# Patient Record
Sex: FEMALE | Race: WHITE | Employment: UNEMPLOYED | URBAN - METROPOLITAN AREA
[De-identification: names, ages, dates, MRNs, and addresses within clinical notes are randomized per-mention and may not be internally consistent; named-entity substitution may affect disease eponyms.]

---

## 2020-09-21 ENCOUNTER — APPOINTMENT (OUTPATIENT)
Dept: GENERAL RADIOLOGY | Age: 65
DRG: 140 | End: 2020-09-21
Attending: EMERGENCY MEDICINE
Payer: MEDICAID

## 2020-09-21 ENCOUNTER — HOSPITAL ENCOUNTER (INPATIENT)
Age: 65
LOS: 2 days | Discharge: HOME HEALTH | DRG: 140 | End: 2020-09-24
Attending: EMERGENCY MEDICINE | Admitting: HOSPITALIST
Payer: MEDICAID

## 2020-09-21 DIAGNOSIS — J96.01 ACUTE RESPIRATORY FAILURE WITH HYPOXIA (HCC): Primary | ICD-10-CM

## 2020-09-21 LAB
ALBUMIN SERPL-MCNC: 3.2 G/DL (ref 3.5–5)
ALBUMIN/GLOB SERPL: 0.8 {RATIO} (ref 1.1–2.2)
ALP SERPL-CCNC: 91 U/L (ref 45–117)
ALT SERPL-CCNC: 28 U/L (ref 12–78)
ANION GAP SERPL CALC-SCNC: 12 MMOL/L (ref 5–15)
AST SERPL W P-5'-P-CCNC: 37 U/L (ref 15–37)
BASOPHILS # BLD: 0 K/UL (ref 0–0.1)
BASOPHILS NFR BLD: 0 % (ref 0–1)
BILIRUB SERPL-MCNC: 0.4 MG/DL (ref 0.2–1)
BUN SERPL-MCNC: 6 MG/DL (ref 6–20)
BUN/CREAT SERPL: 11 (ref 12–20)
CA-I BLD-MCNC: 8.1 MG/DL (ref 8.5–10.1)
CHLORIDE SERPL-SCNC: 87 MMOL/L (ref 97–108)
CK SERPL-CCNC: 215 NG/ML (ref 26–192)
CO2 SERPL-SCNC: 25 MMOL/L (ref 21–32)
CREAT SERPL-MCNC: 0.57 MG/DL (ref 0.55–1.02)
DIFFERENTIAL METHOD BLD: ABNORMAL
EOSINOPHIL # BLD: 0 K/UL (ref 0–0.4)
EOSINOPHIL NFR BLD: 0 % (ref 0–7)
ERYTHROCYTE [DISTWIDTH] IN BLOOD BY AUTOMATED COUNT: 12.1 % (ref 11.5–14.5)
GLOBULIN SER CALC-MCNC: 3.8 G/DL (ref 2–4)
GLUCOSE SERPL-MCNC: 100 MG/DL (ref 65–100)
HCT VFR BLD AUTO: 41.1 % (ref 35–47)
HGB BLD-MCNC: 15 % (ref 11.5–16)
IMM GRANULOCYTES # BLD AUTO: 0 K/UL (ref 0–0.04)
IMM GRANULOCYTES NFR BLD AUTO: 1 % (ref 0–0.5)
LYMPHOCYTES # BLD: 0.4 K/UL (ref 0.8–3.5)
LYMPHOCYTES NFR BLD: 5 % (ref 12–49)
MAGNESIUM SERPL-MCNC: 1.4 MG/DL (ref 1.6–2.4)
MCH RBC QN AUTO: 38.1 PG (ref 26–34)
MCHC RBC AUTO-ENTMCNC: 36.5 G/DL (ref 30–36.5)
MCV RBC AUTO: 104.3 FL (ref 80–99)
MONOCYTES # BLD: 0.9 K/UL (ref 0–1)
MONOCYTES NFR BLD: 10 % (ref 5–13)
NEUTS SEG # BLD: 7 K/UL (ref 1.8–8)
NEUTS SEG NFR BLD: 84 % (ref 32–75)
PLATELET # BLD AUTO: 222 K/UL (ref 150–400)
PMV BLD AUTO: 9.8 FL (ref 8.9–12.9)
POTASSIUM SERPL-SCNC: 2.6 MMOL/L (ref 3.5–5.1)
PROT SERPL-MCNC: 7 G/DL (ref 6.4–8.2)
RBC # BLD AUTO: 3.94 M/UL (ref 3.8–5.2)
SODIUM SERPL-SCNC: 124 MMOL/L (ref 136–145)
TROPONIN I SERPL-MCNC: <0.05 NG/ML
WBC # BLD AUTO: 8.3 K/UL (ref 3.6–11)

## 2020-09-21 PROCEDURE — 80053 COMPREHEN METABOLIC PANEL: CPT

## 2020-09-21 PROCEDURE — 83735 ASSAY OF MAGNESIUM: CPT

## 2020-09-21 PROCEDURE — 36415 COLL VENOUS BLD VENIPUNCTURE: CPT

## 2020-09-21 PROCEDURE — 85025 COMPLETE CBC W/AUTO DIFF WBC: CPT

## 2020-09-21 PROCEDURE — 84484 ASSAY OF TROPONIN QUANT: CPT

## 2020-09-21 PROCEDURE — 94640 AIRWAY INHALATION TREATMENT: CPT

## 2020-09-21 PROCEDURE — 94761 N-INVAS EAR/PLS OXIMETRY MLT: CPT

## 2020-09-21 PROCEDURE — 82550 ASSAY OF CK (CPK): CPT

## 2020-09-21 PROCEDURE — 93005 ELECTROCARDIOGRAM TRACING: CPT

## 2020-09-21 PROCEDURE — 82553 CREATINE MB FRACTION: CPT

## 2020-09-21 PROCEDURE — 87635 SARS-COV-2 COVID-19 AMP PRB: CPT

## 2020-09-21 PROCEDURE — 74011250637 HC RX REV CODE- 250/637: Performed by: EMERGENCY MEDICINE

## 2020-09-21 PROCEDURE — 74011250636 HC RX REV CODE- 250/636: Performed by: EMERGENCY MEDICINE

## 2020-09-21 PROCEDURE — 99285 EMERGENCY DEPT VISIT HI MDM: CPT

## 2020-09-21 PROCEDURE — 74011250637 HC RX REV CODE- 250/637

## 2020-09-21 PROCEDURE — 71045 X-RAY EXAM CHEST 1 VIEW: CPT

## 2020-09-21 RX ORDER — AMLODIPINE BESYLATE 10 MG/1
5 TABLET ORAL DAILY
COMMUNITY
End: 2020-09-22 | Stop reason: DRUGHIGH

## 2020-09-21 RX ORDER — PREDNISONE 20 MG/1
40 TABLET ORAL ONCE
Status: DISCONTINUED | OUTPATIENT
Start: 2020-09-21 | End: 2020-09-21 | Stop reason: SINTOL

## 2020-09-21 RX ORDER — NEBIVOLOL 10 MG/1
10 TABLET ORAL DAILY
COMMUNITY

## 2020-09-21 RX ORDER — MAGNESIUM SULFATE 1 G/100ML
1 INJECTION INTRAVENOUS ONCE
Status: COMPLETED | OUTPATIENT
Start: 2020-09-21 | End: 2020-09-21

## 2020-09-21 RX ORDER — POTASSIUM CHLORIDE 7.45 MG/ML
10 INJECTION INTRAVENOUS ONCE
Status: COMPLETED | OUTPATIENT
Start: 2020-09-21 | End: 2020-09-21

## 2020-09-21 RX ORDER — ALBUTEROL SULFATE 90 UG/1
AEROSOL, METERED RESPIRATORY (INHALATION)
Status: COMPLETED
Start: 2020-09-21 | End: 2020-09-21

## 2020-09-21 RX ORDER — POTASSIUM CHLORIDE 750 MG/1
40 TABLET, FILM COATED, EXTENDED RELEASE ORAL
Status: COMPLETED | OUTPATIENT
Start: 2020-09-21 | End: 2020-09-21

## 2020-09-21 RX ORDER — ALBUTEROL SULFATE 90 UG/1
4 AEROSOL, METERED RESPIRATORY (INHALATION) ONCE
Status: COMPLETED | OUTPATIENT
Start: 2020-09-21 | End: 2020-09-21

## 2020-09-21 RX ADMIN — MAGNESIUM SULFATE HEPTAHYDRATE 1 G: 1 INJECTION, SOLUTION INTRAVENOUS at 20:50

## 2020-09-21 RX ADMIN — POTASSIUM CHLORIDE 40 MEQ: 750 TABLET, FILM COATED, EXTENDED RELEASE ORAL at 18:46

## 2020-09-21 RX ADMIN — POTASSIUM CHLORIDE 10 MEQ: 7.46 INJECTION, SOLUTION INTRAVENOUS at 18:48

## 2020-09-21 RX ADMIN — ALBUTEROL SULFATE 4 PUFF: 108 AEROSOL, METERED RESPIRATORY (INHALATION) at 21:42

## 2020-09-21 RX ADMIN — ALBUTEROL SULFATE 2 PUFF: 108 AEROSOL, METERED RESPIRATORY (INHALATION) at 22:17

## 2020-09-21 NOTE — ED PROVIDER NOTES
EMERGENCY DEPARTMENT HISTORY AND PHYSICAL EXAM        Date: 9/21/2020  Patient Name: Uche Salazar    History of Presenting Illness     Chief Complaint   Patient presents with    Shortness of Breath    Wheezing    Cough       History Provided By: Patient    HPI: Rolo Blackman, 76 y.o. female with history of smoking and HTN presents to the ED stating that she has been feeling ill for 1 week. Pt states that she has been feeling fatigued with a sore throat, abdominal discomfort, decreased appetite, and cough; these symptoms have gradually worsened through out the week. Pt notes that she does not feel feverish, but has not taken her temperature at home; she is 97.9F in the ED. Pt also reports feeling short of breath due to wearing a mask and her history of smoking. She normally does not wear oxygen at home.     PCP: None    Current Facility-Administered Medications   Medication Dose Route Frequency Provider Last Rate Last Dose    multivitamin (ONE A DAY) tablet 1 Tab  1 Tab Oral DAILY Jessica Martin MD   1 Tab at 09/23/20 0921    amLODIPine (NORVASC) tablet 5 mg  5 mg Oral DAILY Jessica Martin MD   5 mg at 09/23/20 7426    nebivoloL (BYSTOLIC) tablet 10 mg (Patient Supplied)  10 mg Oral DAILY Jessica Martin MD   10 mg at 09/23/20 3128    sodium chloride (NS) flush 5-40 mL  5-40 mL IntraVENous Q8H Jessica Martin MD   Stopped at 09/23/20 0600    sodium chloride (NS) flush 5-40 mL  5-40 mL IntraVENous PRN Jessica Martin MD        budesonide-formoteroL (SYMBICORT) 160-4.5 mcg/actuation HFA inhaler 2 Puff  2 Puff Inhalation BID RT Jessica Martin MD   Stopped at 09/22/20 0800    ondansetron (ZOFRAN ODT) tablet 4 mg  4 mg Oral Q4H PRN Jessica Martin MD        nicotine (NICODERM CQ) 14 mg/24 hr patch 1 Patch  1 Patch TransDERmal Q24H Jessica Martin MD        enoxaparin (LOVENOX) injection 30 mg  30 mg SubCUTAneous Q24H Jessica Martin MD 30 mg at 09/23/20 0631       Past History     Past Medical History:  Past Medical History:   Diagnosis Date    HTN (hypertension)     Smoker        Past Surgical History:  Past Surgical History:   Procedure Laterality Date    HX SHOULDER REPLACEMENT Right 11/06/2011    Has fracture surgical neck, has ORIF DONE       Family History:  Family History   Problem Relation Age of Onset    No Known Problems Mother     No Known Problems Father        Social History:  Social History     Tobacco Use    Smoking status: Current Every Day Smoker     Packs/day: 0.50     Years: 46.00     Pack years: 23.00    Smokeless tobacco: Never Used    Tobacco comment: will set up later   Substance Use Topics    Alcohol use: Yes     Alcohol/week: 24.0 standard drinks     Types: 24 Cans of beer per week     Comment: weekly \"couple beers\"    Drug use: Never       Allergies: Allergies   Allergen Reactions    Sulfa (Sulfonamide Antibiotics) Swelling    Pcn [Penicillins] Rash    Prednisone Other (comments)     Patient states \"it makes me bounce off of the walls\"; declines taking     Darvocet A500 [Propoxyphene N-Acetaminophen] Rash       Review of Systems   Review of Systems   Constitutional: Positive for appetite change and fatigue. HENT: Positive for sore throat. Negative for congestion. Eyes: Negative for visual disturbance. Respiratory: Positive for cough and shortness of breath. Cardiovascular: Negative for chest pain. Gastrointestinal: Positive for abdominal pain. Negative for vomiting. Genitourinary: Negative for hematuria. Musculoskeletal: Negative for myalgias. Skin: Negative for rash. Neurological: Negative for headaches. Physical Exam   Physical Exam  Constitutional:       General: She is not in acute distress. Appearance: Normal appearance. HENT:      Head: Normocephalic and atraumatic. Pulmonary:      Effort: Pulmonary effort is normal.      Breath sounds: Normal breath sounds. Comments: Productive cough  Abdominal:      General: There is no distension. Musculoskeletal:      Right lower leg: No edema. Left lower leg: No edema. Skin:     Findings: No rash. Neurological:      Mental Status: She is alert. Psychiatric:         Mood and Affect: Mood normal.         Diagnostic Study Results     Labs -     Recent Results (from the past 12 hour(s))   METABOLIC PANEL, COMPREHENSIVE    Collection Time: 09/23/20  9:50 AM   Result Value Ref Range    Sodium 125 (L) 136 - 145 mmol/L    Potassium 3.6 3.5 - 5.1 mmol/L    Chloride 89 (L) 97 - 108 mmol/L    CO2 25 21 - 32 mmol/L    Anion gap 11 5 - 15 mmol/L    Glucose 110 (H) 65 - 100 mg/dL    BUN 14 6 - 20 mg/dL    Creatinine 0.81 0.55 - 1.02 mg/dL    BUN/Creatinine ratio 17 12 - 20      GFR est AA >60 >60 ml/min/1.73m2    GFR est non-AA >60 >60 ml/min/1.73m2    Calcium 9.2 8.5 - 10.1 mg/dL    Bilirubin, total 0.4 0.2 - 1.0 mg/dL    AST (SGOT) 45 (H) 15 - 37 U/L    ALT (SGPT) 36 12 - 78 U/L    Alk. phosphatase 87 45 - 117 U/L    Protein, total 7.7 6.4 - 8.2 g/dL    Albumin 3.5 3.5 - 5.0 g/dL    Globulin 4.2 (H) 2.0 - 4.0 g/dL    A-G Ratio 0.8 (L) 1.1 - 2.2     CBC WITH AUTOMATED DIFF    Collection Time: 09/23/20  9:50 AM   Result Value Ref Range    WBC 7.0 3.6 - 11.0 K/uL    RBC 3.68 (L) 3.80 - 5.20 M/uL    HGB 12.6 11.5 - 16.0 %    HCT 38.4 35.0 - 47.0 %    .3 (H) 80.0 - 99.0 FL    MCH 34.2 (H) 26.0 - 34.0 PG    MCHC 32.8 30.0 - 36.5 g/dL    RDW 12.5 11.5 - 14.5 %    PLATELET 849 563 - 963 K/uL    MPV 9.7 8.9 - 12.9 FL    NEUTROPHILS 90 (H) 32 - 75 %    LYMPHOCYTES 5 (L) 12 - 49 %    MONOCYTES 5 5 - 13 %    EOSINOPHILS 0 0 - 7 %    BASOPHILS 0 0 - 1 %    IMMATURE GRANULOCYTES 0 0.0 - 0.5 %    ABS. NEUTROPHILS 6.3 1.8 - 8.0 K/UL    ABS. LYMPHOCYTES 0.4 (L) 0.8 - 3.5 K/UL    ABS. MONOCYTES 0.4 0.0 - 1.0 K/UL    ABS. EOSINOPHILS 0.0 0.0 - 0.4 K/UL    ABS. BASOPHILS 0.0 0.0 - 0.1 K/UL    ABS. IMM.  GRANS. 0.0 0.00 - 0.04 K/UL    DF AUTOMATED         Radiologic Studies -   CT CHEST WO CONT   Final Result   IMPRESSION: Changes of COPD are suggested without superimposed acute   cardiopulmonary process. Mild areas of atelectasis suggested in the lingula and   inferior right upper lobe      XR CHEST PORT   Final Result   IMPRESSION: No acute cardiopulmonary finding. Age indeterminate deformity of bilateral posterior ribs. CT Results  (Last 48 hours)               09/22/20 0452  CT CHEST WO CONT Final result    Impression:  IMPRESSION: Changes of COPD are suggested without superimposed acute   cardiopulmonary process. Mild areas of atelectasis suggested in the lingula and   inferior right upper lobe       Narrative:  HISTORY:  COPD       TECHNIQUE: Noncontrast CT of the chest is performed    COMPARISON: None   LIMITATIONS: None       LUNG PARENCHYMA: Overall hyperexpanded appearance of the lungs and flattening of   the diaphragms. No convincing evidence of an acute airspace process. Atelectasis   or scar in the lingula and in the inferior right upper lobe along the minor   fissure   TRACHEA/BRONCHI: Normal   PULMONARY VESSELS: Normal.    PLEURA: Normal   MEDIASTINUM: Normal   HEART: Normal   AORTA/GREAT VESSELS: Normal.   ESOPHAGUS: Normal   AXILLAE: Normal   BONES/TISSUES: Negative for acute abnormality. UPPER ABDOMEN: Visualized upper abdomen unremarkable   OTHER: None               CXR Results  (Last 48 hours)               09/21/20 1620  XR CHEST PORT Final result    Impression:  IMPRESSION: No acute cardiopulmonary finding. Age indeterminate deformity of bilateral posterior ribs. Narrative:  EXAM: XR CHEST PORT       INDICATION: Shortness of breath       COMPARISON: None. FINDINGS: Age indeterminate deformity in bilateral posterior ribs, including   right 4th and 5th and left fifth ribs. Postop changes in the right shoulder   again noted. No focal airspace consolidation.  No pneumothorax or pleural effusion. Unremarkable cardiomediastinal contours. Medical Decision Making and ED Course     I reviewed the vital signs, available nursing notes, past medical history, past surgical history, family history and social history. Vital Signs - Reviewed the patient's vital signs. Patient Vitals for the past 12 hrs:   Temp Pulse Resp BP SpO2   09/23/20 1139 98.3 °F (36.8 °C) 65 20 124/73 98 %   09/23/20 0949     93 %   09/23/20 0754 98.3 °F (36.8 °C) 63 18 111/62 93 %       EKG interpretation: time: 1863 on 9/21/20. Normal sinus rhythm at 71bpm. Normal Axis, normal OK [138], normal QRS [62], normal QTc 384, ST & T waves are normal.      Records Reviewed: Nursing Notes, Old Medical Records and Ambulance Run Sheet    The patient presents with dyspnea, cough, aches and the differential diagnosis is copd, pneumonia, chf, covid 19, viral syndrome, anxiety    Medical Decision Making:   Workup shows potassium of 2.6. Given potassium repletion for this as well as magnesium. Patient short of breath with hypoxia. Will admit for covid rule out. Disposition     Admitted    Diagnosis     Clinical impression:   1. Acute respiratory failure with hypoxia ([address]    2. Hypokalemia     Attestation:  I, [Jose Francisco Merino] am scribing for, and in the presence of Dr. Orly Shepherd, Dr Loreto Sterling, have reviewed any and all documentation by a scribe and authenticated its accuracy. I have reviewed and agreed with any and all documentation and medical decision making by a nurse practitioner/physician assistant. Please note that this dictation was completed with Un[employer], the Free[employer] voice recognition software. Quite often unanticipated grammatical, syntax, homophones, and other interpretive errors are inadvertently transcribed by the computer software. Please disregard these errors. Please excuse any errors that have escaped final proofreading. Thank you.

## 2020-09-21 NOTE — ED TRIAGE NOTES
SOB, malaise x 2 days, no respiratory hx, expiratory wheeze per EMS, given duonebs x 2 PTA by EMS, o2 sats 92% PTA with ems, now on [facility] by EMS

## 2020-09-22 ENCOUNTER — APPOINTMENT (OUTPATIENT)
Dept: CT IMAGING | Age: 65
DRG: 140 | End: 2020-09-22
Attending: HOSPITALIST
Payer: MEDICAID

## 2020-09-22 PROBLEM — J96.91 RESPIRATORY FAILURE WITH HYPOXIA (HCC): Status: ACTIVE | Noted: 2020-09-22

## 2020-09-22 PROBLEM — E87.6 HYPOKALEMIA: Status: ACTIVE | Noted: 2020-09-22

## 2020-09-22 PROBLEM — J44.1 COPD EXACERBATION (HCC): Status: ACTIVE | Noted: 2020-09-22

## 2020-09-22 PROBLEM — J96.00 ACUTE RESPIRATORY FAILURE (HCC): Status: ACTIVE | Noted: 2020-09-22

## 2020-09-22 LAB
ALBUMIN SERPL-MCNC: 3 G/DL (ref 3.5–5)
ANION GAP SERPL CALC-SCNC: 9 MMOL/L (ref 5–15)
BUN SERPL-MCNC: 7 MG/DL (ref 6–20)
BUN/CREAT SERPL: 14 (ref 12–20)
CA-I BLD-MCNC: 8.9 MG/DL (ref 8.5–10.1)
CHLORIDE SERPL-SCNC: 89 MMOL/L (ref 97–108)
CO2 SERPL-SCNC: 24 MMOL/L (ref 21–32)
CREAT SERPL-MCNC: 0.5 MG/DL (ref 0.55–1.02)
GLUCOSE SERPL-MCNC: 87 MG/DL (ref 65–100)
PHOSPHATE SERPL-MCNC: 2.6 MG/DL (ref 2.6–4.7)
POTASSIUM SERPL-SCNC: 4.3 MMOL/L (ref 3.5–5.1)
SODIUM SERPL-SCNC: 122 MMOL/L (ref 136–145)
TSH SERPL DL<=0.05 MIU/L-ACNC: 1.21 UIU/ML (ref 0.36–3.74)

## 2020-09-22 PROCEDURE — 74011250637 HC RX REV CODE- 250/637: Performed by: HOSPITALIST

## 2020-09-22 PROCEDURE — 74011250636 HC RX REV CODE- 250/636: Performed by: HOSPITALIST

## 2020-09-22 PROCEDURE — 84443 ASSAY THYROID STIM HORMONE: CPT

## 2020-09-22 PROCEDURE — 65270000029 HC RM PRIVATE

## 2020-09-22 PROCEDURE — 71250 CT THORAX DX C-: CPT

## 2020-09-22 PROCEDURE — 80069 RENAL FUNCTION PANEL: CPT

## 2020-09-22 PROCEDURE — 82306 VITAMIN D 25 HYDROXY: CPT

## 2020-09-22 RX ORDER — ENOXAPARIN SODIUM 100 MG/ML
30 INJECTION SUBCUTANEOUS EVERY 24 HOURS
Status: DISCONTINUED | OUTPATIENT
Start: 2020-09-22 | End: 2020-09-24 | Stop reason: HOSPADM

## 2020-09-22 RX ORDER — AMLODIPINE BESYLATE 5 MG/1
5 TABLET ORAL DAILY
COMMUNITY
Start: 2020-07-15

## 2020-09-22 RX ORDER — NEBIVOLOL 10 MG/1
10 TABLET ORAL DAILY
Status: DISCONTINUED | OUTPATIENT
Start: 2020-09-22 | End: 2020-09-24 | Stop reason: HOSPADM

## 2020-09-22 RX ORDER — BUDESONIDE AND FORMOTEROL FUMARATE DIHYDRATE 160; 4.5 UG/1; UG/1
2 AEROSOL RESPIRATORY (INHALATION)
Status: DISCONTINUED | OUTPATIENT
Start: 2020-09-22 | End: 2020-09-24 | Stop reason: HOSPADM

## 2020-09-22 RX ORDER — AMLODIPINE BESYLATE 5 MG/1
5 TABLET ORAL DAILY
Status: DISCONTINUED | OUTPATIENT
Start: 2020-09-22 | End: 2020-09-24 | Stop reason: HOSPADM

## 2020-09-22 RX ORDER — NEBIVOLOL 10 MG/1
10 TABLET ORAL DAILY
Status: DISCONTINUED | OUTPATIENT
Start: 2020-09-23 | End: 2020-09-22

## 2020-09-22 RX ORDER — ONDANSETRON 4 MG/1
4 TABLET, ORALLY DISINTEGRATING ORAL
Status: DISCONTINUED | OUTPATIENT
Start: 2020-09-22 | End: 2020-09-24 | Stop reason: HOSPADM

## 2020-09-22 RX ORDER — SODIUM CHLORIDE 0.9 % (FLUSH) 0.9 %
5-40 SYRINGE (ML) INJECTION AS NEEDED
Status: DISCONTINUED | OUTPATIENT
Start: 2020-09-22 | End: 2020-09-24 | Stop reason: HOSPADM

## 2020-09-22 RX ORDER — POTASSIUM CHLORIDE 750 MG/1
40 TABLET, FILM COATED, EXTENDED RELEASE ORAL
Status: COMPLETED | OUTPATIENT
Start: 2020-09-22 | End: 2020-09-22

## 2020-09-22 RX ORDER — IBUPROFEN 200 MG
1 TABLET ORAL EVERY 24 HOURS
Status: DISCONTINUED | OUTPATIENT
Start: 2020-09-22 | End: 2020-09-24 | Stop reason: HOSPADM

## 2020-09-22 RX ORDER — SODIUM CHLORIDE 0.9 % (FLUSH) 0.9 %
5-40 SYRINGE (ML) INJECTION EVERY 8 HOURS
Status: DISCONTINUED | OUTPATIENT
Start: 2020-09-22 | End: 2020-09-24 | Stop reason: HOSPADM

## 2020-09-22 RX ORDER — BISMUTH SUBSALICYLATE 262 MG
1 TABLET,CHEWABLE ORAL DAILY
Status: DISCONTINUED | OUTPATIENT
Start: 2020-09-22 | End: 2020-09-24 | Stop reason: HOSPADM

## 2020-09-22 RX ADMIN — Medication 10 ML: at 23:05

## 2020-09-22 RX ADMIN — METHYLPREDNISOLONE SODIUM SUCCINATE 40 MG: 125 INJECTION, POWDER, FOR SOLUTION INTRAMUSCULAR; INTRAVENOUS at 06:00

## 2020-09-22 RX ADMIN — Medication 10 ML: at 15:39

## 2020-09-22 RX ADMIN — ENOXAPARIN SODIUM 30 MG: 30 INJECTION SUBCUTANEOUS at 05:56

## 2020-09-22 RX ADMIN — POTASSIUM CHLORIDE 40 MEQ: 750 TABLET, FILM COATED, EXTENDED RELEASE ORAL at 05:57

## 2020-09-22 RX ADMIN — Medication 10 ML: at 06:00

## 2020-09-22 RX ADMIN — METHYLPREDNISOLONE SODIUM SUCCINATE 40 MG: 125 INJECTION, POWDER, FOR SOLUTION INTRAMUSCULAR; INTRAVENOUS at 15:36

## 2020-09-22 RX ADMIN — METHYLPREDNISOLONE SODIUM SUCCINATE 40 MG: 125 INJECTION, POWDER, FOR SOLUTION INTRAMUSCULAR; INTRAVENOUS at 23:03

## 2020-09-22 RX ADMIN — AMLODIPINE BESYLATE 5 MG: 5 TABLET ORAL at 09:09

## 2020-09-22 NOTE — ED NOTES
Observed patient on room air vs NC O2. Patient's SpO2 was 92% at rest, then decreased to 85-86% after an episode of coughing with no SpO2 increase after resting. Will notify Dr. Tuyet George and place patient on NC O2.

## 2020-09-22 NOTE — ROUTINE PROCESS
TRANSFER - OUT REPORT: 
 
Verbal report given to Harika(name) on Ryan Govea  being transferred to [facility] room 420(unit) for routine progression of care Report consisted of patients Situation, Background, Assessment and  
Recommendations(SBAR). Information from the following report(s) ED Summary was reviewed with the receiving nurse. Lines:  
Peripheral IV 09/21/20 Right Wrist (Active) Opportunity for questions and clarification was provided. Patient transported with: 
 O2 @ 3 liters

## 2020-09-22 NOTE — PROGRESS NOTES
Pt has bilateral upper/lower extremitie bruises of variable color. Brown discoloration on lower back. Bilateral lower buttocks with red area.

## 2020-09-22 NOTE — PROGRESS NOTES
Problem: Falls - Risk of  Goal: *Absence of Falls  Description: Document Lev Gr Fall Risk and appropriate interventions in the flowsheet.   Outcome: Progressing Towards Goal  Note: Fall Risk Interventions:            Medication Interventions: Bed/chair exit alarm, Teach patient to arise slowly                   Problem: Patient Education: Go to Patient Education Activity  Goal: Patient/Family Education  Outcome: Progressing Towards Goal

## 2020-09-22 NOTE — H&P
History and Physical            History and Physical    Subjective:   Chief Complaint : Severe shortness of breath, cough  Source of information : Patient herself, ED provider. No old medical records available. History of present illness:   76 y.o. female history of hypertension presents chronic smoker presents to the emergency room complaining of not feeling good for more than 1 week. States she started not feeling good for 3 weeks with shortness of breath and cough, it started getting worse for last 1 week. Intermittently she might have noticed some fever, feeling tired all the time with no energy to do anything. Cough is with white-colored sputum. Denies any exacerbating relieving factors. She denies any palpitations. Associated complaints of feeling drenching sweats and fatigue. She found with hypoxia, started on oxygen nasal cannula with improvement. Patient admits to feeling better now. She wanted to go home, but when tried to take off the oxygen she became hypoxic saturation dropping to 80s. Never been diagnosed with COPD, admits recurrent episodes of chest cold and cough episodes. Now she has a cough with no sputum production. She continues to smoke. Past Medical History:   Diagnosis Date    HTN (hypertension)     Smoker      Past Surgical History:   Procedure Laterality Date    HX SHOULDER REPLACEMENT Right 11/06/2011    Has fracture surgical neck, has ORIF DONE     Family History   Problem Relation Age of Onset    No Known Problems Mother     No Known Problems Father       Social History     Tobacco Use    Smoking status: Current Every Day Smoker     Packs/day: 0.50     Years: 46.00     Pack years: 23.00    Smokeless tobacco: Never Used    Tobacco comment: will set up later   Substance Use Topics    Alcohol use: Yes     Comment: weekly \"couple beers\"       Prior to Admission medications    Medication Sig Start Date End Date Taking?  Authorizing Provider   amLODIPine (NORVASC) 5 mg tablet Take 5 mg by mouth daily. 7/15/20  Yes Provider, Historical   nebivoloL (Bystolic) 10 mg tablet Take 10 mg by mouth daily. Indications: high blood pressure   Yes Other, MD David   multivitamin, tx-iron-ca-min (THERA-M w/ IRON) 9 mg iron-400 mcg tab tablet Take 1 Tab by mouth daily. Indications: treatment to prevent mineral deficiency, treatment to prevent vitamin deficiency   Yes Other, MD David     Allergies   Allergen Reactions    Sulfa (Sulfonamide Antibiotics) Swelling    Pcn [Penicillins] Rash    Prednisone Other (comments)     Patient states \"it makes me bounce off of the walls\"; declines taking     Darvocet A500 [Propoxyphene N-Acetaminophen] Rash             Review of Systems:  Constitutional: Appetite is not good, + weight loss, ++ fever, ++ chills, no night sweats  Eye: No recent visual disturbances, no discharge, no double vision  Ear/nose/mouth/throat : No hearing disturbance, no ear pain, ++ nasal congestion, ++ sneezing. No sore throat, no trouble swallowing. Respiratory : +++ trouble breathing, ++++ cough, +++ shortness of breath, no hemoptysis, +++ wheezing  Cardiovascular : No chest pain, no palpitation, no racing of heart, no orthopnea, no paroxysmal nocturnal dyspnea, no peripheral edema  Gastrointestinal : No nausea, no vomiting, no diarrhea, ++constipation, No heartburn, abdominal pain  Genitourinary : No dysuria, no hematuria, no increased frequency, incontinence,  Lymphatics : No swollen glands -Neck, axillary, inguinal  Endocrine : No excessive thirst, no polyuria no cold intolerance, no heat intolerance.   Immunologic : No hives, urticaria, no seasonal allergies,   Musculoskeletal : No joint swelling, pain, effusion,  no back pain, no neck pain,   Integumentary : No rash, no pruritus, no ecchymosis  Hematology : No petechiae, No easy bruising,  No tendency to bleed easy  Neurology : Denies change in mental status, no abnormal balance, no headache, no confusion, numbness, tingling,  Psychiatric : No mood swings, no anxiety, depression    Vitals:     Visit Vitals  /86   Pulse 80   Temp 97.9 °F (36.6 °C)   Resp 27   Ht 5' 3\" (1.6 m)   Wt 52.2 kg (115 lb)   SpO2 97%   BMI 20.37 kg/m²       Physical Exam:   General : Very thin built, chronically ill looking,, looks uncomfortable, with mild respiratory distress  HEENT : PERRLA, normal oral mucosa, atraumatic normocephalic, Normal ear and nose. Neck : Supple, no JVD, no masses noted, no carotid bruit  Lungs : Breath sounds with poor air entry bilaterally, diffuse expiratory wheezes and rhonchi and coarse breath sounds. ,  CVS : Distant heart sounds difficult to hear, monitor is showing sinus bradycardia heart rate around 50. Abdomen : Soft, nontender, no organomegaly, bowel sounds active  Extremities : No edema noted,  pedal pulses palpable  Musculoskeletal : Poor muscle mass, generalized mild wasting. Fair range of motion, no joint swelling or effusion, muscle tone and power appears fair. Skin : Dry,, warm, skin turgor, no pathological rash  Lymphatic : No cervical lymphadenopathy. Neurological : Awake, alert, oriented to time place person. No neurological deficits   Psychiatric : Mood and affect appears appropriate to the situation. Data Review:   Recent Results (from the past 24 hour(s))   CBC WITH AUTOMATED DIFF    Collection Time: 09/21/20  4:15 PM   Result Value Ref Range    WBC 8.3 3.6 - 11.0 K/uL    RBC 3.94 3.80 - 5.20 M/uL    HGB 15.0 11.5 - 16.0 %    HCT 41.1 35.0 - 47.0 %    .3 (H) 80.0 - 99.0 FL    MCH 38.1 (H) 26.0 - 34.0 PG    MCHC 36.5 30.0 - 36.5 g/dL    RDW 12.1 11.5 - 14.5 %    PLATELET 339 024 - 715 K/uL    MPV 9.8 8.9 - 12.9 FL    NEUTROPHILS 84 (H) 32 - 75 %    LYMPHOCYTES 5 (L) 12 - 49 %    MONOCYTES 10 5 - 13 %    EOSINOPHILS 0 0 - 7 %    BASOPHILS 0 0 - 1 %    IMMATURE GRANULOCYTES 1 (H) 0.0 - 0.5 %    ABS. NEUTROPHILS 7.0 1.8 - 8.0 K/UL    ABS.  LYMPHOCYTES 0.4 (L) 0.8 - 3.5 K/UL ABS. MONOCYTES 0.9 0.0 - 1.0 K/UL    ABS. EOSINOPHILS 0.0 0.0 - 0.4 K/UL    ABS. BASOPHILS 0.0 0.0 - 0.1 K/UL    ABS. IMM. GRANS. 0.0 0.00 - 0.04 K/UL    DF AUTOMATED     METABOLIC PANEL, COMPREHENSIVE    Collection Time: 09/21/20  4:15 PM   Result Value Ref Range    Sodium 124 (L) 136 - 145 mmol/L    Potassium 2.6 (LL) 3.5 - 5.1 mmol/L    Chloride 87 (L) 97 - 108 mmol/L    CO2 25 21 - 32 mmol/L    Anion gap 12 5 - 15 mmol/L    Glucose 100 65 - 100 mg/dL    BUN 6 6 - 20 mg/dL    Creatinine 0.57 0.55 - 1.02 mg/dL    BUN/Creatinine ratio 11 (L) 12 - 20      GFR est AA >60 >60 ml/min/1.73m2    GFR est non-AA >60 >60 ml/min/1.73m2    Calcium 8.1 (L) 8.5 - 10.1 mg/dL    Bilirubin, total 0.4 0.2 - 1.0 mg/dL    AST (SGOT) 37 15 - 37 U/L    ALT (SGPT) 28 12 - 78 U/L    Alk. phosphatase 91 45 - 117 U/L    Protein, total 7.0 6.4 - 8.2 g/dL    Albumin 3.2 (L) 3.5 - 5.0 g/dL    Globulin 3.8 2.0 - 4.0 g/dL    A-G Ratio 0.8 (L) 1.1 - 2.2     CK W/ REFLX CKMB    Collection Time: 09/21/20  4:15 PM   Result Value Ref Range    .0 (H) 26 - 192 ng/mL   TROPONIN I    Collection Time: 09/21/20  4:15 PM   Result Value Ref Range    Troponin-I, Qt. <0.05 <0.05 ng/mL   MAGNESIUM    Collection Time: 09/21/20  4:26 PM   Result Value Ref Range    Magnesium 1.4 (L) 1.6 - 2.4 mg/dL             Assessment and Plan :     Acute exacerbation of chronic obstructive bronchitis and chronic obstructive pulmonary disease: Patient was never diagnosed, and never followed with any primary care or pulmonary. She is doing little better since the in the ED. I will start her on IV steroids, nebulizer treatment with DuoNeb. Will add steroid inhaler so that she continue to use it. Consultation placed to pulmonary    Acute hypoxic respiratory failure: We will get an ABG for evaluation, she is doing fairly good on oxygen nasal cannula which we will continue. She may need to set up for home oxygen.     Hypokalemia: Etiology unclear, she is not on any diuretics. Likely from dehydration, she received 40 mEq in the emergency room. I will give another 40 mEq and monitor    Hyponatremia: Likely from volume contraction. But this patient with chronic smoker and COPD need close monitoring of this to make sure it is not an SIADH syndrome from malignancy. I ordered a CT of the chest due to her smoking history for screening. Benign essential hypertension: We will continue home medications    Nicotine addiction: I try to talk to her, but not ready to discuss about it. Ordered nicotine patch. Admitted to medical floor, she is full CODE STATUS, has no advanced medical directives. Home medications reviewed and documented. Signed By: Melanie Briggs MD     September 22, 2020          This dictation was done by dragon, computer voice recognition software. Often unanticipated grammatical, syntax, [place] phones and other interpretive errors are inadvertently transcribed. Please excuse errors that have escaped final proofreading.

## 2020-09-22 NOTE — CONSULTS
PULMONARY CONSULT  [facility]    Name: Becca Hernandez MRN: [id number]   : 1955 Hospital: [facility]   Date: 2020  Admission date: 2020 Hospital Day: 2       HPI:     Hospital Problems  Date Reviewed: 2020          Codes Class Noted POA    COPD exacerbation ([address] ICD-10-CM: J44.1  ICD-9-CM: 491.21  2020 Yes        Respiratory failure with hypoxia [facility]) ICD-10-CM: J96.91  ICD-9-CM: 518.81  2020 Yes        Hypokalemia ICD-10-CM: E87.6  ICD-9-CM: 276.8  2020 Yes        Acute respiratory failure [facility]) ICD-10-CM: J96.00  ICD-9-CM: 518.81  2020 Unknown                   [x] High complexity decision making was performed  [x] See my orders for details      Subjective/Initial History:     I was asked by Brian Gibson MD to see Becca Hernandez  a 76 y.o.    female in consultation     Excerpts from admission 2020 or consult notes as follows:   21-year-old lady came in because of shortness of breath dyspnea cough she was hypoxic she was complaining of generalized weakness for the past 1 week also having cough with white-yellowish colored sputum she was diaphoretic short of breath wheezing cough she is not using any oxygen at home she was found hypoxic she was put on oxygen 3 to 4 L nasal cannula saturation was in the 80s now admitted and pulmonary consult was called for further evaluation     Allergies   Allergen Reactions    Sulfa (Sulfonamide Antibiotics) Swelling    Pcn [Penicillins] Rash    Prednisone Other (comments)     Patient states \"it makes me bounce off of the walls\"; declines taking     Darvocet A500 [Propoxyphene N-Acetaminophen] Rash        MAR reviewed and pertinent medications noted or modified as needed     Current Facility-Administered Medications   Medication    multivitamin (ONE A DAY) tablet 1 Tab    amLODIPine (NORVASC) tablet 5 mg    nebivoloL (BYSTOLIC) tablet 10 mg    sodium chloride (NS) flush 5-40 mL    sodium chloride (NS) flush 5-40 mL    budesonide-formoteroL (SYMBICORT) 160-4.5 mcg/actuation HFA inhaler 2 Puff    methylPREDNISolone (PF) (Solu-MEDROL) injection 40 mg    ondansetron (ZOFRAN ODT) tablet 4 mg    nicotine (NICODERM CQ) 14 mg/24 hr patch 1 Patch    enoxaparin (LOVENOX) injection 30 mg      Patient PCP: None  PMH:  has a past medical history of HTN (hypertension) and Smoker. PSH:   has a past surgical history that includes hx shoulder replacement (Right, 2011). FHX: family history includes No Known Problems in her father and mother. SHX:  reports that she has been smoking. She has a 23.00 pack-year smoking history. She has never used smokeless tobacco. She reports current alcohol use of about 24.0 standard drinks of alcohol per week. She reports that she does not use drugs. ROS:    Review of Systems   Constitutional: Positive for diaphoresis, malaise/fatigue and weight loss. HENT: Positive for congestion. Eyes: Negative. Respiratory: Positive for cough, sputum production, shortness of breath and wheezing. Cardiovascular: Positive for orthopnea. Gastrointestinal: Positive for heartburn. Genitourinary: Negative. Musculoskeletal: Negative. Neurological: Negative. Endo/Heme/Allergies: Negative. Psychiatric/Behavioral: Negative.          Objective:     Vital Signs: Telemetry:    normal sinus rhythm Intake/Output:   Visit Vitals  BP (!) 164/89 (BP 1 Location: Right arm, BP Patient Position: At rest)   Pulse 74   Temp 98.2 °F (36.8 °C)   Resp 20   Ht 5' 3\" (1.6 m)   Wt 52.2 kg (115 lb)   SpO2 98%   BMI 20.37 kg/m²       Temp (24hrs), Av.1 °F (36.7 °C), Min:97.9 °F (36.6 °C), Max:98.2 °F (36.8 °C)        O2 Device: Nasal cannula O2 Flow Rate (L/min): 3 l/min       Wt Readings from Last 4 Encounters:   20 52.2 kg (115 lb)          Intake/Output Summary (Last 24 hours) at 2020 1157  Last data filed at 2020 2227  Gross per 24 hour Intake 200 ml   Output    Net 200 ml       Last shift:      No intake/output data recorded. Last 3 shifts: 09/20 1901 - 09/22 0700  In: 200 [I.V.:200]  Out: -        Physical Exam:     Physical Exam   Constitutional: She is oriented to person, place, and time. She appears distressed. HENT:   Head: Normocephalic and atraumatic. Eyes: Pupils are equal, round, and reactive to light. Conjunctivae and EOM are normal.   Neck: Normal range of motion. Neck supple. Cardiovascular: Normal rate and regular rhythm. Pulmonary/Chest: She is in respiratory distress. She has wheezes. She has rales. Abdominal: Soft. Bowel sounds are normal.   Musculoskeletal: Normal range of motion. Neurological: She is oriented to person, place, and time. Skin: She is diaphoretic. Labs:    Recent Labs     09/21/20  1615   WBC 8.3   HGB 15.0        Recent Labs     09/22/20  0600 09/21/20  1626 09/21/20  1615   *  --  124*   K 4.3  --  2.6*   CL 89*  --  87*   CO2 24  --  25   GLU 87  --  100   BUN 7  --  6   CREA 0.50*  --  0.57   CA 8.9  --  8.1*   MG  --  1.4*  --    PHOS 2.6  --   --    ALB 3.0*  --  3.2*   ALT  --   --  28     No results for input(s): PH, PCO2, PO2, HCO3, FIO2 in the last 72 hours. Recent Labs     09/21/20  1615   TROIQ <0.05     No results found for: BNPP, BNP   No results found for: CULT  Lab Results   Component Value Date/Time    TSH 1.21 09/22/2020 06:00 AM       Imaging:    CXR Results  (Last 48 hours)               09/21/20 1620  XR CHEST PORT Final result    Impression:  IMPRESSION: No acute cardiopulmonary finding. Age indeterminate deformity of bilateral posterior ribs. Narrative:  EXAM: XR CHEST PORT       INDICATION: Shortness of breath       COMPARISON: None. FINDINGS: Age indeterminate deformity in bilateral posterior ribs, including   right 4th and 5th and left fifth ribs. Postop changes in the right shoulder   again noted. No focal airspace consolidation. No pneumothorax or pleural   effusion. Unremarkable cardiomediastinal contours. Results from [place] encounter on 09/21/20   XR CHEST PORT    Narrative EXAM: XR CHEST PORT    INDICATION: Shortness of breath    COMPARISON: None. FINDINGS: Age indeterminate deformity in bilateral posterior ribs, including  right 4th and 5th and left fifth ribs. Postop changes in the right shoulder  again noted. No focal airspace consolidation. No pneumothorax or pleural  effusion. Unremarkable cardiomediastinal contours. Impression IMPRESSION: No acute cardiopulmonary finding. Age indeterminate deformity of bilateral posterior ribs. Results from [place] encounter on 09/21/20   CT CHEST WO CONT    Narrative HISTORY:  COPD    TECHNIQUE: Noncontrast CT of the chest is performed   COMPARISON: None  LIMITATIONS: None    LUNG PARENCHYMA: Overall hyperexpanded appearance of the lungs and flattening of  the diaphragms. No convincing evidence of an acute airspace process. Atelectasis  or scar in the lingula and in the inferior right upper lobe along the minor  fissure  TRACHEA/BRONCHI: Normal  PULMONARY VESSELS: Normal.   PLEURA: Normal  MEDIASTINUM: Normal  HEART: Normal  AORTA/GREAT VESSELS: Normal.  ESOPHAGUS: Normal  AXILLAE: Normal  BONES/TISSUES: Negative for acute abnormality. UPPER ABDOMEN: Visualized upper abdomen unremarkable  OTHER: None      Impression IMPRESSION: Changes of COPD are suggested without superimposed acute  cardiopulmonary process. Mild areas of atelectasis suggested in the lingula and  inferior right upper lobe         IMPRESSION:   1. Acute hypoxic respiratory failure  2. Chronic Obstructive Pulmonary Disease with Severe Acute Exacerbation requiring inpatient hospitalization and management; has very poor airway clearance. Increased work of breathing  3. Atelectasis possible pneumonia right upper lobe lingular area  4. Additional workup outlined below  5.  Pt is requiring Drug therapy requiring intensive monitoring for toxicity  6. Pt is unstable, unpredictable needing inpatient monitoring; is acutely ill and at high risk of sudden decline and decompensation with severe consequenses and continued end organ dysfunction and failure  7. Prognosis guarded       RECOMMENDATIONS/PLAN:     1. Started patient on oxygen via nasal cannula  2. Possible she need oxygen when discharged home will evaluate;  3. Nebulizer treatment Symbicort IV Solu-Medrol  4. Agree with Empiric IV antibiotics pending culture results   5. Follow culture results  6. Supplemental O2 to keep sats > 93%  7. Aspiration precautions  8. Labs to follow electrolytes, renal function and and blood counts  9. Glucose monitoring and SSI  10. Bronchial hygiene with respiratory therapy techniques, bronchodilators  11. DVT, SUP prophylaxis  12. Smoking cessation counseling done  13. Pt needs IV fluids with additives and Drug therapy requiring intensive monitoring for toxicity  14. Prescription drug management with home med reconciliation reviewed       This care involved high complexity medical decision making: I personally:  · Reviewed the flowsheet and previous days notes  · Reviewed and summarized records or history from previous days note or discussions with staff, family  · High Risk Drug therapy requiring intensive monitoring for toxicity: eg steroids, pressors, antibiotics  · Reviewed and/or ordered Clinical lab tests  · Reviewed images and/or ordered Radiology tests  · Reviewed the patients ECG / Telemetry  · Reviewed and/or adjusted NiPPV settings  · Called and arranged for Radiologic procedures or interventions  · performed or ordered Diagnostic endoscopies with identified risk factors.   · discussed my assessment/management with : Nursing, Hospitalist and Family for coordination of care          Jesenia Garcia MD

## 2020-09-23 LAB
25(OH)D3 SERPL-MCNC: 20.5 NG/ML (ref 30–100)
ALBUMIN SERPL-MCNC: 3.5 G/DL (ref 3.5–5)
ALBUMIN/GLOB SERPL: 0.8 {RATIO} (ref 1.1–2.2)
ALP SERPL-CCNC: 87 U/L (ref 45–117)
ALT SERPL-CCNC: 36 U/L (ref 12–78)
ANION GAP SERPL CALC-SCNC: 11 MMOL/L (ref 5–15)
AST SERPL W P-5'-P-CCNC: 45 U/L (ref 15–37)
ATRIAL RATE: 71 BPM
BASOPHILS # BLD: 0 K/UL (ref 0–0.1)
BASOPHILS NFR BLD: 0 % (ref 0–1)
BILIRUB SERPL-MCNC: 0.4 MG/DL (ref 0.2–1)
BUN SERPL-MCNC: 14 MG/DL (ref 6–20)
BUN/CREAT SERPL: 17 (ref 12–20)
CA-I BLD-MCNC: 9.2 MG/DL (ref 8.5–10.1)
CALCULATED P AXIS, ECG09: 77 DEGREES
CALCULATED R AXIS, ECG10: 102 DEGREES
CALCULATED T AXIS, ECG11: 82 DEGREES
CHLORIDE SERPL-SCNC: 89 MMOL/L (ref 97–108)
CO2 SERPL-SCNC: 25 MMOL/L (ref 21–32)
CREAT SERPL-MCNC: 0.81 MG/DL (ref 0.55–1.02)
DIAGNOSIS, 93000: NORMAL
DIFFERENTIAL METHOD BLD: ABNORMAL
EOSINOPHIL # BLD: 0 K/UL (ref 0–0.4)
EOSINOPHIL NFR BLD: 0 % (ref 0–7)
ERYTHROCYTE [DISTWIDTH] IN BLOOD BY AUTOMATED COUNT: 12.5 % (ref 11.5–14.5)
GLOBULIN SER CALC-MCNC: 4.2 G/DL (ref 2–4)
GLUCOSE SERPL-MCNC: 110 MG/DL (ref 65–100)
HCT VFR BLD AUTO: 38.4 % (ref 35–47)
HGB BLD-MCNC: 12.6 % (ref 11.5–16)
IMM GRANULOCYTES # BLD AUTO: 0 K/UL (ref 0–0.04)
IMM GRANULOCYTES NFR BLD AUTO: 0 % (ref 0–0.5)
LYMPHOCYTES # BLD: 0.4 K/UL (ref 0.8–3.5)
LYMPHOCYTES NFR BLD: 5 % (ref 12–49)
MCH RBC QN AUTO: 34.2 PG (ref 26–34)
MCHC RBC AUTO-ENTMCNC: 32.8 G/DL (ref 30–36.5)
MCV RBC AUTO: 104.3 FL (ref 80–99)
MONOCYTES # BLD: 0.4 K/UL (ref 0–1)
MONOCYTES NFR BLD: 5 % (ref 5–13)
NEUTS SEG # BLD: 6.3 K/UL (ref 1.8–8)
NEUTS SEG NFR BLD: 90 % (ref 32–75)
P-R INTERVAL, ECG05: 138 MS
PLATELET # BLD AUTO: 280 K/UL (ref 150–400)
PMV BLD AUTO: 9.7 FL (ref 8.9–12.9)
POTASSIUM SERPL-SCNC: 3.6 MMOL/L (ref 3.5–5.1)
PROT SERPL-MCNC: 7.7 G/DL (ref 6.4–8.2)
Q-T INTERVAL, ECG07: 354 MS
QRS DURATION, ECG06: 62 MS
QTC CALCULATION (BEZET), ECG08: 384 MS
RBC # BLD AUTO: 3.68 M/UL (ref 3.8–5.2)
SODIUM SERPL-SCNC: 125 MMOL/L (ref 136–145)
VENTRICULAR RATE, ECG03: 71 BPM
WBC # BLD AUTO: 7 K/UL (ref 3.6–11)

## 2020-09-23 PROCEDURE — 80053 COMPREHEN METABOLIC PANEL: CPT

## 2020-09-23 PROCEDURE — 36415 COLL VENOUS BLD VENIPUNCTURE: CPT

## 2020-09-23 PROCEDURE — 65270000029 HC RM PRIVATE

## 2020-09-23 PROCEDURE — 74011250636 HC RX REV CODE- 250/636: Performed by: HOSPITALIST

## 2020-09-23 PROCEDURE — 74011250637 HC RX REV CODE- 250/637: Performed by: HOSPITALIST

## 2020-09-23 PROCEDURE — 74011250637 HC RX REV CODE- 250/637: Performed by: INTERNAL MEDICINE

## 2020-09-23 PROCEDURE — 85025 COMPLETE CBC W/AUTO DIFF WBC: CPT

## 2020-09-23 PROCEDURE — 94762 N-INVAS EAR/PLS OXIMTRY CONT: CPT

## 2020-09-23 RX ORDER — DOXYCYCLINE 100 MG/1
100 CAPSULE ORAL EVERY 12 HOURS
Status: DISCONTINUED | OUTPATIENT
Start: 2020-09-23 | End: 2020-09-24 | Stop reason: HOSPADM

## 2020-09-23 RX ADMIN — NEBIVOLOL 10 MG: 10 TABLET ORAL at 09:21

## 2020-09-23 RX ADMIN — ENOXAPARIN SODIUM 30 MG: 30 INJECTION SUBCUTANEOUS at 06:31

## 2020-09-23 RX ADMIN — DOXYCYCLINE HYCLATE 100 MG: 100 CAPSULE ORAL at 14:22

## 2020-09-23 RX ADMIN — MULTIVITAMIN TABLET 1 TABLET: TABLET at 09:21

## 2020-09-23 RX ADMIN — AMLODIPINE BESYLATE 5 MG: 5 TABLET ORAL at 09:21

## 2020-09-23 RX ADMIN — DOXYCYCLINE HYCLATE 100 MG: 100 CAPSULE ORAL at 21:16

## 2020-09-23 RX ADMIN — Medication 10 ML: at 14:25

## 2020-09-23 NOTE — CONSULTS
PULMONARY NOTE  [facility]    Name: Jenae Medina MRN: [id number]   : 1955 Hospital: [facility]   Date: 2020  Admission date: 2020 Hospital Day: 3       HPI:     Hospital Problems  Date Reviewed: 2020          Codes Class Noted POA    COPD exacerbation ([address] ICD-10-CM: J44.1  ICD-9-CM: 491.21  2020 Yes        Respiratory failure with hypoxia [facility]) ICD-10-CM: J96.91  ICD-9-CM: 518.81  2020 Yes        Hypokalemia ICD-10-CM: E87.6  ICD-9-CM: 276.8  2020 Yes        Acute respiratory failure [facility]) ICD-10-CM: J96.00  ICD-9-CM: 518.81  2020 Unknown                   [x] High complexity decision making was performed  [x] See my orders for details      Subjective/Initial History:     I was asked by Frida Alvarenga MD to see Jenae Medina  a 76 y.o.    female in consultation     Excerpts from admission 2020 or consult notes as follows:   58-year-old lady came in because of shortness of breath dyspnea cough she was hypoxic she was complaining of generalized weakness for the past 1 week also having cough with white-yellowish colored sputum she was diaphoretic short of breath wheezing cough she is not using any oxygen at home she was found hypoxic she was put on oxygen 3 to 4 L nasal cannula saturation was in the 80s now admitted and pulmonary consult was called for further evaluation     Allergies   Allergen Reactions    Sulfa (Sulfonamide Antibiotics) Swelling    Pcn [Penicillins] Rash    Prednisone Other (comments)     Patient states \"it makes me bounce off of the walls\"; declines taking     Darvocet A500 [Propoxyphene N-Acetaminophen] Rash        MAR reviewed and pertinent medications noted or modified as needed     Current Facility-Administered Medications   Medication    multivitamin (ONE A DAY) tablet 1 Tab    amLODIPine (NORVASC) tablet 5 mg    nebivoloL (BYSTOLIC) tablet 10 mg (Patient Supplied)    sodium chloride (NS) flush 5-40 mL    sodium chloride (NS) flush 5-40 mL    budesonide-formoteroL (SYMBICORT) 160-4.5 mcg/actuation HFA inhaler 2 Puff    ondansetron (ZOFRAN ODT) tablet 4 mg    nicotine (NICODERM CQ) 14 mg/24 hr patch 1 Patch    enoxaparin (LOVENOX) injection 30 mg      Patient PCP: None  PMH:  has a past medical history of HTN (hypertension) and Smoker. PSH:   has a past surgical history that includes hx shoulder replacement (Right, 2011). FHX: family history includes No Known Problems in her father and mother. SHX:  reports that she has been smoking. She has a 23.00 pack-year smoking history. She has never used smokeless tobacco. She reports current alcohol use of about 24.0 standard drinks of alcohol per week. She reports that she does not use drugs. ROS:    Review of Systems   Constitutional: Positive for diaphoresis, malaise/fatigue and weight loss. HENT: Positive for congestion. Eyes: Negative. Respiratory: Positive for cough, sputum production, shortness of breath and wheezing. Cardiovascular: Positive for orthopnea. Gastrointestinal: Positive for heartburn. Genitourinary: Negative. Musculoskeletal: Negative. Neurological: Negative. Endo/Heme/Allergies: Negative. Psychiatric/Behavioral: Negative. Objective:     Vital Signs: Telemetry:    normal sinus rhythm Intake/Output:   Visit Vitals  /62 (BP 1 Location: Right arm, BP Patient Position: Lying left side)   Pulse 63   Temp 98.3 °F (36.8 °C)   Resp 18   Ht 5' 3\" (1.6 m)   Wt 52.2 kg (115 lb)   SpO2 93%   BMI 20.37 kg/m²       Temp (24hrs), Av.1 °F (36.7 °C), Min:98 °F (36.7 °C), Max:98.3 °F (36.8 °C)        O2 Device: Room air O2 Flow Rate (L/min): 3 l/min       Wt Readings from Last 4 Encounters:   20 52.2 kg (115 lb)        No intake or output data in the 24 hours ending 20 1050    Last shift:      No intake/output data recorded.   Last 3 shifts: 1901 -  0700  In: 200 [I.V.:200]  Out: -        Physical Exam:     Physical Exam   Constitutional: She is oriented to person, place, and time. She appears distressed. HENT:   Head: Normocephalic and atraumatic. Eyes: Pupils are equal, round, and reactive to light. Conjunctivae and EOM are normal.   Neck: Normal range of motion. Neck supple. Cardiovascular: Normal rate and regular rhythm. Pulmonary/Chest: She is in respiratory distress. She has wheezes. She has rales. Abdominal: Soft. Bowel sounds are normal.   Musculoskeletal: Normal range of motion. Neurological: She is oriented to person, place, and time. Skin: She is diaphoretic. Labs:    Recent Labs     09/21/20  1615   WBC 8.3   HGB 15.0        Recent Labs     09/22/20  0600 09/21/20  1626 09/21/20  1615   *  --  124*   K 4.3  --  2.6*   CL 89*  --  87*   CO2 24  --  25   GLU 87  --  100   BUN 7  --  6   CREA 0.50*  --  0.57   CA 8.9  --  8.1*   MG  --  1.4*  --    PHOS 2.6  --   --    ALB 3.0*  --  3.2*   ALT  --   --  28     No results for input(s): PH, PCO2, PO2, HCO3, FIO2 in the last 72 hours. Recent Labs     09/21/20  1615   TROIQ <0.05     No results found for: BNPP, BNP   No results found for: CULT  Lab Results   Component Value Date/Time    TSH 1.21 09/22/2020 06:00 AM       Imaging:    CXR Results  (Last 48 hours)               09/21/20 1620  XR CHEST PORT Final result    Impression:  IMPRESSION: No acute cardiopulmonary finding. Age indeterminate deformity of bilateral posterior ribs. Narrative:  EXAM: XR CHEST PORT       INDICATION: Shortness of breath       COMPARISON: None. FINDINGS: Age indeterminate deformity in bilateral posterior ribs, including   right 4th and 5th and left fifth ribs. Postop changes in the right shoulder   again noted. No focal airspace consolidation. No pneumothorax or pleural   effusion. Unremarkable cardiomediastinal contours.                Results from [facility] encounter on 09/21/20   XR CHEST PORT    Narrative EXAM: XR CHEST PORT    INDICATION: Shortness of breath    COMPARISON: None. FINDINGS: Age indeterminate deformity in bilateral posterior ribs, including  right 4th and 5th and left fifth ribs. Postop changes in the right shoulder  again noted. No focal airspace consolidation. No pneumothorax or pleural  effusion. Unremarkable cardiomediastinal contours. Impression IMPRESSION: No acute cardiopulmonary finding. Age indeterminate deformity of bilateral posterior ribs. Results from [place] encounter on 09/21/20   CT CHEST WO CONT    Narrative HISTORY:  COPD    TECHNIQUE: Noncontrast CT of the chest is performed   COMPARISON: None  LIMITATIONS: None    LUNG PARENCHYMA: Overall hyperexpanded appearance of the lungs and flattening of  the diaphragms. No convincing evidence of an acute airspace process. Atelectasis  or scar in the lingula and in the inferior right upper lobe along the minor  fissure  TRACHEA/BRONCHI: Normal  PULMONARY VESSELS: Normal.   PLEURA: Normal  MEDIASTINUM: Normal  HEART: Normal  AORTA/GREAT VESSELS: Normal.  ESOPHAGUS: Normal  AXILLAE: Normal  BONES/TISSUES: Negative for acute abnormality. UPPER ABDOMEN: Visualized upper abdomen unremarkable  OTHER: None      Impression IMPRESSION: Changes of COPD are suggested without superimposed acute  cardiopulmonary process. Mild areas of atelectasis suggested in the lingula and  inferior right upper lobe         IMPRESSION:   1. Acute hypoxic respiratory failure  2. Chronic Obstructive Pulmonary Disease with Severe Acute Exacerbation requiring inpatient hospitalization and management; has very poor airway clearance. Increased work of breathing  3. Atelectasis possible pneumonia right upper lobe lingular area  4. Additional workup outlined below  5. Pt is requiring Drug therapy requiring intensive monitoring for toxicity  6.  Pt is unstable, unpredictable needing inpatient monitoring; is acutely ill and at high risk of sudden decline and decompensation with severe consequenses and continued end organ dysfunction and failure  7. Prognosis guarded       RECOMMENDATIONS/PLAN:     1. Patient is on room air still continues to have shortness of breath dyspnea and cough  2. Will repeat CXR  3. Nebulizer treatment Symbicort IV Solu-Medrol  4. Agree with Empiric IV antibiotics pending culture results   5. Follow culture results  6. Supplemental O2 to keep sats > 93%  7. Aspiration precautions  8. Labs to follow electrolytes, renal function and and blood counts  9. Glucose monitoring and SSI  10. Bronchial hygiene with respiratory therapy techniques, bronchodilators  11. DVT, SUP prophylaxis  12. Smoking cessation counseling done         This care involved high complexity medical decision making: I personally:  · Reviewed the flowsheet and previous days notes  · Reviewed and summarized records or history from previous days note or discussions with staff, family  · High Risk Drug therapy requiring intensive monitoring for toxicity: eg steroids, pressors, antibiotics  · Reviewed and/or ordered Clinical lab tests  · Reviewed images and/or ordered Radiology tests  · Reviewed the patients ECG / Telemetry  · Reviewed and/or adjusted NiPPV settings  · Called and arranged for Radiologic procedures or interventions  · performed or ordered Diagnostic endoscopies with identified risk factors.   · discussed my assessment/management with : Nursing, Hospitalist and Family for coordination of care          Tamra Acevedo MD

## 2020-09-23 NOTE — PROGRESS NOTES
Hospitalist Progress Note           Daily Progress Note: 2020    Chief complaint: Shortness of breath    Subjective: The patient is seen for follow  up. Notes shortness of breath with exertion. On room air.   COVID-19 result pending    Problem List:  Problem List as of 2020 Date Reviewed: 2020          Codes Class Noted - Resolved    COPD exacerbation ([address] ICD-10-CM: J44.1  ICD-9-CM: 491.21  2020 - Present        Respiratory failure with hypoxia [facility]) ICD-10-CM: J96.91  ICD-9-CM: 518.81  2020 - Present        Hypokalemia ICD-10-CM: E87.6  ICD-9-CM: 276.8  2020 - Present        Acute respiratory failure ([facility]) ICD-10-CM: J96.00  ICD-9-CM: 518.81  2020 - Present              Medications reviewed  Current Facility-Administered Medications   Medication Dose Route Frequency    multivitamin (ONE A DAY) tablet 1 Tab  1 Tab Oral DAILY    amLODIPine (NORVASC) tablet 5 mg  5 mg Oral DAILY    nebivoloL (BYSTOLIC) tablet 10 mg (Patient Supplied)  10 mg Oral DAILY    sodium chloride (NS) flush 5-40 mL  5-40 mL IntraVENous Q8H    sodium chloride (NS) flush 5-40 mL  5-40 mL IntraVENous PRN    budesonide-formoteroL (SYMBICORT) 160-4.5 mcg/actuation HFA inhaler 2 Puff  2 Puff Inhalation BID RT    ondansetron (ZOFRAN ODT) tablet 4 mg  4 mg Oral Q4H PRN    nicotine (NICODERM CQ) 14 mg/24 hr patch 1 Patch  1 Patch TransDERmal Q24H    enoxaparin (LOVENOX) injection 30 mg  30 mg SubCUTAneous Q24H       Review of Systems:   A comprehensive review of systems was negative except for: Respiratory: positive for dyspnea on exertion    Objective:   Physical Exam:     Visit Vitals  /73   Pulse 65   Temp 98.3 °F (36.8 °C)   Resp 20   Ht 5' 3\" (1.6 m)   Wt 52.2 kg (115 lb)   SpO2 98%   BMI 20.37 kg/m²    O2 Flow Rate (L/min): 3 l/min O2 Device: Room air    Temp (24hrs), Av.2 °F (36.8 °C), Min:98 °F (36.7 °C), Max:98.3 °F (36.8 °C)    No intake/output data recorded. 09/21 1901 - 09/23 0700  In: 200 [I.V.:200]  Out: -     General:  Alert, cooperative, no distress, appears stated age. Lungs:   Clear to auscultation bilaterally. Chest wall:  No tenderness or deformity. Heart:  Regular rate and rhythm, S1, S2 normal, no murmur, click, rub or gallop. Abdomen:   Soft, non-tender. Bowel sounds normal. No masses,  No organomegaly. Extremities: Extremities normal, atraumatic, no cyanosis or edema. Pulses: 2+ and symmetric all extremities. Skin: Skin color, texture, turgor normal. No rashes or lesions   Neurologic: CNII-XII intact. No gross sensory or motor deficits     Data Review:       Recent Days:  Recent Labs     09/23/20  0950 09/21/20  1615   WBC 7.0 8.3   HGB 12.6 15.0   HCT 38.4 41.1    222     Recent Labs     09/23/20  0950 09/22/20  0600 09/21/20  1626 09/21/20  1615   * 122*  --  124*   K 3.6 4.3  --  2.6*   CL 89* 89*  --  87*   CO2 25 24  --  25   * 87  --  100   BUN 14 7  --  6   CREA 0.81 0.50*  --  0.57   CA 9.2 8.9  --  8.1*   MG  --   --  1.4*  --    PHOS  --  2.6  --   --    ALB 3.5 3.0*  --  3.2*   TBILI 0.4  --   --  0.4   ALT 36  --   --  28     No results for input(s): PH, PCO2, PO2, HCO3, FIO2 in the last 72 hours. 24 Hour Results:  Recent Results (from the past 24 hour(s))   METABOLIC PANEL, COMPREHENSIVE    Collection Time: 09/23/20  9:50 AM   Result Value Ref Range    Sodium 125 (L) 136 - 145 mmol/L    Potassium 3.6 3.5 - 5.1 mmol/L    Chloride 89 (L) 97 - 108 mmol/L    CO2 25 21 - 32 mmol/L    Anion gap 11 5 - 15 mmol/L    Glucose 110 (H) 65 - 100 mg/dL    BUN 14 6 - 20 mg/dL    Creatinine 0.81 0.55 - 1.02 mg/dL    BUN/Creatinine ratio 17 12 - 20      GFR est AA >60 >60 ml/min/1.73m2    GFR est non-AA >60 >60 ml/min/1.73m2    Calcium 9.2 8.5 - 10.1 mg/dL    Bilirubin, total 0.4 0.2 - 1.0 mg/dL    AST (SGOT) 45 (H) 15 - 37 U/L    ALT (SGPT) 36 12 - 78 U/L    Alk.  phosphatase 87 45 - 117 U/L    Protein, total 7.7 6.4 - 8.2 g/dL    Albumin 3.5 3.5 - 5.0 g/dL    Globulin 4.2 (H) 2.0 - 4.0 g/dL    A-G Ratio 0.8 (L) 1.1 - 2.2     CBC WITH AUTOMATED DIFF    Collection Time: 09/23/20  9:50 AM   Result Value Ref Range    WBC 7.0 3.6 - 11.0 K/uL    RBC 3.68 (L) 3.80 - 5.20 M/uL    HGB 12.6 11.5 - 16.0 %    HCT 38.4 35.0 - 47.0 %    .3 (H) 80.0 - 99.0 FL    MCH 34.2 (H) 26.0 - 34.0 PG    MCHC 32.8 30.0 - 36.5 g/dL    RDW 12.5 11.5 - 14.5 %    PLATELET 239 351 - 889 K/uL    MPV 9.7 8.9 - 12.9 FL    NEUTROPHILS 90 (H) 32 - 75 %    LYMPHOCYTES 5 (L) 12 - 49 %    MONOCYTES 5 5 - 13 %    EOSINOPHILS 0 0 - 7 %    BASOPHILS 0 0 - 1 %    IMMATURE GRANULOCYTES 0 0.0 - 0.5 %    ABS. NEUTROPHILS 6.3 1.8 - 8.0 K/UL    ABS. LYMPHOCYTES 0.4 (L) 0.8 - 3.5 K/UL    ABS. MONOCYTES 0.4 0.0 - 1.0 K/UL    ABS. EOSINOPHILS 0.0 0.0 - 0.4 K/UL    ABS. BASOPHILS 0.0 0.0 - 0.1 K/UL    ABS. IMM. GRANS. 0.0 0.00 - 0.04 K/UL    DF AUTOMATED             Assessment/     Acute hypoxic respiratory failure  Acute COPD exacerbation  Chronic tobacco use       Plan:  Continue supportive care including DuoNeb treatment and oral antibiotics  Ambulatory pulse oximetry obtained  Anticipate discharge to home tomorrow      Care Plan discussed with: Patient/Family    Total time spent with patient: 30 minutes.     Jd Uribe MD

## 2020-09-24 VITALS
HEART RATE: 64 BPM | DIASTOLIC BLOOD PRESSURE: 63 MMHG | RESPIRATION RATE: 18 BRPM | WEIGHT: 115 LBS | BODY MASS INDEX: 20.38 KG/M2 | OXYGEN SATURATION: 93 % | TEMPERATURE: 98.1 F | SYSTOLIC BLOOD PRESSURE: 122 MMHG | HEIGHT: 63 IN

## 2020-09-24 LAB — SARS-COV-2, COV2NT: NOT DETECTED

## 2020-09-24 PROCEDURE — 94762 N-INVAS EAR/PLS OXIMTRY CONT: CPT

## 2020-09-24 PROCEDURE — 74011250637 HC RX REV CODE- 250/637: Performed by: INTERNAL MEDICINE

## 2020-09-24 PROCEDURE — 74011250636 HC RX REV CODE- 250/636: Performed by: HOSPITALIST

## 2020-09-24 PROCEDURE — 74011250637 HC RX REV CODE- 250/637: Performed by: HOSPITALIST

## 2020-09-24 RX ORDER — BUDESONIDE AND FORMOTEROL FUMARATE DIHYDRATE 160; 4.5 UG/1; UG/1
2 AEROSOL RESPIRATORY (INHALATION) 2 TIMES DAILY
Qty: 1 INHALER | Refills: 0 | Status: SHIPPED | OUTPATIENT
Start: 2020-09-24 | End: 2020-10-24

## 2020-09-24 RX ORDER — DOXYCYCLINE 100 MG/1
100 CAPSULE ORAL EVERY 12 HOURS
Qty: 10 CAP | Refills: 0 | Status: SHIPPED | OUTPATIENT
Start: 2020-09-24 | End: 2020-09-29

## 2020-09-24 RX ORDER — IBUPROFEN 200 MG
1 TABLET ORAL EVERY 24 HOURS
Qty: 30 PATCH | Refills: 0 | Status: SHIPPED | OUTPATIENT
Start: 2020-09-25 | End: 2020-10-25

## 2020-09-24 RX ADMIN — NEBIVOLOL 10 MG: 10 TABLET ORAL at 09:00

## 2020-09-24 RX ADMIN — MULTIVITAMIN TABLET 1 TABLET: TABLET at 09:19

## 2020-09-24 RX ADMIN — DOXYCYCLINE HYCLATE 100 MG: 100 CAPSULE ORAL at 09:19

## 2020-09-24 RX ADMIN — AMLODIPINE BESYLATE 5 MG: 5 TABLET ORAL at 09:19

## 2020-09-24 RX ADMIN — ENOXAPARIN SODIUM 30 MG: 30 INJECTION SUBCUTANEOUS at 08:47

## 2020-09-24 NOTE — DISCHARGE SUMMARY
Physician Discharge Summary     Patient ID:    Rachel Cortez  [id number]  76 y.o.  1955    Admit date: 9/21/2020    Discharge date : 9/24/2020    Chronic Diagnoses:    Problem List as of 9/24/2020 Date Reviewed: 9/22/2020          Codes Class Noted - Resolved    COPD exacerbation ([facility] ICD-10-CM: J44.1  ICD-9-CM: 491.21  9/22/2020 - Present        Respiratory failure with hypoxia ([facility] ICD-10-CM: J96.91  ICD-9-CM: 518.81  9/22/2020 - Present        Hypokalemia ICD-10-CM: E87.6  ICD-9-CM: 276.8  9/22/2020 - Present        Acute respiratory failure ([facility] ICD-10-CM: J96.00  ICD-9-CM: 518.81  9/22/2020 - Present          22    Final Diagnoses:   COPD exacerbation ([facility] [J44.1]  Acute respiratory failure ([facility] [J96.00]    Reason for Hospitalization: Shortness of breath        Hospital Course: 69-year-old lady with history of chronic tobacco use admitted for shortness of breath. Noted to be in acute COPD exacerbation with acute bronchitis. Treated with antibiotics but no steroids. She has allergies to IV and oral steroids. Receive treatments in the form of duo nebs with improvement in respiratory status. She has been on room air for over 24 hours without desaturation. Deemed stable for discharge to home with outpatient follow-up. Coronavirus test results pending. Advised self quarantine at home until results obtained                Discharge Medications:   Current Discharge Medication List      START taking these medications    Details   budesonide-formoteroL (SYMBICORT) 160-4.5 mcg/actuation HFAA Take 2 Puffs by inhalation two (2) times a day for 30 days. Qty: 1 Inhaler, Refills: 0      doxycycline (VIBRAMYCIN) 100 mg capsule Take 1 Cap by mouth every twelve (12) hours for 5 days. Qty: 10 Cap, Refills: 0      nicotine (NICODERM CQ) 14 mg/24 hr patch 1 Patch by TransDERmal route every twenty-four (24) hours for 30 days.   Qty: 30 Patch, Refills: 0         CONTINUE these medications which have NOT CHANGED    Details   amLODIPine (NORVASC) 5 mg tablet Take 5 mg by mouth daily. nebivoloL (Bystolic) 10 mg tablet Take 10 mg by mouth daily. Indications: high blood pressure      multivitamin, tx-iron-ca-min (THERA-M w/ IRON) 9 mg iron-400 mcg tab tablet Take 1 Tab by mouth daily. Indications: treatment to prevent mineral deficiency, treatment to prevent vitamin deficiency               Follow up Care:    1. None in 1-2 weeks. Please call to set up an appointment shortly after discharge. Diet:  Cardiac Diet    Disposition:  Home. Advanced Directive:   FULL x   DNR      Discharge Exam:  Lungs:   Clear to auscultation bilaterally. Chest wall:  No tenderness or deformity. Heart:  Regular rate and rhythm, S1, S2 normal, no murmur, click, rub or gallop. Abdomen:   Soft, non-tender. Bowel sounds normal. No masses,  No organomegaly. Extremities: Extremities normal, atraumatic, no cyanosis or edema. Pulses: 2+ and symmetric all extremities. Skin: Skin color, texture, turgor normal. No rashes or lesions   Neurologic: CNII-XII intact. No gross sensory or motor deficits         CONSULTATIONS: Pulmonary/Intensive care    Significant Diagnostic Studies:   9/21/2020: BUN 6 mg/dL (Ref range: 6 - 20 mg/dL); Calcium 8.1 mg/dL* (Ref range: 8.5 - 10.1 mg/dL); CO2 25 mmol/L (Ref range: 21 - 32 mmol/L); Creatinine 0.57 mg/dL (Ref range: 0.55 - 1.02 mg/dL); Glucose 100 mg/dL (Ref range: 65 - 100 mg/dL); HCT 41.1 % (Ref range: 35.0 - 47.0 %); HGB 15.0 % (Ref range: 11.5 - 16.0 %); Potassium 2.6 mmol/L* (Ref range: 3.5 - 5.1 mmol/L); Sodium 124 mmol/L* (Ref range: 136 - 145 mmol/L)  9/22/2020: BUN 7 mg/dL (Ref range: 6 - 20 mg/dL); Calcium 8.9 mg/dL (Ref range: 8.5 - 10.1 mg/dL); CO2 24 mmol/L (Ref range: 21 - 32 mmol/L); Creatinine 0.50 mg/dL* (Ref range: 0.55 - 1.02 mg/dL); Glucose 87 mg/dL (Ref range: 65 - 100 mg/dL); Potassium 4.3 mmol/L (Ref range: 3.5 - 5.1 mmol/L);  Sodium 122 mmol/L* (Ref range: 136 - 145 mmol/L)  Recent Labs     09/23/20  0950 09/21/20  1615   WBC 7.0 8.3   HGB 12.6 15.0   HCT 38.4 41.1    222     Recent Labs     09/23/20  0950 09/22/20  0600 09/21/20  1626 09/21/20  1615   * 122*  --  124*   K 3.6 4.3  --  2.6*   CL 89* 89*  --  87*   CO2 25 24  --  25   BUN 14 7  --  6   CREA 0.81 0.50*  --  0.57   * 87  --  100   CA 9.2 8.9  --  8.1*   MG  --   --  1.4*  --    PHOS  --  2.6  --   --      Recent Labs     09/23/20  0950 09/22/20  0600 09/21/20  1615   ALT 36  --  28   AP 87  --  91   TBILI 0.4  --  0.4   TP 7.7  --  7.0   ALB 3.5 3.0* 3.2*   GLOB 4.2*  --  3.8     No results for input(s): INR, PTP, APTT, INREXT in the last 72 hours. No results for input(s): FE, TIBC, PSAT, FERR in the last 72 hours. No results for input(s): PH, PCO2, PO2 in the last 72 hours. No results for input(s): CPK, CKMB in the last 72 hours.     No lab exists for component: TROPONINI  No results found for: GLUCPOC        Signed:  Domenico Rushing MD  9/24/2020  10:32 AM

## 2020-09-24 NOTE — PROGRESS NOTES
Discharge order written for home health. Pt discharged prior to CM being able to follow up to make home health arrangements. Cm attempted to contact pt [phone]. Cm was not able to get in touch with her.

## 2020-09-24 NOTE — PROGRESS NOTES
Pulmonary Progress Note    Subjective:   Daily Progress Note: 2020 10:34 AM    CC: Shortness of breath dyspnea cough    HPI:  56-year-old lady came in because of shortness of breath dyspnea cough she was hypoxic she was complaining of generalized weakness for the past 1 week also having cough with white-yellowish colored sputum she was diaphoretic short of breath wheezing cough she is not using any oxygen at home she was found hypoxic she was put on oxygen 3 to 4 L nasal cannula saturation was in the 80s now admitted and pulmonary consult was called for further evaluation     Review of Systems  Review of Systems   Constitutional: Positive for diaphoresis, malaise/fatigue and weight loss. HENT: Positive for congestion. Eyes: Negative. Respiratory: Positive for cough, sputum production, shortness of breath and wheezing. Cardiovascular: Positive for orthopnea. Gastrointestinal: Positive for heartburn. Genitourinary: Negative. Musculoskeletal: Negative. Neurological: Negative. Endo/Heme/Allergies: Negative. Psychiatric/Behavioral: Negative. Objective:     Visit Vitals  /64   Pulse 62   Temp 98 °F (36.7 °C)   Resp 18   Ht 5' 3\" (1.6 m)   Wt 52.2 kg (115 lb)   SpO2 93%   BMI 20.37 kg/m²    O2 Flow Rate (L/min): 3 l/min O2 Device: Room air    Temp (24hrs), Av.1 °F (36.7 °C), Min:98 °F (36.7 °C), Max:98.3 °F (36.8 °C)      No intake/output data recorded. No intake/output data recorded. Physical Exam   Constitutional: She is oriented to person, place, and time. She appears distressed. HENT:   Head: Normocephalic and atraumatic. Eyes: Pupils are equal, round, and reactive to light. Conjunctivae and EOM are normal.   Neck: Normal range of motion. Neck supple. Cardiovascular: Normal rate and regular rhythm. Pulmonary/Chest: She is in respiratory distress. She has wheezes. She has rales. Abdominal: Soft.  Bowel sounds are normal.   Musculoskeletal: Normal range of motion. Neurological: She is oriented to person, place, and time. Skin: She is diaphoretic. Data Review    No results found for this or any previous visit (from the past 24 hour(s)). Current Facility-Administered Medications   Medication Dose Route Frequency    doxycycline (VIBRAMYCIN) capsule 100 mg  100 mg Oral Q12H    multivitamin (ONE A DAY) tablet 1 Tab  1 Tab Oral DAILY    amLODIPine (NORVASC) tablet 5 mg  5 mg Oral DAILY    nebivoloL (BYSTOLIC) tablet 10 mg (Patient Supplied)  10 mg Oral DAILY    sodium chloride (NS) flush 5-40 mL  5-40 mL IntraVENous Q8H    sodium chloride (NS) flush 5-40 mL  5-40 mL IntraVENous PRN    budesonide-formoteroL (SYMBICORT) 160-4.5 mcg/actuation HFA inhaler 2 Puff  2 Puff Inhalation BID RT    ondansetron (ZOFRAN ODT) tablet 4 mg  4 mg Oral Q4H PRN    nicotine (NICODERM CQ) 14 mg/24 hr patch 1 Patch  1 Patch TransDERmal Q24H    enoxaparin (LOVENOX) injection 30 mg  30 mg SubCUTAneous Q24H         Assessment/Plan:     Active Problems:    COPD exacerbation ([facility]) (9/22/2020)      Respiratory failure with hypoxia ([facility]) (9/22/2020)      Hypokalemia (9/22/2020)      Acute respiratory failure ([address] (9/22/2020)      1. Acute hypoxic respiratory failure  2. Chronic Obstructive Pulmonary Disease with Severe Acute Exacerbation requiring inpatient hospitalization and management; has very poor airway clearance. Increased work of breathing  3. Atelectasis possible pneumonia right upper lobe lingular area  4. Additional workup outlined below  5. Pt is requiring Drug therapy requiring intensive monitoring for toxicity  6. Pt is unstable, unpredictable needing inpatient monitoring; is acutely ill and at high risk of sudden decline and decompensation with severe consequenses and continued end organ dysfunction and failure  7. Prognosis guarded        RECOMMENDATIONS/PLAN:      1. Started patient on oxygen via nasal cannula  2.  Possible she need oxygen when discharged home will evaluate;  3. Nebulizer treatment Symbicort IV Solu-Medrol  4. Agree with Empiric IV antibiotics pending culture results   5. Follow culture results  6. Supplemental O2 to keep sats > 93%  7. Aspiration precautions  8. Labs to follow electrolytes, renal function and and blood counts  9. Glucose monitoring and SSI  10. Bronchial hygiene with respiratory therapy techniques, bronchodilators  11. DVT, SUP prophylaxis  12.  Smoking cessation counseling done

## 2022-03-18 PROBLEM — J96.91 RESPIRATORY FAILURE WITH HYPOXIA (HCC): Status: ACTIVE | Noted: 2020-09-22

## 2022-03-19 PROBLEM — E87.6 HYPOKALEMIA: Status: ACTIVE | Noted: 2020-09-22

## 2022-03-19 PROBLEM — J96.00 ACUTE RESPIRATORY FAILURE (HCC): Status: ACTIVE | Noted: 2020-09-22

## 2022-03-20 PROBLEM — J44.1 COPD EXACERBATION (HCC): Status: ACTIVE | Noted: 2020-09-22

## 2022-12-25 ENCOUNTER — HOSPITAL ENCOUNTER (INPATIENT)
Age: 67
LOS: 11 days | Discharge: NURSING, REHAB OR LONG-TERM CARE | DRG: 981 | End: 2023-01-05
Attending: STUDENT IN AN ORGANIZED HEALTH CARE EDUCATION/TRAINING PROGRAM | Admitting: INTERNAL MEDICINE
Payer: MEDICARE

## 2022-12-25 ENCOUNTER — APPOINTMENT (OUTPATIENT)
Dept: CT IMAGING | Age: 67
DRG: 981 | End: 2022-12-25
Attending: STUDENT IN AN ORGANIZED HEALTH CARE EDUCATION/TRAINING PROGRAM
Payer: MEDICARE

## 2022-12-25 ENCOUNTER — APPOINTMENT (OUTPATIENT)
Dept: GENERAL RADIOLOGY | Age: 67
DRG: 981 | End: 2022-12-25
Attending: STUDENT IN AN ORGANIZED HEALTH CARE EDUCATION/TRAINING PROGRAM
Payer: MEDICARE

## 2022-12-25 DIAGNOSIS — E83.42 HYPOMAGNESEMIA: ICD-10-CM

## 2022-12-25 DIAGNOSIS — N17.9 AKI (ACUTE KIDNEY INJURY) (HCC): ICD-10-CM

## 2022-12-25 DIAGNOSIS — E87.1 CHRONIC HYPONATREMIA: ICD-10-CM

## 2022-12-25 DIAGNOSIS — Z78.9 ALCOHOL USE: ICD-10-CM

## 2022-12-25 DIAGNOSIS — S42.292A OTHER CLOSED DISPLACED FRACTURE OF PROXIMAL END OF LEFT HUMERUS, INITIAL ENCOUNTER: Primary | ICD-10-CM

## 2022-12-25 PROBLEM — E86.0 DEHYDRATION: Status: ACTIVE | Noted: 2022-12-25

## 2022-12-25 LAB
ABO + RH BLD: NORMAL
ALBUMIN SERPL-MCNC: 2.6 G/DL (ref 3.5–5)
ALBUMIN/GLOB SERPL: 0.9 (ref 1.1–2.2)
ALP SERPL-CCNC: 93 U/L (ref 45–117)
ALT SERPL-CCNC: 28 U/L (ref 12–78)
ANION GAP SERPL CALC-SCNC: 13 MMOL/L (ref 5–15)
AST SERPL W P-5'-P-CCNC: 53 U/L (ref 15–37)
BASOPHILS # BLD: 0 K/UL (ref 0–0.1)
BASOPHILS NFR BLD: 0 % (ref 0–1)
BILIRUB SERPL-MCNC: 0.9 MG/DL (ref 0.2–1)
BLOOD GROUP ANTIBODIES SERPL: NEGATIVE
BUN SERPL-MCNC: 19 MG/DL (ref 6–20)
BUN/CREAT SERPL: 14 (ref 12–20)
CA-I BLD-MCNC: 7.4 MG/DL (ref 8.5–10.1)
CHLORIDE SERPL-SCNC: 92 MMOL/L (ref 97–108)
CK SERPL-CCNC: 616 U/L (ref 26–192)
CO2 SERPL-SCNC: 21 MMOL/L (ref 21–32)
CREAT SERPL-MCNC: 1.36 MG/DL (ref 0.55–1.02)
DIFFERENTIAL METHOD BLD: ABNORMAL
EOSINOPHIL # BLD: 0 K/UL (ref 0–0.4)
EOSINOPHIL NFR BLD: 0 % (ref 0–7)
ERYTHROCYTE [DISTWIDTH] IN BLOOD BY AUTOMATED COUNT: 11.6 % (ref 11.5–14.5)
ETHANOL SERPL-MCNC: <10 MG/DL
GLOBULIN SER CALC-MCNC: 2.8 G/DL (ref 2–4)
GLUCOSE SERPL-MCNC: 121 MG/DL (ref 65–100)
HCT VFR BLD AUTO: 32.9 % (ref 35–47)
HGB BLD-MCNC: 11.8 G/DL (ref 11.5–16)
IMM GRANULOCYTES # BLD AUTO: 0.1 K/UL (ref 0–0.04)
IMM GRANULOCYTES NFR BLD AUTO: 1 % (ref 0–0.5)
INR PPP: 1 (ref 0.9–1.1)
LIPASE SERPL-CCNC: 123 U/L (ref 73–393)
LYMPHOCYTES # BLD: 0.8 K/UL (ref 0.8–3.5)
LYMPHOCYTES NFR BLD: 8 % (ref 12–49)
MAGNESIUM SERPL-MCNC: 1.1 MG/DL (ref 1.6–2.4)
MCH RBC QN AUTO: 37.5 PG (ref 26–34)
MCHC RBC AUTO-ENTMCNC: 35.9 G/DL (ref 30–36.5)
MCV RBC AUTO: 104.4 FL (ref 80–99)
MONOCYTES # BLD: 1.2 K/UL (ref 0–1)
MONOCYTES NFR BLD: 12 % (ref 5–13)
NEUTS SEG # BLD: 8 K/UL (ref 1.8–8)
NEUTS SEG NFR BLD: 79 % (ref 32–75)
NRBC # BLD: 0 K/UL (ref 0–0.01)
NRBC BLD-RTO: 0 PER 100 WBC
PLATELET # BLD AUTO: 237 K/UL (ref 150–400)
PMV BLD AUTO: 9.7 FL (ref 8.9–12.9)
POTASSIUM SERPL-SCNC: 3.9 MMOL/L (ref 3.5–5.1)
PROT SERPL-MCNC: 5.4 G/DL (ref 6.4–8.2)
PROTHROMBIN TIME: 13.6 SEC (ref 11.9–14.6)
RBC # BLD AUTO: 3.15 M/UL (ref 3.8–5.2)
SODIUM SERPL-SCNC: 126 MMOL/L (ref 136–145)
SPECIMEN EXP DATE BLD: NORMAL
TROPONIN-HIGH SENSITIVITY: 25 NG/L (ref 0–51)
TROPONIN-HIGH SENSITIVITY: 25 NG/L (ref 0–51)
WBC # BLD AUTO: 10 K/UL (ref 3.6–11)

## 2022-12-25 PROCEDURE — 80053 COMPREHEN METABOLIC PANEL: CPT

## 2022-12-25 PROCEDURE — 93005 ELECTROCARDIOGRAM TRACING: CPT

## 2022-12-25 PROCEDURE — 74011250636 HC RX REV CODE- 250/636: Performed by: STUDENT IN AN ORGANIZED HEALTH CARE EDUCATION/TRAINING PROGRAM

## 2022-12-25 PROCEDURE — 86850 RBC ANTIBODY SCREEN: CPT

## 2022-12-25 PROCEDURE — 82077 ASSAY SPEC XCP UR&BREATH IA: CPT

## 2022-12-25 PROCEDURE — 71045 X-RAY EXAM CHEST 1 VIEW: CPT

## 2022-12-25 PROCEDURE — 71250 CT THORAX DX C-: CPT

## 2022-12-25 PROCEDURE — 96365 THER/PROPH/DIAG IV INF INIT: CPT

## 2022-12-25 PROCEDURE — 74011000250 HC RX REV CODE- 250: Performed by: STUDENT IN AN ORGANIZED HEALTH CARE EDUCATION/TRAINING PROGRAM

## 2022-12-25 PROCEDURE — 94761 N-INVAS EAR/PLS OXIMETRY MLT: CPT

## 2022-12-25 PROCEDURE — 83690 ASSAY OF LIPASE: CPT

## 2022-12-25 PROCEDURE — 84484 ASSAY OF TROPONIN QUANT: CPT

## 2022-12-25 PROCEDURE — 70486 CT MAXILLOFACIAL W/O DYE: CPT

## 2022-12-25 PROCEDURE — 36415 COLL VENOUS BLD VENIPUNCTURE: CPT

## 2022-12-25 PROCEDURE — 85610 PROTHROMBIN TIME: CPT

## 2022-12-25 PROCEDURE — 65270000029 HC RM PRIVATE

## 2022-12-25 PROCEDURE — 99285 EMERGENCY DEPT VISIT HI MDM: CPT

## 2022-12-25 PROCEDURE — 82550 ASSAY OF CK (CPK): CPT

## 2022-12-25 PROCEDURE — 73060 X-RAY EXAM OF HUMERUS: CPT

## 2022-12-25 PROCEDURE — 72125 CT NECK SPINE W/O DYE: CPT

## 2022-12-25 PROCEDURE — 70450 CT HEAD/BRAIN W/O DYE: CPT

## 2022-12-25 PROCEDURE — 83735 ASSAY OF MAGNESIUM: CPT

## 2022-12-25 PROCEDURE — 74011000250 HC RX REV CODE- 250: Performed by: INTERNAL MEDICINE

## 2022-12-25 PROCEDURE — 94640 AIRWAY INHALATION TREATMENT: CPT

## 2022-12-25 PROCEDURE — 85025 COMPLETE CBC W/AUTO DIFF WBC: CPT

## 2022-12-25 PROCEDURE — 73030 X-RAY EXAM OF SHOULDER: CPT

## 2022-12-25 RX ORDER — ACETYLCYSTEINE 200 MG/ML
200 SOLUTION ORAL; RESPIRATORY (INHALATION)
Status: DISCONTINUED | OUTPATIENT
Start: 2022-12-26 | End: 2022-12-27

## 2022-12-25 RX ORDER — PROMETHAZINE HYDROCHLORIDE 25 MG/1
12.5 TABLET ORAL
Status: DISCONTINUED | OUTPATIENT
Start: 2022-12-25 | End: 2023-01-05 | Stop reason: HOSPADM

## 2022-12-25 RX ORDER — IPRATROPIUM BROMIDE AND ALBUTEROL SULFATE 2.5; .5 MG/3ML; MG/3ML
3 SOLUTION RESPIRATORY (INHALATION)
Status: DISCONTINUED | OUTPATIENT
Start: 2022-12-25 | End: 2023-01-05 | Stop reason: HOSPADM

## 2022-12-25 RX ORDER — KETAMINE HYDROCHLORIDE 50 MG/ML
0.5 INJECTION INTRAMUSCULAR; INTRAVENOUS
Status: DISPENSED | OUTPATIENT
Start: 2022-12-25 | End: 2022-12-26

## 2022-12-25 RX ORDER — ATENOLOL 25 MG/1
25 TABLET ORAL DAILY
Status: DISCONTINUED | OUTPATIENT
Start: 2022-12-26 | End: 2022-12-27

## 2022-12-25 RX ORDER — ACETYLCYSTEINE 200 MG/ML
400 SOLUTION ORAL; RESPIRATORY (INHALATION)
Status: COMPLETED | OUTPATIENT
Start: 2022-12-25 | End: 2022-12-25

## 2022-12-25 RX ORDER — PROPOFOL 10 MG/ML
0.5 INJECTION, EMULSION INTRAVENOUS
Status: DISPENSED | OUTPATIENT
Start: 2022-12-25 | End: 2022-12-26

## 2022-12-25 RX ORDER — ACETYLCYSTEINE 200 MG/ML
200 SOLUTION ORAL; RESPIRATORY (INHALATION)
Status: DISCONTINUED | OUTPATIENT
Start: 2022-12-25 | End: 2022-12-25

## 2022-12-25 RX ORDER — MAGNESIUM SULFATE HEPTAHYDRATE 40 MG/ML
2 INJECTION, SOLUTION INTRAVENOUS
Status: COMPLETED | OUTPATIENT
Start: 2022-12-25 | End: 2022-12-26

## 2022-12-25 RX ORDER — IPRATROPIUM BROMIDE AND ALBUTEROL SULFATE 2.5; .5 MG/3ML; MG/3ML
3 SOLUTION RESPIRATORY (INHALATION)
Status: COMPLETED | OUTPATIENT
Start: 2022-12-25 | End: 2022-12-25

## 2022-12-25 RX ORDER — HYDROCODONE BITARTRATE AND ACETAMINOPHEN 5; 325 MG/1; MG/1
1 TABLET ORAL
Status: DISCONTINUED | OUTPATIENT
Start: 2022-12-25 | End: 2023-01-03

## 2022-12-25 RX ORDER — ONDANSETRON 2 MG/ML
4 INJECTION INTRAMUSCULAR; INTRAVENOUS
Status: DISCONTINUED | OUTPATIENT
Start: 2022-12-25 | End: 2023-01-05 | Stop reason: HOSPADM

## 2022-12-25 RX ORDER — DIAZEPAM 5 MG/1
20 TABLET ORAL
Status: DISCONTINUED | OUTPATIENT
Start: 2022-12-25 | End: 2023-01-05 | Stop reason: HOSPADM

## 2022-12-25 RX ORDER — AMLODIPINE BESYLATE 5 MG/1
5 TABLET ORAL DAILY
Status: DISCONTINUED | OUTPATIENT
Start: 2022-12-26 | End: 2022-12-25

## 2022-12-25 RX ORDER — POLYETHYLENE GLYCOL 3350 17 G/17G
17 POWDER, FOR SOLUTION ORAL DAILY PRN
Status: DISCONTINUED | OUTPATIENT
Start: 2022-12-25 | End: 2023-01-05 | Stop reason: HOSPADM

## 2022-12-25 RX ORDER — SODIUM CHLORIDE 0.9 % (FLUSH) 0.9 %
5-40 SYRINGE (ML) INJECTION EVERY 8 HOURS
Status: DISCONTINUED | OUTPATIENT
Start: 2022-12-25 | End: 2023-01-05 | Stop reason: HOSPADM

## 2022-12-25 RX ORDER — ACETAMINOPHEN 650 MG/1
650 SUPPOSITORY RECTAL
Status: DISCONTINUED | OUTPATIENT
Start: 2022-12-25 | End: 2023-01-05 | Stop reason: HOSPADM

## 2022-12-25 RX ORDER — DIAZEPAM 5 MG/1
10 TABLET ORAL
Status: DISCONTINUED | OUTPATIENT
Start: 2022-12-25 | End: 2023-01-05 | Stop reason: HOSPADM

## 2022-12-25 RX ORDER — SODIUM CHLORIDE 9 MG/ML
100 INJECTION, SOLUTION INTRAVENOUS CONTINUOUS
Status: DISCONTINUED | OUTPATIENT
Start: 2022-12-25 | End: 2022-12-27 | Stop reason: ALTCHOICE

## 2022-12-25 RX ORDER — ENOXAPARIN SODIUM 100 MG/ML
30 INJECTION SUBCUTANEOUS DAILY
Status: DISCONTINUED | OUTPATIENT
Start: 2022-12-26 | End: 2023-01-05 | Stop reason: HOSPADM

## 2022-12-25 RX ORDER — ACETAMINOPHEN 325 MG/1
650 TABLET ORAL
Status: DISCONTINUED | OUTPATIENT
Start: 2022-12-25 | End: 2023-01-05 | Stop reason: HOSPADM

## 2022-12-25 RX ORDER — SODIUM CHLORIDE 0.9 % (FLUSH) 0.9 %
5-40 SYRINGE (ML) INJECTION AS NEEDED
Status: DISCONTINUED | OUTPATIENT
Start: 2022-12-25 | End: 2023-01-05 | Stop reason: HOSPADM

## 2022-12-25 RX ADMIN — SODIUM CHLORIDE 100 ML/HR: 9 INJECTION, SOLUTION INTRAVENOUS at 17:13

## 2022-12-25 RX ADMIN — MAGNESIUM SULFATE HEPTAHYDRATE 2 G: 40 INJECTION, SOLUTION INTRAVENOUS at 17:31

## 2022-12-25 RX ADMIN — SODIUM CHLORIDE 1000 ML: 9 INJECTION, SOLUTION INTRAVENOUS at 16:30

## 2022-12-25 RX ADMIN — MAGNESIUM SULFATE HEPTAHYDRATE 2 G: 40 INJECTION, SOLUTION INTRAVENOUS at 22:07

## 2022-12-25 RX ADMIN — IPRATROPIUM BROMIDE AND ALBUTEROL SULFATE 3 ML: 2.5; .5 SOLUTION RESPIRATORY (INHALATION) at 18:42

## 2022-12-25 RX ADMIN — SODIUM CHLORIDE, PRESERVATIVE FREE 10 ML: 5 INJECTION INTRAVENOUS at 22:12

## 2022-12-25 RX ADMIN — ACETYLCYSTEINE 400 MG: 200 SOLUTION ORAL; RESPIRATORY (INHALATION) at 18:40

## 2022-12-25 NOTE — ED TRIAGE NOTES
Pt arrived via ems from home, pt fell last night, NO LOC, NO THINNERS, pt complains of pain all over, and extreme left arm pain, difficulty walking, pt appears to have bruises throughout left side of body.      Pt is a&0x4  Pt has 4mg of zofran and 50mcg of fentanyl in route

## 2022-12-25 NOTE — ED NOTES
Bedside and Verbal shift change report given to Enrike RN (oncoming nurse) by Davon Burton RN (offgoing nurse). Report included the following information SBAR and ED Summary.

## 2022-12-25 NOTE — ED PROVIDER NOTES
[address]  EMERGENCY DEPARTMENT ENCOUNTER NOTE    Date: 12/25/2022  Patient Name: [facility] DR RUTH STOCKTON    History of Presenting Illness     Chief Complaint   Patient presents with    Fall     HPI: Ramana Dupree, 75 y.o. female with a past medical history and outpatient medications as listed and reviewed below  presents for follow-up. The details of the incident are not clear as the patient does not remember how she fell down however she reports that she was going to the bathroom yesterday when she found her self on the ground. She sustained acute onset pain over the left shoulder and arm and developed significant bruising today. Her boyfriend was able to put her in bed and she has not been able to get out of bed since yesterday. She is unsure if she hit her head or neck but reports neck pain, arm pain, chest pain. No abdominal pain, nausea, vomiting. No weakness numbness in lower extremities. Is able to feel and move her left elbow and wrist and hand without any numbness however has significant pain over the proximal humerus and shoulder. Shortness of breath is not significant and the chest pain is localized to the left-sided chest wall and worse with movement and palpation. She reports daily alcohol drinking. Denies any anticoagulation or antiplatelet use.     Medical History   I reviewed the medical, surgical, family, and social history, as well as allergies:    PCP: None    Past Medical History:  Past Medical History:   Diagnosis Date    HTN (hypertension)     Smoker      Past Surgical History:  Past Surgical History:   Procedure Laterality Date    HX SHOULDER REPLACEMENT Right 11/06/2011    Has fracture surgical neck, has ORIF DONE     Current Outpatient Medications:  Current Outpatient Medications   Medication Instructions    multivitamin, tx-iron-ca-min (THERA-M w/ IRON) 9 mg iron-400 mcg tab tablet 1 Tablet, Oral, DAILY    nebivoloL (BYSTOLIC) 10 mg, Oral, DAILY      Family History:  Family History   Problem Relation Age of Onset    No Known Problems Mother     No Known Problems Father      Social History:  Social History     Tobacco Use    Smoking status: Every Day     Packs/day: 0.50     Years: 46.00     Pack years: 23.00     Types: Cigarettes    Smokeless tobacco: Never    Tobacco comments:     will set up later   Substance Use Topics    Alcohol use: Yes     Alcohol/week: 24.0 standard drinks     Types: 24 Cans of beer per week     Comment: weekly \"couple beers\"    Drug use: Never     Allergies: Allergies   Allergen Reactions    Sulfa (Sulfonamide Antibiotics) Swelling    Pcn [Penicillins] Rash    Prednisone Other (comments)     Patient states \"it makes me bounce off of the walls\"; declines taking     Darvocet A500 [Propoxyphene N-Acetaminophen] Rash       Review of Systems     Review of Systems  Negative: Positives and pertinent negatives as per HPI. All other systems were reviewed and are negative. Physical Exam & Vital Signs   Vital Signs - I reviewed the patient's vital signs. Patient Vitals for the past 12 hrs:   Temp Pulse Resp BP SpO2   12/25/22 1813 98.3 °F (36.8 °C) 90 20 (!) 143/97 99 %   12/25/22 1811 -- 90 -- (!) 143/97 95 %   12/25/22 1739 -- 86 -- -- 100 %   12/25/22 1730 -- 86 -- (!) 155/108 --   12/25/22 1715 -- 94 -- (!) 149/84 --   12/25/22 1700 -- 86 13 (!) 154/84 94 %   12/25/22 1449 -- 88 19 -- 97 %   12/25/22 1444 -- 93 18 (!) 150/83 96 %   12/25/22 1439 -- 96 -- (!) 140/105 98 %   12/25/22 1434 98.3 °F (36.8 °C) (!) 105 18 (!) 140/105 95 %     Physical Exam:    PRIMARY SURVEY  GENERAL: awake, alert  AIRWAY: Intact. C-spine precautions initiated. BREATHING: Equal bilateral air entry. CIRCULATION: Initial BP normal. Access secured via PIVs.  DISABILITY: GCS 15  EXPOSURE: Patient was examined for signs of trauma.     SECONDARY SURVEY  HEENT:  * PERRL, EOMI  * No raccoon eyes, no coy sign  * No fractured teeth  * Oropharynx clear without bleeding  * No C-spine tenderness, C-collar in place  CV:  * audible heart sounds  * +2 pulses in UE/LE bilaterally  * warm and perfused extremities bilaterally  PULMONARY: Good bilateral air movement, no wheezes, no crackles  ABDOMEN: soft ND/NT. BACK: Noted midline C-spine tenderness, step offs, or deformities. EXTREMITIES: WWP, significant hematoma and bruising over the left shoulder and humerus extending to the upper left side of the back. Tenderness over the ribs over the left anterior and posterior chest.  She has normal radial pulses with intact ulnar, median, and radial nerve function in the left upper extremity. SKIN: No lacerations. No abrasions. NEURO:  * Speech clear  * Moves U&LE to command    Medical Decision Making     Patient is a 75 y.o. female presenting for fall. Vitals reveal no significant abnormalities and physical exam reveals  traumatic findings as above . Based on the history, physical exam, risk factors, and vital signs, differential includes: Pneumothorax, hemothorax, rib fractures, humerus fracture. No evidence of neurovascular compromise. We will do a CT head, face, and neck as well as CT chest to rule out occult injury. In addition, will do x-rays. We will do cardiac work-up to rule out a cardiac cause of her syncope however it might be mechanical due to her alcohol use however due to the unclear history, will initiate work-up. We will also rule out rhabdomyolysis, LUIS ANGEL, and other metabolic abnormalities. .    See ED Course and Reassessment for evaluation and discussion.       EMR Automatically Imported Results     Labs:  Recent Results (from the past 12 hour(s))   CBC WITH AUTOMATED DIFF    Collection Time: 12/25/22  2:44 PM   Result Value Ref Range    WBC 10.0 3.6 - 11.0 K/uL    RBC 3.15 (L) 3.80 - 5.20 M/uL    HGB 11.8 11.5 - 16.0 g/dL    HCT 32.9 (L) 35.0 - 47.0 %    .4 (H) 80.0 - 99.0 FL    MCH 37.5 (H) 26.0 - 34.0 PG    MCHC 35.9 30.0 - 36.5 g/dL    RDW 11.6 11.5 - 14.5 %    PLATELET 123 931 - 698 K/uL    MPV 9.7 8.9 - 12.9 FL    NRBC 0.0 0.0  WBC    ABSOLUTE NRBC 0.00 0.00 - 0.01 K/uL    NEUTROPHILS 79 (H) 32 - 75 %    LYMPHOCYTES 8 (L) 12 - 49 %    MONOCYTES 12 5 - 13 %    EOSINOPHILS 0 0 - 7 %    BASOPHILS 0 0 - 1 %    IMMATURE GRANULOCYTES 1 (H) 0 - 0.5 %    ABS. NEUTROPHILS 8.0 1.8 - 8.0 K/UL    ABS. LYMPHOCYTES 0.8 0.8 - 3.5 K/UL    ABS. MONOCYTES 1.2 (H) 0.0 - 1.0 K/UL    ABS. EOSINOPHILS 0.0 0.0 - 0.4 K/UL    ABS. BASOPHILS 0.0 0.0 - 0.1 K/UL    ABS. IMM. GRANS. 0.1 (H) 0.00 - 0.04 K/UL    DF AUTOMATED     TYPE & SCREEN    Collection Time: 12/25/22  2:44 PM   Result Value Ref Range    Crossmatch Expiration 12/28/2022,2359     ABO/Rh(D) O Negative     Antibody screen Negative    PROTHROMBIN TIME + INR    Collection Time: 12/25/22  2:44 PM   Result Value Ref Range    Prothrombin time 13.6 11.9 - 14.6 sec    INR 1.0 0.9 - 1.1     METABOLIC PANEL, COMPREHENSIVE    Collection Time: 12/25/22  2:44 PM   Result Value Ref Range    Sodium 126 (L) 136 - 145 mmol/L    Potassium 3.9 3.5 - 5.1 mmol/L    Chloride 92 (L) 97 - 108 mmol/L    CO2 21 21 - 32 mmol/L    Anion gap 13 5 - 15 mmol/L    Glucose 121 (H) 65 - 100 mg/dL    BUN 19 6 - 20 mg/dL    Creatinine 1.36 (H) 0.55 - 1.02 mg/dL    BUN/Creatinine ratio 14 12 - 20      eGFR 42 (L) >60 ml/min/1.73m2    Calcium 7.4 (L) 8.5 - 10.1 mg/dL    Bilirubin, total 0.9 0.2 - 1.0 mg/dL    AST (SGOT) 53 (H) 15 - 37 U/L    ALT (SGPT) 28 12 - 78 U/L    Alk.  phosphatase 93 45 - 117 U/L    Protein, total 5.4 (L) 6.4 - 8.2 g/dL    Albumin 2.6 (L) 3.5 - 5.0 g/dL    Globulin 2.8 2.0 - 4.0 g/dL    A-G Ratio 0.9 (L) 1.1 - 2.2     TROPONIN-HIGH SENSITIVITY    Collection Time: 12/25/22  2:44 PM   Result Value Ref Range    Troponin-High Sensitivity 25 0 - 51 ng/L   LIPASE    Collection Time: 12/25/22  2:44 PM   Result Value Ref Range    Lipase 123 73 - 393 U/L   MAGNESIUM    Collection Time: 12/25/22  2:44 PM   Result Value Ref Range Magnesium 1.1 (L) 1.6 - 2.4 mg/dL   ETHYL ALCOHOL    Collection Time: 12/25/22  2:44 PM   Result Value Ref Range    ALCOHOL(ETHYL),SERUM <10 <10 mg/dL   CK    Collection Time: 12/25/22  2:44 PM   Result Value Ref Range     (H) 26 - 192 U/L   TROPONIN-HIGH SENSITIVITY    Collection Time: 12/25/22  5:09 PM   Result Value Ref Range    Troponin-High Sensitivity 25 0 - 51 ng/L     Radiologic Studies:  CT Results  (Last 48 hours)                 12/25/22 1552  CT HEAD WO CONT Final result    Impression:  1. No definite acute maxillofacial fracture. Age-indeterminate bilateral nasal   bone fractures, correlate with symptomatology. 2.  No acute intracranial abnormality. .           Narrative:  EXAM: CT MAXILLOFACIAL WO CONT,       EXAM: CT HEAD WO CONT       INDICATION: fall, L maxillary tenderness       COMPARISON: None. CONTRAST:   None. TECHNIQUE:  Multislice helical CT of the head and facial bones was performed in   the axial plane without intravenous contrast administration. Coronal and   sagittal reformations were generated. CT dose reduction was achieved through   use of a standardized protocol tailored for this examination and automatic   exposure control for dose modulation. FINDINGS:       HEAD:   Generalized volume loss. Scattered white matter hypodensities may reflect   chronic microangiopathic change. There is no intracranial hemorrhage,   extra-axial collection, or mass effect. The basilar cisterns are open. No CT   evidence of acute infarct. MAXILLOFACIAL:   Bones: No definite acute fracture. Age-indeterminate bilateral nasal bone   fractures (402-39, 405-33). Paranasal sinuses: Clear   Orbits: The globes, optic nerves, and extraocular muscles are within normal   limits. Soft tissues: Within normal limits. No evidence of mass. Miscellaneous: N/A            12/25/22 1552  CT SPINE CERV WO CONT Final result    Impression:  No acute fracture nor subluxation.  Chronic C6 spinous process fracture. Fat   stranding/contusion in the left supraclavicular region. Narrative:  EXAM:  CT CERVICAL SPINE WITHOUT CONTRAST       INDICATION: fall, neck tenderness. COMPARISON: None. CONTRAST:  None. TECHNIQUE: Multislice helical CT of the cervical spine was performed without   intravenous contrast administration. Sagittal and coronal reformats were   generated. CT dose reduction was achieved through use of a standardized   protocol tailored for this examination and automatic exposure control for dose   modulation. FINDINGS:       No subluxation. Degenerative-appearing stepwise anterolisthesis from C5-C7. There is no acute fracture or compression deformity. Chronic C6 spinous process   fracture (304-104). The odontoid process is intact. The craniocervical junction   is within normal limits. Multilevel degenerative changes. Fat stranding/contusion in the left supraclavicular region. Atherosclerosis. 12/25/22 6380  CT MAXILLOFACIAL WO CONT Final result    Impression:  1. No definite acute maxillofacial fracture. Age-indeterminate bilateral nasal   bone fractures, correlate with symptomatology. 2.  No acute intracranial abnormality. .           Narrative:  EXAM: CT MAXILLOFACIAL WO CONT,       EXAM: CT HEAD WO CONT       INDICATION: fall, L maxillary tenderness       COMPARISON: None. CONTRAST:   None. TECHNIQUE:  Multislice helical CT of the head and facial bones was performed in   the axial plane without intravenous contrast administration. Coronal and   sagittal reformations were generated. CT dose reduction was achieved through   use of a standardized protocol tailored for this examination and automatic   exposure control for dose modulation. FINDINGS:       HEAD:   Generalized volume loss. Scattered white matter hypodensities may reflect   chronic microangiopathic change.  There is no intracranial hemorrhage, extra-axial collection, or mass effect. The basilar cisterns are open. No CT   evidence of acute infarct. MAXILLOFACIAL:   Bones: No definite acute fracture. Age-indeterminate bilateral nasal bone   fractures (402-39, 405-33). Paranasal sinuses: Clear   Orbits: The globes, optic nerves, and extraocular muscles are within normal   limits. Soft tissues: Within normal limits. No evidence of mass. Miscellaneous: N/A            12/25/22 1552  CT CHEST WO CONT Final result    Impression:  1. Redemonstrated acute left humerus fracture. No definite acute rib fracture. Several chronic bilateral rib deformities. Chronic right proximal humerus   probably. 2.  No acute cardiopulmonary abnormality. Narrative:  INDICATION: fall with humerus deformity and crepitus concern for rib fractures,   hemothorax, pneumothorax       COMPARISON: Chest radiograph 12/25/2022, left shoulder and humerus radiograph   12/25/2022       CONTRAST: None. TECHNIQUE:  5 mm axial images were obtained through the chest. Coronal and   sagittal reformats were generated. CT dose reduction was achieved through use   of a standardized protocol tailored for this examination and automatic exposure   control for dose modulation. The absence of intravenous contrast reduces the sensitivity for evaluation of   the mediastinum, jordan, vasculature, and upper abdominal organs. FINDINGS:       CHEST WALL: No mass or axillary lymphadenopathy. THYROID: No nodule. MEDIASTINUM: No mass or lymphadenopathy. JORDAN: No mass or lymphadenopathy. THORACIC AORTA: No aneurysm. Atherosclerosis. MAIN PULMONARY ARTERY: Normal in caliber. TRACHEA/BRONCHI: Patent. ESOPHAGUS: Patulous. HEART: Normal in size. Mild coronary artery calcifications. PLEURA: No effusion or pneumothorax. LUNGS: No nodule, mass, or airspace disease. Bandlike atelectasis in the right   upper lobe and lingula.    INCIDENTALLY IMAGED UPPER ABDOMEN: No significant abnormality in the   incidentally imaged upper abdomen. BONES: Redemonstrated acute comminuted proximal-mid left humeral shaft fracture,   with surrounding soft tissue stranding/contusion no definite acute rib fracture. Several chronic bilateral rib fracture deformities. Chronic right proximal   humerus deformity. Degenerative changes. .                 CXR Results  (Last 48 hours)                 12/25/22 1524  XR CHEST PORT Final result    Impression:      No acute process on portable chest.           Narrative:  EXAM:  XR CHEST PORT       INDICATION: Left chest tenderness       COMPARISON: none       TECHNIQUE: Upright portable chest AP view       FINDINGS: Cardiac monitoring leads. The cardiac silhouette is within normal   limits. The pulmonary vasculature is within normal limits. The lungs and pleural spaces are clear. The visualized bones and upper abdomen   are age-appropriate.                  Medications ordered:  Medications   0.9% sodium chloride infusion (100 mL/hr IntraVENous New Bag 12/25/22 1713)   ketamine (KETALAR) 50 mg/mL injection 25 mg (has no administration in time range)   propofoL (DIPRIVAN) 10 mg/mL injection 25 mg (has no administration in time range)   magnesium sulfate 2 g/50 ml IVPB (premix or compounded) (2 g IntraVENous New Bag 12/25/22 1731)   sodium chloride (NS) flush 5-40 mL (has no administration in time range)   sodium chloride (NS) flush 5-40 mL (has no administration in time range)   acetaminophen (TYLENOL) tablet 650 mg (has no administration in time range)     Or   acetaminophen (TYLENOL) suppository 650 mg (has no administration in time range)   polyethylene glycol (MIRALAX) packet 17 g (has no administration in time range)   promethazine (PHENERGAN) tablet 12.5 mg (has no administration in time range)     Or   ondansetron (ZOFRAN) injection 4 mg (has no administration in time range)   enoxaparin (LOVENOX) injection 30 mg (has no administration in time range)   diazePAM (VALIUM) tablet 10 mg (has no administration in time range)   diazePAM (VALIUM) tablet 20 mg (has no administration in time range)   0.9% sodium chloride 3,408 mL with folic acid 1 mg, thiamine 100 mg, mvi (adult no. 4 with vit K) 10 mL infusion (has no administration in time range)   multivitamin, tx-iron-ca-min (THERA-M w/ IRON) tablet 1 Tablet (has no administration in time range)   atenoloL (TENORMIN) tablet 25 mg (has no administration in time range)   HYDROcodone-acetaminophen (NORCO) 5-325 mg per tablet 1 Tablet (has no administration in time range)   acetylcysteine (MUCOMYST) 200 mg/mL (20 %) solution 400 mg (has no administration in time range)   sodium chloride 0.9 % bolus infusion 1,000 mL (1,000 mL IntraVENous New Bag 12/25/22 1630)       ED Course & Reassessment     ED Course:     ED Course as of 12/25/22 1830   Sun Dec 25, 2022   1519 CBC does not show any evidence of acute process. Leukocytosis not present to suggest infection. Hemoglobin not suggestive of acute anemia. INR and PT are within normal limits. [SS]   1601 Hypomagnesemia, will replace. Lipase is not significantly elevated. ETOH level not elevated. [SS]   1602 Trop 25, will trend. [SS]   4737 Mild rhabdo noted. [SS]   1602 No significant electrolyte derangements besides chronic hyponatremia. Creatinine elevated, LUIS ANGEL noted. No significant transaminitis noted. Normal bilirubin. Giving additional fluids. [SS]   1604 XR: Acute comminuted fracture of the proximal to mid humeral shaft with impaction and varus angulation. [SS]   1472 Chest x-ray negative for acute pathology: no CXR evidence of pulmonary edema, pleural effusion, pneumothorax, or pneumonia. [SS]   7996 The non-contrasted head CT was negative for acute process making acute intracranial bleed unlikely. No evidence of evolving large subacute strokes, ventriculomegaly, or masses. CT C-spine did not show any evidence of acute bone abnormalities.  CT facial bones did not show any signs of facial bone fractures or bone abnormalities. [SS]   1657 The patient will need reduction of fracture however given her low magnesium and electrolyte abnormalities, will need to replace those first before reduction. Discussed with orthopedics as well. [SS]   7232 CT chest without pneumothorax or hemothorax or rib fractures. [SS]   8379 Second troponin with negative delta change. ACS ruled out per the high-sensitivity troponin algorithm.   [SS]      ED Course User Index  [SS] Stephanie Heath MD       Reassessment:    The patient was about to undergo procedural sedation after obtaining consent however she had an aspiration event before the procedure was initiated. She had thick secretions and required to be sat up and chest physiotherapy was done with improvement of her symptoms. Given her aspiration events and high risk of decompensation during procedure sedation, I will hold off on that procedure, give Mucomyst, and admit for reduction under controlled setting in the operating room. Discussed the case with orthopedics, they recommended sling and no reduction for now. Final Disposition     Admission: [facility]    After completion of ED workup and discussion of results and diagnoses, patient was admitted to the hospital. Case was discussed with admitting provider. Procedures, Critical Care, & Clinical Tools   Performed by: Anjali Velasco MD  Procedures     EKG interpretation (Preliminary):  Rhythm: normal sinus rhythm; and regular . Rate (approx.): 89.   Axis: normal;  WI interval: normal;  QRS interval: normal ;  ST/T wave: normal;  Other findings: normal.        PROCEDURES:  none      CRITICAL CARE DOCUMENTATION  TRAUMA/BLEEDING CRITICAL CARE NOTE :  6:29 PM    Critical care time is being documented due to the fulfillment of at least one of the following:    - Critical conditions: condition that acutely impairs one or more vital organ systems such that there is a high probability of imminent or life-threatening deterioration in condition. Examples are diagnoses including but not limited to Afib RVR, DKA, PE, Etc. .    - Critical interventions: an action whose failure to initiate would likely allow a sudden, clinically significant decline in the patient's condition. These include  Requirement of transfer or ICU admission  Contemplation or provision of tPA  Drip initiation (pressors, antiarrhythmics, heparin, etc.)  Antidotes given (narcan, charcoal, epi for anaphylaxis, etc..)  >=2L fluid bolus  >=3 Duonebs  >1 IV/IM doses of sedatives, antiepileptics, BP meds, rate control meds, adenosine. Procedures that are suggestive of critical care: chest tubes, cardioversion, BiPAP, IO, etc..    Critical care time is documented based on continuous or non-continuous provision of care that includes face-to-face time, placing orders, chart review, documentation, discussion with consultants, discussion with family. This time calculation is a best approximation and does not include time spent on CPR, EKG interpretation, central line placement, intubation, laceration repairs, and other separately billed procedures. Amount of Critical Care Time: 35minutes    Details of critical care provision is documented above. A general summary is listed below:    IMPENDING DETERIORATION - Cardiovascular  ASSOCIATED RISK FACTORS - Trauma  MANAGEMENT- Bedside Assessment  INTERPRETATION -  Xrays and CT Scan  INTERVENTIONS - Hemodynamic and Metabolic interventions, Vascular control. CASE REVIEW - Hospitalist & Subspecialist  TREATMENT RESPONSE - Stable  PERFORMED BY - Self    NOTES   :  During this entire length of time I was immediately available to the patient . Matt Gamez MD        HEART SCORE  Heart score not applicable: no chest pain . Diagnosis     Clinical Impression:   1. Other closed displaced fracture of proximal end of left humerus, initial encounter    2. Alcohol use    3. Hypomagnesemia    4. LUIS ANGEL (acute kidney injury) ([facility] [address]    5. Chronic hyponatremia        Attestations:  Mel Cano MD    Documentation Comments   - I am the first and primary provider for this patient and am the primary provider of record. - Initial assessment performed. The patients presenting problems have been discussed, and the staff are in agreement with the care plan formulated and outlined with them. I have encouraged them to ask questions as they arise throughout their visit. - Available medical records, nursing notes, old EKGs, and EMS run sheets (if patient was EMS transported) were reviewed    Please note that this dictation was completed with YAZUO, the computer voice recognition software. Quite often unanticipated grammatical, syntax, homophones, and other interpretive errors are inadvertently transcribed by the computer software. Please disregard these errors. Please excuse any errors that have escaped final proofreading.

## 2022-12-25 NOTE — H&P
History & Physical    Primary Care Provider: None  Source of Information: Patient/family     History of Presenting Illness:   Tai Becker is a 75 y.o. female with history of alcohol abuse who fell in her home last night. Since then she complains of severe pain all over as well as extreme left arm pain and difficulty walking. In the ED she was hypertensive and mildly tachycardic. Labs showed a sodium of 126, creatinine 1.36 and a magnesium of 1.1. X-ray of the right arm shows a right proximal to mid humeral shaft fracture with impaction and varus angulation    Patient admits to drinking about 2 beers a day although her significant other at the bedside motioned to me that it is significantly more than that. In fact, she was intoxicated last night when she fell    She is supposed to be on Bystolic for hypertension but stopped several weeks ago because she did not want to go into the doctor's office to get a refill       Review of Systems:  A comprehensive review of systems was negative except for that written in the History of Present Illness. Past Medical History:   Diagnosis Date    HTN (hypertension)     Smoker         Past Surgical History:   Procedure Laterality Date    HX SHOULDER REPLACEMENT Right 11/06/2011    Has fracture surgical neck, has ORIF DONE       Prior to Admission medications    Medication Sig Start Date End Date Taking? Authorizing Provider   amLODIPine (NORVASC) 5 mg tablet Take 5 mg by mouth daily. 7/15/20   Provider, Historical   nebivoloL (Bystolic) 10 mg tablet Take 10 mg by mouth daily. Indications: high blood pressure    David Caro MD   multivitamin, tx-iron-ca-min (THERA-M w/ IRON) 9 mg iron-400 mcg tab tablet Take 1 Tab by mouth daily.  Indications: treatment to prevent mineral deficiency, treatment to prevent vitamin deficiency    David Caro MD       Allergies   Allergen Reactions    Sulfa (Sulfonamide Antibiotics) Swelling    Pcn [Penicillins] Rash Prednisone Other (comments)     Patient states \"it makes me bounce off of the walls\"; declines taking     Darvocet A500 [Propoxyphene N-Acetaminophen] Rash        Family History   Problem Relation Age of Onset    No Known Problems Mother     No Known Problems Father         Social History     Socioeconomic History    Marital status: LIFE PARTNER    Number of children: 0   Tobacco Use    Smoking status: Every Day     Packs/day: 0.50     Years: 46.00     Pack years: 23.00     Types: Cigarettes    Smokeless tobacco: Never    Tobacco comments:     will set up later   Substance and Sexual Activity    Alcohol use: Yes     Alcohol/week: 24.0 standard drinks     Types: 24 Cans of beer per week     Comment: weekly \"couple beers\"    Drug use: Never    Sexual activity: Not Currently   Social History Narrative    Lives at home with a friend, independent with activity, both of them smoke. CODE STATUS:  DNR    Full    Other      Objective:     Physical Exam:     Visit Vitals  BP (!) 155/108   Pulse 86   Temp 98.3 °F (36.8 °C)   Resp 13   Ht 5' 3\" (1.6 m)   Wt 49.9 kg (110 lb)   SpO2 100%   BMI 19.49 kg/m²      O2 Device: None (Room air)    General:  Alert, cooperative, no distress, appears stated age. Head:  Normocephalic, without obvious abnormality, atraumatic. Eyes:  Conjunctivae/corneas clear. PERRL, EOMs intact. Nose: Nares normal. Septum midline. Mucosa normal. No drainage or sinus tenderness. Throat: Lips, mucosa, and tongue normal. Teeth and gums normal.   Neck: Supple, symmetrical, trachea midline, no adenopathy, thyroid: no enlargement/tenderness/nodules, no carotid bruit and no JVD. Back:   Symmetric, no curvature. ROM normal. No CVA tenderness. Lungs:   Clear to auscultation bilaterally. Chest wall:  No tenderness or deformity. Heart:  Regular rate and rhythm, S1, S2 normal, no murmur, click, rub or gallop. Abdomen:   Soft, non-tender. Bowel sounds normal. No masses,  No organomegaly. Extremities: Extensive ecchymoses over the entire left arm   Pulses: 2+ and symmetric all extremities. Skin: Skin color, texture, turgor normal. No rashes or lesions   Neurologic: CNII-XII intact. No motor or sensory deficits. 24 Hour Results:    Recent Results (from the past 24 hour(s))   CBC WITH AUTOMATED DIFF    Collection Time: 12/25/22  2:44 PM   Result Value Ref Range    WBC 10.0 3.6 - 11.0 K/uL    RBC 3.15 (L) 3.80 - 5.20 M/uL    HGB 11.8 11.5 - 16.0 g/dL    HCT 32.9 (L) 35.0 - 47.0 %    .4 (H) 80.0 - 99.0 FL    MCH 37.5 (H) 26.0 - 34.0 PG    MCHC 35.9 30.0 - 36.5 g/dL    RDW 11.6 11.5 - 14.5 %    PLATELET 244 010 - 103 K/uL    MPV 9.7 8.9 - 12.9 FL    NRBC 0.0 0.0  WBC    ABSOLUTE NRBC 0.00 0.00 - 0.01 K/uL    NEUTROPHILS 79 (H) 32 - 75 %    LYMPHOCYTES 8 (L) 12 - 49 %    MONOCYTES 12 5 - 13 %    EOSINOPHILS 0 0 - 7 %    BASOPHILS 0 0 - 1 %    IMMATURE GRANULOCYTES 1 (H) 0 - 0.5 %    ABS. NEUTROPHILS 8.0 1.8 - 8.0 K/UL    ABS. LYMPHOCYTES 0.8 0.8 - 3.5 K/UL    ABS. MONOCYTES 1.2 (H) 0.0 - 1.0 K/UL    ABS. EOSINOPHILS 0.0 0.0 - 0.4 K/UL    ABS. BASOPHILS 0.0 0.0 - 0.1 K/UL    ABS. IMM.  GRANS. 0.1 (H) 0.00 - 0.04 K/UL    DF AUTOMATED     TYPE & SCREEN    Collection Time: 12/25/22  2:44 PM   Result Value Ref Range    Crossmatch Expiration 12/28/2022,2359     ABO/Rh(D) O Negative     Antibody screen Negative    PROTHROMBIN TIME + INR    Collection Time: 12/25/22  2:44 PM   Result Value Ref Range    Prothrombin time 13.6 11.9 - 14.6 sec    INR 1.0 0.9 - 1.1     METABOLIC PANEL, COMPREHENSIVE    Collection Time: 12/25/22  2:44 PM   Result Value Ref Range    Sodium 126 (L) 136 - 145 mmol/L    Potassium 3.9 3.5 - 5.1 mmol/L    Chloride 92 (L) 97 - 108 mmol/L    CO2 21 21 - 32 mmol/L    Anion gap 13 5 - 15 mmol/L    Glucose 121 (H) 65 - 100 mg/dL    BUN 19 6 - 20 mg/dL    Creatinine 1.36 (H) 0.55 - 1.02 mg/dL    BUN/Creatinine ratio 14 12 - 20      eGFR 42 (L) >60 ml/min/1.73m2    Calcium 7.4 (L) 8.5 - 10.1 mg/dL    Bilirubin, total 0.9 0.2 - 1.0 mg/dL    AST (SGOT) 53 (H) 15 - 37 U/L    ALT (SGPT) 28 12 - 78 U/L    Alk. phosphatase 93 45 - 117 U/L    Protein, total 5.4 (L) 6.4 - 8.2 g/dL    Albumin 2.6 (L) 3.5 - 5.0 g/dL    Globulin 2.8 2.0 - 4.0 g/dL    A-G Ratio 0.9 (L) 1.1 - 2.2     TROPONIN-HIGH SENSITIVITY    Collection Time: 12/25/22  2:44 PM   Result Value Ref Range    Troponin-High Sensitivity 25 0 - 51 ng/L   LIPASE    Collection Time: 12/25/22  2:44 PM   Result Value Ref Range    Lipase 123 73 - 393 U/L   MAGNESIUM    Collection Time: 12/25/22  2:44 PM   Result Value Ref Range    Magnesium 1.1 (L) 1.6 - 2.4 mg/dL   ETHYL ALCOHOL    Collection Time: 12/25/22  2:44 PM   Result Value Ref Range    ALCOHOL(ETHYL),SERUM <10 <10 mg/dL   CK    Collection Time: 12/25/22  2:44 PM   Result Value Ref Range     (H) 26 - 192 U/L   TROPONIN-HIGH SENSITIVITY    Collection Time: 12/25/22  5:09 PM   Result Value Ref Range    Troponin-High Sensitivity 25 0 - 51 ng/L         Imaging:   CT HEAD WO CONT   Final Result   1. No definite acute maxillofacial fracture. Age-indeterminate bilateral nasal   bone fractures, correlate with symptomatology. 2.  No acute intracranial abnormality. .          CT SPINE CERV WO CONT   Final Result   No acute fracture nor subluxation. Chronic C6 spinous process fracture. Fat   stranding/contusion in the left supraclavicular region. CT MAXILLOFACIAL WO CONT   Final Result   1. No definite acute maxillofacial fracture. Age-indeterminate bilateral nasal   bone fractures, correlate with symptomatology. 2.  No acute intracranial abnormality. .          CT CHEST WO CONT   Final Result   1. Redemonstrated acute left humerus fracture. No definite acute rib fracture. Several chronic bilateral rib deformities. Chronic right proximal humerus   probably. 2.  No acute cardiopulmonary abnormality.       XR SHOULDER LT AP/LAT MIN 2 V   Final Result      Acute right proximal to mid humeral shaft fracture as described above. XR HUMERUS LT   Final Result      Acute right proximal to mid humeral shaft fracture as described above.           XR CHEST PORT   Final Result      No acute process on portable chest.                 Assessment:   Acute kidney injury, prerenal due to dehydration    Dehydration with hyponatremia    Right humerus fracture    Alcohol abuse    Hypomagnesemia    Tobacco abuse    Essential hypertension      Plan:   Admit to medical limit treat as inpatient   hydrate with normal saline  Replete magnesium  Orthopedic surgery consultation    CIWA alcohol withdrawal precautions      Home medications were reviewed    Signed By: Keith Dave MD     December 25, 2022

## 2022-12-26 LAB
ANION GAP SERPL CALC-SCNC: 11 MMOL/L (ref 5–15)
ATRIAL RATE: 89 BPM
BUN SERPL-MCNC: 20 MG/DL (ref 6–20)
BUN/CREAT SERPL: 18 (ref 12–20)
CA-I BLD-MCNC: 7.8 MG/DL (ref 8.5–10.1)
CALCULATED R AXIS, ECG10: 81 DEGREES
CALCULATED T AXIS, ECG11: 97 DEGREES
CHLORIDE SERPL-SCNC: 99 MMOL/L (ref 97–108)
CO2 SERPL-SCNC: 22 MMOL/L (ref 21–32)
CREAT SERPL-MCNC: 1.11 MG/DL (ref 0.55–1.02)
DIAGNOSIS, 93000: NORMAL
ERYTHROCYTE [DISTWIDTH] IN BLOOD BY AUTOMATED COUNT: 11.7 % (ref 11.5–14.5)
GLUCOSE SERPL-MCNC: 97 MG/DL (ref 65–100)
HCT VFR BLD AUTO: 28.4 % (ref 35–47)
HGB BLD-MCNC: 10.1 G/DL (ref 11.5–16)
MAGNESIUM SERPL-MCNC: 2.8 MG/DL (ref 1.6–2.4)
MCH RBC QN AUTO: 38.1 PG (ref 26–34)
MCHC RBC AUTO-ENTMCNC: 35.6 G/DL (ref 30–36.5)
MCV RBC AUTO: 107.2 FL (ref 80–99)
NRBC # BLD: 0 K/UL (ref 0–0.01)
NRBC BLD-RTO: 0 PER 100 WBC
P-R INTERVAL, ECG05: 112 MS
PLATELET # BLD AUTO: 183 K/UL (ref 150–400)
PMV BLD AUTO: 9.9 FL (ref 8.9–12.9)
POTASSIUM SERPL-SCNC: 3.8 MMOL/L (ref 3.5–5.1)
Q-T INTERVAL, ECG07: 378 MS
QRS DURATION, ECG06: 60 MS
QTC CALCULATION (BEZET), ECG08: 459 MS
RBC # BLD AUTO: 2.65 M/UL (ref 3.8–5.2)
SODIUM SERPL-SCNC: 132 MMOL/L (ref 136–145)
VENTRICULAR RATE, ECG03: 89 BPM
WBC # BLD AUTO: 7.3 K/UL (ref 3.6–11)

## 2022-12-26 PROCEDURE — 36415 COLL VENOUS BLD VENIPUNCTURE: CPT

## 2022-12-26 PROCEDURE — 97161 PT EVAL LOW COMPLEX 20 MIN: CPT

## 2022-12-26 PROCEDURE — 74011250637 HC RX REV CODE- 250/637: Performed by: INTERNAL MEDICINE

## 2022-12-26 PROCEDURE — 97530 THERAPEUTIC ACTIVITIES: CPT

## 2022-12-26 PROCEDURE — 65270000029 HC RM PRIVATE

## 2022-12-26 PROCEDURE — 74011250636 HC RX REV CODE- 250/636: Performed by: STUDENT IN AN ORGANIZED HEALTH CARE EDUCATION/TRAINING PROGRAM

## 2022-12-26 PROCEDURE — 80048 BASIC METABOLIC PNL TOTAL CA: CPT

## 2022-12-26 PROCEDURE — 97165 OT EVAL LOW COMPLEX 30 MIN: CPT

## 2022-12-26 PROCEDURE — 74011250636 HC RX REV CODE- 250/636: Performed by: INTERNAL MEDICINE

## 2022-12-26 PROCEDURE — 85027 COMPLETE CBC AUTOMATED: CPT

## 2022-12-26 PROCEDURE — 74011000250 HC RX REV CODE- 250: Performed by: INTERNAL MEDICINE

## 2022-12-26 PROCEDURE — 83735 ASSAY OF MAGNESIUM: CPT

## 2022-12-26 RX ADMIN — SODIUM CHLORIDE, PRESERVATIVE FREE 10 ML: 5 INJECTION INTRAVENOUS at 05:41

## 2022-12-26 RX ADMIN — SODIUM CHLORIDE, PRESERVATIVE FREE 10 ML: 5 INJECTION INTRAVENOUS at 13:45

## 2022-12-26 RX ADMIN — SODIUM CHLORIDE 100 ML/HR: 9 INJECTION, SOLUTION INTRAVENOUS at 05:41

## 2022-12-26 RX ADMIN — HYDROCODONE BITARTRATE AND ACETAMINOPHEN 1 TABLET: 5; 325 TABLET ORAL at 11:00

## 2022-12-26 RX ADMIN — MULTIPLE VITAMINS W/ MINERALS TAB 1 TABLET: TAB at 11:00

## 2022-12-26 RX ADMIN — SODIUM CHLORIDE, PRESERVATIVE FREE 10 ML: 5 INJECTION INTRAVENOUS at 21:24

## 2022-12-26 RX ADMIN — THIAMINE HYDROCHLORIDE: 100 INJECTION, SOLUTION INTRAMUSCULAR; INTRAVENOUS at 11:55

## 2022-12-26 RX ADMIN — ATENOLOL 25 MG: 25 TABLET ORAL at 11:00

## 2022-12-26 RX ADMIN — HYDROCODONE BITARTRATE AND ACETAMINOPHEN 1 TABLET: 5; 325 TABLET ORAL at 17:45

## 2022-12-26 NOTE — PROGRESS NOTES
2      Hospitalist Progress Note               Daily Progress Note: 12/26/2022      Subjective: The patient is seen for follow up. Danny Callahan is a 75 y.o. female with history of alcohol abuse who fell in her home last night. Since then she complains of severe pain all over as well as extreme left arm pain and difficulty walking. In the ED she was hypertensive and mildly tachycardic. Labs showed a sodium of 126, creatinine 1.36 and a magnesium of 1.1. X-ray of the right arm shows a right proximal to mid humeral shaft fracture with impaction and varus angulation     Patient admits to drinking about 2 beers a day although her significant other at the bedside motioned to me that it is significantly more than that. In fact, she was intoxicated last night when she fell     She is supposed to be on Bystolic for hypertension but stopped several weeks ago because she did not want to go into the doctor's office to get a refill    Patient was admitted, started on IV fluids and magnesium replacement    ------  Patient is seen for follow-up. She is feeling better overall. Labs show an improved sodium of 132. Creatinine has improved at 1.1. Orthopedics planning surgical repair of her humerus fracture tomorrow according to the nurse.     Problem List:  Problem List as of 12/26/2022 Date Reviewed: 9/22/2020            Codes Class Noted - Resolved    Dehydration ICD-10-CM: E86.0  ICD-9-CM: 276.51  12/25/2022 - Present        COPD exacerbation ([facility] ICD-10-CM: J44.1  ICD-9-CM: 491.21  9/22/2020 - Present        Respiratory failure with hypoxia ([facility] ICD-10-CM: J96.91  ICD-9-CM: 518.81  9/22/2020 - Present        Hypokalemia ICD-10-CM: E87.6  ICD-9-CM: 276.8  9/22/2020 - Present        Acute respiratory failure ([facility] ICD-10-CM: J96.00  ICD-9-CM: 518.81  9/22/2020 - Present           Medications reviewed  Current Facility-Administered Medications   Medication Dose Route Frequency    0.9% sodium chloride infusion  100 mL/hr IntraVENous CONTINUOUS    ketamine (KETALAR) 50 mg/mL injection 25 mg  0.5 mg/kg IntraVENous ONCE PRN    propofoL (DIPRIVAN) 10 mg/mL injection 25 mg  0.5 mg/kg IntraVENous ONCE PRN    sodium chloride (NS) flush 5-40 mL  5-40 mL IntraVENous Q8H    sodium chloride (NS) flush 5-40 mL  5-40 mL IntraVENous PRN    acetaminophen (TYLENOL) tablet 650 mg  650 mg Oral Q6H PRN    Or    acetaminophen (TYLENOL) suppository 650 mg  650 mg Rectal Q6H PRN    polyethylene glycol (MIRALAX) packet 17 g  17 g Oral DAILY PRN    promethazine (PHENERGAN) tablet 12.5 mg  12.5 mg Oral Q6H PRN    Or    ondansetron (ZOFRAN) injection 4 mg  4 mg IntraVENous Q6H PRN    enoxaparin (LOVENOX) injection 30 mg  30 mg SubCUTAneous DAILY    diazePAM (VALIUM) tablet 10 mg  10 mg Oral Q1H PRN    diazePAM (VALIUM) tablet 20 mg  20 mg Oral Q1H PRN    0.9% sodium chloride 1,765 mL with folic acid 1 mg, thiamine 100 mg, mvi (adult no. 4 with vit K) 10 mL infusion   IntraVENous DAILY    multivitamin, tx-iron-ca-min (THERA-M w/ IRON) tablet 1 Tablet  1 Tablet Oral DAILY    atenoloL (TENORMIN) tablet 25 mg  25 mg Oral DAILY    HYDROcodone-acetaminophen (NORCO) 5-325 mg per tablet 1 Tablet  1 Tablet Oral Q6H PRN    albuterol-ipratropium (DUO-NEB) 2.5 MG-0.5 MG/3 ML  3 mL Nebulization Q6H PRN    acetylcysteine (MUCOMYST) 200 mg/mL (20 %) solution 200 mg  200 mg Nebulization Q6HWA RT       Review of Systems:   A comprehensive review of systems was negative except for that written in the HPI. Objective:   Physical Exam:     Visit Vitals  /72 (BP 1 Location: Right upper arm, BP Patient Position: At rest)   Pulse 76   Temp 97.8 °F (36.6 °C)   Resp 18   Ht 5' 3\" (1.6 m)   Wt 49.9 kg (110 lb)   SpO2 100%   BMI 19.49 kg/m²    O2 Flow Rate (L/min): 2 l/min O2 Device: None (Room air)    Temp (24hrs), Av.2 °F (36.8 °C), Min:97.8 °F (36.6 °C), Max:98.3 °F (36.8 °C)    No intake/output data recorded.    1901 -  0700  In: 1050 [I.V.:1050]  Out: -     General:   Awake and alert   Lungs:   Clear to auscultation bilaterally. Chest wall:  No tenderness or deformity. Heart:  Regular rate and rhythm, S1, S2 normal, no murmur, click, rub or gallop. Abdomen:   Soft, non-tender. Bowel sounds normal. No masses,  No organomegaly. Extremities: Extremities normal, atraumatic, no cyanosis or edema. Pulses: 2+ and symmetric all extremities. Skin: Skin color, texture, turgor normal. No rashes or lesions   Neurologic: CNII-XII intact. No gross focal deficits         Data Review:       Recent Days:  Recent Labs     12/26/22  0705 12/25/22  1444   WBC 7.3 10.0   HGB 10.1* 11.8   HCT 28.4* 32.9*    237     Recent Labs     12/26/22  0705 12/25/22  1444   * 126*   K 3.8 3.9   CL 99 92*   CO2 22 21   GLU 97 121*   BUN 20 19   CREA 1.11* 1.36*   CA 7.8* 7.4*   MG 2.8* 1.1*   ALB  --  2.6*   TBILI  --  0.9   ALT  --  28   INR  --  1.0     No results for input(s): PH, PCO2, PO2, HCO3, FIO2 in the last 72 hours. 24 Hour Results:  Recent Results (from the past 24 hour(s))   CBC WITH AUTOMATED DIFF    Collection Time: 12/25/22  2:44 PM   Result Value Ref Range    WBC 10.0 3.6 - 11.0 K/uL    RBC 3.15 (L) 3.80 - 5.20 M/uL    HGB 11.8 11.5 - 16.0 g/dL    HCT 32.9 (L) 35.0 - 47.0 %    .4 (H) 80.0 - 99.0 FL    MCH 37.5 (H) 26.0 - 34.0 PG    MCHC 35.9 30.0 - 36.5 g/dL    RDW 11.6 11.5 - 14.5 %    PLATELET 620 676 - 409 K/uL    MPV 9.7 8.9 - 12.9 FL    NRBC 0.0 0.0  WBC    ABSOLUTE NRBC 0.00 0.00 - 0.01 K/uL    NEUTROPHILS 79 (H) 32 - 75 %    LYMPHOCYTES 8 (L) 12 - 49 %    MONOCYTES 12 5 - 13 %    EOSINOPHILS 0 0 - 7 %    BASOPHILS 0 0 - 1 %    IMMATURE GRANULOCYTES 1 (H) 0 - 0.5 %    ABS. NEUTROPHILS 8.0 1.8 - 8.0 K/UL    ABS. LYMPHOCYTES 0.8 0.8 - 3.5 K/UL    ABS. MONOCYTES 1.2 (H) 0.0 - 1.0 K/UL    ABS. EOSINOPHILS 0.0 0.0 - 0.4 K/UL    ABS. BASOPHILS 0.0 0.0 - 0.1 K/UL    ABS. IMM.  GRANS. 0.1 (H) 0.00 - 0.04 K/UL    DF AUTOMATED     TYPE & SCREEN    Collection Time: 12/25/22  2:44 PM   Result Value Ref Range    Crossmatch Expiration 12/28/2022,2359     ABO/Rh(D) O Negative     Antibody screen Negative    PROTHROMBIN TIME + INR    Collection Time: 12/25/22  2:44 PM   Result Value Ref Range    Prothrombin time 13.6 11.9 - 14.6 sec    INR 1.0 0.9 - 1.1     METABOLIC PANEL, COMPREHENSIVE    Collection Time: 12/25/22  2:44 PM   Result Value Ref Range    Sodium 126 (L) 136 - 145 mmol/L    Potassium 3.9 3.5 - 5.1 mmol/L    Chloride 92 (L) 97 - 108 mmol/L    CO2 21 21 - 32 mmol/L    Anion gap 13 5 - 15 mmol/L    Glucose 121 (H) 65 - 100 mg/dL    BUN 19 6 - 20 mg/dL    Creatinine 1.36 (H) 0.55 - 1.02 mg/dL    BUN/Creatinine ratio 14 12 - 20      eGFR 42 (L) >60 ml/min/1.73m2    Calcium 7.4 (L) 8.5 - 10.1 mg/dL    Bilirubin, total 0.9 0.2 - 1.0 mg/dL    AST (SGOT) 53 (H) 15 - 37 U/L    ALT (SGPT) 28 12 - 78 U/L    Alk.  phosphatase 93 45 - 117 U/L    Protein, total 5.4 (L) 6.4 - 8.2 g/dL    Albumin 2.6 (L) 3.5 - 5.0 g/dL    Globulin 2.8 2.0 - 4.0 g/dL    A-G Ratio 0.9 (L) 1.1 - 2.2     TROPONIN-HIGH SENSITIVITY    Collection Time: 12/25/22  2:44 PM   Result Value Ref Range    Troponin-High Sensitivity 25 0 - 51 ng/L   LIPASE    Collection Time: 12/25/22  2:44 PM   Result Value Ref Range    Lipase 123 73 - 393 U/L   MAGNESIUM    Collection Time: 12/25/22  2:44 PM   Result Value Ref Range    Magnesium 1.1 (L) 1.6 - 2.4 mg/dL   ETHYL ALCOHOL    Collection Time: 12/25/22  2:44 PM   Result Value Ref Range    ALCOHOL(ETHYL),SERUM <10 <10 mg/dL   CK    Collection Time: 12/25/22  2:44 PM   Result Value Ref Range     (H) 26 - 192 U/L   EKG, 12 LEAD, INITIAL    Collection Time: 12/25/22  2:46 PM   Result Value Ref Range    Ventricular Rate 89 BPM    Atrial Rate 89 BPM    P-R Interval 112 ms    QRS Duration 60 ms    Q-T Interval 378 ms    QTC Calculation (Bezet) 459 ms    Calculated R Axis 81 degrees    Calculated T Axis 97 degrees    Diagnosis Sinus rhythm with Premature atrial complexes  Otherwise normal ECG  No previous ECGs available  Confirmed by URBAN MONTALVO, Jose Borrero ([id number]) on 12/26/2022 10:06:57 AM     TROPONIN-HIGH SENSITIVITY    Collection Time: 12/25/22  5:09 PM   Result Value Ref Range    Troponin-High Sensitivity 25 0 - 51 ng/L   MAGNESIUM    Collection Time: 12/26/22  7:05 AM   Result Value Ref Range    Magnesium 2.8 (H) 1.6 - 2.4 mg/dL   METABOLIC PANEL, BASIC    Collection Time: 12/26/22  7:05 AM   Result Value Ref Range    Sodium 132 (L) 136 - 145 mmol/L    Potassium 3.8 3.5 - 5.1 mmol/L    Chloride 99 97 - 108 mmol/L    CO2 22 21 - 32 mmol/L    Anion gap 11 5 - 15 mmol/L    Glucose 97 65 - 100 mg/dL    BUN 20 6 - 20 mg/dL    Creatinine 1.11 (H) 0.55 - 1.02 mg/dL    BUN/Creatinine ratio 18 12 - 20      eGFR 54 (L) >60 ml/min/1.73m2    Calcium 7.8 (L) 8.5 - 10.1 mg/dL   CBC W/O DIFF    Collection Time: 12/26/22  7:05 AM   Result Value Ref Range    WBC 7.3 3.6 - 11.0 K/uL    RBC 2.65 (L) 3.80 - 5.20 M/uL    HGB 10.1 (L) 11.5 - 16.0 g/dL    HCT 28.4 (L) 35.0 - 47.0 %    .2 (H) 80.0 - 99.0 FL    MCH 38.1 (H) 26.0 - 34.0 PG    MCHC 35.6 30.0 - 36.5 g/dL    RDW 11.7 11.5 - 14.5 %    PLATELET 703 688 - 846 K/uL    MPV 9.9 8.9 - 12.9 FL    NRBC 0.0 0.0  WBC    ABSOLUTE NRBC 0.00 0.00 - 0.01 K/uL       CT HEAD WO CONT   Final Result   1. No definite acute maxillofacial fracture. Age-indeterminate bilateral nasal   bone fractures, correlate with symptomatology. 2.  No acute intracranial abnormality. .          CT SPINE CERV WO CONT   Final Result   No acute fracture nor subluxation. Chronic C6 spinous process fracture. Fat   stranding/contusion in the left supraclavicular region. CT MAXILLOFACIAL WO CONT   Final Result   1. No definite acute maxillofacial fracture. Age-indeterminate bilateral nasal   bone fractures, correlate with symptomatology. 2.  No acute intracranial abnormality. .          CT CHEST WO CONT   Final Result   1. Redemonstrated acute left humerus fracture. No definite acute rib fracture. Several chronic bilateral rib deformities. Chronic right proximal humerus   probably. 2.  No acute cardiopulmonary abnormality. XR SHOULDER LT AP/LAT MIN 2 V   Final Result      Acute right proximal to mid humeral shaft fracture as described above. XR HUMERUS LT   Final Result      Acute right proximal to mid humeral shaft fracture as described above. XR CHEST PORT   Final Result      No acute process on portable chest.              Assessment:  Acute kidney injury, prerenal due to dehydration    Dehydration with hyponatremia    Right humerus fracture    Alcohol abuse    Hypomagnesemia     Tobacco abuse    Essential hypertension         Plan:  Continue IV fluids  Continue to monitor labs  Awaiting surgical repair of humerus fracture      Care Plan discussed with: Patient/Family    Disposition:     Total time spent with patient: 30 minutes.     Scarlett Canavan, MD

## 2022-12-26 NOTE — PROGRESS NOTES
OCCUPATIONAL THERAPY EVALUATION  Patient: Lolita Settler (89 y.o. female)  Date: 12/26/2022  Primary Diagnosis: Dehydration [E86.0]  Procedure(s) (LRB):  LEFT HUMERUS IM NAIL (Left) Day of Surgery   Precautions: Kept NWB LUE with sling       ASSESSMENT  Pt is a 75 y.o. female presenting to [facility] with c/o a fall, admitted 12/25/22 and currently being treated for LUIS ANGEL, dehydration with hyponatremia, L humerus fracture, alcohol abuse, hypomagnesemia, tobacco abuse, and essential HTN. X-ray of LUE completed 12/25/22 shows acute proximal to mid humeral shaft fracture. Pt kept NWB on the LUE wearing sling at this time. See below for home and PLOF information. Pt received seated EOB upon arrival, AXO x4, and agreeable to OT evaluation. Based on current observations, pt presents with deficits in generalized strength/AROM, bed mobility, static/dynamic sitting balance, static/dynamic standing balance (see PT note for gait details), functional activity tolerance, coordination, cognition/confusion, decreased safety awareness, and pain currently impacting overall performance of ADLs and functional transfers/mobility (see below for objective details and assist levels). Overall, pt tolerates session fair with pt completing sit<>stand transfers and taking side steps at EOB with min-mod Ax1-2 overall. Additional time req'd for today's tasks. Pt able to bring food>mouth IND. Once returned supine pt washed face with set up to hand pt a wet wash cloth. Sling adjusted for appropriate fit on LUE with max A. Pt would benefit from continued skilled OT services to address current impairments and improve IND and safety with self cares and functional transfers/mobility. Current OT d/c recommendation Ernesto Hayes once medically appropriate.     Other factors to consider for discharge: family/social support, DME, time since onset, severity of deficits, functional baseline     Patient will benefit from skilled therapy intervention to address the above noted impairments. PLAN :  Recommendations and Planned Interventions: self care training, functional mobility training, therapeutic exercise, balance training, therapeutic activities, cognitive retraining, endurance activities, patient education, and home safety training    Recommend with staff: Encourage HEP in prep for ADLs/mobility and Use of bed/chair alarm for safety    Recommend next session: Standing grooming    Frequency/Duration: Patient will be followed by occupational therapy:  3-5x/week to address goals. Recommendation for discharge: (in order for the patient to meet his/her long term goals)  Ernesto Hayes    This discharge recommendation:  Has been made in collaboration with the attending provider and/or case management    IF patient discharges home will need the following DME: TBD       SUBJECTIVE:   Patient stated My arm hurts.     OBJECTIVE DATA SUMMARY:   HISTORY:   Past Medical History:   Diagnosis Date    HTN (hypertension)     Smoker      Past Surgical History:   Procedure Laterality Date    HX SHOULDER REPLACEMENT Right 11/06/2011    Has fracture surgical neck, has ORIF DONE       Per pt:   Home Situation  Home Environment: Private residence  One/Two Story Residence: One story  Living Alone: No  Support Systems: Spouse/Significant Other  Patient Expects to be Discharged to[de-identified] Home  Current DME Used/Available at Home: None    Hand dominance: Right    EXAMINATION OF PERFORMANCE DEFICITS:  Cognitive/Behavioral Status:  Neurologic State: Alert  Orientation Level: Oriented X4  Cognition: Follows commands    Hearing: Auditory  Auditory Impairment: None    Range of Motion:  AROM: Generally decreased, functional (Grossly decreased LUE)    Strength:  Strength: Generally decreased, functional (Grossly decreased LUE)    Coordination:  Fine Motor Skills-Upper: Left Intact; Right Intact    Gross Motor Skills-Upper: Left Impaired;Right Intact    Balance:  Sitting: Impaired  Sitting - Static: Good (unsupported)  Sitting - Dynamic: Fair (occasional)  Standing: Impaired  Standing - Static: Poor  Standing - Dynamic : Poor    Functional Mobility and Transfers for ADLs:  Bed Mobility:  Sit to Supine: Minimum assistance;Assist x2    Transfers:  Sit to Stand: Moderate assistance; Additional time  Stand to Sit: Moderate assistance; Additional time  Bed to Chair: Moderate assistance (Simulated side steps at EOB)      ADL Intervention and task modifications:  Grooming  Position Performed: Other (comment) (Semi supine)  Washing Face: Set-up    Lower Body Dressing Assistance  Dressing Assistance: Maximum assistance (Donning sling)  Socks: Total assistance (dependent)    Therapeutic Exercise:  Pt would benefit from UE HEP in prep for ADLs and functional mobility/transfers. Functional Measure:    [facility] AM-PACTM \"6 Clicks\"                                                       Daily Activity Inpatient Short Form  How much help from another person does the patient currently need. .. Total; A Lot A Little None   1. Putting on and taking off regular lower body clothing? [x]  1 []  2 []  3 []  4   2. Bathing (including washing, rinsing, drying)? [x]  1 []  2 []  3 []  4   3. Toileting, which includes using toilet, bedpan or urinal? [x] 1 []  2 []  3 []  4   4. Putting on and taking off regular upper body clothing? []  1 [x]  2 []  3 []  4   5. Taking care of personal grooming such as brushing teeth? []  1 [x]  2 []  3 []  4   6. Eating meals? []  1 []  2 [x]  3 []  4   © 2007, Trustees of [facility], under license to "[employer]". All rights reserved     Score: 10/24     Interpretation of Tool:  Represents clinically-significant functional categories (i.e. Activities of daily living).   Percentage of Impairment CH    0%   CI    1-19% CJ    20-39% CK    40-59% CL    60-79% CM    80-99% CN     100%   AMPA  Score 6-24 24 23 20-22 15-19 10-14 7-9 6     Occupational Therapy Evaluation Charge Determination   History Examination Decision-Making   LOW Complexity : Brief history review  MEDIUM Complexity : 3-5 performance deficits relating to physical, cognitive , or psychosocial skils that result in activity limitations and / or participation restrictions LOW Complexity : No comorbidities that affect functional and no verbal or physical assistance needed to complete eval tasks       Based on the above components, the patient evaluation is determined to be of the following complexity level: LOW     Pain Ratin/10 LUE, neck, and back    Activity Tolerance:   Fair and requires rest breaks    After treatment patient left in no apparent distress:    Supine in bed, Call bell within reach, Bed / chair alarm activated, and Side rails x 3, bed locked and in lowest position    COMMUNICATION/EDUCATION:   The patients plan of care was discussed with: Physical therapist and Registered nurse. Patient/family agree to work toward stated goals and plan of care. This patients plan of care is appropriate for delegation to [facility]. PT/OT sessions occurred together for increased safety of pt and clinician. Thank you for this referral.  Love Cazares OT  Time Calculation: 27 mins    Problem: Self Care Deficits Care Plan (Adult)  Goal: *Acute Goals and Plan of Care (Insert Text)  Description:   FUNCTIONAL STATUS PRIOR TO ADMISSION: Patient was independent and active without use of DME. Patient was independent for basic and instrumental ADLs. HOME SUPPORT: The patient lived with her boyfriend but did not require assist.    Occupational Therapy Goals  Initiated 2022  Patient Goal: Move and do things with less pain in LUE. 1.  Patient will perform lower body dressing with modified independence within 7 day(s). 2.  Patient will perform grooming with modified independence within 7 day(s).   3.  Patient will perform bathing with modified independence within 7 day(s). 4.  Patient will perform toilet transfers with modified independence within 7 day(s). 5.  Patient will perform all aspects of toileting with modified independence within 7 day(s). 6.  Patient will participate in upper extremity therapeutic exercise/activities with modified independence within 7 day(s).     Outcome: Not Met

## 2022-12-26 NOTE — PROGRESS NOTES
Reason for Admission:  Fall, Dehydration                     RUR Score:  11%                   Plan for utilizing home health:  TBD        PCP: First and Last name:  None     Name of Practice:    Are you a current patient: Yes/No:    Approximate date of last visit:    Can you participate in a virtual visit with your PCP:                     Current Advanced Directive/Advance Care Plan: Full Code      Healthcare Decision Maker:   Click here to complete [address] including selection of the [address] Relationship (ie \"Primary\")      Pallavi Dillifer, friend, Pt stated that she would rather have Jose Merino as her decision maker should she not be able to. Pt stated that she does not have any children. Pt did not know Jose Merino phone number, she stated that she lives in [place]. Transition of Care Plan:      Met f/f with Pt, she confirmed that the information on the face sheet is correct. Pt stated that she has lived with her boyfriend Russ Dubon for 25 years. Pt stated no HH, no DME and independent with ADL. Pt stated that she uses [address] Pt stated that Russ Dubon will give her a ride home when she is D/C. CM dispo: TBD, HH or SNF, will wait for PT/OT eval/recommendations.

## 2022-12-26 NOTE — PROGRESS NOTES
Problem: Pressure Injury - Risk of  Goal: *Prevention of pressure injury  Description: Document Barrie Scale and appropriate interventions in the flowsheet.   Outcome: Progressing Towards Goal  Note: Pressure Injury Interventions:  Sensory Interventions: Keep linens dry and wrinkle-free    Moisture Interventions: Internal/External urinary devices    Activity Interventions: PT/OT evaluation    Mobility Interventions: PT/OT evaluation    Nutrition Interventions: Document food/fluid/supplement intake

## 2022-12-26 NOTE — ED NOTES
Bedside shift change report given to [address] [place] [address] (oncoming nurse) by Alondra Dunne RN (offgoing nurse). Report included the following information SBAR, ED Summary, MAR, and Recent Results.

## 2022-12-26 NOTE — PROGRESS NOTES
Problem: Pressure Injury - Risk of  Goal: *Prevention of pressure injury  Description: Document Barrie Scale and appropriate interventions in the flowsheet. Note: Pressure Injury Interventions:  Sensory Interventions: Float heels, Keep linens dry and wrinkle-free    Moisture Interventions: Absorbent underpads, Internal/External urinary devices    Activity Interventions: Increase time out of bed    Mobility Interventions: Float heels    Nutrition Interventions: Document food/fluid/supplement intake                     Problem: Falls - Risk of  Goal: *Absence of Falls  Description: Document Newton Fall Risk and appropriate interventions in the flowsheet.   Note: Fall Risk Interventions:

## 2022-12-26 NOTE — PROGRESS NOTES
PHYSICAL THERAPY EVALUATION  Patient: Amado Orona (84 y.o. female)  Date: 12/26/2022  Primary Diagnosis: Dehydration [E86.0]  Procedure(s) (LRB):  LEFT HUMERUS IM NAIL (Left) * Surgery Date in Future *   Precautions: Fall      ASSESSMENT  Pt is a 75 y.o. female admitted on 12/25/2022 for fall and fx left humerus PT/OT eval recieved ; pt currently being treated for ortho intervntion(await Sx) . Pt sitting at eob upon PT arrival, agreeable to evaluation. Pt A&O x 4. Based on the objective data described, the patient presents with generalized weakness, impaired functional mobility, impaired amb, impaired balance, and decreased endurance to activities. (See below for objective details and assist levels). Able to stand at eob and took 3 steps to the Hob with gait belt and assist.Patient has gen weakness. Left arm in the sling and sling was adjusted for proper position by OT/PT. return to bed with assist and left arm supported on the pillow. Overall pt tolerated session good today with PT. Pt will benefit from continued skilled PT to address above deficits and return to PLOF. Current PT DC recommendation Ernesto Hayes once medically appropriate. Patient was kept NWB LUE  Current Level of Function Impacting Discharge (mobility/balance): decreased gen strength and endurance    Other factors to consider for discharge: SNF      PLAN :  Recommendations and Planned Interventions: bed mobility training, transfer training, gait training, therapeutic exercises, patient and family training/education, and therapeutic activities      Recommend for staff: Out of bed to chair for meals and Amb to bathroom for toileting with gt belt and AD    Frequency/Duration: Patient will be followed by physical therapy:  3-5x/week to address goals.     Recommendation for discharge: (in order for the patient to meet his/her long term goals)  Ernesto Hayes    This discharge recommendation:  Has been made in collaboration with the attending provider and/or case management    IF patient discharges home will need the following DME: to be determined (TBD)         SUBJECTIVE:   Patient stated I am ok.     OBJECTIVE DATA SUMMARY:   HISTORY:    Past Medical History:   Diagnosis Date    HTN (hypertension)     Smoker      Past Surgical History:   Procedure Laterality Date    HX SHOULDER REPLACEMENT Right 11/06/2011    Has fracture surgical neck, has ORIF DONE       Home Situation  Home Environment: Private residence  One/Two Story Residence: One story  Living Alone: No  Support Systems: Spouse/Significant Other  Patient Expects to be Discharged to[de-identified] Home  Current DME Used/Available at Home: None    EXAMINATION/PRESENTATION/DECISION MAKING:   Critical Behavior:  Neurologic State: Alert  Orientation Level: Oriented X4  Cognition: Follows commands     Hearing: Auditory  Auditory Impairment: None  Skin:  black and blue LUE  Edema: LUE  Range Of Motion:  AROM: Generally decreased, functional                       Strength:    Strength: Generally decreased, functional                    Tone & Sensation:                                  Functional Mobility:  Bed Mobility:        Sit to Supine: Minimum assistance;Assist x2     Transfers:  Sit to Stand: Moderate assistance; Additional time  Stand to Sit: Moderate assistance; Additional time        Bed to Chair: Moderate assistance (Simulated side steps at EOB)              Balance:   Sitting: Impaired  Sitting - Static: Good (unsupported)  Sitting - Dynamic: Fair (occasional)  Standing: Impaired  Standing - Static: Poor  Standing - Dynamic : Poor  Ambulation/Gait Training:  Distance (ft): 3 Feet (ft)  Assistive Device: Gait belt  Ambulation - Level of Assistance: Moderate assistance     Gait Description (WDL): Exceptions to WDL                                         Stairs:               Therapeutic Exercises:   AP and laq reviewed and performed at eob    Functional Measure:  [facility] [address] [address] AM-PAC 6 Clicks         Basic Mobility Inpatient Short Form  How much difficulty does the patient currently have. .. Unable A Lot A Little None   1. Turning over in bed (including adjusting bedclothes, sheets and blankets)? [] 1   [] 2   [x] 3   [] 4   2. Sitting down on and standing up from a chair with arms ( e.g., wheelchair, bedside commode, etc.)   [] 1   [] 2   [x] 3   [] 4   3. Moving from lying on back to sitting on the side of the bed? [] 1   [] 2   [x] 3   [] 4          How much help from another person does the patient currently need. .. Total A Lot A Little None   4. Moving to and from a bed to a chair (including a wheelchair)? [] 1   [] 2   [x] 3   [] 4   5. Need to walk in hospital room? [] 1   [x] 2   [] 3   [] 4   6. Climbing 3-5 steps with a railing? [] 1   [x] 2   [] 3   [] 4   © , Trustees of [facility], under license to [employer]. All rights reserved     Score:  Initial:  Most Recent: X (Date: 2022 )   Interpretation of Tool:  Represents activities that are increasingly more difficult (i.e. Bed mobility, Transfers, Gait).   Score 24 23 22-20 19-15 14-10 9-7 6   Modifier CH CI CJ CK CL CM CN         Physical Therapy Evaluation Charge Determination   History Examination Presentation Decision-Making   LOW Complexity : Zero comorbidities / personal factors that will impact the outcome / POC LOW Complexity : 1-2 Standardized tests and measures addressing body structure, function, activity limitation and / or participation in recreation  LOW Complexity : Stable, uncomplicated  Other outcome measures [facility] 6        Based on the above components, the patient evaluation is determined to be of the following complexity level: LOW     Pain Ratin/10 LUE    Activity Tolerance:   Fair    After treatment patient left in no apparent distress:   Bed locked and in lowest position Supine in bed, Call bell within reach, Bed / chair alarm activated, and Side rails x 3 Keegan Pavon GOALS:    Problem: Mobility Impaired (Adult and Pediatric)  Goal: *Acute Goals and Plan of Care (Insert Text)  Description: Patient stated goal: get better  Patient will move from supine to sit and sit to supine , scoot up and down, and roll side to side in bed with supervision/set-up within 7 day(s). Patient will transfer from bed to chair and chair to bed with supervision/set-up using the least restrictive device within 7 day(s). Patient will improve static standing balance to supervision within 1 week(s). Patient will ambulate 50 feet with supervision with least restrictive device within 1 weeks. Outcome: Progressing Towards Goal       COMMUNICATION/EDUCATION:   The patients plan of care was discussed with: Occupational therapist, Registered nurse, and Case management. Fall prevention education was provided and the patient/caregiver indicated understanding., Patient/family have participated as able in goal setting and plan of care. , and Patient/family agree to work toward stated goals and plan of care.          Thank you for this referral.  Ene Alanis, PT   Time Calculation: 20 mins

## 2022-12-26 NOTE — ED NOTES
TRANSFER - OUT REPORT:    Verbal report given to [facility] RN(name) on Pat Settler  being transferred to  (unit) for routine progression of care       Report consisted of patients Situation, Background, Assessment and   Recommendations(SBAR). Information from the following report(s) SBAR, ED Summary, MAR, and Recent Results was reviewed with the receiving nurse. Lines:   Peripheral IV 12/25/22 Anterior;Proximal;Right Forearm (Active)        Opportunity for questions and clarification was provided.       Patient transported with:   [employer]

## 2022-12-27 PROCEDURE — 74011000250 HC RX REV CODE- 250: Performed by: INTERNAL MEDICINE

## 2022-12-27 PROCEDURE — 97530 THERAPEUTIC ACTIVITIES: CPT

## 2022-12-27 PROCEDURE — 74011250636 HC RX REV CODE- 250/636: Performed by: INTERNAL MEDICINE

## 2022-12-27 PROCEDURE — 65270000029 HC RM PRIVATE

## 2022-12-27 PROCEDURE — 74011250637 HC RX REV CODE- 250/637: Performed by: INTERNAL MEDICINE

## 2022-12-27 RX ADMIN — DIAZEPAM 10 MG: 5 TABLET ORAL at 17:33

## 2022-12-27 RX ADMIN — MULTIPLE VITAMINS W/ MINERALS TAB 1 TABLET: TAB at 08:49

## 2022-12-27 RX ADMIN — HYDROCODONE BITARTRATE AND ACETAMINOPHEN 1 TABLET: 5; 325 TABLET ORAL at 19:43

## 2022-12-27 RX ADMIN — ATENOLOL 25 MG: 25 TABLET ORAL at 08:49

## 2022-12-27 RX ADMIN — SODIUM CHLORIDE, PRESERVATIVE FREE 10 ML: 5 INJECTION INTRAVENOUS at 14:13

## 2022-12-27 RX ADMIN — ACETAMINOPHEN 650 MG: 325 TABLET ORAL at 12:21

## 2022-12-27 RX ADMIN — HYDROCODONE BITARTRATE AND ACETAMINOPHEN 1 TABLET: 5; 325 TABLET ORAL at 12:21

## 2022-12-27 RX ADMIN — ENOXAPARIN SODIUM 30 MG: 100 INJECTION SUBCUTANEOUS at 08:49

## 2022-12-27 RX ADMIN — SODIUM CHLORIDE, PRESERVATIVE FREE 10 ML: 5 INJECTION INTRAVENOUS at 21:25

## 2022-12-27 RX ADMIN — SODIUM CHLORIDE, PRESERVATIVE FREE 10 ML: 5 INJECTION INTRAVENOUS at 08:45

## 2022-12-27 RX ADMIN — THIAMINE HYDROCHLORIDE: 100 INJECTION, SOLUTION INTRAMUSCULAR; INTRAVENOUS at 14:12

## 2022-12-27 RX ADMIN — DIAZEPAM 10 MG: 5 TABLET ORAL at 12:21

## 2022-12-27 NOTE — PROGRESS NOTES
OCCUPATIONAL THERAPY TREATMENT  Patient: Sherrell Georges (89 y.o. female)  Date: 12/27/2022  Diagnosis: Dehydration [E86.0] <principal problem not specified>  Procedure(s) (LRB):  LEFT HUMERUS IM NAIL (Left) Day of Surgery  Precautions:    Chart, occupational therapy assessment, plan of care, and goals were reviewed. ASSESSMENT  Pt continues with skilled OT/PT services and is progressing towards goals. Pt received semi-supine in bed upon arrival, AXO x4 and agreeable to LORD/ PTA tx at this time. Pt co-tx due level of pain, level of assistance needed  and activity tolerance. Overall, pt continues to present with deficits in generalized strength/AROM, coordination, bed mobility, static/dynamic sitting and standing balance and functional activity tolerance during performance of ADLs/mobility (see below for objective details and assist levels). Pt tolerated session fair, required freq rest breaks. LORD unable to correct position of L UE in sling d/t level of pain. Edema noted in L hand, Hand positioned on blanket at end of session to support wrist/ hand. Pt able to stand for ~2 minutes w/ min a x2, HHA and gait belt. Able to take a couple of side steps to hob w/ much encouragement. Pt required extended amounts of time with all mobility. Pt completed limited UE therex, d/t pain level. Education provided on supporting L wrist and hand and completing hand flex/ext exercises to help manage edema. Pt left with all known needs met. Will continue to progress. Recommend d/c to [facility] once medically appropriate. Other factors to consider for discharge: PLOF, time since onset, severity of deficits, and decline from functional baseline. PLAN :  Patient continues to benefit from skilled intervention to address the above impairments. Continue treatment per established plan of care. to address goals.     Recommendation for discharge: (in order for the patient to meet his/her long term goals)  Ernesto Freddy    This discharge recommendation:  Has been made in collaboration with the attending provider and/or case management    IF patient discharges home will need the following DME: TBD       SUBJECTIVE:   Patient stated That gown is too big, I am not going to wear it.     OBJECTIVE DATA SUMMARY:   Cognitive/Behavioral Status:  Neurologic State: Alert  Orientation Level: Oriented X4  Cognition: Decreased command following;Decreased attention/concentration      Functional Mobility and Transfers for ADLs:  Bed Mobility:  Rolling: Bed Modified; Additional time; Adaptive equipment  Supine to Sit: Bed Modified; Additional time  Sit to Supine: Moderate assistance;Assist x1  Scooting: Maximum assistance;Assist x2 (to [facility])    Transfers:  Sit to Stand: Minimum assistance;Assist x2; Additional time      Balance:  Sitting: Impaired; Without support  Sitting - Static: Good (unsupported)  Sitting - Dynamic: Fair (occasional)  Standing: Impaired; With support (HHA)  Standing - Static: Poor;Constant support  Standing - Dynamic : Poor;Constant support      Therapeutic Exercises:   Exercise Sets Reps AROM AAROM PROM Self PROM Comments   Hand flex/ ext 1 10 [x] [] [] [] L limited d/t edema       Pain:  9/10 L UE, ribs, back    Activity Tolerance:   Fair and requires frequent rest breaks    After treatment patient left in no apparent distress:   Supine in bed, Call bell within reach, Bed / chair alarm activated, and Side rails x 3, bed locked and in lowest position    COMMUNICATION/COLLABORATION:   The patients plan of care was discussed with: Physical therapy assistant and Registered nurse. PT/OT sessions occurred together for increased safety of pt and clinician.      NADEEM Villeda  Time Calculation: 40 mins    Problem: Self Care Deficits Care Plan (Adult)  Goal: *Acute Goals and Plan of Care (Insert Text)  Description:   FUNCTIONAL STATUS PRIOR TO ADMISSION: Patient was independent and active without use of DME. Patient was independent for basic and instrumental ADLs. HOME SUPPORT: The patient lived with her boyfriend but did not require assist.    Occupational Therapy Goals  Initiated 12/26/2022  Patient Goal: Move and do things with less pain in LUE. 1.  Patient will perform lower body dressing with modified independence within 7 day(s). 2.  Patient will perform grooming with modified independence within 7 day(s). 3.  Patient will perform bathing with modified independence within 7 day(s). 4.  Patient will perform toilet transfers with modified independence within 7 day(s). 5.  Patient will perform all aspects of toileting with modified independence within 7 day(s). 6.  Patient will participate in upper extremity therapeutic exercise/activities with modified independence within 7 day(s).     Outcome: Progressing Towards Goal

## 2022-12-27 NOTE — PROGRESS NOTES
CM attempted to meet with patient to discuss DCP. PT/OT working with patient. CM will follow-up.    ------------    1100- CM met with patient at bedside to discuss therapy recs for SNF. Patient denied wanting to go to any facility and initially denied [facility], stating that she would call to have it arranged if she changed her mind. CM explained that her PCP will have to set-up [facility] if the hospital doesn't arrange it. CM explained that CM could set it up and she could change her mind later if she feels like she doesn't need it and patient agreed. No preference of agency. CM sent referrals. ------------    1120- Patient accepted with [facility].

## 2022-12-27 NOTE — PROGRESS NOTES
Hospitalist Progress Note         Pilar Molina MD          Daily Progress Note: 12/27/2022      Subjective: The patient is seen for follow  up. 75 y.o. female with history of alcohol abuse who fell in her home last night. Since then she complains of severe pain all over as well as extreme left arm pain and difficulty walking. In the ED she was hypertensive and mildly tachycardic. Labs showed a sodium of 126, creatinine 1.36 and a magnesium of 1.1. X-ray of the left arm shows a proximal to mid humeral shaft fracture with impaction and varus angulation. Patient seen by orthopedic surgeon and request conservative management for now. Will be reevaluated in 10 days after swelling/erythema improves.     Problem List:  Problem List as of 12/27/2022 Date Reviewed: 9/22/2020            Codes Class Noted - Resolved    Dehydration ICD-10-CM: E86.0  ICD-9-CM: 276.51  12/25/2022 - Present        COPD exacerbation ([facility] ICD-10-CM: J44.1  ICD-9-CM: 491.21  9/22/2020 - Present        Respiratory failure with hypoxia [facility]) ICD-10-CM: J96.91  ICD-9-CM: 518.81  9/22/2020 - Present        Hypokalemia ICD-10-CM: E87.6  ICD-9-CM: 276.8  9/22/2020 - Present        Acute respiratory failure ([facility] ICD-10-CM: J96.00  ICD-9-CM: 518.81  9/22/2020 - Present           Medications reviewed  Current Facility-Administered Medications   Medication Dose Route Frequency    0.9% sodium chloride infusion  100 mL/hr IntraVENous CONTINUOUS    sodium chloride (NS) flush 5-40 mL  5-40 mL IntraVENous Q8H    sodium chloride (NS) flush 5-40 mL  5-40 mL IntraVENous PRN    acetaminophen (TYLENOL) tablet 650 mg  650 mg Oral Q6H PRN    Or    acetaminophen (TYLENOL) suppository 650 mg  650 mg Rectal Q6H PRN    polyethylene glycol (MIRALAX) packet 17 g  17 g Oral DAILY PRN    promethazine (PHENERGAN) tablet 12.5 mg  12.5 mg Oral Q6H PRN    Or    ondansetron (ZOFRAN) injection 4 mg  4 mg IntraVENous Q6H PRN    enoxaparin (LOVENOX) injection 30 mg  30 mg SubCUTAneous DAILY    diazePAM (VALIUM) tablet 10 mg  10 mg Oral Q1H PRN    diazePAM (VALIUM) tablet 20 mg  20 mg Oral Q1H PRN    0.9% sodium chloride 6,240 mL with folic acid 1 mg, thiamine 100 mg, mvi (adult no. 4 with vit K) 10 mL infusion   IntraVENous DAILY    multivitamin, tx-iron-ca-min (THERA-M w/ IRON) tablet 1 Tablet  1 Tablet Oral DAILY    HYDROcodone-acetaminophen (NORCO) 5-325 mg per tablet 1 Tablet  1 Tablet Oral Q6H PRN    albuterol-ipratropium (DUO-NEB) 2.5 MG-0.5 MG/3 ML  3 mL Nebulization Q6H PRN    acetylcysteine (MUCOMYST) 200 mg/mL (20 %) solution 200 mg  200 mg Nebulization Q6HWA RT       Review of Systems:   A comprehensive review of systems was negative except for that written in the HPI. Objective:   Physical Exam:     Visit Vitals  /71 (BP 1 Location: Left leg, BP Patient Position: At rest)   Pulse 87   Temp 97.7 °F (36.5 °C)   Resp 18   Ht 5' 3\" (1.6 m)   Wt 49.9 kg (110 lb)   SpO2 98%   BMI 19.49 kg/m²    O2 Flow Rate (L/min): 2 l/min O2 Device: None (Room air)    Temp (24hrs), Av.5 °F (36.4 °C), Min:97.3 °F (36.3 °C), Max:97.7 °F (36.5 °C)    No intake/output data recorded.  1901 -  0700  In: 125 [I.V.:125]  Out: 75 [Urine:75]    General:  Alert, cooperative, no distress, appears stated age. Lungs:   Clear to auscultation bilaterally. Chest wall:  No tenderness or deformity. Heart:  Regular rate and rhythm, S1, S2 normal, no murmur, click, rub or gallop. Abdomen:   Soft, non-tender. Bowel sounds normal. No masses,  No organomegaly. Extremities: Extremities normal, atraumatic, no cyanosis or edema. Pulses: 2+ and symmetric all extremities. Skin: Skin color, texture, turgor normal. No rashes or lesions   Neurologic: CNII-XII intact.  No gross sensory or motor deficits     Data Review:       Recent Days:  Recent Labs     22  0705 22  1444   WBC 7.3 10.0   HGB 10.1* 11.8   HCT 28.4* 32.9*    237 Recent Labs     12/26/22  0705 12/25/22  1444   * 126*   K 3.8 3.9   CL 99 92*   CO2 22 21   GLU 97 121*   BUN 20 19   CREA 1.11* 1.36*   CA 7.8* 7.4*   MG 2.8* 1.1*   ALB  --  2.6*   TBILI  --  0.9   ALT  --  28   INR  --  1.0     No results for input(s): PH, PCO2, PO2, HCO3, FIO2 in the last 72 hours. 24 Hour Results:  No results found for this or any previous visit (from the past 24 hour(s)). Assessment/     Acute kidney injury, prerenal due to dehydration. Improved    Dehydration with hyponatremia    Left humerus fracture    Alcohol abuse    Hypomagnesemia     Tobacco abuse    Essential hypertension         Plan:  Continue supportive care  Monitor routine electrolytes  PT OT evaluation  Await placement to skilled nursing facility      Care Plan discussed with: Patient/Family    Total time spent with patient: 30 minutes.     Carlito Woody MD

## 2022-12-27 NOTE — PROGRESS NOTES
Problem: Mobility Impaired (Adult and Pediatric)  Goal: *Acute Goals and Plan of Care (Insert Text)  Description: Patient stated goal: get better  Patient will move from supine to sit and sit to supine , scoot up and down, and roll side to side in bed with supervision/set-up within 7 day(s). Patient will transfer from bed to chair and chair to bed with supervision/set-up using the least restrictive device within 7 day(s). Patient will improve static standing balance to supervision within 1 week(s). Patient will ambulate 50 feet with supervision with least restrictive device within 1 weeks. Outcome: Progressing Towards Goal   PHYSICAL THERAPY TREATMENT  Patient: Merrill Bhagat (19 y.o. female)  Date: 12/27/2022  Diagnosis: Dehydration [E86.0] <principal problem not specified>  Procedure(s) (LRB):  LEFT HUMERUS IM NAIL (Left) Day of Surgery  Precautions:    Chart, physical therapy assessment, plan of care and goals were reviewed. ASSESSMENT  Patient continues with skilled PT services and is progressing towards goals. Pt found semi upon PTA/LORD arrival, agreeable to session. (See below for objective details and assist levels). Overall pt tolerated session fair today with bed mobility and transfers, limited by pain. Pt demo'd improved mobility requiring less A. Pt performed sit <>stand with less A, min A x2 with HHA and increased time, able to maintain static standing balance ~2 mins and performed side steps to [facility]. Pt complains of dizziness with transfers requiring additional time to recover, BP sitting /64, in sitting post stand /89. Pt required increased time throughout session with mobility and constant redirection to task. Will continue to benefit from skilled PT services, and will continue to progress as tolerated.      Current Level of Function Impacting Discharge (mobility/balance): functional mobility     Other factors to consider for discharge: PLOF and amount of A needed for mobility          PLAN :  Patient continues to benefit from skilled intervention to address the above impairments. Continue treatment per established plan of care to address goals. Recommend with staff: Out of bed to chair for meals and Encourage HEP in prep for ADLs/mobility    Recommendation for discharge: (in order for the patient to meet his/her long term goals)  Ernesto Freddy    This discharge recommendation:  Has been made in collaboration with the attending provider and/or case management    IF patient discharges home will need the following DME: to be determined (TBD)       SUBJECTIVE:   Patient stated I have broken ribs.     OBJECTIVE DATA SUMMARY:   Critical Behavior:  Neurologic State: Alert  Orientation Level: Oriented X4  Cognition: Decreased command following, Decreased attention/concentration     Functional Mobility Training:  Bed Mobility:  Rolling: Bed Modified; Additional time; Adaptive equipment  Supine to Sit: Bed Modified; Additional time  Sit to Supine: Moderate assistance;Assist x1  Scooting: Maximum assistance;Assist x2 (to [facility])  Transfers:  Sit to Stand: Minimum assistance;Assist x2; Additional time  Stand to Sit: Minimum assistance;Assist x2; Additional time  Balance:  Sitting: Impaired; Without support  Sitting - Static: Good (unsupported)  Sitting - Dynamic: Fair (occasional)  Standing: Impaired; With support (HHA)  Standing - Static: Poor;Constant support  Standing - Dynamic : Poor;Constant support  Ambulation/Gait Training:     Assistive Device: Gait belt (HHA)  Ambulation - Level of Assistance:  Moderate assistance       Pain Ratin/10    Activity Tolerance:   Fair and requires frequent rest breaks    After treatment patient left in no apparent distress:   Bed locked and returned to lowest position, Supine in bed, Call bell within reach, and Bed / chair alarm activated    COMMUNICATION/COLLABORATION:   The patients plan of care was discussed with: Occupational therapy assistant and Registered nurse.      PTA/LORD cotreat to maximize pt and clinician safety     Gladys Bullock PTA, PT   Time Calculation: 40 mins

## 2022-12-28 PROCEDURE — 74011000250 HC RX REV CODE- 250: Performed by: INTERNAL MEDICINE

## 2022-12-28 PROCEDURE — 74011250637 HC RX REV CODE- 250/637: Performed by: INTERNAL MEDICINE

## 2022-12-28 PROCEDURE — 97530 THERAPEUTIC ACTIVITIES: CPT

## 2022-12-28 PROCEDURE — 74011250636 HC RX REV CODE- 250/636: Performed by: INTERNAL MEDICINE

## 2022-12-28 PROCEDURE — 74011250637 HC RX REV CODE- 250/637: Performed by: ORTHOPAEDIC SURGERY

## 2022-12-28 PROCEDURE — 65270000029 HC RM PRIVATE

## 2022-12-28 RX ORDER — ASCORBIC ACID 500 MG
1000 TABLET ORAL 2 TIMES DAILY
Status: DISPENSED | OUTPATIENT
Start: 2022-12-28 | End: 2023-01-02

## 2022-12-28 RX ORDER — HYDROCODONE BITARTRATE AND ACETAMINOPHEN 5; 325 MG/1; MG/1
1 TABLET ORAL
Qty: 20 TABLET | Refills: 0 | Status: SHIPPED | OUTPATIENT
Start: 2022-12-28 | End: 2022-12-31

## 2022-12-28 RX ADMIN — OXYCODONE HYDROCHLORIDE AND ACETAMINOPHEN 1000 MG: 500 TABLET ORAL at 09:57

## 2022-12-28 RX ADMIN — THIAMINE HYDROCHLORIDE: 100 INJECTION, SOLUTION INTRAMUSCULAR; INTRAVENOUS at 10:58

## 2022-12-28 RX ADMIN — SODIUM CHLORIDE, PRESERVATIVE FREE 10 ML: 5 INJECTION INTRAVENOUS at 18:29

## 2022-12-28 RX ADMIN — ENOXAPARIN SODIUM 30 MG: 100 INJECTION SUBCUTANEOUS at 09:58

## 2022-12-28 RX ADMIN — SODIUM CHLORIDE, PRESERVATIVE FREE 10 ML: 5 INJECTION INTRAVENOUS at 06:14

## 2022-12-28 RX ADMIN — HYDROCODONE BITARTRATE AND ACETAMINOPHEN 1 TABLET: 5; 325 TABLET ORAL at 12:15

## 2022-12-28 RX ADMIN — HYDROCODONE BITARTRATE AND ACETAMINOPHEN 1 TABLET: 5; 325 TABLET ORAL at 06:16

## 2022-12-28 RX ADMIN — MULTIPLE VITAMINS W/ MINERALS TAB 1 TABLET: TAB at 09:57

## 2022-12-28 RX ADMIN — DIAZEPAM 10 MG: 5 TABLET ORAL at 18:28

## 2022-12-28 RX ADMIN — SODIUM CHLORIDE, PRESERVATIVE FREE 10 ML: 5 INJECTION INTRAVENOUS at 21:05

## 2022-12-28 RX ADMIN — HYDROCODONE BITARTRATE AND ACETAMINOPHEN 1 TABLET: 5; 325 TABLET ORAL at 18:28

## 2022-12-28 NOTE — PROGRESS NOTES
Problem: Mobility Impaired (Adult and Pediatric)  Goal: *Acute Goals and Plan of Care (Insert Text)  Description: Patient stated goal: get better  Patient will move from supine to sit and sit to supine , scoot up and down, and roll side to side in bed with supervision/set-up within 7 day(s). Patient will transfer from bed to chair and chair to bed with supervision/set-up using the least restrictive device within 7 day(s). Patient will improve static standing balance to supervision within 1 week(s). Patient will ambulate 50 feet with supervision with least restrictive device within 1 weeks. Outcome: Progressing Towards Goal   PHYSICAL THERAPY TREATMENT  Patient: Calista Whalen (29 y.o. female)  Date: 12/28/2022  Diagnosis: Dehydration [E86.0] <principal problem not specified>  Procedure(s) (LRB):  LEFT HUMERUS IM NAIL (Left) 1 Day Post-Op  Precautions:    Chart, physical therapy assessment, plan of care and goals were reviewed. ASSESSMENT  Patient continues with skilled PT services and is progressing towards goals. Pt found sitting in bed upon PTA/LORD arrival, agreeable to session. (See below for objective details and assist levels). Overall pt tolerated session fair today with transfers and seated therex. Pt performed bed mobility with less A and able to transfer to bedside commode, demo's improved ambulation with beronica walk. Returned to bed and demo'd seated therex, no complaints of fatigue or increase in breathing noted. Will continue to benefit from skilled PT services, and will continue to progress as tolerated. Current Level of Function Impacting Discharge (mobility/balance): functional mobility     Other factors to consider for discharge: safety          PLAN :  Patient continues to benefit from skilled intervention to address the above impairments. Continue treatment per established plan of care to address goals.     Recommend with staff: Out of bed to chair for meals, Encourage HEP in prep for ADLs/mobility, and Amb to bathroom for toileting with gt belt and AD    Recommendation for discharge: (in order for the patient to meet his/her long term goals)  Ernesto Floyd discharge recommendation:  Has been made in collaboration with the attending provider and/or case management    IF patient discharges home will need the following DME: to be determined (TBD)       SUBJECTIVE:   Patient stated Ryan Bottoms does everyone want to tell me what to do.     OBJECTIVE DATA SUMMARY:   Critical Behavior:  Neurologic State: Alert  Orientation Level: Oriented X4  Cognition: Decreased command following     Functional Mobility Training:  Bed Mobility:  Rolling: Bed Modified; Additional time;Minimum assistance  Supine to Sit: Additional time;Bed Modified;Stand-by assistance  Sit to Supine: Minimum assistance;Assist x2  Scooting: Minimum assistance;Assist x2  Transfers:  Sit to Stand: Minimum assistance;Assist x2; Additional time  Stand to Sit: Minimum assistance; Additional time;Assist x2  Balance:  Sitting: Intact  Sitting - Static: Good (unsupported)  Sitting - Dynamic: Fair (occasional)  Standing: Impaired  Standing - Static: Fair  Standing - Dynamic : Poor  Ambulation/Gait Training:  Distance (ft): 4 Feet (ft)  Assistive Device: Gait belt;Walker beronica  Ambulation - Level of Assistance: Minimal assistance      Therapeutic Exercises:       EXERCISE   Sets   Reps   Active Active Assist   Passive Self ROM   Comments   Ankle Pumps 1 10 [x] [] [] []    Quad Sets/Glut Sets   [] [] [] []    Hamstring Sets   [] [] [] []    Short Arc Quads   [] [] [] []    Heel Slides   [] [] [] []    Straight Leg Raises   [] [] [] []    Hip abd/add   [] [] [] []    Long Arc Quads 1 10 [x] [] [] []    Marching 1 10 [x] [] [] []       [] [] [] []       Pain Rating:  Pre mobility pt denies pain, 7/10 headache and LUE    Activity Tolerance:   Fair and requires rest breaks    After treatment patient left in no apparent distress:   Bed locked and returned to lowest position, Call bell within reach and long sitting in bed    COMMUNICATION/COLLABORATION:   The patients plan of care was discussed with: Occupational therapy assistant and Registered nurse.      PTA/LORD cotreat to maximize pt and clinician safety     Bhavin Zarco PTA, PT   Time Calculation: 39 mins

## 2022-12-28 NOTE — PROGRESS NOTES
OCCUPATIONAL THERAPY TREATMENT  Patient: Magalis Raymond (89 y.o. female)  Date: 12/28/2022  Diagnosis: Dehydration [E86.0] <principal problem not specified>  Procedure(s) (LRB):  LEFT HUMERUS IM NAIL (Left) 1 Day Post-Op  Precautions:    Chart, occupational therapy assessment, plan of care, and goals were reviewed. ASSESSMENT  Pt continues with skilled OT/PT services and is progressing towards goals. Pt received semi-supine in bed upon arrival, AXO x4 and agreeable to LORD/ PTA tx at this time. Overall, pt continues to present with deficits in generalized strength/AROM, coordination, bed mobility, static/dynamic sitting and standing balance and functional activity tolerance during performance of ADLs/mobility (see below for objective details and assist levels). Pt tolerated session fair, required freq rest breaks and vc's for safety awareness. Pt requesting use of BSC. Pt donned sling w/ total A. Education provided on positioning of sling w/ pt vocalizing fair understanding. Pt min a x 2 to t/f to/ from bsc. Pt set up for perineal care. Pt sat eob for ~10 minutes and completed U/L body therex. See PTA note for LE therex. UE therex completed w/ R UE only, see grid below for details, to increase strength/ endurance to aid in adl performance . Pt demonstrated impaired safety awareness during session and decreased command following. Will continue to progress. Recommend d/c to [facility] once medically appropriate. Other factors to consider for discharge: PLOF, time since onset, severity of deficits and decline from functional baseline         PLAN :  Patient continues to benefit from skilled intervention to address the above impairments. Continue treatment per established plan of care. to address goals. Recommend with staff: Out of bed to chair for meals and Encourage HEP in prep for ADLs/mobility    Recommend next session:  Toileting and Seated grooming    Recommendation for discharge: (in order for the patient to meet his/her long term goals)  Ernesto Hayes    This discharge recommendation:  Has been made in collaboration with the attending provider and/or case management    IF patient discharges home will need the following DME: TBD       SUBJECTIVE:   Patient stated if you just let me do it my way.     OBJECTIVE DATA SUMMARY:   Cognitive/Behavioral Status:  Neurologic State: Alert  Orientation Level: Oriented X4  Cognition: Decreased command following      Functional Mobility and Transfers for ADLs:  Bed Mobility:  Rolling: Bed Modified; Additional time;Minimum assistance  Supine to Sit: Additional time;Bed Modified;Stand-by assistance  Sit to Supine: Minimum assistance;Assist x2  Scooting: Minimum assistance;Assist x2    Transfers:  Sit to Stand: Minimum assistance;Assist x2; Additional time  Functional Transfers  Toilet Transfer : Minimum assistance;Assist x1;Additional time; Adaptive equipment    Balance:  Sitting: Intact  Sitting - Static: Good (unsupported)  Sitting - Dynamic: Fair (occasional)  Standing: Impaired  Standing - Static: Fair  Standing - Dynamic : Poor    ADL Intervention:    Toileting  Bladder Hygiene: Set-up; Supervision  Bowel Hygiene: Supervision;Set-up         Therapeutic Exercises:   Exercise Sets Reps AROM AAROM PROM Self PROM Comments   Shoulder flex/ext 1 10 [x] [] [] []  L UE only   Elbow flex/ext 1 10 [x] [] [] []    Wrist flex/ext 1 10 [x] [] [] []    Hand flex/ext 1 10 [x] [] [] []        Pain:  7/10    Activity Tolerance:   Fair and requires frequent rest breaks    After treatment patient left in no apparent distress:   Supine in bed, Call bell within reach, Bed / chair alarm activated, and Side rails x 3, bed locked and in lowest position    COMMUNICATION/COLLABORATION:   The patients plan of care was discussed with: Physical therapy assistant and Registered nurse. PT/OT sessions occurred together for increased safety of pt and clinician.      Carmen Ambriz NADEEM Hernandez  Time Calculation: 39 mins     Problem: Self Care Deficits Care Plan (Adult)  Goal: *Acute Goals and Plan of Care (Insert Text)  Description:   FUNCTIONAL STATUS PRIOR TO ADMISSION: Patient was independent and active without use of DME. Patient was independent for basic and instrumental ADLs. HOME SUPPORT: The patient lived with her boyfriend but did not require assist.    Occupational Therapy Goals  Initiated 12/26/2022  Patient Goal: Move and do things with less pain in LUE. 1.  Patient will perform lower body dressing with modified independence within 7 day(s). 2.  Patient will perform grooming with modified independence within 7 day(s). 3.  Patient will perform bathing with modified independence within 7 day(s). 4.  Patient will perform toilet transfers with modified independence within 7 day(s). 5.  Patient will perform all aspects of toileting with modified independence within 7 day(s). 6.  Patient will participate in upper extremity therapeutic exercise/activities with modified independence within 7 day(s).     Outcome: Progressing Towards Goal

## 2022-12-28 NOTE — ROUTINE PROCESS
Bedside shift change report given to Nancy Floyd (oncoming nurse) by HORTENSIA Byrd (offgoing nurse). Report included the following information SBAR, MAR, and Recent Results.

## 2022-12-28 NOTE — DISCHARGE SUMMARY
Physician Discharge Summary     Patient ID:    Leslie Lemos  [id number]  75 y.o.  1955    Admit date: 12/25/2022    Discharge date : 12/28/2022    Chronic Diagnoses:    Problem List as of 12/28/2022 Date Reviewed: 9/22/2020            Codes Class Noted - Resolved    Dehydration ICD-10-CM: E86.0  ICD-9-CM: 276.51  12/25/2022 - Present        COPD exacerbation ([facility] ICD-10-CM: J44.1  ICD-9-CM: 491.21  9/22/2020 - Present        Respiratory failure with hypoxia [facility]) ICD-10-CM: J96.91  ICD-9-CM: 518.81  9/22/2020 - Present        Hypokalemia ICD-10-CM: E87.6  ICD-9-CM: 276.8  9/22/2020 - Present        Acute respiratory failure ([facility] ICD-10-CM: J96.00  ICD-9-CM: 518.81  9/22/2020 - Present       22    Final Diagnoses:   Dehydration [E86.0]     Acute kidney injury, prerenal due to dehydration. Improved    Dehydration with hyponatremia    Left humerus fracture    Alcohol abuse    Hypomagnesemia     Tobacco abuse    Essential hypertension    Reason for Hospitalization:    Ground level fall    Hospital Course:     75 y.o. female with history of alcohol abuse who fell in her home last night. Since then she complains of severe pain all over as well as extreme left arm pain and difficulty walking. In the ED she was hypertensive and mildly tachycardic. Labs showed a sodium of 126, creatinine 1.36 and a magnesium of 1.1. X-ray of the left arm shows a proximal to mid humeral shaft fracture with impaction and varus angulation. Patient seen by orthopedic surgeon and request conservative management for now. Will be reevaluated in 10 days after swelling/erythema improves. Recommended discharge to skilled care facility but patient adamant about going home with home health.             Discharge Medications:   Current Discharge Medication List        START taking these medications    Details   HYDROcodone-acetaminophen (NORCO) 5-325 mg per tablet Take 1 Tablet by mouth every six (6) hours as needed for Pain for up to 3 days. Max Daily Amount: 4 Tablets. Qty: 20 Tablet, Refills: 0  Start date: 2022, End date: 2022    Associated Diagnoses: Other closed displaced fracture of proximal end of left humerus, initial encounter           CONTINUE these medications which have NOT CHANGED    Details   nebivoloL (BYSTOLIC) 10 mg tablet Take 10 mg by mouth daily. Indications: high blood pressure      multivitamin, tx-iron-ca-min (THERA-M w/ IRON) 9 mg iron-400 mcg tab tablet Take 1 Tab by mouth daily. Indications: treatment to prevent mineral deficiency, treatment to prevent vitamin deficiency               Follow up Care:    1. None in 1-2 weeks. Please call to set up an appointment shortly after discharge. Diet:  Cardiac Diet    Disposition:  Home. Advanced Directive:   FULL    DNR      Discharge Exam:  Visit Vitals  /62 (BP 1 Location: Left leg, BP Patient Position: At rest)   Pulse 78   Temp 97.5 °F (36.4 °C)   Resp 18   Ht 5' 3\" (1.6 m)   Wt 49.9 kg (110 lb)   SpO2 97%   BMI 19.49 kg/m²    O2 Flow Rate (L/min): 2 l/min O2 Device: None (Room air)    Temp (24hrs), Av.1 °F (36.7 °C), Min:97.5 °F (36.4 °C), Max:98.5 °F (36.9 °C)    No intake/output data recorded.  1901 -  0700  In: 125 [I.V.:125]  Out: 75 [Urine:75]    General:  Alert, cooperative, no distress, appears stated age. Lungs:   Clear to auscultation bilaterally. Chest wall:  No tenderness or deformity. Heart:  Regular rate and rhythm, S1, S2 normal, no murmur, click, rub or gallop. Abdomen:   Soft, non-tender. Bowel sounds normal. No masses,  No organomegaly. Extremities: Extremities normal, atraumatic, no cyanosis or edema. Pulses: 2+ and symmetric all extremities. Skin: Skin color, texture, turgor normal. No rashes or lesions   Neurologic: CNII-XII intact.  No gross sensory or motor deficits         CONSULTATIONS: Orthopedic Surgery    Significant Diagnostic Studies:   2022: BUN 19 mg/dL (Ref range: 6 - 20 mg/dL); Calcium 7.4 mg/dL (L; Ref range: 8.5 - 10.1 mg/dL); CO2 21 mmol/L (Ref range: 21 - 32 mmol/L); Creatinine 1.36 mg/dL (H; Ref range: 0.55 - 1.02 mg/dL); Glucose 121 mg/dL (H; Ref range: 65 - 100 mg/dL); HCT 32.9 % (L; Ref range: 35.0 - 47.0 %); HGB 11.8 g/dL (Ref range: 11.5 - 16.0 g/dL); Potassium 3.9 mmol/L (Ref range: 3.5 - 5.1 mmol/L); Sodium 126 mmol/L (L; Ref range: 136 - 145 mmol/L)  12/26/2022: BUN 20 mg/dL (Ref range: 6 - 20 mg/dL); Calcium 7.8 mg/dL (L; Ref range: 8.5 - 10.1 mg/dL); CO2 22 mmol/L (Ref range: 21 - 32 mmol/L); Creatinine 1.11 mg/dL (H; Ref range: 0.55 - 1.02 mg/dL); Glucose 97 mg/dL (Ref range: 65 - 100 mg/dL); HCT 28.4 % (L; Ref range: 35.0 - 47.0 %); HGB 10.1 g/dL (L; Ref range: 11.5 - 16.0 g/dL); Potassium 3.8 mmol/L (Ref range: 3.5 - 5.1 mmol/L); Sodium 132 mmol/L (L; Ref range: 136 - 145 mmol/L)  Recent Labs     12/26/22  0705 12/25/22  1444   WBC 7.3 10.0   HGB 10.1* 11.8   HCT 28.4* 32.9*    237     Recent Labs     12/26/22  0705 12/25/22  1444   * 126*   K 3.8 3.9   CL 99 92*   CO2 22 21   BUN 20 19   CREA 1.11* 1.36*   GLU 97 121*   CA 7.8* 7.4*   MG 2.8* 1.1*     Recent Labs     12/25/22  1444   ALT 28   AP 93   TBILI 0.9   TP 5.4*   ALB 2.6*   GLOB 2.8   LPSE 123     Recent Labs     12/25/22  1444   INR 1.0   PTP 13.6      No results for input(s): FE, TIBC, PSAT, FERR in the last 72 hours. No results for input(s): PH, PCO2, PO2 in the last 72 hours.   Recent Labs     12/25/22  1444   *     No results found for: GLUCPOC    Total Time: 35 minutes    Signed:  Daniel Booth MD  12/28/2022  1:32 PM

## 2022-12-28 NOTE — WOUND CARE
Wound Care Note:      Wound Care into see patient because of skin tear to left lower arm    Patient states that she fell at home and has fractured her left arm, left arm is edematous and purple/blue in color. Patient does not have sling on, states that it got blood all over it and it was removed. Patient states that she is not sure why her shoulder is not being operated on as she is in so much pain. Nurse notified of patient pain. Breakthrough bleeding/drainage noted to old bandage, old bandage removed and bleeding noted to posterior lower arm. Wound cleansed with NS, [place] applied to area and covered with abd pads and secured with rolled gauze. Dressing to be changed daily and PRN for soiling. May need to schedule dressing changes around pain medication.          Skin Care & Pressure Relief Recommendations:  Minimize layers of linen  Pads under patient to optimize support surface and microclimate  Turn/Reposition approximately every 2 hours  Pillow/Wedge  Manage Incontinence  Promote Continence; Skin Protective lotion to buttocks and sacrum daily and as needed with incontinence care  Offload heels with pillows or offloading boots      Alec Waller RN, BSN  [facility] [employer]

## 2022-12-28 NOTE — PROGRESS NOTES
DC order noted. Patient accepted with [facility] once PCP has been confirmed. Patient previously denied wanting to go to a SNF. CM spoke with patient and she does not have a PCP. Patient states that she would rather have a female PCP. CM gave info to patient for Verna Neff DNP. CM explained that once she sees her PCP, they can set her up with [facility]. Patient verbalized understanding. As CM was in room going over DCP. Dr. Yris Clancy enters room stating that he has patient tentatively scheduled in the OR with him tomorrow and to hold the DC. CM notified Dr. Leanna Mcduffie via [employer]. DC delay entered. CM will continue to follow.

## 2022-12-28 NOTE — PROGRESS NOTES
Problem: Falls - Risk of  Goal: *Absence of Falls  Description: Document Earma Thomas Fall Risk and appropriate interventions in the flowsheet.   Outcome: Progressing Towards Goal  Note: Fall Risk Interventions:  Mobility Interventions: (P) Bed/chair exit alarm         Medication Interventions: Patient to call before getting OOB    Elimination Interventions: Bed/chair exit alarm, Call light in reach    History of Falls Interventions: Bed/chair exit alarm         Problem: Pain  Goal: *Control of Pain  Outcome: Progressing Towards Goal

## 2022-12-29 ENCOUNTER — ANESTHESIA EVENT (OUTPATIENT)
Dept: SURGERY | Age: 67
DRG: 981 | End: 2022-12-29
Payer: MEDICARE

## 2022-12-29 ENCOUNTER — ANESTHESIA (OUTPATIENT)
Dept: SURGERY | Age: 67
DRG: 981 | End: 2022-12-29
Payer: MEDICARE

## 2022-12-29 PROCEDURE — 74011000258 HC RX REV CODE- 258: Performed by: INTERNAL MEDICINE

## 2022-12-29 PROCEDURE — 74011000250 HC RX REV CODE- 250: Performed by: INTERNAL MEDICINE

## 2022-12-29 PROCEDURE — 74011250637 HC RX REV CODE- 250/637: Performed by: INTERNAL MEDICINE

## 2022-12-29 PROCEDURE — 65270000029 HC RM PRIVATE

## 2022-12-29 PROCEDURE — 74011250636 HC RX REV CODE- 250/636: Performed by: INTERNAL MEDICINE

## 2022-12-29 PROCEDURE — 74011250637 HC RX REV CODE- 250/637: Performed by: ORTHOPAEDIC SURGERY

## 2022-12-29 PROCEDURE — 93005 ELECTROCARDIOGRAM TRACING: CPT

## 2022-12-29 RX ORDER — DILTIAZEM HYDROCHLORIDE 5 MG/ML
10 INJECTION INTRAVENOUS ONCE
Status: COMPLETED | OUTPATIENT
Start: 2022-12-29 | End: 2022-12-29

## 2022-12-29 RX ORDER — SODIUM CHLORIDE 9 MG/ML
250 INJECTION, SOLUTION INTRAVENOUS CONTINUOUS
Status: DISCONTINUED | OUTPATIENT
Start: 2022-12-29 | End: 2022-12-31

## 2022-12-29 RX ORDER — DILTIAZEM HYDROCHLORIDE 30 MG/1
30 TABLET, FILM COATED ORAL EVERY 6 HOURS
Status: DISCONTINUED | OUTPATIENT
Start: 2022-12-29 | End: 2022-12-29

## 2022-12-29 RX ADMIN — MULTIPLE VITAMINS W/ MINERALS TAB 1 TABLET: TAB at 09:03

## 2022-12-29 RX ADMIN — SODIUM CHLORIDE 250 ML/HR: 9 INJECTION, SOLUTION INTRAVENOUS at 15:28

## 2022-12-29 RX ADMIN — THIAMINE HYDROCHLORIDE: 100 INJECTION, SOLUTION INTRAMUSCULAR; INTRAVENOUS at 09:03

## 2022-12-29 RX ADMIN — DILTIAZEM HYDROCHLORIDE 10 MG: 5 INJECTION, SOLUTION INTRAVENOUS at 19:25

## 2022-12-29 RX ADMIN — HYDROCODONE BITARTRATE AND ACETAMINOPHEN 1 TABLET: 5; 325 TABLET ORAL at 09:03

## 2022-12-29 RX ADMIN — OXYCODONE HYDROCHLORIDE AND ACETAMINOPHEN 1000 MG: 500 TABLET ORAL at 21:58

## 2022-12-29 RX ADMIN — DILTIAZEM HYDROCHLORIDE 30 MG: 30 TABLET, FILM COATED ORAL at 15:27

## 2022-12-29 RX ADMIN — SODIUM CHLORIDE, PRESERVATIVE FREE 10 ML: 5 INJECTION INTRAVENOUS at 05:28

## 2022-12-29 RX ADMIN — SODIUM CHLORIDE, PRESERVATIVE FREE 10 ML: 5 INJECTION INTRAVENOUS at 22:01

## 2022-12-29 RX ADMIN — DILTIAZEM HYDROCHLORIDE 5 MG/HR: 5 INJECTION, SOLUTION INTRAVENOUS at 19:44

## 2022-12-29 RX ADMIN — HYDROCODONE BITARTRATE AND ACETAMINOPHEN 1 TABLET: 5; 325 TABLET ORAL at 19:20

## 2022-12-29 RX ADMIN — DIAZEPAM 10 MG: 5 TABLET ORAL at 01:31

## 2022-12-29 RX ADMIN — OXYCODONE HYDROCHLORIDE AND ACETAMINOPHEN 1000 MG: 500 TABLET ORAL at 09:03

## 2022-12-29 NOTE — PROGRESS NOTES
Orthopedic progress note    Date:2022       Room:[facility]  Patient Ricardo Pedraza     Date of Birth:[birth year]     Age:67 y.o. Subjective    54-year-old female seen in follow-up for left proximal humerus fracture. Patient is scheduled for surgery today with Dr. Marina Grant. Patient is complaining of moderate pain of her left upper extremity. She does not feel her pain medication helps as much. No other complaints at this time. Objective           Vitals Last 24 Hours:  TEMPERATURE:  Temp  Av.6 °F (36.4 °C)  Min: 97.2 °F (36.2 °C)  Max: 97.9 °F (36.6 °C)  RESPIRATIONS RANGE: Resp  Av  Min: 18  Max: 18  PULSE OXIMETRY RANGE: SpO2  Av.3 %  Min: 93 %  Max: 98 %  PULSE RANGE: Pulse  Av  Min: 71  Max: 117  BLOOD PRESSURE RANGE: Systolic (13LTH), NTO:773 , Min:106 , PU   ; Diastolic (28VWY), PYL:17, Min:55, Max:83    Current Facility-Administered Medications   Medication Dose Route Frequency    ascorbic acid (vitamin C) (VITAMIN C) tablet 1,000 mg  1,000 mg Oral BID    sodium chloride (NS) flush 5-40 mL  5-40 mL IntraVENous Q8H    sodium chloride (NS) flush 5-40 mL  5-40 mL IntraVENous PRN    acetaminophen (TYLENOL) tablet 650 mg  650 mg Oral Q6H PRN    Or    acetaminophen (TYLENOL) suppository 650 mg  650 mg Rectal Q6H PRN    polyethylene glycol (MIRALAX) packet 17 g  17 g Oral DAILY PRN    promethazine (PHENERGAN) tablet 12.5 mg  12.5 mg Oral Q6H PRN    Or    ondansetron (ZOFRAN) injection 4 mg  4 mg IntraVENous Q6H PRN    [Held by provider] enoxaparin (LOVENOX) injection 30 mg  30 mg SubCUTAneous DAILY    diazePAM (VALIUM) tablet 10 mg  10 mg Oral Q1H PRN    diazePAM (VALIUM) tablet 20 mg  20 mg Oral Q1H PRN    0.9% sodium chloride 2,318 mL with folic acid 1 mg, thiamine 100 mg, mvi (adult no. 4 with vit K) 10 mL infusion   IntraVENous DAILY    multivitamin, tx-iron-ca-min (THERA-M w/ IRON) tablet 1 Tablet  1 Tablet Oral DAILY    HYDROcodone-acetaminophen (NORCO) 5-325 mg per tablet 1 Tablet  1 Tablet Oral Q6H PRN    albuterol-ipratropium (DUO-NEB) 2.5 MG-0.5 MG/3 ML  3 mL Nebulization Q6H PRN          I/O (24Hr): Intake/Output Summary (Last 24 hours) at 12/29/2022 1240  Last data filed at 12/29/2022 0138  Gross per 24 hour   Intake --   Output 250 ml   Net -250 ml     Objective:  Vital signs: (most recent): Blood pressure 121/83, pulse 76, temperature 97.9 °F (36.6 °C), resp. rate 18, height 5' 3\" (1.6 m), weight 49.9 kg (110 lb), SpO2 98 %. Labs/Imaging/Diagnostics    Labs:  CBC:No results for input(s): WBC, RBC, HGB, HCT, MCV, RDW, PLT, HGBEXT, HCTEXT, PLTEXT in the last 72 hours. CHEMISTRIES:No results for input(s): NA, K, CL, CO2, BUN, CREA, CA, PHOS, MG in the last 72 hours. No lab exists for component: GLUCOSEPT/INR:No results for input(s): INR, INREXT in the last 72 hours. No lab exists for component: PROTIME  APTT:No results for input(s): APTT in the last 72 hours. LIVER PROFILE:No results for input(s): AST, ALT in the last 72 hours. No lab exists for component: Clayton Stokes, ALKPHOS  Lab Results   Component Value Date/Time    ALT (SGPT) 28 12/25/2022 02:44 PM    AST (SGOT) 53 (H) 12/25/2022 02:44 PM    Alk. phosphatase 93 12/25/2022 02:44 PM    Bilirubin, total 0.9 12/25/2022 02:44 PM       Physical Exam:  Left upper extremity: Shoulder sling in place. Mild swelling to left hand. Limited range of motion of fingers left hand secondary to pain.  strength is 3 out of 5. EPL intact. Radial pulse palpable. Left upper extremity appears neurovascularly intact. Assessment//Plan           Patient Active Problem List    Diagnosis Date Noted    Dehydration 12/25/2022    COPD exacerbation ([facility] 09/22/2020    Respiratory failure with hypoxia ([facility] 09/22/2020    Hypokalemia 09/22/2020    Acute respiratory failure ([facility] 09/22/2020     Status post left proximal humerus fracture  Plan for the OR today with Dr. Krys Franks. All questions answered.   Consent signed.       Electronically signed by Heather Philippe PA-C on 12/29/2022 at 12:40 PM

## 2022-12-29 NOTE — PROGRESS NOTES
ORTHO ATTENDING NOTE:    Patient was seen and examined, chart was reviewed, radiographs and lab studies were reviewed in detail, on December 27 as well as today. Patient was previously seen and evaluated by my partner Dr. Olivia Gomez for her left humerus fracture. IMPRESSION:  Comminuted, displaced unstable fracture of left humerus shaft and surgical neck, with extensive ecchymosis and bruising of left upper extremity, in a patient with multiple comorbidities and risk factors. PLAN:  I had a detailed discussion with the patient, yesterday as well as today, about the nature of the injury to her left arm, its natural history and treatment options. Surgical treatment with internal fixation of the fracture would mitigate some complications such as malunion, nonunion and poor functional outcome, but may be attended with increased risk of wound healing problems, surgical site infection, and neurovascular injury, among others. The patient expressed her desire to proceed with surgical treatment. She has a previous history of a right shoulder fracture which was treated with surgery in the past.  I have tentatively scheduled for surgery for tomorrow, pending medical clearance and optimization, and availability of specialized implants as well as OR time.

## 2022-12-29 NOTE — PROGRESS NOTES
Problem: Pressure Injury - Risk of  Goal: *Prevention of pressure injury  Description: Document Barrie Scale and appropriate interventions in the flowsheet. Outcome: Progressing Towards Goal  Note: Pressure Injury Interventions:  Sensory Interventions: Keep linens dry and wrinkle-free    Moisture Interventions: Absorbent underpads    Activity Interventions: Assess need for specialty bed    Mobility Interventions: Assess need for specialty bed, PT/OT evaluation    Nutrition Interventions: Document food/fluid/supplement intake                     Problem: Patient Education: Go to Patient Education Activity  Goal: Patient/Family Education  Outcome: Progressing Towards Goal     Problem: Falls - Risk of  Goal: *Absence of Falls  Description: Document Dima Crane Fall Risk and appropriate interventions in the flowsheet.   Outcome: Progressing Towards Goal  Note: Fall Risk Interventions:  Mobility Interventions: Bed/chair exit alarm, Patient to call before getting OOB, Utilize walker, cane, or other assistive device         Medication Interventions: Bed/chair exit alarm    Elimination Interventions: Bed/chair exit alarm, Call light in reach, Patient to call for help with toileting needs    History of Falls Interventions: Bed/chair exit alarm, Door open when patient unattended

## 2022-12-29 NOTE — PROGRESS NOTES
Problem: Pressure Injury - Risk of  Goal: *Prevention of pressure injury  Description: Document Barrie Scale and appropriate interventions in the flowsheet. Outcome: Progressing Towards Goal  Note: Pressure Injury Interventions:  Sensory Interventions: Keep linens dry and wrinkle-free, Maintain/enhance activity level, Minimize linen layers    Moisture Interventions: Absorbent underpads, Internal/External urinary devices, Minimize layers    Activity Interventions: Assess need for specialty bed    Mobility Interventions: Assess need for specialty bed, Float heels, HOB 30 degrees or less    Nutrition Interventions: Document food/fluid/supplement intake                     Problem: Patient Education: Go to Patient Education Activity  Goal: Patient/Family Education  Outcome: Progressing Towards Goal     Problem: Falls - Risk of  Goal: *Absence of Falls  Description: Document Newton Fall Risk and appropriate interventions in the flowsheet.   Outcome: Progressing Towards Goal  Note: Fall Risk Interventions:  Mobility Interventions: Bed/chair exit alarm, Patient to call before getting OOB, Utilize walker, cane, or other assistive device         Medication Interventions: Bed/chair exit alarm    Elimination Interventions: Bed/chair exit alarm, Call light in reach, Patient to call for help with toileting needs    History of Falls Interventions: Bed/chair exit alarm, Door open when patient unattended         Problem: Patient Education: Go to Patient Education Activity  Goal: Patient/Family Education  Outcome: Progressing Towards Goal     Problem: Pain  Goal: *Control of Pain  Outcome: Progressing Towards Goal     Problem: Patient Education: Go to Patient Education Activity  Goal: Patient/Family Education  Outcome: Progressing Towards Goal     Problem: Patient Education: Go to Patient Education Activity  Goal: Patient/Family Education  Outcome: Progressing Towards Goal     Problem: Patient Education: Go to Patient Education Activity  Goal: Patient/Family Education  Outcome: Progressing Towards Goal

## 2022-12-29 NOTE — PROGRESS NOTES
Hospitalist Progress Note         Pilar Molina MD          Daily Progress Note: 12/29/2022      Subjective: The patient is seen for follow  up. 75 y.o. female with history of alcohol abuse who fell in her home last night. Since then she complains of severe pain all over as well as extreme left arm pain and difficulty walking. In the ED she was hypertensive and mildly tachycardic. Labs showed a sodium of 126, creatinine 1.36 and a magnesium of 1.1. X-ray of the left arm shows a proximal to mid humeral shaft fracture with impaction and varus angulation. Patient seen by orthopedic surgeon.   Plans of surgical repair of left upper extremity fracture at this afternoon.,      Problem List:  Problem List as of 12/29/2022 Date Reviewed: 9/22/2020            Codes Class Noted - Resolved    Dehydration ICD-10-CM: E86.0  ICD-9-CM: 276.51  12/25/2022 - Present        COPD exacerbation ([facility] ICD-10-CM: J44.1  ICD-9-CM: 491.21  9/22/2020 - Present        Respiratory failure with hypoxia [facility]) ICD-10-CM: J96.91  ICD-9-CM: 518.81  9/22/2020 - Present        Hypokalemia ICD-10-CM: E87.6  ICD-9-CM: 276.8  9/22/2020 - Present        Acute respiratory failure ([facility] ICD-10-CM: J96.00  ICD-9-CM: 518.81  9/22/2020 - Present         Medications reviewed  Current Facility-Administered Medications   Medication Dose Route Frequency    ascorbic acid (vitamin C) (VITAMIN C) tablet 1,000 mg  1,000 mg Oral BID    sodium chloride (NS) flush 5-40 mL  5-40 mL IntraVENous Q8H    sodium chloride (NS) flush 5-40 mL  5-40 mL IntraVENous PRN    acetaminophen (TYLENOL) tablet 650 mg  650 mg Oral Q6H PRN    Or    acetaminophen (TYLENOL) suppository 650 mg  650 mg Rectal Q6H PRN    polyethylene glycol (MIRALAX) packet 17 g  17 g Oral DAILY PRN    promethazine (PHENERGAN) tablet 12.5 mg  12.5 mg Oral Q6H PRN    Or    ondansetron (ZOFRAN) injection 4 mg  4 mg IntraVENous Q6H PRN    [Held by provider] enoxaparin (LOVENOX) injection 30 mg  30 mg SubCUTAneous DAILY    diazePAM (VALIUM) tablet 10 mg  10 mg Oral Q1H PRN    diazePAM (VALIUM) tablet 20 mg  20 mg Oral Q1H PRN    0.9% sodium chloride 8,408 mL with folic acid 1 mg, thiamine 100 mg, mvi (adult no. 4 with vit K) 10 mL infusion   IntraVENous DAILY    multivitamin, tx-iron-ca-min (THERA-M w/ IRON) tablet 1 Tablet  1 Tablet Oral DAILY    HYDROcodone-acetaminophen (NORCO) 5-325 mg per tablet 1 Tablet  1 Tablet Oral Q6H PRN    albuterol-ipratropium (DUO-NEB) 2.5 MG-0.5 MG/3 ML  3 mL Nebulization Q6H PRN       Review of Systems:   A comprehensive review of systems was negative except for that written in the HPI. Objective:   Physical Exam:     Visit Vitals  /83 (BP 1 Location: Right lower arm, BP Patient Position: At rest;Supine)   Pulse 76   Temp 97.9 °F (36.6 °C)   Resp 18   Ht 5' 3\" (1.6 m)   Wt 49.9 kg (110 lb)   SpO2 98%   BMI 19.49 kg/m²    O2 Flow Rate (L/min): 2 l/min O2 Device: None (Room air)    Temp (24hrs), Av.6 °F (36.4 °C), Min:97.2 °F (36.2 °C), Max:97.9 °F (36.6 °C)    No intake/output data recorded.  1901 -  0700  In: -   Out: 250 [Urine:250]    General:  Alert, cooperative, no distress, appears stated age. Lungs:   Clear to auscultation bilaterally. Chest wall:  No tenderness or deformity. Heart:  Regular rate and rhythm, S1, S2 normal, no murmur, click, rub or gallop. Abdomen:   Soft, non-tender. Bowel sounds normal. No masses,  No organomegaly. Extremities: Extremities normal, atraumatic, no cyanosis or edema. Pulses: 2+ and symmetric all extremities. Skin: Skin color, texture, turgor normal. No rashes or lesions   Neurologic: CNII-XII intact. No gross sensory or motor deficits     Data Review:       Recent Days:  No results for input(s): WBC, HGB, HCT, PLT, HGBEXT, HCTEXT, PLTEXT, HGBEXT, HCTEXT, PLTEXT in the last 72 hours.     No results for input(s): NA, K, CL, CO2, GLU, BUN, CREA, CA, MG, PHOS, ALB, TBIL, TBILI, ALT, INR, INREXT, INREXT in the last 72 hours. No lab exists for component: SGOT    No results for input(s): PH, PCO2, PO2, HCO3, FIO2 in the last 72 hours. 24 Hour Results:  No results found for this or any previous visit (from the past 24 hour(s)). Assessment/     Acute kidney injury, prerenal due to dehydration. Improved    Dehydration with hyponatremia    Left humerus fracture    Alcohol abuse    Hypomagnesemia     Tobacco abuse    Essential hypertension         Plan:  Continue supportive care  Monitor routine electrolytes  Follow-up post fracture repair later today  Await placement to skilled nursing facility      Care Plan discussed with: Patient/Family    Total time spent with patient: 30 minutes.     Yue Jewell MD

## 2022-12-29 NOTE — PROGRESS NOTES
OT tx session attempted at [phone], however pt on hold per RN. Will continue to follow pt and attempt tx session at a later time as time allows. Thank you.

## 2022-12-29 NOTE — PROGRESS NOTES
Patient Aox3. Patient reports 9/10 left forearm pain. Patient upset with only being able to eat clear liguids until 10am. Dr. Joann Street at bedside and this RN explained about protocol for surgery this afternoon. Patient talking on phone and eating broth, morning meds given. CBWR.

## 2022-12-29 NOTE — PROGRESS NOTES
Spoke to Dr. Gabriela Kidd regarding elevated HR sustaining in the 150s. He ordered 250ml/hr NS bolus and cardizem. Charge Nurse Bryson Henriquez made aware.

## 2022-12-29 NOTE — ANESTHESIA PREPROCEDURE EVALUATION
Relevant Problems   RESPIRATORY SYSTEM   (+) COPD exacerbation (HCC)       Anesthetic History   No history of anesthetic complications            Review of Systems / Medical History  Patient summary reviewed and pertinent labs reviewed    Pulmonary    COPD: severe      Smoker         Neuro/Psych   Within defined limits           Cardiovascular    Hypertension                Comments: Sinus rhythm with Premature atrial complexes   GI/Hepatic/Renal         Renal disease: CRI       Endo/Other        Anemia     Other Findings   Comments: EtOH abuse         Past Medical History:   Diagnosis Date    HTN (hypertension)     Smoker        Past Surgical History:   Procedure Laterality Date    HX SHOULDER REPLACEMENT Right 11/06/2011    Has fracture surgical neck, has ORIF DONE       Current Outpatient Medications   Medication Instructions    HYDROcodone-acetaminophen (NORCO) 5-325 mg per tablet 1 Tablet, Oral, EVERY 6 HOURS AS NEEDED    multivitamin, tx-iron-ca-min (THERA-M w/ IRON) 9 mg iron-400 mcg tab tablet 1 Tablet, Oral, DAILY    nebivoloL (BYSTOLIC) 10 mg, DAILY       Current Facility-Administered Medications   Medication Dose Route Frequency    ascorbic acid (vitamin C) (VITAMIN C) tablet 1,000 mg  1,000 mg Oral BID    sodium chloride (NS) flush 5-40 mL  5-40 mL IntraVENous Q8H    sodium chloride (NS) flush 5-40 mL  5-40 mL IntraVENous PRN    acetaminophen (TYLENOL) tablet 650 mg  650 mg Oral Q6H PRN    Or    acetaminophen (TYLENOL) suppository 650 mg  650 mg Rectal Q6H PRN    polyethylene glycol (MIRALAX) packet 17 g  17 g Oral DAILY PRN    promethazine (PHENERGAN) tablet 12.5 mg  12.5 mg Oral Q6H PRN    Or    ondansetron (ZOFRAN) injection 4 mg  4 mg IntraVENous Q6H PRN    [Held by provider] enoxaparin (LOVENOX) injection 30 mg  30 mg SubCUTAneous DAILY    diazePAM (VALIUM) tablet 10 mg  10 mg Oral Q1H PRN    diazePAM (VALIUM) tablet 20 mg  20 mg Oral Q1H PRN    0.9% sodium chloride 1,000 mL with folic acid 1 mg, thiamine 100 mg, mvi (adult no. 4 with vit K) 10 mL infusion   IntraVENous DAILY    multivitamin, tx-iron-ca-min (THERA-M w/ IRON) tablet 1 Tablet  1 Tablet Oral DAILY    HYDROcodone-acetaminophen (NORCO) 5-325 mg per tablet 1 Tablet  1 Tablet Oral Q6H PRN    albuterol-ipratropium (DUO-NEB) 2.5 MG-0.5 MG/3 ML  3 mL Nebulization Q6H PRN       Patient Vitals for the past 24 hrs:   Temp Pulse Resp BP SpO2   12/29/22 0742 36.6 °C (97.9 °F) 76 18 121/83 98 %   12/29/22 0439 -- (!) 117 -- -- --   12/29/22 0035 -- (!) 105 -- -- --   12/28/22 1945 36.2 °C (97.2 °F) 71 18 (!) 106/55 96 %   12/28/22 1429 36.6 °C (97.8 °F) 86 18 (!) 132/59 93 %       Lab Results   Component Value Date/Time    WBC 7.3 12/26/2022 07:05 AM    HGB 10.1 (L) 12/26/2022 07:05 AM    HCT 28.4 (L) 12/26/2022 07:05 AM    PLATELET 149 15/02/4593 07:05 AM    .2 (H) 12/26/2022 07:05 AM     Lab Results   Component Value Date/Time    Sodium 132 (L) 12/26/2022 07:05 AM    Potassium 3.8 12/26/2022 07:05 AM    Chloride 99 12/26/2022 07:05 AM    CO2 22 12/26/2022 07:05 AM    Anion gap 11 12/26/2022 07:05 AM    Glucose 97 12/26/2022 07:05 AM    BUN 20 12/26/2022 07:05 AM    Creatinine 1.11 (H) 12/26/2022 07:05 AM    BUN/Creatinine ratio 18 12/26/2022 07:05 AM    GFR est AA >60 09/23/2020 09:50 AM    GFR est non-AA >60 09/23/2020 09:50 AM    Calcium 7.8 (L) 12/26/2022 07:05 AM     No results found for: APTT, PTP, INR, INREXT  Lab Results   Component Value Date/Time    Glucose 97 12/26/2022 07:05 AM              Anesthetic Plan    ASA: 3  Anesthesia type: general and regional - supraclavicular block    Monitoring Plan: Continuous noninvasive hemodynamic monitoring      Induction: Intravenous  Anesthetic plan and risks discussed with: Patient

## 2022-12-29 NOTE — PROGRESS NOTES
Patient Aox3 with male friend at bedside. Patient upset bc surgery hasnt happened yet. Reassured patient that surgery will be today. Patient is NPO.CBWR.

## 2022-12-29 NOTE — PROGRESS NOTES
Dr. Ana Laura George called this RN and stated he will reschedule surgery until tomorrow. He approved patient eating tonight but will be clear liquids for breakfast tomorrow. Md stated her do surgery before noon tomorrow. Pt made aware and very upset about the change in her surgery date. Juan A Do made aware.

## 2022-12-29 NOTE — PROGRESS NOTES
Telemetry called this RN to reports patient is sustaining a HR of 150-200s with A-Fib. Dr. Crista Mathur was paged and requested patient to be transferred to . Yovana called and patient will go to Room 469. Charge Nurse Isac Wilson placed transfer order.

## 2022-12-30 ENCOUNTER — APPOINTMENT (OUTPATIENT)
Dept: GENERAL RADIOLOGY | Age: 67
DRG: 981 | End: 2022-12-30
Attending: ORTHOPAEDIC SURGERY
Payer: MEDICARE

## 2022-12-30 LAB
ATRIAL RATE: 500 BPM
BASOPHILS # BLD: 0 K/UL (ref 0–0.1)
BASOPHILS NFR BLD: 0 % (ref 0–1)
CALCULATED R AXIS, ECG10: 76 DEGREES
CALCULATED T AXIS, ECG11: -90 DEGREES
DIAGNOSIS, 93000: NORMAL
DIFFERENTIAL METHOD BLD: ABNORMAL
EOSINOPHIL # BLD: 0 K/UL (ref 0–0.4)
EOSINOPHIL NFR BLD: 0 % (ref 0–7)
ERYTHROCYTE [DISTWIDTH] IN BLOOD BY AUTOMATED COUNT: 12.9 % (ref 11.5–14.5)
HCT VFR BLD AUTO: 23.8 % (ref 35–47)
HGB BLD-MCNC: 8.3 G/DL (ref 11.5–16)
IMM GRANULOCYTES # BLD AUTO: 0 K/UL
IMM GRANULOCYTES NFR BLD AUTO: 0 %
LYMPHOCYTES # BLD: 1 K/UL (ref 0.8–3.5)
LYMPHOCYTES NFR BLD: 14 % (ref 12–49)
MCH RBC QN AUTO: 37.9 PG (ref 26–34)
MCHC RBC AUTO-ENTMCNC: 34.9 G/DL (ref 30–36.5)
MCV RBC AUTO: 108.7 FL (ref 80–99)
MONOCYTES # BLD: 0.1 K/UL (ref 0–1)
MONOCYTES NFR BLD: 1 % (ref 5–13)
NEUTS BAND NFR BLD MANUAL: 1 % (ref 0–6)
NEUTS SEG # BLD: 5.8 K/UL (ref 1.8–8)
NEUTS SEG NFR BLD: 84 % (ref 32–75)
NRBC # BLD: 0.02 K/UL (ref 0–0.01)
NRBC BLD-RTO: 0.3 PER 100 WBC
PLATELET # BLD AUTO: 249 K/UL (ref 150–400)
PMV BLD AUTO: 9.6 FL (ref 8.9–12.9)
Q-T INTERVAL, ECG07: 242 MS
QRS DURATION, ECG06: 58 MS
QTC CALCULATION (BEZET), ECG08: 394 MS
RBC # BLD AUTO: 2.19 M/UL (ref 3.8–5.2)
RBC MORPH BLD: ABNORMAL
VENTRICULAR RATE, ECG03: 160 BPM
WBC # BLD AUTO: 6.9 K/UL (ref 3.6–11)

## 2022-12-30 PROCEDURE — 36415 COLL VENOUS BLD VENIPUNCTURE: CPT

## 2022-12-30 PROCEDURE — 77030010707: Performed by: ORTHOPAEDIC SURGERY

## 2022-12-30 PROCEDURE — 74011000250 HC RX REV CODE- 250: Performed by: NURSE ANESTHETIST, CERTIFIED REGISTERED

## 2022-12-30 PROCEDURE — 77030003827 HC BIT DRL J&J -B: Performed by: ORTHOPAEDIC SURGERY

## 2022-12-30 PROCEDURE — C1713 ANCHOR/SCREW BN/BN,TIS/BN: HCPCS | Performed by: ORTHOPAEDIC SURGERY

## 2022-12-30 PROCEDURE — 74011000250 HC RX REV CODE- 250: Performed by: ORTHOPAEDIC SURGERY

## 2022-12-30 PROCEDURE — 74011250636 HC RX REV CODE- 250/636: Performed by: ORTHOPAEDIC SURGERY

## 2022-12-30 PROCEDURE — 2709999900 HC NON-CHARGEABLE SUPPLY: Performed by: ORTHOPAEDIC SURGERY

## 2022-12-30 PROCEDURE — 77030031139 HC SUT VCRL2 J&J -A: Performed by: ORTHOPAEDIC SURGERY

## 2022-12-30 PROCEDURE — 74011250636 HC RX REV CODE- 250/636: Performed by: ANESTHESIOLOGY

## 2022-12-30 PROCEDURE — 74011250636 HC RX REV CODE- 250/636: Performed by: NURSE ANESTHETIST, CERTIFIED REGISTERED

## 2022-12-30 PROCEDURE — 77030003915: Performed by: ORTHOPAEDIC SURGERY

## 2022-12-30 PROCEDURE — 77030042316 HC BLD SAW -B: Performed by: ORTHOPAEDIC SURGERY

## 2022-12-30 PROCEDURE — 77030003880 HC BIT DRL TWST BIOM -C: Performed by: ORTHOPAEDIC SURGERY

## 2022-12-30 PROCEDURE — 86900 BLOOD TYPING SEROLOGIC ABO: CPT

## 2022-12-30 PROCEDURE — 76060000039 HC ANESTHESIA 4 TO 4.5 HR: Performed by: ORTHOPAEDIC SURGERY

## 2022-12-30 PROCEDURE — 0PSG04Z REPOSITION LEFT HUMERAL SHAFT WITH INTERNAL FIXATION DEVICE, OPEN APPROACH: ICD-10-PCS | Performed by: ORTHOPAEDIC SURGERY

## 2022-12-30 PROCEDURE — P9016 RBC LEUKOCYTES REDUCED: HCPCS

## 2022-12-30 PROCEDURE — 74011250637 HC RX REV CODE- 250/637: Performed by: ORTHOPAEDIC SURGERY

## 2022-12-30 PROCEDURE — 74011000258 HC RX REV CODE- 258: Performed by: NURSE ANESTHETIST, CERTIFIED REGISTERED

## 2022-12-30 PROCEDURE — 76010000135 HC OR TIME 4 TO 4.5 HR: Performed by: ORTHOPAEDIC SURGERY

## 2022-12-30 PROCEDURE — 86923 COMPATIBILITY TEST ELECTRIC: CPT

## 2022-12-30 PROCEDURE — 77030008975 HC BN CANC CHP LIFV -E: Performed by: ORTHOPAEDIC SURGERY

## 2022-12-30 PROCEDURE — 87086 URINE CULTURE/COLONY COUNT: CPT

## 2022-12-30 PROCEDURE — A4565 SLINGS: HCPCS | Performed by: ORTHOPAEDIC SURGERY

## 2022-12-30 PROCEDURE — 87077 CULTURE AEROBIC IDENTIFY: CPT

## 2022-12-30 PROCEDURE — 77030018673: Performed by: ORTHOPAEDIC SURGERY

## 2022-12-30 PROCEDURE — 65270000029 HC RM PRIVATE

## 2022-12-30 PROCEDURE — 74011000250 HC RX REV CODE- 250: Performed by: INTERNAL MEDICINE

## 2022-12-30 PROCEDURE — 85025 COMPLETE CBC W/AUTO DIFF WBC: CPT

## 2022-12-30 PROCEDURE — 87186 SC STD MICRODIL/AGAR DIL: CPT

## 2022-12-30 PROCEDURE — 77030006788 HC BLD SAW OSC STRY -B: Performed by: ORTHOPAEDIC SURGERY

## 2022-12-30 PROCEDURE — 74011250637 HC RX REV CODE- 250/637: Performed by: INTERNAL MEDICINE

## 2022-12-30 PROCEDURE — 77030040361 HC SLV COMPR DVT MDII -B: Performed by: ORTHOPAEDIC SURGERY

## 2022-12-30 PROCEDURE — 76210000016 HC OR PH I REC 1 TO 1.5 HR: Performed by: ORTHOPAEDIC SURGERY

## 2022-12-30 PROCEDURE — 74011000258 HC RX REV CODE- 258: Performed by: INTERNAL MEDICINE

## 2022-12-30 PROCEDURE — 76000 FLUOROSCOPY <1 HR PHYS/QHP: CPT

## 2022-12-30 PROCEDURE — 77030008463 HC STPLR SKN PROX J&J -B: Performed by: ORTHOPAEDIC SURGERY

## 2022-12-30 PROCEDURE — 77030008462 HC STPLR SKN PROX J&J -A: Performed by: ORTHOPAEDIC SURGERY

## 2022-12-30 PROCEDURE — 77030011264 HC ELECTRD BLD EXT COVD -A: Performed by: ORTHOPAEDIC SURGERY

## 2022-12-30 DEVICE — PEG BNE FIX L38MM DIA3.2MM PROX HUM G LOK FOR ALPS PLATING: Type: IMPLANTABLE DEVICE | Site: HUMERUS | Status: FUNCTIONAL

## 2022-12-30 DEVICE — SCREW BNE L40MM DIA4MM STD CANC TI ST LOK FULL THRD BLNT SM: Type: IMPLANTABLE DEVICE | Site: HUMERUS | Status: FUNCTIONAL

## 2022-12-30 DEVICE — SCREW BNE L50MM DIA4MM STD CANC TI ST LOK FULL THRD BLNT SM: Type: IMPLANTABLE DEVICE | Site: HUMERUS | Status: FUNCTIONAL

## 2022-12-30 DEVICE — SCREW BNE L28MM DIA3.5MM CORT DST TIB TI NONCANNULATED: Type: IMPLANTABLE DEVICE | Site: HUMERUS | Status: FUNCTIONAL

## 2022-12-30 DEVICE — K WIRE FIX L15.2CM DIA2MM S STL SMOOTH DBL SHRP TIP: Type: IMPLANTABLE DEVICE | Site: HUMERUS | Status: FUNCTIONAL

## 2022-12-30 DEVICE — SCREW BNE L42MM DIA4MM STD CANC TI ST LOK FULL THRD BLNT SM: Type: IMPLANTABLE DEVICE | Site: HUMERUS | Status: FUNCTIONAL

## 2022-12-30 DEVICE — SCR CORT LP 3.5X24MM T15 --: Type: IMPLANTABLE DEVICE | Site: HUMERUS | Status: FUNCTIONAL

## 2022-12-30 DEVICE — IMPLANTABLE DEVICE: Type: IMPLANTABLE DEVICE | Site: HUMERUS | Status: FUNCTIONAL

## 2022-12-30 DEVICE — GRAFT 1-8MM BONE CANCELLOUS CHIP 40ML: Type: IMPLANTABLE DEVICE | Site: HUMERUS | Status: FUNCTIONAL

## 2022-12-30 DEVICE — SCREW BNE L24MM DIA3.5MM STD CORT DST TIB TI ST LOK FULL: Type: IMPLANTABLE DEVICE | Site: HUMERUS | Status: FUNCTIONAL

## 2022-12-30 DEVICE — SCREW BNE L44MM DIA4MM STD CANC TI ST LOK FULL THRD BLNT SM: Type: IMPLANTABLE DEVICE | Site: HUMERUS | Status: FUNCTIONAL

## 2022-12-30 DEVICE — SCR CORT LP 3.5X28MM T15 --: Type: IMPLANTABLE DEVICE | Site: HUMERUS | Status: FUNCTIONAL

## 2022-12-30 DEVICE — SCREW BNE L20MM DIA3.5MM STD CORT DST TIB TI ST LOK FULL: Type: IMPLANTABLE DEVICE | Site: HUMERUS | Status: FUNCTIONAL

## 2022-12-30 DEVICE — PEG BNE FIX L42MM DIA3.2MM PROX HUM G LOK FOR ALPS PLATING: Type: IMPLANTABLE DEVICE | Site: HUMERUS | Status: FUNCTIONAL

## 2022-12-30 DEVICE — PEG BNE FIX L40MM DIA3.2MM PROX HUM G LOK FOR ALPS PLATING: Type: IMPLANTABLE DEVICE | Site: HUMERUS | Status: FUNCTIONAL

## 2022-12-30 RX ORDER — SODIUM CHLORIDE 0.9 % (FLUSH) 0.9 %
5-40 SYRINGE (ML) INJECTION AS NEEDED
Status: DISCONTINUED | OUTPATIENT
Start: 2022-12-30 | End: 2022-12-30 | Stop reason: HOSPADM

## 2022-12-30 RX ORDER — DEXTROSE MONOHYDRATE AND SODIUM CHLORIDE 5; .45 G/100ML; G/100ML
50 INJECTION, SOLUTION INTRAVENOUS CONTINUOUS
Status: DISCONTINUED | OUTPATIENT
Start: 2022-12-30 | End: 2022-12-31

## 2022-12-30 RX ORDER — VANCOMYCIN HYDROCHLORIDE 5 G/100ML
INJECTION, POWDER, LYOPHILIZED, FOR SOLUTION INTRAVENOUS AS NEEDED
Status: DISCONTINUED | OUTPATIENT
Start: 2022-12-30 | End: 2022-12-30 | Stop reason: HOSPADM

## 2022-12-30 RX ORDER — SODIUM CHLORIDE 9 MG/ML
INJECTION, SOLUTION INTRAVENOUS
Status: DISCONTINUED | OUTPATIENT
Start: 2022-12-30 | End: 2022-12-30 | Stop reason: HOSPADM

## 2022-12-30 RX ORDER — TRANEXAMIC ACID 100 MG/ML
INJECTION, SOLUTION INTRAVENOUS AS NEEDED
Status: DISCONTINUED | OUTPATIENT
Start: 2022-12-30 | End: 2022-12-30 | Stop reason: HOSPADM

## 2022-12-30 RX ORDER — CEFAZOLIN SODIUM 1 G/3ML
INJECTION, POWDER, FOR SOLUTION INTRAMUSCULAR; INTRAVENOUS AS NEEDED
Status: DISCONTINUED | OUTPATIENT
Start: 2022-12-30 | End: 2022-12-30 | Stop reason: HOSPADM

## 2022-12-30 RX ORDER — SODIUM CHLORIDE 9 MG/ML
250 INJECTION, SOLUTION INTRAVENOUS AS NEEDED
Status: DISCONTINUED | OUTPATIENT
Start: 2022-12-30 | End: 2023-01-05 | Stop reason: HOSPADM

## 2022-12-30 RX ORDER — MIDAZOLAM HYDROCHLORIDE 1 MG/ML
INJECTION, SOLUTION INTRAMUSCULAR; INTRAVENOUS AS NEEDED
Status: DISCONTINUED | OUTPATIENT
Start: 2022-12-30 | End: 2022-12-30 | Stop reason: HOSPADM

## 2022-12-30 RX ORDER — ROCURONIUM BROMIDE 10 MG/ML
INJECTION, SOLUTION INTRAVENOUS AS NEEDED
Status: DISCONTINUED | OUTPATIENT
Start: 2022-12-30 | End: 2022-12-30 | Stop reason: HOSPADM

## 2022-12-30 RX ORDER — SODIUM CHLORIDE 0.9 % (FLUSH) 0.9 %
5-40 SYRINGE (ML) INJECTION EVERY 8 HOURS
Status: DISCONTINUED | OUTPATIENT
Start: 2022-12-30 | End: 2022-12-30 | Stop reason: HOSPADM

## 2022-12-30 RX ORDER — TRANEXAMIC ACID 100 MG/ML
INJECTION, SOLUTION INTRAVENOUS
Status: COMPLETED
Start: 2022-12-30 | End: 2022-12-30

## 2022-12-30 RX ORDER — ROPIVACAINE HYDROCHLORIDE 5 MG/ML
INJECTION, SOLUTION EPIDURAL; INFILTRATION; PERINEURAL
Status: COMPLETED | OUTPATIENT
Start: 2022-12-30 | End: 2022-12-30

## 2022-12-30 RX ORDER — ROPIVACAINE HYDROCHLORIDE 5 MG/ML
INJECTION, SOLUTION EPIDURAL; INFILTRATION; PERINEURAL
Status: COMPLETED
Start: 2022-12-30 | End: 2022-12-30

## 2022-12-30 RX ORDER — HYDROMORPHONE HYDROCHLORIDE 1 MG/ML
0.5 INJECTION, SOLUTION INTRAMUSCULAR; INTRAVENOUS; SUBCUTANEOUS
Status: DISCONTINUED | OUTPATIENT
Start: 2022-12-30 | End: 2022-12-30 | Stop reason: HOSPADM

## 2022-12-30 RX ORDER — FENTANYL CITRATE 50 UG/ML
50 INJECTION, SOLUTION INTRAMUSCULAR; INTRAVENOUS
Status: DISCONTINUED | OUTPATIENT
Start: 2022-12-30 | End: 2022-12-30 | Stop reason: HOSPADM

## 2022-12-30 RX ORDER — LIDOCAINE HYDROCHLORIDE 10 MG/ML
0.1 INJECTION, SOLUTION EPIDURAL; INFILTRATION; INTRACAUDAL; PERINEURAL AS NEEDED
Status: DISCONTINUED | OUTPATIENT
Start: 2022-12-30 | End: 2022-12-30 | Stop reason: HOSPADM

## 2022-12-30 RX ORDER — MIDAZOLAM HYDROCHLORIDE 1 MG/ML
1 INJECTION, SOLUTION INTRAMUSCULAR; INTRAVENOUS AS NEEDED
Status: DISCONTINUED | OUTPATIENT
Start: 2022-12-30 | End: 2022-12-30 | Stop reason: HOSPADM

## 2022-12-30 RX ORDER — PROPOFOL 10 MG/ML
INJECTION, EMULSION INTRAVENOUS AS NEEDED
Status: DISCONTINUED | OUTPATIENT
Start: 2022-12-30 | End: 2022-12-30 | Stop reason: HOSPADM

## 2022-12-30 RX ORDER — DIPHENHYDRAMINE HYDROCHLORIDE 50 MG/ML
12.5 INJECTION, SOLUTION INTRAMUSCULAR; INTRAVENOUS AS NEEDED
Status: DISCONTINUED | OUTPATIENT
Start: 2022-12-30 | End: 2022-12-30 | Stop reason: HOSPADM

## 2022-12-30 RX ORDER — FENTANYL CITRATE 50 UG/ML
50 INJECTION, SOLUTION INTRAMUSCULAR; INTRAVENOUS AS NEEDED
Status: DISCONTINUED | OUTPATIENT
Start: 2022-12-30 | End: 2022-12-30 | Stop reason: HOSPADM

## 2022-12-30 RX ORDER — MORPHINE SULFATE 2 MG/ML
2 INJECTION, SOLUTION INTRAMUSCULAR; INTRAVENOUS
Status: DISCONTINUED | OUTPATIENT
Start: 2022-12-30 | End: 2022-12-30 | Stop reason: HOSPADM

## 2022-12-30 RX ORDER — NORETHINDRONE AND ETHINYL ESTRADIOL 0.5-0.035
5 KIT ORAL AS NEEDED
Status: DISCONTINUED | OUTPATIENT
Start: 2022-12-30 | End: 2022-12-30 | Stop reason: HOSPADM

## 2022-12-30 RX ORDER — LIDOCAINE HYDROCHLORIDE 20 MG/ML
INJECTION, SOLUTION EPIDURAL; INFILTRATION; INTRACAUDAL; PERINEURAL AS NEEDED
Status: DISCONTINUED | OUTPATIENT
Start: 2022-12-30 | End: 2022-12-30 | Stop reason: HOSPADM

## 2022-12-30 RX ORDER — MIDAZOLAM HYDROCHLORIDE 1 MG/ML
0.5 INJECTION, SOLUTION INTRAMUSCULAR; INTRAVENOUS
Status: DISCONTINUED | OUTPATIENT
Start: 2022-12-30 | End: 2022-12-30 | Stop reason: HOSPADM

## 2022-12-30 RX ORDER — DEXAMETHASONE SODIUM PHOSPHATE 4 MG/ML
INJECTION, SOLUTION INTRA-ARTICULAR; INTRALESIONAL; INTRAMUSCULAR; INTRAVENOUS; SOFT TISSUE AS NEEDED
Status: DISCONTINUED | OUTPATIENT
Start: 2022-12-30 | End: 2022-12-30 | Stop reason: HOSPADM

## 2022-12-30 RX ORDER — OXYCODONE AND ACETAMINOPHEN 5; 325 MG/1; MG/1
1 TABLET ORAL AS NEEDED
Status: DISCONTINUED | OUTPATIENT
Start: 2022-12-30 | End: 2022-12-30 | Stop reason: HOSPADM

## 2022-12-30 RX ORDER — ONDANSETRON 2 MG/ML
4 INJECTION INTRAMUSCULAR; INTRAVENOUS AS NEEDED
Status: DISCONTINUED | OUTPATIENT
Start: 2022-12-30 | End: 2022-12-30 | Stop reason: HOSPADM

## 2022-12-30 RX ORDER — ONDANSETRON 2 MG/ML
INJECTION INTRAMUSCULAR; INTRAVENOUS AS NEEDED
Status: DISCONTINUED | OUTPATIENT
Start: 2022-12-30 | End: 2022-12-30 | Stop reason: HOSPADM

## 2022-12-30 RX ORDER — FENTANYL CITRATE 50 UG/ML
INJECTION, SOLUTION INTRAMUSCULAR; INTRAVENOUS AS NEEDED
Status: DISCONTINUED | OUTPATIENT
Start: 2022-12-30 | End: 2022-12-30 | Stop reason: HOSPADM

## 2022-12-30 RX ADMIN — ONDANSETRON 4 MG: 2 INJECTION INTRAMUSCULAR; INTRAVENOUS at 18:44

## 2022-12-30 RX ADMIN — PHENYLEPHRINE HYDROCHLORIDE 200 MCG: 10 INJECTION INTRAVENOUS at 15:08

## 2022-12-30 RX ADMIN — ROPIVACAINE HYDROCHLORIDE 20 ML: 5 INJECTION, SOLUTION EPIDURAL; INFILTRATION; PERINEURAL at 14:35

## 2022-12-30 RX ADMIN — HYDROCODONE BITARTRATE AND ACETAMINOPHEN 1 TABLET: 5; 325 TABLET ORAL at 13:01

## 2022-12-30 RX ADMIN — SODIUM CHLORIDE, PRESERVATIVE FREE 10 ML: 5 INJECTION INTRAVENOUS at 21:17

## 2022-12-30 RX ADMIN — CEFAZOLIN 1 G: 1 INJECTION, POWDER, FOR SOLUTION INTRAMUSCULAR; INTRAVENOUS at 21:16

## 2022-12-30 RX ADMIN — PHENYLEPHRINE HYDROCHLORIDE 300 MCG: 10 INJECTION INTRAVENOUS at 15:17

## 2022-12-30 RX ADMIN — DEXTROSE AND SODIUM CHLORIDE 50 ML/HR: 5; 450 INJECTION, SOLUTION INTRAVENOUS at 23:54

## 2022-12-30 RX ADMIN — TRANEXAMIC ACID 1 G: 1 INJECTION, SOLUTION INTRAVENOUS at 15:50

## 2022-12-30 RX ADMIN — SUGAMMADEX 200 MG: 100 INJECTION, SOLUTION INTRAVENOUS at 19:01

## 2022-12-30 RX ADMIN — TRANEXAMIC ACID 1 G: 1 INJECTION, SOLUTION INTRAVENOUS at 18:37

## 2022-12-30 RX ADMIN — PHENYLEPHRINE HYDROCHLORIDE 300 MCG: 10 INJECTION INTRAVENOUS at 15:24

## 2022-12-30 RX ADMIN — SODIUM CHLORIDE 1 G: 9 INJECTION, SOLUTION INTRAVENOUS at 16:15

## 2022-12-30 RX ADMIN — CEFAZOLIN SODIUM 1 G: 1 INJECTION, POWDER, FOR SOLUTION INTRAMUSCULAR; INTRAVENOUS at 16:11

## 2022-12-30 RX ADMIN — OXYCODONE HYDROCHLORIDE AND ACETAMINOPHEN 1000 MG: 500 TABLET ORAL at 09:13

## 2022-12-30 RX ADMIN — PROPOFOL 80 MG: 10 INJECTION, EMULSION INTRAVENOUS at 15:02

## 2022-12-30 RX ADMIN — MIDAZOLAM HYDROCHLORIDE 2 MG: 2 INJECTION, SOLUTION INTRAMUSCULAR; INTRAVENOUS at 14:32

## 2022-12-30 RX ADMIN — PHENYLEPHRINE HYDROCHLORIDE 50 MCG/MIN: 10 INJECTION INTRAVENOUS at 15:27

## 2022-12-30 RX ADMIN — LIDOCAINE HYDROCHLORIDE 60 MG: 20 INJECTION, SOLUTION EPIDURAL; INFILTRATION; INTRACAUDAL; PERINEURAL at 15:02

## 2022-12-30 RX ADMIN — DILTIAZEM HYDROCHLORIDE 5 MG/HR: 5 INJECTION, SOLUTION INTRAVENOUS at 12:53

## 2022-12-30 RX ADMIN — FENTANYL CITRATE 100 MCG: 50 INJECTION, SOLUTION INTRAMUSCULAR; INTRAVENOUS at 15:00

## 2022-12-30 RX ADMIN — SODIUM CHLORIDE: 9 INJECTION, SOLUTION INTRAVENOUS at 14:59

## 2022-12-30 RX ADMIN — DEXAMETHASONE SODIUM PHOSPHATE 4 MG: 4 INJECTION, SOLUTION INTRA-ARTICULAR; INTRALESIONAL; INTRAMUSCULAR; INTRAVENOUS; SOFT TISSUE at 15:02

## 2022-12-30 RX ADMIN — ROCURONIUM BROMIDE 20 MG: 10 INJECTION, SOLUTION INTRAVENOUS at 18:07

## 2022-12-30 RX ADMIN — ROCURONIUM BROMIDE 20 MG: 10 INJECTION, SOLUTION INTRAVENOUS at 16:40

## 2022-12-30 RX ADMIN — SODIUM CHLORIDE: 9 INJECTION, SOLUTION INTRAVENOUS at 15:37

## 2022-12-30 RX ADMIN — ROCURONIUM BROMIDE 50 MG: 10 INJECTION, SOLUTION INTRAVENOUS at 15:02

## 2022-12-30 RX ADMIN — DEXTROSE AND SODIUM CHLORIDE 50 ML/HR: 5; 450 INJECTION, SOLUTION INTRAVENOUS at 10:01

## 2022-12-30 RX ADMIN — MULTIPLE VITAMINS W/ MINERALS TAB 1 TABLET: TAB at 09:13

## 2022-12-30 RX ADMIN — SODIUM CHLORIDE: 9 INJECTION, SOLUTION INTRAVENOUS at 16:27

## 2022-12-30 NOTE — PROGRESS NOTES
Dual skin assessment by Alisha López, and Germania Mathur RN patient's skin is thin with scattered bruising on lower extremities . She has a fracture of of the left arm fingers appear purple in color with bruising  to left shoulder, left thigh,  left lumbar region extending to the back.

## 2022-12-30 NOTE — ANESTHESIA PROCEDURE NOTES
Peripheral Block    Start time: 12/30/2022 2:32 PM  End time: 12/30/2022 2:37 PM  Performed by: Stefan Henderson MD  Authorized by: Stefan Henderson MD       Pre-procedure:    Indications: at surgeon's request and post-op pain management    Preanesthetic Checklist: patient identified, risks and benefits discussed, site marked, timeout performed, anesthesia consent given, patient being monitored and fire risk safety assessment completed and verbalized    Timeout Time: 14:32 EST      Block Type:   Block Type:  Supraclavicular  Laterality:  Left  Monitoring:  Continuous pulse ox, frequent vital sign checks, heart rate, responsive to questions and oxygen  Injection Technique:  Single shot  Procedures: ultrasound guided    Patient Position: seated  Prep: chlorhexidine    Location:  Supraclavicular  Needle Type:  Stimuplex  Needle Gauge:  22 G  Needle Localization:  Ultrasound guidance  Medication Injected:  Ropivacaine (PF) (NAROPIN)(0.5%) 5 mg/mL injection - Peripheral Nerve Block   20 mL - 12/30/2022 2:35:00 PM  Med Admin Time: 12/30/2022 2:35 PM    Assessment:  Number of attempts:  1  Injection Assessment:  Incremental injection every 5 mL, local visualized surrounding nerve on ultrasound, negative aspiration for blood, negative aspiration for CSF, no paresthesia and no intravascular symptoms  Patient tolerance:  Patient tolerated the procedure well with no immediate complications

## 2022-12-30 NOTE — PROGRESS NOTES
Hospitalist Progress Note         Paige Patton MD          Daily Progress Note: 12/30/2022      Subjective: The patient is seen for follow  up. 75 y.o. female with history of alcohol abuse who fell in her home last night. Since then she complains of severe pain all over as well as extreme left arm pain and difficulty walking. In the ED she was hypertensive and mildly tachycardic. Labs showed a sodium of 126, creatinine 1.36 and a magnesium of 1.1.  ----     X-ray of the left arm shows a proximal to mid humeral shaft fracture with impaction and varus angulation. Patient seen by orthopedic surgeon.   Plans of surgical repair of left upper extremity fracture at this afternoon.,      Problem List:  Problem List as of 12/30/2022 Date Reviewed: 9/22/2020            Codes Class Noted - Resolved    Dehydration ICD-10-CM: E86.0  ICD-9-CM: 276.51  12/25/2022 - Present        COPD exacerbation ([facility] ICD-10-CM: J44.1  ICD-9-CM: 491.21  9/22/2020 - Present        Respiratory failure with hypoxia [facility]) ICD-10-CM: J96.91  ICD-9-CM: 518.81  9/22/2020 - Present        Hypokalemia ICD-10-CM: E87.6  ICD-9-CM: 276.8  9/22/2020 - Present        Acute respiratory failure ([facility] ICD-10-CM: J96.00  ICD-9-CM: 518.81  9/22/2020 - Present       Medications reviewed  Current Facility-Administered Medications   Medication Dose Route Frequency    dextrose 5 % - 0.45% NaCl infusion  50 mL/hr IntraVENous CONTINUOUS    0.9% sodium chloride infusion  250 mL/hr IntraVENous CONTINUOUS    dilTIAZem (CARDIZEM) 125 mg in 0.9% sodium chloride 125 mL ([id number])  0-15 mg/hr IntraVENous TITRATE    ascorbic acid (vitamin C) (VITAMIN C) tablet 1,000 mg  1,000 mg Oral BID    sodium chloride (NS) flush 5-40 mL  5-40 mL IntraVENous Q8H    sodium chloride (NS) flush 5-40 mL  5-40 mL IntraVENous PRN    acetaminophen (TYLENOL) tablet 650 mg  650 mg Oral Q6H PRN    Or    acetaminophen (TYLENOL) suppository 650 mg  650 mg Rectal Q6H PRN    polyethylene glycol (MIRALAX) packet 17 g  17 g Oral DAILY PRN    promethazine (PHENERGAN) tablet 12.5 mg  12.5 mg Oral Q6H PRN    Or    ondansetron (ZOFRAN) injection 4 mg  4 mg IntraVENous Q6H PRN    enoxaparin (LOVENOX) injection 30 mg  30 mg SubCUTAneous DAILY    diazePAM (VALIUM) tablet 10 mg  10 mg Oral Q1H PRN    diazePAM (VALIUM) tablet 20 mg  20 mg Oral Q1H PRN    multivitamin, tx-iron-ca-min (THERA-M w/ IRON) tablet 1 Tablet  1 Tablet Oral DAILY    HYDROcodone-acetaminophen (NORCO) 5-325 mg per tablet 1 Tablet  1 Tablet Oral Q6H PRN    albuterol-ipratropium (DUO-NEB) 2.5 MG-0.5 MG/3 ML  3 mL Nebulization Q6H PRN       Review of Systems:   A comprehensive review of systems was negative except for that written in the HPI. Objective:   Physical Exam:     Visit Vitals  BP (!) 140/92 (BP 1 Location: Right leg, BP Patient Position: At rest;Lying)   Pulse 88   Temp 98 °F (36.7 °C)   Resp 20   Ht 5' 3\" (1.6 m)   Wt 44.4 kg (97 lb 14.2 oz)   SpO2 98%   BMI 17.34 kg/m²    O2 Flow Rate (L/min): 2 l/min O2 Device: None (Room air)    Temp (24hrs), Av.3 °F (36.8 °C), Min:97.5 °F (36.4 °C), Max:99.5 °F (37.5 °C)    No intake/output data recorded.  1901 -  0700  In: -   Out: 250 [Urine:250]    General:  Alert, cooperative, no distress, appears stated age. Lungs:   Clear to auscultation bilaterally. Chest wall:  No tenderness or deformity. Heart:  Regular rate and rhythm, S1, S2 normal, no murmur, click, rub or gallop. Abdomen:   Soft, non-tender. Bowel sounds normal. No masses,  No organomegaly. Extremities: Extremities normal, atraumatic, no cyanosis or edema. Pulses: 2+ and symmetric all extremities. Skin: Skin color, texture, turgor normal. No rashes or lesions   Neurologic: CNII-XII intact.  No gross sensory or motor deficits     Data Review:       Recent Days:  Recent Labs     22  0501   WBC 6.9   HGB 8.3*   HCT 23.8*          No results for input(s): NA, K, CL, CO2, GLU, BUN, CREA, CA, MG, PHOS, ALB, TBIL, TBILI, ALT, INR, INREXT, INREXT in the last 72 hours. No lab exists for component: SGOT    No results for input(s): PH, PCO2, PO2, HCO3, FIO2 in the last 72 hours. 24 Hour Results:  Recent Results (from the past 24 hour(s))   EKG, 12 LEAD, INITIAL    Collection Time: 12/29/22  5:49 PM   Result Value Ref Range    Ventricular Rate 160 BPM    Atrial Rate 500 BPM    QRS Duration 58 ms    Q-T Interval 242 ms    QTC Calculation (Bezet) 394 ms    Calculated R Axis 76 degrees    Calculated T Axis -90 degrees    Diagnosis       Atrial fibrillation with rapid ventricular response  Low voltage QRS  Septal infarct , age undetermined  Abnormal ECG  When compared with ECG of 25-DEC-2022 14:46,  Atrial fibrillation has replaced Sinus rhythm  Vent. rate has increased BY  71 BPM  QRS voltage has decreased  Nonspecific T wave abnormality now evident in Inferior leads  Confirmed by Alfredo Russell ([id number]) on 12/30/2022 11:29:00 AM     CBC WITH AUTOMATED DIFF    Collection Time: 12/30/22  5:01 AM   Result Value Ref Range    WBC 6.9 3.6 - 11.0 K/uL    RBC 2.19 (L) 3.80 - 5.20 M/uL    HGB 8.3 (L) 11.5 - 16.0 g/dL    HCT 23.8 (L) 35.0 - 47.0 %    .7 (H) 80.0 - 99.0 FL    MCH 37.9 (H) 26.0 - 34.0 PG    MCHC 34.9 30.0 - 36.5 g/dL    RDW 12.9 11.5 - 14.5 %    PLATELET 360 004 - 508 K/uL    MPV 9.6 8.9 - 12.9 FL    NRBC 0.3 (H) 0.0  WBC    ABSOLUTE NRBC 0.02 (H) 0.00 - 0.01 K/uL    NEUTROPHILS 84 (H) 32 - 75 %    BAND NEUTROPHILS 1 0 - 6 %    LYMPHOCYTES 14 12 - 49 %    MONOCYTES 1 (L) 5 - 13 %    EOSINOPHILS 0 0 - 7 %    BASOPHILS 0 0 - 1 %    IMMATURE GRANULOCYTES 0 %    ABS. NEUTROPHILS 5.8 1.8 - 8.0 K/UL    ABS. LYMPHOCYTES 1.0 0.8 - 3.5 K/UL    ABS. MONOCYTES 0.1 0.0 - 1.0 K/UL    ABS. EOSINOPHILS 0.0 0.0 - 0.4 K/UL    ABS. BASOPHILS 0.0 0.0 - 0.1 K/UL    ABS. IMM.  GRANS. 0.0 K/UL    DF Manual      RBC COMMENTS Macrocytosis  1+               Assessment/     Acute kidney injury, prerenal due to dehydration. Improved    Dehydration with hyponatremia    Left humerus fracture    Alcohol abuse    Hypomagnesemia     Tobacco abuse    Essential hypertension    Sinus tachycardia. Improved on cardizem         Plan:  Continue supportive care  Monitor routine electrolytes  Follow-up post fracture repair later today  Continue IV cardizem until post op      Care Plan discussed with: Patient/Family    Total time spent with patient: 30 minutes.     Jayleen Travis MD

## 2022-12-30 NOTE — PROGRESS NOTES
PT deferred for today as pt is scheduled for surgery today. Will continue to follow and re evaluate at a later time.

## 2022-12-30 NOTE — PROGRESS NOTES
Comprehensive Nutrition Assessment    Type and Reason for Visit: Initial    Nutrition Recommendations/Plan:   Once po diet restarts, consider Ensure Clear (if CL diet), Compact otherwise  Liberalize diet to promote intake  Monitor intake of meals and supplements  Monitor labs, weight status     Malnutrition Assessment:  Malnutrition Status:       Context:  Acute illness     Findings of the 6 clinical characteristics of malnutrition:   Energy Intake:  No significant decrease in energy intake  Weight Loss:  No significant weight loss     Body Fat Loss:  Unable to assess,     Muscle Mass Loss:  Unable to assess,    Fluid Accumulation:  No significant fluid accumulation,     Strength:  Not performed     Nutrition Assessment:    Admit to hospital 2/2 dehydration, LUIS ANGEL; GLF resulting in humerus fracture. Currently NPO from  for procedure. Pt unavailable at time of interview. No reported weight loss or loss of appetiteMedications: Vit C, MVI, Valium, Na+Cl- 0.9%@ 250ml/hr. Lbas Hgb 8.3/Hct 23.8    Nutrition Related Findings:    NPFE deferred as pt unavailable at time of assessment Wound Type: None    Current Nutrition Intake & Therapies:  Average Meal Intake: Unable to assess (NPO intermitentlly since admission)  Average Supplement Intake: None ordered  No diet orders on file    Anthropometric Measures:  Height: 5' 3\" (160 cm)  Ideal Body Weight (IBW): 115 lbs (52 kg)  Admission Body Weight: 97 lb  Current Body Wt:  44 kg (97 lb), 84.3 % IBW.  Not specified  Current BMI (kg/m2): 17.2        Weight Adjustment: No adjustment                 BMI Category: Underweight (BMI less than 22) age over 72    Estimated Daily Nutrient Needs:  Energy Requirements Based On: Kcal/kg  Weight Used for Energy Requirements: Current  Energy (kcal/day): 1320 - 1540 cals (30 -35 kcal/kg)  Weight Used for Protein Requirements: Current  Protein (g/day): 53 - 57 gm (1.2 - 1.3 gm/kg)  Method Used for Fluid Requirements: Other (comment) (minimum recommendation for elderly status)  Fluid (ml/day): 1500 ml/day    Nutrition Diagnosis:   Increased nutrient needs, Underweight related to acute injury/trauma, impaired nutrient utilization (GLF resulting in humerus fracture s/p repair; h/o ETOH abuse) as evidenced by BMI, lab values    Nutrition Interventions:   Food and/or Nutrient Delivery: Start oral nutrition supplement, Continue oral nutrition supplement (once po diet restarts, consider Ensure Clear if CL diet, Compact otherwise. Liberalize diet to promote intake)  Nutrition Education/Counseling: No recommendations at this time  Coordination of Nutrition Care: Continue to monitor while inpatient       Goals:     Goals: PO intake 50% or greater, Initiate PO diet, by next RD assessment       Nutrition Monitoring and Evaluation:   Behavioral-Environmental Outcomes: None identified  Food/Nutrient Intake Outcomes: Diet advancement/tolerance, IVF intake  Physical Signs/Symptoms Outcomes: Biochemical data, Weight    Discharge Planning:     Too soon to determine    Nba Adamson RD  Contact: [phone]

## 2022-12-30 NOTE — PROGRESS NOTES
Problem: Pressure Injury - Risk of  Goal: *Prevention of pressure injury  Description: Document Barrie Scale and appropriate interventions in the flowsheet.   Outcome: Progressing Towards Goal  Note: Pressure Injury Interventions:  Sensory Interventions: Keep linens dry and wrinkle-free    Moisture Interventions: Absorbent underpads    Activity Interventions: Assess need for specialty bed    Mobility Interventions: Assess need for specialty bed    Nutrition Interventions: Document food/fluid/supplement intake                     Problem: Patient Education: Go to Patient Education Activity  Goal: Patient/Family Education  Outcome: Progressing Towards Goal

## 2022-12-30 NOTE — PROGRESS NOTES
CM reviewed clinical chart. Patient has been accepted with [facility] when medically stable for discharge.

## 2022-12-30 NOTE — PROGRESS NOTES
Pt subclavicular block left done per Dr. Catana Hammans pt tolerated well start time 1432 stop time [phone]

## 2022-12-30 NOTE — CONSULTS
Consult    NAME: Asif Camarena   :  1955   MRN:  [id number]     Date/Time:  2022 10:53 AM    Patient PCP: None  ________________________________________________________________________     Problem List:   -Admitted to the hospital following fall and fractured left humerus  -Cardiology consult was invited secondary to increased troponin level. -Hypertension  -COPD2  -Current smoker  -No known established coronary artery disease and or no recent cardiac work-up has been done in the recent past        Assessment/Plan:   -70-year-old white female with no known established coronary artery disease admitted to the hospital following fall and fracture of her left humerus.  -Cardiology consult was invited secondary to borderline increased troponin and possible preoperative evaluation for noncardiac surgery.  -When I saw the patient she was sitting up in the bed and denies any symptoms of chest pain shortness of breath dizziness palpitation or any other anginal equivalent. -EKG shows no acute changes of significant ischemia or injury pattern.  -Coronary artery disease risk factors includes age greater than 72 years hypertension and tobacco abuse.  -We will check echocardiogram and then make further cardiac management decision as clinically indicated. -Based on my overall cardiac assessment she would be of mild to moderate risk for noncardiac surgical intervention as this surgery is mandatory you may proceed with some risk on board and I will be happy to assist in case cardiac management is warranted. -Thank you for the consultation and letting me participate in the care of your patient.       []        High complexity decision making was performed      Patient Active Problem List   Diagnosis Code    COPD exacerbation ([facility] [address] J44.1    Respiratory failure with hypoxia (HCC) J96.91    Hypokalemia E87.6    Acute respiratory failure (HCC) J96.00    Dehydration E86.0        Subjective:   CHIEF COMPLAINT: HISTORY OF PRESENT ILLNESS:     Kendy Self is a 75 y.o.  female who has no known established coronary artery disease and admitted to the hospital after she fractured her left humerus following fall. When I saw the patient she was sitting up in the bed comfortably and denies any chest pain shortness of breath or any history of coronary artery disease or any previous cardiac work-up being done. Her risk factors for heart disease are smoking and hypertension in addition to her age greater than 72 years. Borderline increased troponin could be multifactorial. We will check echocardiogram and make further recommendation but you may proceed with surgery as based on my overall clinical assessment she would be low to moderate risk. We were asked to consult for work up and evaluation of the above problems. Past Medical History:   Diagnosis Date    HTN (hypertension)     Smoker       Past Surgical History:   Procedure Laterality Date    HX SHOULDER REPLACEMENT Right 11/06/2011    Has fracture surgical neck, has ORIF DONE     Allergies   Allergen Reactions    Atenolol Angioedema    Sulfa (Sulfonamide Antibiotics) Swelling    Pcn [Penicillins] Rash    Prednisone Other (comments)     Patient states \"it makes me bounce off of the walls\"; declines taking     Darvocet A500 [Propoxyphene N-Acetaminophen] Rash      Meds:  See below  Social History     Tobacco Use    Smoking status: Every Day     Packs/day: 0.50     Years: 46.00     Pack years: 23.00     Types: Cigarettes    Smokeless tobacco: Never    Tobacco comments:     will set up later   Substance Use Topics    Alcohol use:  Yes     Alcohol/week: 24.0 standard drinks     Types: 24 Cans of beer per week     Comment: weekly \"couple beers\"      Family History   Problem Relation Age of Onset    No Known Problems Mother     No Known Problems Father        REVIEW OF SYSTEMS:     []         Unable to obtain  ROS due to ---   [x]         Total of 12 systems reviewed as follows:    Constitutional: negative fever, negative chills, negative weight loss  Eyes:   negative visual changes  ENT:   negative sore throat, tongue or lip swelling  Respiratory:  negative cough, negative dyspnea  Cards:  negative for chest pain, palpitations, lower extremity edema  GI:   negative for nausea, vomiting, diarrhea, and abdominal pain  Genitourinary: negative for frequency, dysuria  Integument:  negative for rash   Hematologic:  negative for easy bruising and gum/nose bleeding  Musculoskel: negative for myalgias,  back pain  Neurological:  negative for headaches, dizziness, vertigo, weakness  Behavl/Psych: negative for feelings of anxiety, depression     Pertinent Positives include :    Objective:      Physical Exam:    Last 24hrs VS reviewed since prior progress note. Most recent are:    Visit Vitals  /81 (BP 1 Location: Right leg, BP Patient Position: At rest;Sitting)   Pulse 89   Temp 97.5 °F (36.4 °C)   Resp 20   Ht 5' 3\" (1.6 m)   Wt 44.4 kg (97 lb 14.2 oz)   SpO2 99%   BMI 17.34 kg/m²     No intake or output data in the 24 hours ending 12/30/22 1053     General Appearance: Well developed, well nourished, alert & oriented x 3,    no acute distress. Ears/Nose/Mouth/Throat: Pupils equal and round, Hearing grossly normal.  Neck: Supple. JVP within normal limits. Carotids good upstrokes, with no bruit. Chest: Decreased breath sound all over the lung field. Fine crackles at the bases. .  Cardiovascular: Regular rate and rhythm, S1S2 normal, no murmur, rubs, gallops. Abdomen: Soft, non-tender, bowel sounds are active. No organomegaly. Extremities: No edema bilaterally. Femoral pulses +2, Distal Pulses +1. Skin: Warm and dry. Neuro: Awake following command detailed examination deferred. []         Post-cath site without hematoma, bruit, tenderness, or thrill. Distal pulses intact. CT HEAD WO CONT   Final Result   1. No definite acute maxillofacial fracture.  Age-indeterminate bilateral nasal   bone fractures, correlate with symptomatology. 2.  No acute intracranial abnormality. .          CT SPINE CERV WO CONT   Final Result   No acute fracture nor subluxation. Chronic C6 spinous process fracture. Fat   stranding/contusion in the left supraclavicular region. CT MAXILLOFACIAL WO CONT   Final Result   1. No definite acute maxillofacial fracture. Age-indeterminate bilateral nasal   bone fractures, correlate with symptomatology. 2.  No acute intracranial abnormality. .          CT CHEST WO CONT   Final Result   1. Redemonstrated acute left humerus fracture. No definite acute rib fracture. Several chronic bilateral rib deformities. Chronic right proximal humerus   probably. 2.  No acute cardiopulmonary abnormality. XR SHOULDER LT AP/LAT MIN 2 V   Final Result      Acute right proximal to mid humeral shaft fracture as described above. XR HUMERUS LT   Final Result      Acute right proximal to mid humeral shaft fracture as described above. XR CHEST PORT   Final Result      No acute process on portable chest.              Data:      Telemetry:    EKG:  []  No new EKG for review. Prior to Admission medications    Medication Sig Start Date End Date Taking? Authorizing Provider   HYDROcodone-acetaminophen (NORCO) 5-325 mg per tablet Take 1 Tablet by mouth every six (6) hours as needed for Pain for up to 3 days. Max Daily Amount: 4 Tablets. 12/28/22 12/31/22 Yes Albert Anderson MD   nebivoloL (BYSTOLIC) 10 mg tablet Take 10 mg by mouth daily. Indications: high blood pressure  Patient not taking: Reported on 12/25/2022    David Caro MD   multivitamin, tx-iron-ca-min (THERA-M w/ IRON) 9 mg iron-400 mcg tab tablet Take 1 Tab by mouth daily.  Indications: treatment to prevent mineral deficiency, treatment to prevent vitamin deficiency    David Caro MD       Recent Results (from the past 24 hour(s))   CBC WITH AUTOMATED DIFF    Collection Time: 12/30/22 5:01 AM   Result Value Ref Range    WBC 6.9 3.6 - 11.0 K/uL    RBC 2.19 (L) 3.80 - 5.20 M/uL    HGB 8.3 (L) 11.5 - 16.0 g/dL    HCT 23.8 (L) 35.0 - 47.0 %    .7 (H) 80.0 - 99.0 FL    MCH 37.9 (H) 26.0 - 34.0 PG    MCHC 34.9 30.0 - 36.5 g/dL    RDW 12.9 11.5 - 14.5 %    PLATELET 351 670 - 143 K/uL    MPV 9.6 8.9 - 12.9 FL    NRBC 0.3 (H) 0.0  WBC    ABSOLUTE NRBC 0.02 (H) 0.00 - 0.01 K/uL    NEUTROPHILS 84 (H) 32 - 75 %    BAND NEUTROPHILS 1 0 - 6 %    LYMPHOCYTES 14 12 - 49 %    MONOCYTES 1 (L) 5 - 13 %    EOSINOPHILS 0 0 - 7 %    BASOPHILS 0 0 - 1 %    IMMATURE GRANULOCYTES 0 %    ABS. NEUTROPHILS 5.8 1.8 - 8.0 K/UL    ABS. LYMPHOCYTES 1.0 0.8 - 3.5 K/UL    ABS. MONOCYTES 0.1 0.0 - 1.0 K/UL    ABS. EOSINOPHILS 0.0 0.0 - 0.4 K/UL    ABS. BASOPHILS 0.0 0.0 - 0.1 K/UL    ABS. IMM.  GRANS. 0.0 K/UL    DF Manual      RBC COMMENTS Macrocytosis  1+            Gurinder Green MD

## 2022-12-31 ENCOUNTER — APPOINTMENT (OUTPATIENT)
Dept: GENERAL RADIOLOGY | Age: 67
DRG: 981 | End: 2022-12-31
Attending: ORTHOPAEDIC SURGERY
Payer: MEDICARE

## 2022-12-31 LAB
ANION GAP SERPL CALC-SCNC: ABNORMAL MMOL/L (ref 5–15)
BASOPHILS # BLD: 0 K/UL (ref 0–0.1)
BASOPHILS NFR BLD: 0 % (ref 0–1)
BUN SERPL-MCNC: 22 MG/DL (ref 6–20)
BUN/CREAT SERPL: 18 (ref 12–20)
CA-I BLD-MCNC: 7.2 MG/DL (ref 8.5–10.1)
CHLORIDE SERPL-SCNC: 110 MMOL/L (ref 97–108)
CO2 SERPL-SCNC: ABNORMAL MMOL/L (ref 21–32)
CREAT SERPL-MCNC: 1.22 MG/DL (ref 0.55–1.02)
DIFFERENTIAL METHOD BLD: ABNORMAL
EOSINOPHIL # BLD: 0 K/UL (ref 0–0.4)
EOSINOPHIL NFR BLD: 0 % (ref 0–7)
ERYTHROCYTE [DISTWIDTH] IN BLOOD BY AUTOMATED COUNT: 17.1 % (ref 11.5–14.5)
GLUCOSE SERPL-MCNC: 119 MG/DL (ref 65–100)
HCT VFR BLD AUTO: 27.7 % (ref 35–47)
HGB BLD-MCNC: 9.3 G/DL (ref 11.5–16)
IMM GRANULOCYTES # BLD AUTO: 0 K/UL
IMM GRANULOCYTES NFR BLD AUTO: 0 %
LYMPHOCYTES # BLD: 1.5 K/UL (ref 0.8–3.5)
LYMPHOCYTES NFR BLD: 13 % (ref 12–49)
MCH RBC QN AUTO: 36.8 PG (ref 26–34)
MCHC RBC AUTO-ENTMCNC: 33.6 G/DL (ref 30–36.5)
MCV RBC AUTO: 109.5 FL (ref 80–99)
MONOCYTES # BLD: 0.4 K/UL (ref 0–1)
MONOCYTES NFR BLD: 3 % (ref 5–13)
NEUTS BAND NFR BLD MANUAL: 3 % (ref 0–6)
NEUTS SEG # BLD: 9.9 K/UL (ref 1.8–8)
NEUTS SEG NFR BLD: 81 % (ref 32–75)
NRBC # BLD: 0.03 K/UL (ref 0–0.01)
NRBC BLD-RTO: 0.3 PER 100 WBC
PLATELET # BLD AUTO: 219 K/UL (ref 150–400)
PMV BLD AUTO: 9.3 FL (ref 8.9–12.9)
POTASSIUM SERPL-SCNC: ABNORMAL MMOL/L (ref 3.5–5.1)
RBC # BLD AUTO: 2.53 M/UL (ref 3.8–5.2)
RBC MORPH BLD: ABNORMAL
SODIUM SERPL-SCNC: 131 MMOL/L (ref 136–145)
WBC # BLD AUTO: 11.8 K/UL (ref 3.6–11)

## 2022-12-31 PROCEDURE — 74011250636 HC RX REV CODE- 250/636: Performed by: ORTHOPAEDIC SURGERY

## 2022-12-31 PROCEDURE — 85025 COMPLETE CBC W/AUTO DIFF WBC: CPT

## 2022-12-31 PROCEDURE — 74011000258 HC RX REV CODE- 258: Performed by: ORTHOPAEDIC SURGERY

## 2022-12-31 PROCEDURE — 73060 X-RAY EXAM OF HUMERUS: CPT

## 2022-12-31 PROCEDURE — 74011250637 HC RX REV CODE- 250/637: Performed by: ORTHOPAEDIC SURGERY

## 2022-12-31 PROCEDURE — 74011000250 HC RX REV CODE- 250: Performed by: ORTHOPAEDIC SURGERY

## 2022-12-31 PROCEDURE — 65270000029 HC RM PRIVATE

## 2022-12-31 PROCEDURE — 97530 THERAPEUTIC ACTIVITIES: CPT

## 2022-12-31 PROCEDURE — 36415 COLL VENOUS BLD VENIPUNCTURE: CPT

## 2022-12-31 PROCEDURE — 80048 BASIC METABOLIC PNL TOTAL CA: CPT

## 2022-12-31 RX ORDER — DILTIAZEM HYDROCHLORIDE 120 MG/1
120 CAPSULE, COATED, EXTENDED RELEASE ORAL DAILY
Status: DISCONTINUED | OUTPATIENT
Start: 2023-01-01 | End: 2023-01-02

## 2022-12-31 RX ADMIN — OXYCODONE HYDROCHLORIDE AND ACETAMINOPHEN 1000 MG: 500 TABLET ORAL at 21:06

## 2022-12-31 RX ADMIN — CEFAZOLIN 1 G: 1 INJECTION, POWDER, FOR SOLUTION INTRAMUSCULAR; INTRAVENOUS at 04:52

## 2022-12-31 RX ADMIN — CEFAZOLIN 1 G: 1 INJECTION, POWDER, FOR SOLUTION INTRAMUSCULAR; INTRAVENOUS at 21:14

## 2022-12-31 RX ADMIN — DILTIAZEM HYDROCHLORIDE 5 MG/HR: 5 INJECTION, SOLUTION INTRAVENOUS at 00:37

## 2022-12-31 RX ADMIN — HYDROCODONE BITARTRATE AND ACETAMINOPHEN 1 TABLET: 5; 325 TABLET ORAL at 08:22

## 2022-12-31 RX ADMIN — SODIUM CHLORIDE, PRESERVATIVE FREE 10 ML: 5 INJECTION INTRAVENOUS at 15:32

## 2022-12-31 RX ADMIN — DIAZEPAM 10 MG: 5 TABLET ORAL at 02:43

## 2022-12-31 RX ADMIN — HYDROCODONE BITARTRATE AND ACETAMINOPHEN 1 TABLET: 5; 325 TABLET ORAL at 02:43

## 2022-12-31 RX ADMIN — CEFAZOLIN 1 G: 1 INJECTION, POWDER, FOR SOLUTION INTRAMUSCULAR; INTRAVENOUS at 15:32

## 2022-12-31 RX ADMIN — SODIUM CHLORIDE, PRESERVATIVE FREE 10 ML: 5 INJECTION INTRAVENOUS at 21:15

## 2022-12-31 RX ADMIN — MULTIPLE VITAMINS W/ MINERALS TAB 1 TABLET: TAB at 08:22

## 2022-12-31 RX ADMIN — DIAZEPAM 10 MG: 5 TABLET ORAL at 21:21

## 2022-12-31 RX ADMIN — OXYCODONE HYDROCHLORIDE AND ACETAMINOPHEN 1000 MG: 500 TABLET ORAL at 08:22

## 2022-12-31 RX ADMIN — SODIUM CHLORIDE, PRESERVATIVE FREE 10 ML: 5 INJECTION INTRAVENOUS at 05:38

## 2022-12-31 RX ADMIN — DIAZEPAM 10 MG: 5 TABLET ORAL at 08:22

## 2022-12-31 RX ADMIN — DILTIAZEM HYDROCHLORIDE 5 MG/HR: 5 INJECTION, SOLUTION INTRAVENOUS at 23:13

## 2022-12-31 NOTE — OP NOTES
Operative Note    Patient: Etelvina Schwab  YOB: 1955  MRN: [id number]    Date of Procedure: 12/30/2022     Pre-Op Diagnosis:  Comminuted fracture of left proximal humerus shaft, with associated surgical neck fracture    Additional diagnoses: 1) Chronic alcoholism  2) Tobacco abuse disorder  3) Severe osteoporosis  4) Malnutrition  5) Acute blood loss anemia  6) Vitamin D deficiency    Post-Op Diagnosis: Same as preoperative diagnosis. Procedure(s):  OPEN REDUCTION & INTERNAL FIXATION OF LEFT  HUMERUS FRACTURE    Surgeon(s):  Payton Liriano MD    Surgical Assistant: Surg Asst-1: Andrez Coffman    Anesthesia: General     Estimated Blood Loss (mL):  221     Complications: None    Specimens:   ID Type Source Tests Collected by Time Destination   1 : Urine Culture Urine  CULTURE, URINE, URINALYSIS, COMPLETE Payton Liriano MD 12/30/2022 1524 Microbiology     Implants:   Implant Name Type Inv. Item Serial No.  Lot No. LRB No. Used Action   GRAFT 1-8MM BONE CANCELLOUS CHIP 40ML - [id number] Bone GRAFT 1-8MM BONE CANCELLOUS CHIP 40ML [id number] [facility] [employer]_WD N/A Left 1 Implanted   K WIRE FIX L15.2CM DIA2MM S STL SMOOTH DBL SHRP TIP - SN/A  K WIRE FIX L15.2CM DIA2MM S STL SMOOTH DBL SHRP TIP N/A [employer]WD N/A Left 3 Implanted   SCR JESSICA LP 3.5X24MM T15 --  - SN/A  SCR JESSICA LP 3.5X24MM T15 --  N/A [employer] N/A Left 2 Implanted   PLATE  MM LT PROX HUM 11 HOLE HI ALPS - SN/A  PLATE  MM LT PROX HUM 11 HOLE HI ALPS N/A [employer]WD N/A Left 1 Implanted   BONE FEM STRUT JESSICA [id number] --  - [id number]  BONE FEM STRUT JESSICA [id number] --  [id number] [facility] T[employer] N/A Left 1 Implanted   SCR JESSICA LP 3.5X28MM T15 --  - SN/A  SCR JESSICA LP 3.5X28MM T15 --  N/A [employer]WD N/A Left 1 Implanted   SCREW BNE L24MM DIA3. 5MM STD JESSICA DST TIB TI ST JAYESH FULL - SN/A  SCREW BNE L24MM DIA3.5MM STD JESSICA DST TIB TI ST JAYESH FULL N/A [employer]WD N/A Left 1 Implanted   PEG BNE FIX L38MM DIA3. 2MM PROX HUM G JAYESH FOR ALPS PLATING - SN/A  PEG BNE FIX L38MM DIA3. 2MM PROX HUM G JAYESH FOR ALPS PLATING N/A [employer]WD N/A Left 2 Implanted     Drains:   External Urinary Catheter 12/29/22 (Active)   Site Assessment Clean, dry, & intact 12/29/22 0138   Repositioned Yes 12/29/22 0138   Perineal Care Yes 12/29/22 0138   Wick Changed Yes 12/29/22 0138   Suction Canister/Tubing Changed Yes 12/29/22 0138   Urine Output (mL) 250 ml 12/29/22 0138     Findings: 1) Markedly thin and atrophic skin, with extensive skin tears on the forearm, with diffuse marked edema and induration of upper extremity  2) extensive auto stripping of bone fragments within the injury zone  3) very thin and parotic bone quality, with severely osteoporotic cortical as well as cancellous bone  4) chronic full-thickness retracted tear of supraspinatus tendon, with large full-thickness gap in the region of the rotator cuff interval  5) Stable, near anatomic reduction of the fractures obtained, with satisfactory hardware placement and fixation. Detailed Description of Procedure: The patient and operative site were identified in the preoperative holding area. After induction of anesthesia the patient was positioned on a beachchair table. Carefully positioning was performed. Intravenous antibiotics were administered. Fluoroscopy was positioned appropriately across the table. The left shoulder and upper extremity were prepped and draped into a surgical field with ChloraPrep. After timeout was called, I used a standard anterior deltopectoral approach for exposure of the injury zone, extending distally into the anterior Erasmo's approach to the humeral shaft. The fracture morphology was as noted above, and as expected from the CT scan findings.   2 major eggshell thin cortical fragments were noted, in addition to the distal shaft and the humeral head fragments. The rotator cuff showed a chronic tear with a large hiatus in the rotator interval, as noted above. The infraspinatus tendon was captured and provided control of the humeral head fragment, with 2 locking #2 FiberWire sutures. The fracture was cleared of hematoma and early healing granulation, and nicely visualized. Findings were as noted above. Auto-dissection was considerable. Deformity of the fracture was as noted above. After a few preliminary attempts at fracture reduction, it became evident that the tendon axial cortical bone was not amenable to the use of reduction clamps or interfragmentary screws. We decided to use an intramedullary cortical strut, to bridge the humeral head and the distal fragment and to provide a scaffold for reconstruction of the humeral shaft. A 20 cm long fresh frozen femoral cortical strut allograft was thawed, and divided longitudinally into the 1 cm wide cortical strut. This was snugly fitted into the intramedullary canal of the distal shaft. Proximally, the strut was abutted into the center of the humeral head fragment. The vectors were nicely balanced, and near anatomic restoration and alignment of the humeral head was obtained. Cancellous bone allograft was now packed into the humeral head, around the strut. The remaining fracture fragments were now easily reconstructed and reduced near anatomically, around the strut. An anterior to posterior cortical lag screw was inserted towards the distal end of the reduced fragments. After confirming satisfactory reduction fluoroscopically, the chosen plate was now placed immediately lateral to the bicipital groove and the biceps tendon. Careful positioning of the height as well as centering of the plate from the humeral shaft was performed. A proximal K wire through the plate was inserted into the humeral head, and showed a well centered position.   A distal screw was inserted, the center of the plate nicely on the bone. After confirming this with fluoroscopy, a nonlocking screw was placed in the proximal oblong hole of the shaft. I proceeded with inserting the locking smooth pegs and screws in the humeral head. After insertion of all the screws, fluoroscopy was used to check for inadvertent intra-articular placement, and a near far near technique showed all the hardware to be nonarticular. Finally, 1 more nonlocking and  2 locking screws were inserted into the distal part of the plate. Fluoroscopic images were obtained and saved, confirming satisfactory fracture reduction and implant placement. Range of motion of the shoulder was checked and noted to be intact. The FiberWire sutures in the rotator cuff were tied securely to the proximal end of the plate. The wound was thoroughly irrigated. Hemostasis was confirmed. A drain was felt to be unnecessary. The remaining closure of the wound was performed with standard technique. The skin was closed with staples, and sterile nonadherent absorbent dressings were applied. The patient tolerated the procedure well and was transferred to the recovery room in stable condition. Postoperatively, a physical therapy and rehabilitation program will be initiated, to be continued after discharge. Medical management will continue on the floor under the medical team.  Follow-up will be in 2 weeks at at the orthopedic office.     Electronically Signed by Jenn Viramontes MD on 12/30/2022 at 7:04 PM

## 2022-12-31 NOTE — PROGRESS NOTES
PHYSICAL THERAPY REEVALUATION  Patient: Juan Bojorquez (76 y.o. female)  Date: 12/31/2022  Primary Diagnosis: Dehydration [E86.0]  Procedure(s) (LRB):  OPEN REDUCTION INTERNAL FIXATION LEFT  HUMERUS (Left) 1 Day Post-Op   Precautions: NWB LUE         ASSESSMENT  Based on the objective data described below, the patient presents with decreased mobility. ROM, strength, endurance to activities. Patient is s/o orif left ue and found in bed drawsy, wanted to eat something. kept eyes closed. Patient's left ue with dressing for whole arm. No slingoozing a lot, towel socked. required some spoon of pudding to be fed. she did not eat luch or breakfast.She was very hungry but did no feel like eating any of the food presented to her. RN informed  Current Level of Function Impacting Discharge (mobility/balance): requires max assist for bed mob, transfers. Functional Outcome Measure: The patient scored 9/24 on the [facility] outcome measure which is indicative of low complexity. Other factors to consider for discharge: SNF     Patient will benefit from skilled therapy intervention to address the above noted impairments. PLAN :  Recommendations and Planned Interventions: bed mobility training, transfer training, gait training, therapeutic exercises, patient and family training/education, and therapeutic activities      Frequency/Duration: Patient will be followed by physical therapy:  3-5x/week to address goals. Recommendation for discharge: (in order for the patient to meet his/her long term goals)  Ernesto Hayes    This discharge recommendation:  Has been made in collaboration with the attending provider and/or case management    Equipment recommendations for successful discharge (if) home: wheelchair         SUBJECTIVE:   Patient stated I am hungry.     OBJECTIVE DATA SUMMARY:   HISTORY:    Past Medical History:   Diagnosis Date    HTN (hypertension)     Smoker      Past Surgical History:   Procedure Laterality Date    HX SHOULDER REPLACEMENT Right 11/06/2011    Has fracture surgical neck, has ORIF DONE     Hospital course since last seen and reason for reevaluation: patient had left humerus ORIF 12/30    Personal factors and/or comorbidities impacting plan of care:     Home Situation  Home Environment: Private residence  One/Two Story Residence: One story  Living Alone: No  Support Systems: Spouse/Significant Other  Patient Expects to be Discharged to[de-identified] Home with home health  Current DME Used/Available at Home: None    EXAMINATION/PRESENTATION/DECISION MAKING:   Critical Behavior:  Neurologic State: Alert, Drowsy  Orientation Level: Oriented to person  Cognition: Poor safety awareness     Hearing: Auditory  Auditory Impairment: None  Skin:  bruising all over the body  Edema: LUE  Range Of Motion:  AROM: Generally decreased, functional                       Strength:    Strength: Generally decreased, functional                    T   Functional Mobility:  Bed Mobility:     Supine to Sit: Maximum assistance;Assist x2  Sit to Supine: Maximum assistance;Assist x2  Scooting: Maximum assistance;Assist x2; Other (comment) (pad assist)  Transfers:  Sit to Stand: Maximum assistance;Assist x2  Stand to Sit: Maximum assistance;Assist x2                       Balance:   Sitting: Impaired; Without support  Sitting - Static: Fair (occasional)  Sitting - Dynamic: Fair (occasional)  Standing: Impaired  Standing - Static: Fair;Constant support  Standing - Dynamic : Fair;Constant support  Ambulation/Gait Training:   Unable to take steps                                                   Functional Measure:  [facility] Mobility Inpatient Short Form  How much difficulty does the patient currently have. .. Unable A Lot A Little None   1. Turning over in bed (including adjusting bedclothes, sheets and blankets)? [] 1   [x] 2   [] 3   [] 4   2.   Sitting down on and standing up from a chair with arms ( e.g., wheelchair, bedside commode, etc.)   [] 1   [x] 2   [] 3   [] 4   3. Moving from lying on back to sitting on the side of the bed? [] 1   [x] 2   [] 3   [] 4          How much help from another person does the patient currently need. .. Total A Lot A Little None   4. Moving to and from a bed to a chair (including a wheelchair)? [x] 1   [] 2   [] 3   [] 4   5. Need to walk in hospital room? [x] 1   [] 2   [] 3   [] 4   6. Climbing 3-5 steps with a railing? [x] 1   [] 2   [] 3   [] 4   © , Trustees of [employer], under license to [employer] All rights reserved     Score:     Interpretation of Tool:  Represents activities that are increasingly more difficult (i.e. Bed mobility, Transfers, Gait). Cutoff score 42.9 (18) correlates to a good likelihood of discharging home versus a facility  Raw Score Scale Score Scale Score Standard Error Approximate Degree of Functional Impairment   6 23.55 4.57 100%   7 26.42 4.33 92%   8 28.58 4.04 87%   9 30.55 3.69 81%   10 32.29 3.42 77%   11 33.86 3.22 73%   12 35.33 3.08 69%   13 36.74 2.99 65%   14 38.10 2.95 61%   15 39.45 2.93 58%   16 40.78 2.95 54%   17 42.13 3.03 51%   18 43.63 3.20 47%   19 45.44 3.55 42%   20 47.67 4.06 36%   21 50.25 4.69 29%   22 53.28 5.43 21%   23 56.93 6.22 11%   24 61.14 6.94 0%   Alisha Gloria, Harjeet Bryant, Elieser Abdiel Smoke, Nelsy Mullins. KLAUS SampsonPAC 6-Clicks Functional Assessment Scores Predict [address] Discharge Destination, Physical Therapy, Volume 94, Issue 9, 2014, Pages 1425-0114, [email or web address]          Pain Ratin/10 faces    Activity Tolerance:   Fair  Please refer to the flowsheet for vital signs taken during this treatment.     After treatment patient left in no apparent distress:   Supine in bed, Heels elevated for pressure relief, Call bell within reach, Bed / chair alarm activated, Side rails x 3, and Restraints    COMMUNICATION/EDUCATION:   The patients plan of care was discussed with: Occupational therapist, Registered nurse, and Case management. Fall prevention education was provided and the patient/caregiver indicated understanding. and Patient understands intent and goals of therapy, but is neutral about his/her participation.     Thank you for this referral.  Escobar Barnes PT   Time Calculation: 40 mins

## 2022-12-31 NOTE — ANESTHESIA POSTPROCEDURE EVALUATION
Procedure(s):  OPEN REDUCTION INTERNAL FIXATION LEFT  HUMERUS. general, regional    Anesthesia Post Evaluation      Multimodal analgesia: multimodal analgesia used between 6 hours prior to anesthesia start to PACU discharge  Patient location during evaluation: PACU  Patient participation: complete - patient participated  Level of consciousness: awake  Pain score: 0  Pain management: adequate  Airway patency: patent  Anesthetic complications: no  Cardiovascular status: acceptable  Respiratory status: acceptable  Hydration status: acceptable  Post anesthesia nausea and vomiting:  controlled  Final Post Anesthesia Temperature Assessment:  Normothermia (36.0-37.5 degrees C)      INITIAL Post-op Vital signs:   Vitals Value Taken Time   /78 12/30/22 2015   Temp     Pulse 78 12/30/22 2027   Resp 24 12/30/22 2027   SpO2 95 % 12/30/22 2020   Vitals shown include unvalidated device data.

## 2022-12-31 NOTE — PROGRESS NOTES
On call Dr. Caesar Reis notified of patient being on 3 continuous fluids. 0.9% NS, dextrose 5% in 0.45% NS, and  Cardizem drip at 5mg/Hr. Order received for a regular cardiac diet with low sodium and to D/C 0.9% NS and dextrose 5% in 0.45% NS.

## 2022-12-31 NOTE — PROGRESS NOTES
Hospitalist Progress Note         Bird Lozoya MD          Daily Progress Note: 12/31/2022      Subjective: The patient is seen for follow  up. 75 y.o. female with history of alcohol abuse who fell in her home last night. Since then she complains of severe pain all over as well as extreme left arm pain and difficulty walking. In the ED she was hypertensive and mildly tachycardic. Labs showed a sodium of 126, creatinine 1.36 and a magnesium of 1.1.  ----     X-ray of the left arm shows a proximal to mid humeral shaft fracture with impaction and varus angulation. Patient seen by orthopedic surgeon. POD#1 s/p fracture repair.  Resting comfortably    Problem List:  Problem List as of 12/31/2022 Date Reviewed: 9/22/2020            Codes Class Noted - Resolved    Dehydration ICD-10-CM: E86.0  ICD-9-CM: 276.51  12/25/2022 - Present        COPD exacerbation ([facility] ICD-10-CM: J44.1  ICD-9-CM: 491.21  9/22/2020 - Present        Respiratory failure with hypoxia [facility]) ICD-10-CM: J96.91  ICD-9-CM: 518.81  9/22/2020 - Present        Hypokalemia ICD-10-CM: E87.6  ICD-9-CM: 276.8  9/22/2020 - Present        Acute respiratory failure ([facility] ICD-10-CM: J96.00  ICD-9-CM: 518.81  9/22/2020 - Present       Medications reviewed  Current Facility-Administered Medications   Medication Dose Route Frequency    0.9% sodium chloride infusion 250 mL  250 mL IntraVENous PRN    ceFAZolin (ANCEF) 1 g in sterile water (preservative free) 10 mL IV syringe  1 g IntraVENous Q8H    [Held by provider] dilTIAZem (CARDIZEM) 125 mg in 0.9% sodium chloride 125 mL ([id number])  0-15 mg/hr IntraVENous TITRATE    ascorbic acid (vitamin C) (VITAMIN C) tablet 1,000 mg  1,000 mg Oral BID    sodium chloride (NS) flush 5-40 mL  5-40 mL IntraVENous Q8H    sodium chloride (NS) flush 5-40 mL  5-40 mL IntraVENous PRN    acetaminophen (TYLENOL) tablet 650 mg  650 mg Oral Q6H PRN    Or    acetaminophen (TYLENOL) suppository 650 mg 650 mg Rectal Q6H PRN    polyethylene glycol (MIRALAX) packet 17 g  17 g Oral DAILY PRN    promethazine (PHENERGAN) tablet 12.5 mg  12.5 mg Oral Q6H PRN    Or    ondansetron (ZOFRAN) injection 4 mg  4 mg IntraVENous Q6H PRN    enoxaparin (LOVENOX) injection 30 mg  30 mg SubCUTAneous DAILY    diazePAM (VALIUM) tablet 10 mg  10 mg Oral Q1H PRN    diazePAM (VALIUM) tablet 20 mg  20 mg Oral Q1H PRN    multivitamin, tx-iron-ca-min (THERA-M w/ IRON) tablet 1 Tablet  1 Tablet Oral DAILY    HYDROcodone-acetaminophen (NORCO) 5-325 mg per tablet 1 Tablet  1 Tablet Oral Q6H PRN    albuterol-ipratropium (DUO-NEB) 2.5 MG-0.5 MG/3 ML  3 mL Nebulization Q6H PRN       Review of Systems:   A comprehensive review of systems was negative except for that written in the HPI. Objective:   Physical Exam:     Visit Vitals  /64 (BP 1 Location: Right leg, BP Patient Position: At rest;Lying)   Pulse 78   Temp 97.2 °F (36.2 °C)   Resp 18   Ht 5' 3\" (1.6 m)   Wt 44.4 kg (97 lb 14.2 oz)   SpO2 98%   BMI 17.34 kg/m²    O2 Flow Rate (L/min): 2 l/min O2 Device: None (Room air)    Temp (24hrs), Av.7 °F (36.5 °C), Min:97 °F (36.1 °C), Max:98.6 °F (37 °C)    No intake/output data recorded.  1901 -  0700  In:  [I.V.:1750]  Out: 50 [Urine:50]    General:  Arousable but lethargic   Lungs:   Clear to auscultation bilaterally. Chest wall:  No tenderness or deformity. Heart:  Regular rate and rhythm, S1, S2 normal, no murmur, click, rub or gallop. Abdomen:   Soft, non-tender. Bowel sounds normal. No masses,  No organomegaly. Extremities: Extremities normal, atraumatic, no cyanosis or edema. Pulses: 2+ and symmetric all extremities. Skin: Skin color, texture, turgor normal. No rashes or lesions   Neurologic: CNII-XII intact.  No gross sensory or motor deficits     Data Review:       Recent Days:  Recent Labs     22  0713 22  0501   WBC 11.8* 6.9   HGB 9.3* 8.3*   HCT 27.7* 23.8*    249       Recent Labs     12/31/22  0713   *   K Hemolyzed, recollect requested   *   CO2 Hemolyzed, recollect requested   *   BUN 22*   CREA 1.22*   CA 7.2*       No results for input(s): PH, PCO2, PO2, HCO3, FIO2 in the last 72 hours. 24 Hour Results:  Recent Results (from the past 24 hour(s))   TYPE & SCREEN    Collection Time: 12/30/22  2:18 PM   Result Value Ref Range    Crossmatch Expiration 01/02/2023,2359     ABO/Rh(D) Polina Evens Negative     Antibody screen Negative     Unit number [id number]     Blood component type RC LR,1     Unit division 00     Status of unit Issued,final     TRANSFUSION STATUS Ok to transfuse     Crossmatch result Compatible     Unit number [id number]     Blood component type RC LR     Unit division 00     Status of unit Allocated     TRANSFUSION STATUS Ok to transfuse     Crossmatch result Compatible    CBC WITH AUTOMATED DIFF    Collection Time: 12/31/22  7:13 AM   Result Value Ref Range    WBC 11.8 (H) 3.6 - 11.0 K/uL    RBC 2.53 (L) 3.80 - 5.20 M/uL    HGB 9.3 (L) 11.5 - 16.0 g/dL    HCT 27.7 (L) 35.0 - 47.0 %    .5 (H) 80.0 - 99.0 FL    MCH 36.8 (H) 26.0 - 34.0 PG    MCHC 33.6 30.0 - 36.5 g/dL    RDW 17.1 (H) 11.5 - 14.5 %    PLATELET 835 303 - 376 K/uL    MPV 9.3 8.9 - 12.9 FL    NRBC 0.3 (H) 0.0  WBC    ABSOLUTE NRBC 0.03 (H) 0.00 - 0.01 K/uL    NEUTROPHILS 81 (H) 32 - 75 %    BAND NEUTROPHILS 3 0 - 6 %    LYMPHOCYTES 13 12 - 49 %    MONOCYTES 3 (L) 5 - 13 %    EOSINOPHILS 0 0 - 7 %    BASOPHILS 0 0 - 1 %    IMMATURE GRANULOCYTES 0 %    ABS. NEUTROPHILS 9.9 (H) 1.8 - 8.0 K/UL    ABS. LYMPHOCYTES 1.5 0.8 - 3.5 K/UL    ABS. MONOCYTES 0.4 0.0 - 1.0 K/UL    ABS. EOSINOPHILS 0.0 0.0 - 0.4 K/UL    ABS. BASOPHILS 0.0 0.0 - 0.1 K/UL    ABS. IMM.  GRANS. 0.0 K/UL    DF Manual      RBC COMMENTS Anisocytosis  1+        RBC COMMENTS Macrocytosis  1+        RBC COMMENTS Polychromasia  1+       METABOLIC PANEL, BASIC    Collection Time: 12/31/22  7:13 AM   Result Value Ref Range    Sodium 131 (L) 136 - 145 mmol/L    Potassium Hemolyzed, recollect requested 3.5 - 5.1 mmol/L    Chloride 110 (H) 97 - 108 mmol/L    CO2 Hemolyzed, recollect requested 21 - 32 mmol/L    Anion gap Not calculated 5 - 15 mmol/L    Glucose 119 (H) 65 - 100 mg/dL    BUN 22 (H) 6 - 20 mg/dL    Creatinine 1.22 (H) 0.55 - 1.02 mg/dL    BUN/Creatinine ratio 18 12 - 20      eGFR 48 (L) >60 ml/min/1.73m2    Calcium 7.2 (L) 8.5 - 10.1 mg/dL           Assessment/     Acute kidney injury, prerenal due to dehydration. Improved    Dehydration with hyponatremia    Left humerus fracture s/p ORIF 12/30    Alcohol abuse    Hypomagnesemia     Tobacco abuse    Essential hypertension    Sinus tachycardia. Improved on cardizem         Plan:  Continue supportive care  Monitor routine electrolytes  PTOT eval        Care Plan discussed with: Patient/Family    Total time spent with patient: 30 minutes.     Joycelyn Goff MD

## 2022-12-31 NOTE — PROGRESS NOTES
OCCUPATIONAL THERAPY RE-ASSESSMENT  Patient: Saurabh Man (64 y.o. female)  Date: 12/31/2022  Primary Diagnosis: Dehydration [E86.0]  Procedure(s) (LRB):  OPEN REDUCTION INTERNAL FIXATION LEFT  HUMERUS (Left) 1 Day Post-Op   Precautions: fall risk, NWB LUE       ASSESSMENT  Pt initially evaluated and placed on OT caseload 12/26 and seen for 2 skilled OT sessions since evaluation. Pt seen today for OT re-assessment s/p ORIF of L humerus fx; POD #1. Pt w/ orders to remain NWB to LUE. Pt also w/ orders for \"Left UE pendulum exercises, RCT protocol, and OT for hand\". Pt received semi-supine in bed upon arrival w/ mitten donned on RUE, AXO x1 (pt tearful and initially drowsy) and agreeable to working with OT/PT at this time. Pt educated on NWB status of LUE prior to ambulation; pt will benefit from further reinforcement. Based on current observations, pt continues to present with deficits in generalized strength/AROM, bed mobility, static/dynamic sitting balance, static/dynamic standing balance, functional activity tolerance, and pain impacting overall performance of ADLs and functional transfers/mobility (see below for objective details and assist levels). Overall, pt tolerates session fair today, currently with max A x2 for sup<>sit transfer and to scoot toward EOB. Pt w/ bandage in place on LUE (unable to place sling); bandage noted to soiled and RN informed. She tolerated sitting EOB for ~6-8 minutes w/ high guard for sitting balance and SBA-min A for balance; she req'd total A for feeding. Completed STS w/ max A x2 but pt unable to safely take side steps toward HOB. Returned to supine w/ mitten donned. Pt not appropriate to educate on exs at this time 2' being tearful, slightly drowsy, and confusion; will educate at appropriate Pt would benefit from continued skilled OT services to address noted deficits and maximize IND/safety with ADLs and functional transfers/mobility.  OT goals and POC reviewed on this date and updated to reflect current progress. Current OT recommendation Ernesto Hayes at discharge once medically appropriate. Other factors to consider for discharge: family support, DME, time since onset, severity of deficits      Patient will benefit from skilled therapy intervention to address the above noted impairments. PLAN :  Recommendations and Planned Interventions: self care training, functional mobility training, therapeutic exercise, balance training, therapeutic activities, endurance activities, and patient education    Recommend with staff: elevated LUE w/ pillows     Recommend next session: LB dressing , LB bathing, Seated grooming, and Standing grooming    Frequency/Duration: Patient will be followed by occupational therapy: 3-5x/week to address goals. Recommendation for discharge: (in order for the patient to meet his/her long term goals)  Ernesto Hayes    This discharge recommendation:  Has been made in collaboration with the attending provider and/or case management    IF patient discharges home will need the following DME: TBD at next placement       SUBJECTIVE:   Patient stated I am so hungry.     OBJECTIVE DATA SUMMARY:   HISTORY:   Past Medical History:   Diagnosis Date    HTN (hypertension)     Smoker      Past Surgical History:   Procedure Laterality Date    HX SHOULDER REPLACEMENT Right 11/06/2011    Has fracture surgical neck, has ORIF DONE       Per pt:   Home Situation  Home Environment: Private residence  One/Two Story Residence: One story  Living Alone: No  Support Systems: Spouse/Significant Other  Patient Expects to be Discharged to[de-identified] Home with home health  Current DME Used/Available at Home: None      EXAMINATION OF PERFORMANCE DEFICITS:  Cognitive/Behavioral Status:  Neurologic State: Drowsy; Alert  Orientation Level: Oriented to person  Cognition: Poor safety awareness;Decreased attention/concentration;Decreased command following Skin: bruising noted along L side of back down to L hip    Edema: swelling in fingers     Hearing: Auditory  Auditory Impairment: None        Range of Motion:  AROM: Generally decreased, functional                         Strength:  Strength: Generally decreased, functional                Coordination:     Fine Motor Skills-Upper: Left Impaired;Right Intact    Gross Motor Skills-Upper: Left Impaired;Right Intact      Balance:  Sitting: Impaired; With support  Sitting - Static: Fair (occasional)  Sitting - Dynamic: Fair (occasional)  Standing: Impaired  Standing - Static: Fair;Constant support  Standing - Dynamic : Fair;Constant support    Functional Mobility and Transfers for ADLs:  Bed Mobility:  Supine to Sit: Maximum assistance;Assist x2  Sit to Supine: Maximum assistance;Assist x2  Scooting: Maximum assistance;Assist x2 (to scoot toward [facility])    Transfers:  Sit to Stand: Maximum assistance;Assist x2  Stand to Sit: Maximum assistance;Assist x2    ADL Intervention and task modifications:  Feeding  Food to Mouth: Maximum assistance                                       Therapeutic Exercise: Will educate on hand ROM/exs and pendulum next exs     Functional Measure:    [employer] AM-PACTM \"6 Clicks\"                                                       Daily Activity Inpatient Short Form  How much help from another person does the patient currently need. .. Total; A Lot A Little None   1. Putting on and taking off regular lower body clothing? [x]  1 []  2 []  3 []  4   2. Bathing (including washing, rinsing, drying)? [x]  1 []  2 []  3 []  4   3. Toileting, which includes using toilet, bedpan or urinal? [x] 1 []  2 []  3 []  4   4. Putting on and taking off regular upper body clothing? [x]  1 []  2 []  3 []  4   5. Taking care of personal grooming such as brushing teeth? [x]  1 []  2 []  3 []  4   6. Eating meals?  [x]  1 []  2 []  3 []  4   © 2007, Trustees of [facility] [employer], under license to [employer]alyan [employer]. All rights reserved     Score: 6/24     Interpretation of Tool:  Represents clinically-significant functional categories (i.e. Activities of daily living). Percentage of Impairment CH    0%   CI    1-19% CJ    20-39% CK    40-59% CL    60-79% CM    80-99% CN     100%   [facility]  Score 6-24 24 23 20-22 15-19 10-14 7-9 6       Pain Rating:  Pt in pain but not able to verbalize number    Activity Tolerance:   Fair and requires rest breaks    After treatment patient left in no apparent distress:    Supine in bed, Call bell within reach, Bed / chair alarm activated, and Side rails x 3, bed locked and in lowest position    COMMUNICATION/EDUCATION:   The patients plan of care was discussed with: Physical therapist and Registered nurse. OT/PT sessions occurred together for increased patient and clinician safety as pt with decreased activity tolerance and requires A of 2 for mobility at this time. Thank you for this referral.  Ashley Leblanc OT  Time Calculation: 41 mins   Problem: Self Care Deficits Care Plan (Adult)  Goal: *Acute Goals and Plan of Care (Insert Text)  Description:   FUNCTIONAL STATUS PRIOR TO ADMISSION: Patient was independent and active without use of DME. Patient was independent for basic and instrumental ADLs. HOME SUPPORT: The patient lived with her boyfriend but did not require assist.    Occupational Therapy Goals  Initiated 12/26/2022  Patient Goal: Move and do things with less pain in LUE. 1.  Patient will perform lower body dressing with min A within 7 day(s). 2.  Patient will perform grooming with min A within 7 day(s). 3.  Patient will perform bathing with min A within 7 day(s). 4.  Patient will perform toilet transfers with min A within 7 day(s). 5.  Patient will perform all aspects of toileting with SBA within 7 day(s). 6.  Patient will participate in upper extremity therapeutic exercise/activities with modified independence within 7 day(s).     Outcome: Progressing Towards Goal

## 2022-12-31 NOTE — PROGRESS NOTES
Progress Note      12/31/2022 1:34 PM  NAME: Merrill Bhagat   MRN:  [id number]   Admit Diagnosis: Dehydration [E86.0]      Problem List:   -Admitted to the hospital following fall and fractured left humerus  -Cardiology consult was invited secondary to increased troponin level. -Hypertension  -COPD2  -Current smoker  -No known established coronary artery disease and or no recent cardiac work-up has been done in the recent past     Assessment/Plan:   -Follow-up visit from yesterday consultation  -Patient is lying in the bed complaining of left arm pain intervention of left humeral neck. -Was clinically and hemodynamically stable per my cardiac assessment. No perioperative event and patient tolerated the surgery well.  -No further cardiovascular testing based on my clinical assessment and we will follow on as needed basis. []       High complexity decision making was performed in this patient at high risk for decompensation with multiple organ involvement. Subjective:     Cornelius Henry is a 75 y.o.  female who has no known established coronary artery disease and admitted to the hospital after she fractured her left humerus following fall. When I saw the patient she was sitting up in the bed comfortably and denies any chest pain shortness of breath or any history of coronary artery disease or any previous cardiac work-up being done. Her risk factors for heart disease are smoking and hypertension in addition to her age greater than 72 years. Borderline increased troponin could be multifactorial. We will check echocardiogram and make further recommendation but you may proceed with surgery as based on my overall clinical assessment she would be low to moderate risk.     Review of Systems:    Symptom Y/N Comments  Symptom Y/N Comments   Fever/Chills N   Chest Pain N    Poor Appetite N   Edema N    Cough N   Abdominal Pain N    Sputum N   Joint Pain N    SOB/CHUNG N   Pruritis/Rash N Nausea/vomit N   Tolerating PT/OT Y    Diarrhea N   Tolerating Diet Y    Constipation N   Other       Could NOT obtain due to:      Objective:      Physical Exam:    Last 24hrs VS reviewed since prior progress note. Most recent are:    Visit Vitals  BP (!) 93/53   Pulse (!) 58   Temp 97.4 °F (36.3 °C)   Resp 18   Ht 5' 3\" (1.6 m)   Wt 44.4 kg (97 lb 14.2 oz)   SpO2 97%   BMI 17.34 kg/m²       Intake/Output Summary (Last 24 hours) at 12/31/2022 1334  Last data filed at 12/30/2022 2040  Gross per 24 hour   Intake 2024 ml   Output 50 ml   Net 1974 ml        General Appearance: Well developed, well nourished, alert & oriented x 3,    no acute distress. Ears/Nose/Mouth/Throat: Hearing grossly normal.  Neck: Supple. Chest: Lungs clear to auscultation bilaterally. Cardiovascular: Regular rate and rhythm, S1S2 normal, no murmur. Abdomen: Soft, non-tender, bowel sounds are active. Extremities: No edema bilaterally. Skin: Warm and dry. []         Post-cath site without hematoma, bruit, tenderness, or thrill. Distal pulses intact. PMH/SH reviewed - no change compared to H&P    Data Review    Telemetry: normal sinus rhythm     EKG:   []  No new EKG for review  XR FLUOROSCOPY UNDER 60 MINUTES   Final Result   Portable imaging during procedure. REPORT PROVIDED FOR COMPLIANCE ONLY AT NO CHARGE. CT HEAD WO CONT   Final Result   1. No definite acute maxillofacial fracture. Age-indeterminate bilateral nasal   bone fractures, correlate with symptomatology. 2.  No acute intracranial abnormality. .          CT SPINE CERV WO CONT   Final Result   No acute fracture nor subluxation. Chronic C6 spinous process fracture. Fat   stranding/contusion in the left supraclavicular region. CT MAXILLOFACIAL WO CONT   Final Result   1. No definite acute maxillofacial fracture. Age-indeterminate bilateral nasal   bone fractures, correlate with symptomatology. 2.  No acute intracranial abnormality. .          CT CHEST WO CONT   Final Result   1. Redemonstrated acute left humerus fracture. No definite acute rib fracture. Several chronic bilateral rib deformities. Chronic right proximal humerus   probably. 2.  No acute cardiopulmonary abnormality. XR SHOULDER LT AP/LAT MIN 2 V   Final Result      Acute right proximal to mid humeral shaft fracture as described above. XR HUMERUS LT   Final Result      Acute right proximal to mid humeral shaft fracture as described above. XR CHEST PORT   Final Result      No acute process on portable chest.              Lab Data Personally Reviewed:    Recent Labs     12/31/22 0713 12/30/22  0501   WBC 11.8* 6.9   HGB 9.3* 8.3*   HCT 27.7* 23.8*    249     No results for input(s): INR, PTP, APTT, INREXT in the last 72 hours. Recent Labs     12/31/22  0713   *   K Hemolyzed, recollect requested   *   CO2 Hemolyzed, recollect requested   BUN 22*   CREA 1.22*   *   CA 7.2*     No results for input(s): CPK, CKNDX, TROIQ in the last 72 hours. No lab exists for component: CPKMB  No results found for: CHOL, CHOLX, CHLST, CHOLV, HDL, HDLP, LDL, LDLC, DLDLP, TGLX, TRIGL, TRIGP, CHHD, CHHDX    No results for input(s): AP, TBIL, TP, ALB, GLOB, GGT, AML, LPSE in the last 72 hours. No lab exists for component: SGOT, GPT, AMYP, HLPSE  No results for input(s): PH, PCO2, PO2 in the last 72 hours.     Medications Personally Reviewed:    Current Facility-Administered Medications   Medication Dose Route Frequency    [START ON 1/1/2023] dilTIAZem ER (CARDIZEM CD) capsule 120 mg  120 mg Oral DAILY    0.9% sodium chloride infusion 250 mL  250 mL IntraVENous PRN    ceFAZolin (ANCEF) 1 g in sterile water (preservative free) 10 mL IV syringe  1 g IntraVENous Q8H    [Held by provider] dilTIAZem (CARDIZEM) 125 mg in 0.9% sodium chloride 125 mL ([id number])  0-15 mg/hr IntraVENous TITRATE    ascorbic acid (vitamin C) (VITAMIN C) tablet 1,000 mg  1,000 mg Oral BID sodium chloride (NS) flush 5-40 mL  5-40 mL IntraVENous Q8H    sodium chloride (NS) flush 5-40 mL  5-40 mL IntraVENous PRN    acetaminophen (TYLENOL) tablet 650 mg  650 mg Oral Q6H PRN    Or    acetaminophen (TYLENOL) suppository 650 mg  650 mg Rectal Q6H PRN    polyethylene glycol (MIRALAX) packet 17 g  17 g Oral DAILY PRN    promethazine (PHENERGAN) tablet 12.5 mg  12.5 mg Oral Q6H PRN    Or    ondansetron (ZOFRAN) injection 4 mg  4 mg IntraVENous Q6H PRN    enoxaparin (LOVENOX) injection 30 mg  30 mg SubCUTAneous DAILY    diazePAM (VALIUM) tablet 10 mg  10 mg Oral Q1H PRN    diazePAM (VALIUM) tablet 20 mg  20 mg Oral Q1H PRN    multivitamin, tx-iron-ca-min (THERA-M w/ IRON) tablet 1 Tablet  1 Tablet Oral DAILY    HYDROcodone-acetaminophen (NORCO) 5-325 mg per tablet 1 Tablet  1 Tablet Oral Q6H PRN    albuterol-ipratropium (DUO-NEB) 2.5 MG-0.5 MG/3 ML  3 mL Nebulization Q6H PRN         Nataly Christie MD

## 2022-12-31 NOTE — PROGRESS NOTES
MS: .14/.10/.36  SR:70's-90's, no ectopy  BP:90's-120's   Patient BP noted to be 97/55 and HR noted to be 55. On call Dr. Idalia Basurto notified. Order received to hold Cardizem Drip.

## 2022-12-31 NOTE — PROGRESS NOTES
Problem: Pressure Injury - Risk of  Goal: *Prevention of pressure injury  Description: Document Barrie Scale and appropriate interventions in the flowsheet. Outcome: Progressing Towards Goal  Note: Pressure Injury Interventions:  Sensory Interventions: Assess changes in LOC    Moisture Interventions: Apply protective barrier, creams and emollients, Absorbent underpads    Activity Interventions: PT/OT evaluation    Mobility Interventions: Assess need for specialty bed    Nutrition Interventions: Document food/fluid/supplement intake, Discuss nutritional consult with provider                     Problem: Falls - Risk of  Goal: *Absence of Falls  Description: Document Lolita Car Fall Risk and appropriate interventions in the flowsheet.   Outcome: Progressing Towards Goal  Note: Fall Risk Interventions:  Mobility Interventions: Bed/chair exit alarm         Medication Interventions: Bed/chair exit alarm    Elimination Interventions: Bed/chair exit alarm, Call light in reach    History of Falls Interventions: Bed/chair exit alarm

## 2023-01-01 ENCOUNTER — APPOINTMENT (OUTPATIENT)
Dept: GENERAL RADIOLOGY | Age: 68
DRG: 981 | End: 2023-01-01
Attending: INTERNAL MEDICINE
Payer: MEDICARE

## 2023-01-01 LAB
ANION GAP SERPL CALC-SCNC: 15 MMOL/L (ref 5–15)
ATRIAL RATE: 340 BPM
BASOPHILS # BLD: 0 K/UL (ref 0–0.1)
BASOPHILS NFR BLD: 0 % (ref 0–1)
BUN SERPL-MCNC: 30 MG/DL (ref 6–20)
BUN/CREAT SERPL: 20 (ref 12–20)
CA-I BLD-MCNC: 7.9 MG/DL (ref 8.5–10.1)
CALCULATED R AXIS, ECG10: 92 DEGREES
CALCULATED T AXIS, ECG11: 87 DEGREES
CHLORIDE SERPL-SCNC: 106 MMOL/L (ref 97–108)
CO2 SERPL-SCNC: 11 MMOL/L (ref 21–32)
CREAT SERPL-MCNC: 1.53 MG/DL (ref 0.55–1.02)
DIAGNOSIS, 93000: NORMAL
DIFFERENTIAL METHOD BLD: ABNORMAL
EOSINOPHIL # BLD: 0 K/UL (ref 0–0.4)
EOSINOPHIL NFR BLD: 0 % (ref 0–7)
ERYTHROCYTE [DISTWIDTH] IN BLOOD BY AUTOMATED COUNT: 16.6 % (ref 11.5–14.5)
GLUCOSE SERPL-MCNC: 102 MG/DL (ref 65–100)
HCT VFR BLD AUTO: 25.3 % (ref 35–47)
HGB BLD-MCNC: 8.5 G/DL (ref 11.5–16)
IMM GRANULOCYTES # BLD AUTO: 0.4 K/UL (ref 0–0.04)
IMM GRANULOCYTES NFR BLD AUTO: 2 % (ref 0–0.5)
LACTATE SERPL-SCNC: 1.8 MMOL/L (ref 0.4–2)
LYMPHOCYTES # BLD: 0.5 K/UL (ref 0.8–3.5)
LYMPHOCYTES NFR BLD: 3 % (ref 12–49)
MCH RBC QN AUTO: 36.8 PG (ref 26–34)
MCHC RBC AUTO-ENTMCNC: 33.6 G/DL (ref 30–36.5)
MCV RBC AUTO: 109.5 FL (ref 80–99)
MONOCYTES # BLD: 1.5 K/UL (ref 0–1)
MONOCYTES NFR BLD: 10 % (ref 5–13)
NEUTS SEG # BLD: 13.7 K/UL (ref 1.8–8)
NEUTS SEG NFR BLD: 85 % (ref 32–75)
NRBC # BLD: 0.06 K/UL (ref 0–0.01)
NRBC BLD-RTO: 0.4 PER 100 WBC
PLATELET # BLD AUTO: 270 K/UL (ref 150–400)
PMV BLD AUTO: 9.1 FL (ref 8.9–12.9)
POTASSIUM SERPL-SCNC: 3.5 MMOL/L (ref 3.5–5.1)
Q-T INTERVAL, ECG07: 316 MS
QRS DURATION, ECG06: 70 MS
QTC CALCULATION (BEZET), ECG08: 435 MS
RBC # BLD AUTO: 2.31 M/UL (ref 3.8–5.2)
SODIUM SERPL-SCNC: 132 MMOL/L (ref 136–145)
VENTRICULAR RATE, ECG03: 114 BPM
WBC # BLD AUTO: 16.2 K/UL (ref 3.6–11)

## 2023-01-01 PROCEDURE — 74011250636 HC RX REV CODE- 250/636: Performed by: INTERNAL MEDICINE

## 2023-01-01 PROCEDURE — 74011000250 HC RX REV CODE- 250: Performed by: INTERNAL MEDICINE

## 2023-01-01 PROCEDURE — 83605 ASSAY OF LACTIC ACID: CPT

## 2023-01-01 PROCEDURE — 74011250637 HC RX REV CODE- 250/637: Performed by: ORTHOPAEDIC SURGERY

## 2023-01-01 PROCEDURE — 71045 X-RAY EXAM CHEST 1 VIEW: CPT

## 2023-01-01 PROCEDURE — 93005 ELECTROCARDIOGRAM TRACING: CPT

## 2023-01-01 PROCEDURE — 65270000029 HC RM PRIVATE

## 2023-01-01 PROCEDURE — 74011250637 HC RX REV CODE- 250/637: Performed by: INTERNAL MEDICINE

## 2023-01-01 PROCEDURE — 80048 BASIC METABOLIC PNL TOTAL CA: CPT

## 2023-01-01 PROCEDURE — 74011250636 HC RX REV CODE- 250/636: Performed by: ORTHOPAEDIC SURGERY

## 2023-01-01 PROCEDURE — 85025 COMPLETE CBC W/AUTO DIFF WBC: CPT

## 2023-01-01 PROCEDURE — 74011000250 HC RX REV CODE- 250: Performed by: ORTHOPAEDIC SURGERY

## 2023-01-01 PROCEDURE — 36415 COLL VENOUS BLD VENIPUNCTURE: CPT

## 2023-01-01 RX ORDER — DIGOXIN 250 MCG
0.25 TABLET ORAL ONCE
Status: DISCONTINUED | OUTPATIENT
Start: 2023-01-01 | End: 2023-01-01

## 2023-01-01 RX ORDER — SODIUM BICARBONATE 1 MEQ/ML
100 SYRINGE (ML) INTRAVENOUS ONCE
Status: COMPLETED | OUTPATIENT
Start: 2023-01-01 | End: 2023-01-01

## 2023-01-01 RX ORDER — DIGOXIN 0.25 MG/ML
250 INJECTION INTRAMUSCULAR; INTRAVENOUS
Status: COMPLETED | OUTPATIENT
Start: 2023-01-01 | End: 2023-01-01

## 2023-01-01 RX ORDER — LEVOFLOXACIN 5 MG/ML
750 INJECTION, SOLUTION INTRAVENOUS
Status: DISCONTINUED | OUTPATIENT
Start: 2023-01-01 | End: 2023-01-03

## 2023-01-01 RX ORDER — LIDOCAINE HYDROCHLORIDE 20 MG/ML
JELLY TOPICAL ONCE
Status: COMPLETED | OUTPATIENT
Start: 2023-01-01 | End: 2023-01-01

## 2023-01-01 RX ADMIN — SODIUM CHLORIDE, PRESERVATIVE FREE 10 ML: 5 INJECTION INTRAVENOUS at 05:21

## 2023-01-01 RX ADMIN — HYDROCODONE BITARTRATE AND ACETAMINOPHEN 1 TABLET: 5; 325 TABLET ORAL at 18:14

## 2023-01-01 RX ADMIN — OXYCODONE HYDROCHLORIDE AND ACETAMINOPHEN 1000 MG: 500 TABLET ORAL at 21:41

## 2023-01-01 RX ADMIN — LEVOFLOXACIN 750 MG: 5 INJECTION, SOLUTION INTRAVENOUS at 13:52

## 2023-01-01 RX ADMIN — LIDOCAINE HYDROCHLORIDE: 20 JELLY TOPICAL at 16:41

## 2023-01-01 RX ADMIN — SODIUM CHLORIDE, PRESERVATIVE FREE 10 ML: 5 INJECTION INTRAVENOUS at 21:42

## 2023-01-01 RX ADMIN — SODIUM BICARBONATE: 84 INJECTION, SOLUTION INTRAVENOUS at 13:44

## 2023-01-01 RX ADMIN — SODIUM CHLORIDE, PRESERVATIVE FREE 10 ML: 5 INJECTION INTRAVENOUS at 13:39

## 2023-01-01 RX ADMIN — SODIUM CHLORIDE 500 ML: 9 INJECTION, SOLUTION INTRAVENOUS at 04:49

## 2023-01-01 RX ADMIN — SODIUM BICARBONATE 100 MEQ: 84 INJECTION INTRAVENOUS at 13:43

## 2023-01-01 RX ADMIN — DIGOXIN 250 MCG: 0.25 INJECTION INTRAMUSCULAR; INTRAVENOUS at 13:39

## 2023-01-01 NOTE — PROGRESS NOTES
Patients HR noted to be in the 140's , fluctuating up and down. On call Dr. Дмитрий Wilson made aware. Order received to restart cardizem drip at 5mg/hr. Cardizem restarted. Dr. Aundrea Anthony notified of patients HR, order received to change the rate of the cardizem to 2.5mg/hr. Rate changed to 2.5mg/hr at 2332.

## 2023-01-01 NOTE — PROGRESS NOTES
Patients BP noted to be 95/63. On call Dr. Gilford Guiles notified. Order received for a 500ml bolus of 0.9% NS and to obtain an EKG. Bolus started. Patient noted to be in AFIB with RVR, ,  per EKG results. Jc notified of results. No new orders received. EKG strip placed in patient chart.

## 2023-01-01 NOTE — PROGRESS NOTES
Problem: Pressure Injury - Risk of  Goal: *Prevention of pressure injury  Description: Document Barrie Scale and appropriate interventions in the flowsheet. Outcome: Progressing Towards Goal  Note: Pressure Injury Interventions:  Sensory Interventions: Check visual cues for pain    Moisture Interventions: Check for incontinence Q2 hours and as needed    Activity Interventions: PT/OT evaluation    Mobility Interventions: Float heels    Nutrition Interventions: Document food/fluid/supplement intake    Friction and Shear Interventions: Apply protective barrier, creams and emollients                Problem: Falls - Risk of  Goal: *Absence of Falls  Description: Document Newton Fall Risk and appropriate interventions in the flowsheet.   Outcome: Progressing Towards Goal  Note: Fall Risk Interventions:  Mobility Interventions: Bed/chair exit alarm         Medication Interventions: Bed/chair exit alarm    Elimination Interventions: Bed/chair exit alarm, Call light in reach    History of Falls Interventions: Bed/chair exit alarm

## 2023-01-01 NOTE — PROGRESS NOTES
Problem: Pressure Injury - Risk of  Goal: *Prevention of pressure injury  Description: Document Barrie Scale and appropriate interventions in the flowsheet. Outcome: Progressing Towards Goal  Note: Pressure Injury Interventions:  Sensory Interventions: Assess changes in LOC, Check visual cues for pain, Keep linens dry and wrinkle-free, Minimize linen layers, Turn and reposition approx. every two hours (pillows and wedges if needed)    Moisture Interventions: Absorbent underpads, Contain wound drainage, Internal/External urinary devices, Minimize layers    Activity Interventions: Assess need for specialty bed    Mobility Interventions: Assess need for specialty bed    Nutrition Interventions: Document food/fluid/supplement intake, Discuss nutritional consult with provider, Offer support with meals,snacks and hydration    Friction and Shear Interventions: Foam dressings/transparent film/skin sealants, Minimize layers                Problem: Patient Education: Go to Patient Education Activity  Goal: Patient/Family Education  Outcome: Progressing Towards Goal     Problem: Falls - Risk of  Goal: *Absence of Falls  Description: Document Dima Crane Fall Risk and appropriate interventions in the flowsheet.   Outcome: Progressing Towards Goal  Note: Fall Risk Interventions:  Mobility Interventions: Bed/chair exit alarm         Medication Interventions: Bed/chair exit alarm    Elimination Interventions: Bed/chair exit alarm, Call light in reach    History of Falls Interventions: Bed/chair exit alarm

## 2023-01-01 NOTE — PROGRESS NOTES
ST consult received. Patient is not appropriate for po trials. Patient with significant wet vocal quality that does not clear with cue for cough and throat clearing. Patient also has audible upper airway congestion. Patient reports she is unable to manage secretions and choking on secretions. Rn and Dr. Merlyn Winters made aware that patient is not appropriate for po intake. Rn reports CXR ordered. Case conference with Dr. Merlyn Winters and he in agreement to NPO status no exceptions and reports to order alternate source of nutrient intake. Rec: ST to f/u  on 1/2/23 though patient would benefit from [facility] prior to initiation of diet.

## 2023-01-01 NOTE — PROGRESS NOTES
Progress Note      1/1/2023 1:34 PM  NAME: Salome Abbott   MRN:  [id number]   Admit Diagnosis: Dehydration [E86.0]      Problem List:   -Admitted to the hospital following fall and fractured left humerus  -Cardiology consult was invited secondary to increased troponin level. -Hypertension  -COPD2  -Current smoker  -No known established coronary artery disease and or no recent cardiac work-up has been done in the recent past     Assessment/Plan:   Note dictated January 1, 2023  -Patient is complaining of nonspecific pain unable to communicate with me clearly.  -She is status post left humerus fracture intervention by orthopedics. -Heart rate increased up to 170 with rhythm being atrial fibrillation.  -Based on her electrolytes and creatinine level I will cautiously give 1 dose of digoxin 0.25 mg IV and then follow her closely. She is unable to tolerate Cardizem secondary to low blood pressure.  -We will continue to follow while she is in the hospital along with the team.  -We will check echocardiogram once results are available.  ---------------------------------------------------------------------------------------------------------------  -Follow-up visit from yesterday consultation  -Patient is lying in the bed complaining of left arm pain intervention of left humeral neck. -Was clinically and hemodynamically stable per my cardiac assessment. No perioperative event and patient tolerated the surgery well.  -No further cardiovascular testing based on my clinical assessment and we will follow on as needed basis. []       High complexity decision making was performed in this patient at high risk for decompensation with multiple organ involvement. Subjective:     Rivka Gonzáles is a 75 y.o.  female who has no known established coronary artery disease and admitted to the hospital after she fractured her left humerus following fall.   When I saw the patient she was sitting up in the bed comfortably and denies any chest pain shortness of breath or any history of coronary artery disease or any previous cardiac work-up being done. Her risk factors for heart disease are smoking and hypertension in addition to her age greater than 72 years. Borderline increased troponin could be multifactorial. We will check echocardiogram and make further recommendation but you may proceed with surgery as based on my overall clinical assessment she would be low to moderate risk. Review of Systems:    Symptom Y/N Comments  Symptom Y/N Comments   Fever/Chills N   Chest Pain N    Poor Appetite N   Edema N    Cough N   Abdominal Pain N    Sputum N   Joint Pain N    SOB/CHUNG N   Pruritis/Rash N    Nausea/vomit N   Tolerating PT/OT Y    Diarrhea N   Tolerating Diet Y    Constipation N   Other       Could NOT obtain due to:      Objective:      Physical Exam:    Last 24hrs VS reviewed since prior progress note. Most recent are:    Visit Vitals  BP 95/62 (BP 1 Location: Right leg, BP Patient Position: Supine)   Pulse 66   Temp 97.3 °F (36.3 °C)   Resp 18   Ht 5' 3\" (1.6 m)   Wt 44.4 kg (97 lb 14.2 oz)   SpO2 (!) 83%   BMI 17.34 kg/m²     No intake or output data in the 24 hours ending 01/01/23 1147       General Appearance: Well developed, well nourished, alert & oriented x 3,    no acute distress. Ears/Nose/Mouth/Throat: Hearing grossly normal.  Neck: Supple. Chest: Lungs clear to auscultation bilaterally. Cardiovascular: Regular rate and rhythm, S1S2 normal, no murmur. Abdomen: Soft, non-tender, bowel sounds are active. Extremities: No edema bilaterally. Skin: Warm and dry. []         Post-cath site without hematoma, bruit, tenderness, or thrill. Distal pulses intact. PMH/SH reviewed - no change compared to H&P    Data Review    Telemetry: normal sinus rhythm     EKG:   []  No new EKG for review  XR HUMERUS LT   Final Result   Postoperative images of left humeral internal fixation.       XR FLUOROSCOPY UNDER 60 MINUTES   Final Result   Portable imaging during procedure. REPORT PROVIDED FOR COMPLIANCE ONLY AT NO CHARGE. CT HEAD WO CONT   Final Result   1. No definite acute maxillofacial fracture. Age-indeterminate bilateral nasal   bone fractures, correlate with symptomatology. 2.  No acute intracranial abnormality. .          CT SPINE CERV WO CONT   Final Result   No acute fracture nor subluxation. Chronic C6 spinous process fracture. Fat   stranding/contusion in the left supraclavicular region. CT MAXILLOFACIAL WO CONT   Final Result   1. No definite acute maxillofacial fracture. Age-indeterminate bilateral nasal   bone fractures, correlate with symptomatology. 2.  No acute intracranial abnormality. .          CT CHEST WO CONT   Final Result   1. Redemonstrated acute left humerus fracture. No definite acute rib fracture. Several chronic bilateral rib deformities. Chronic right proximal humerus   probably. 2.  No acute cardiopulmonary abnormality. XR SHOULDER LT AP/LAT MIN 2 V   Final Result      Acute right proximal to mid humeral shaft fracture as described above. XR HUMERUS LT   Final Result      Acute right proximal to mid humeral shaft fracture as described above. XR CHEST PORT   Final Result      No acute process on portable chest.         XR CHEST PORT    (Results Pending)        Lab Data Personally Reviewed:    Recent Labs     01/01/23  0711 12/31/22  0713   WBC 16.2* 11.8*   HGB 8.5* 9.3*   HCT 25.3* 27.7*    219       No results for input(s): INR, PTP, APTT, INREXT, INREXT in the last 72 hours. Recent Labs     12/31/22  0713   *   K Hemolyzed, recollect requested   *   CO2 Hemolyzed, recollect requested   BUN 22*   CREA 1.22*   *   CA 7.2*       No results for input(s): CPK, CKNDX, TROIQ in the last 72 hours.     No lab exists for component: CPKMB  No results found for: CHOL, CHOLX, CHLST, CHOLV, HDL, HDLP, LDL, LDLC, DLDLP, TGLX, TRIGL, TRIGP, CHHD, CHHDX    No results for input(s): AP, TBIL, TP, ALB, GLOB, GGT, AML, LPSE in the last 72 hours. No lab exists for component: SGOT, GPT, AMYP, HLPSE  No results for input(s): PH, PCO2, PO2 in the last 72 hours.     Medications Personally Reviewed:    Current Facility-Administered Medications   Medication Dose Route Frequency    digoxin (LANOXIN) tablet 0.25 mg  0.25 mg Oral ONCE    dilTIAZem ER (CARDIZEM CD) capsule 120 mg  120 mg Oral DAILY    0.9% sodium chloride infusion 250 mL  250 mL IntraVENous PRN    ascorbic acid (vitamin C) (VITAMIN C) tablet 1,000 mg  1,000 mg Oral BID    sodium chloride (NS) flush 5-40 mL  5-40 mL IntraVENous Q8H    sodium chloride (NS) flush 5-40 mL  5-40 mL IntraVENous PRN    acetaminophen (TYLENOL) tablet 650 mg  650 mg Oral Q6H PRN    Or    acetaminophen (TYLENOL) suppository 650 mg  650 mg Rectal Q6H PRN    polyethylene glycol (MIRALAX) packet 17 g  17 g Oral DAILY PRN    promethazine (PHENERGAN) tablet 12.5 mg  12.5 mg Oral Q6H PRN    Or    ondansetron (ZOFRAN) injection 4 mg  4 mg IntraVENous Q6H PRN    enoxaparin (LOVENOX) injection 30 mg  30 mg SubCUTAneous DAILY    diazePAM (VALIUM) tablet 10 mg  10 mg Oral Q1H PRN    diazePAM (VALIUM) tablet 20 mg  20 mg Oral Q1H PRN    multivitamin, tx-iron-ca-min (THERA-M w/ IRON) tablet 1 Tablet  1 Tablet Oral DAILY    HYDROcodone-acetaminophen (NORCO) 5-325 mg per tablet 1 Tablet  1 Tablet Oral Q6H PRN    albuterol-ipratropium (DUO-NEB) 2.5 MG-0.5 MG/3 ML  3 mL Nebulization Q6H PRN         Jagjit Zapien MD

## 2023-01-01 NOTE — PROGRESS NOTES
Patients BP noted to be 102/62 after 500ml 0.9% NS bolus. On call Dr. Rachel Latham notified. No new orders received.

## 2023-01-01 NOTE — PROGRESS NOTES
Hospitalist Progress Note         Jarvis Kong MD          Daily Progress Note: 1/1/2023      Subjective: The patient is seen for follow  up. 75 y.o. female with history of alcohol abuse who fell in her home last night. Since then she complains of severe pain all over as well as extreme left arm pain and difficulty walking. In the ED she was hypertensive and mildly tachycardic. Labs showed a sodium of 126, creatinine 1.36 and a magnesium of 1.1.  ----     X-ray of the left arm shows a proximal to mid humeral shaft fracture with impaction and varus angulation. Patient seen by orthopedic surgeon. POD#2 s/p fracture repair. Resting comfortably.  Coughing on attempted swallowing    Problem List:  Problem List as of 1/1/2023 Date Reviewed: 9/22/2020            Codes Class Noted - Resolved    Dehydration ICD-10-CM: E86.0  ICD-9-CM: 276.51  12/25/2022 - Present        COPD exacerbation ([facility] ICD-10-CM: J44.1  ICD-9-CM: 491.21  9/22/2020 - Present        Respiratory failure with hypoxia [facility]) ICD-10-CM: J96.91  ICD-9-CM: 518.81  9/22/2020 - Present        Hypokalemia ICD-10-CM: E87.6  ICD-9-CM: 276.8  9/22/2020 - Present        Acute respiratory failure ([facility] ICD-10-CM: J96.00  ICD-9-CM: 518.81  9/22/2020 - Present       Medications reviewed  Current Facility-Administered Medications   Medication Dose Route Frequency    digoxin (LANOXIN) injection 250 mcg  250 mcg IntraVENous NOW    lidocaine (XYLOCAINE) 2 % jelly   Mucous Membrane ONCE    sodium bicarbonate (8.4%) 100 mEq in dextrose 5% 1,000 mL infusion   IntraVENous CONTINUOUS    sodium bicarbonate 8.4 % (1 mEq/mL) injection 100 mEq  100 mEq IntraVENous ONCE    dilTIAZem ER (CARDIZEM CD) capsule 120 mg  120 mg Oral DAILY    0.9% sodium chloride infusion 250 mL  250 mL IntraVENous PRN    ascorbic acid (vitamin C) (VITAMIN C) tablet 1,000 mg  1,000 mg Oral BID    sodium chloride (NS) flush 5-40 mL  5-40 mL IntraVENous Q8H sodium chloride (NS) flush 5-40 mL  5-40 mL IntraVENous PRN    acetaminophen (TYLENOL) tablet 650 mg  650 mg Oral Q6H PRN    Or    acetaminophen (TYLENOL) suppository 650 mg  650 mg Rectal Q6H PRN    polyethylene glycol (MIRALAX) packet 17 g  17 g Oral DAILY PRN    promethazine (PHENERGAN) tablet 12.5 mg  12.5 mg Oral Q6H PRN    Or    ondansetron (ZOFRAN) injection 4 mg  4 mg IntraVENous Q6H PRN    enoxaparin (LOVENOX) injection 30 mg  30 mg SubCUTAneous DAILY    diazePAM (VALIUM) tablet 10 mg  10 mg Oral Q1H PRN    diazePAM (VALIUM) tablet 20 mg  20 mg Oral Q1H PRN    multivitamin, tx-iron-ca-min (THERA-M w/ IRON) tablet 1 Tablet  1 Tablet Oral DAILY    HYDROcodone-acetaminophen (NORCO) 5-325 mg per tablet 1 Tablet  1 Tablet Oral Q6H PRN    albuterol-ipratropium (DUO-NEB) 2.5 MG-0.5 MG/3 ML  3 mL Nebulization Q6H PRN       Review of Systems:   A comprehensive review of systems was negative except for that written in the HPI. Objective:   Physical Exam:     Visit Vitals  /61 (BP 1 Location: Right leg, BP Patient Position: Supine)   Pulse 72   Temp 97.6 °F (36.4 °C)   Resp 19   Ht 5' 3\" (1.6 m)   Wt 44.4 kg (97 lb 14.2 oz)   SpO2 (!) 72%   BMI 17.34 kg/m²    O2 Flow Rate (L/min): 2 l/min O2 Device: Nasal cannula    Temp (24hrs), Av.6 °F (36.4 °C), Min:97.3 °F (36.3 °C), Max:97.9 °F (36.6 °C)    No intake/output data recorded.  1901 -  0700  In: -   Out: 48 [Urine:50]    General:  Awake and alert   Lungs:   Clear to auscultation bilaterally. Chest wall:  No tenderness or deformity. Heart:  Regular rate and rhythm, S1, S2 normal, no murmur, click, rub or gallop. Abdomen:   Soft, non-tender. Bowel sounds normal. No masses,  No organomegaly. Extremities: Extremities normal, atraumatic, no cyanosis or edema. Pulses: 2+ and symmetric all extremities. Skin: Skin color, texture, turgor normal. No rashes or lesions   Neurologic: CNII-XII intact.  No gross sensory or motor deficits     Data Review:       Recent Days:  Recent Labs     01/01/23  0711 12/31/22  0713 12/30/22  0501   WBC 16.2* 11.8* 6.9   HGB 8.5* 9.3* 8.3*   HCT 25.3* 27.7* 23.8*    219 249       Recent Labs     01/01/23  1134 12/31/22  0713   * 131*   K 3.5 Hemolyzed, recollect requested    110*   CO2 11* Hemolyzed, recollect requested   * 119*   BUN 30* 22*   CREA 1.53* 1.22*   CA 7.9* 7.2*       No results for input(s): PH, PCO2, PO2, HCO3, FIO2 in the last 72 hours. 24 Hour Results:  Recent Results (from the past 24 hour(s))   CBC WITH AUTOMATED DIFF    Collection Time: 01/01/23  7:11 AM   Result Value Ref Range    WBC 16.2 (H) 3.6 - 11.0 K/uL    RBC 2.31 (L) 3.80 - 5.20 M/uL    HGB 8.5 (L) 11.5 - 16.0 g/dL    HCT 25.3 (L) 35.0 - 47.0 %    .5 (H) 80.0 - 99.0 FL    MCH 36.8 (H) 26.0 - 34.0 PG    MCHC 33.6 30.0 - 36.5 g/dL    RDW 16.6 (H) 11.5 - 14.5 %    PLATELET 819 367 - 532 K/uL    MPV 9.1 8.9 - 12.9 FL    NRBC 0.4 (H) 0.0  WBC    ABSOLUTE NRBC 0.06 (H) 0.00 - 0.01 K/uL    NEUTROPHILS 85 (H) 32 - 75 %    LYMPHOCYTES 3 (L) 12 - 49 %    MONOCYTES 10 5 - 13 %    EOSINOPHILS 0 0 - 7 %    BASOPHILS 0 0 - 1 %    IMMATURE GRANULOCYTES 2 (H) 0 - 0.5 %    ABS. NEUTROPHILS 13.7 (H) 1.8 - 8.0 K/UL    ABS. LYMPHOCYTES 0.5 (L) 0.8 - 3.5 K/UL    ABS. MONOCYTES 1.5 (H) 0.0 - 1.0 K/UL    ABS. EOSINOPHILS 0.0 0.0 - 0.4 K/UL    ABS. BASOPHILS 0.0 0.0 - 0.1 K/UL    ABS. IMM.  GRANS. 0.4 (H) 0.00 - 0.04 K/UL    DF AUTOMATED     METABOLIC PANEL, BASIC    Collection Time: 01/01/23 11:34 AM   Result Value Ref Range    Sodium 132 (L) 136 - 145 mmol/L    Potassium 3.5 3.5 - 5.1 mmol/L    Chloride 106 97 - 108 mmol/L    CO2 11 (LL) 21 - 32 mmol/L    Anion gap 15 5 - 15 mmol/L    Glucose 102 (H) 65 - 100 mg/dL    BUN 30 (H) 6 - 20 mg/dL    Creatinine 1.53 (H) 0.55 - 1.02 mg/dL    BUN/Creatinine ratio 20 12 - 20      eGFR 37 (L) >60 ml/min/1.73m2    Calcium 7.9 (L) 8.5 - 10.1 mg/dL   LACTIC ACID    Collection Time: 01/01/23 11:34 AM   Result Value Ref Range    Lactic acid 1.8 0.4 - 2.0 mmol/L           Assessment/     Acute kidney injury, prerenal due to dehydration. Improved    Acute hypoxic respiratory failure    Probably aspiration pneumonia    Dehydration with hyponatremia    Left humerus fracture s/p ORIF 12/30    Alcohol abuse    Hypomagnesemia     Tobacco abuse    Essential hypertension    Sinus tachycardia. Improved on cardizem    PCN allergy         Plan:  Begin empiric levaquin  Continue supportive care  Monitor routine electrolytes  PTOT eval when clinically improved        Care Plan discussed with: Patient/Family    Total time spent with patient: 30 minutes.     Kailey Condon MD No indicators present

## 2023-01-01 NOTE — PROGRESS NOTES
Dr. Isrrael Mckinney notified of patient's large amount of drainage to left humerus post op dressing. Dr. Isrrael Mckinney will see the patient today.

## 2023-01-02 LAB
ANION GAP SERPL CALC-SCNC: 7 MMOL/L (ref 5–15)
BACTERIA SPEC CULT: ABNORMAL
BASOPHILS # BLD: 0 K/UL (ref 0–0.1)
BASOPHILS NFR BLD: 0 % (ref 0–1)
BUN SERPL-MCNC: 24 MG/DL (ref 6–20)
BUN/CREAT SERPL: 20 (ref 12–20)
CA-I BLD-MCNC: 7.7 MG/DL (ref 8.5–10.1)
CHLORIDE SERPL-SCNC: 102 MMOL/L (ref 97–108)
CO2 SERPL-SCNC: 25 MMOL/L (ref 21–32)
COLONY COUNT,CNT: ABNORMAL
CREAT SERPL-MCNC: 1.23 MG/DL (ref 0.55–1.02)
DIFFERENTIAL METHOD BLD: ABNORMAL
EOSINOPHIL # BLD: 0 K/UL (ref 0–0.4)
EOSINOPHIL NFR BLD: 0 % (ref 0–7)
ERYTHROCYTE [DISTWIDTH] IN BLOOD BY AUTOMATED COUNT: 16.2 % (ref 11.5–14.5)
GLUCOSE SERPL-MCNC: 120 MG/DL (ref 65–100)
HCT VFR BLD AUTO: 23.9 % (ref 35–47)
HGB BLD-MCNC: 8.4 G/DL (ref 11.5–16)
IMM GRANULOCYTES # BLD AUTO: 0 K/UL
IMM GRANULOCYTES NFR BLD AUTO: 0 %
LYMPHOCYTES # BLD: 0.7 K/UL (ref 0.8–3.5)
LYMPHOCYTES NFR BLD: 7 % (ref 12–49)
MCH RBC QN AUTO: 36.8 PG (ref 26–34)
MCHC RBC AUTO-ENTMCNC: 35.1 G/DL (ref 30–36.5)
MCV RBC AUTO: 104.8 FL (ref 80–99)
MONOCYTES # BLD: 1.6 K/UL (ref 0–1)
MONOCYTES NFR BLD: 15 % (ref 5–13)
NEUTS SEG # BLD: 8.2 K/UL (ref 1.8–8)
NEUTS SEG NFR BLD: 78 % (ref 32–75)
NRBC # BLD: 0.12 K/UL (ref 0–0.01)
NRBC BLD-RTO: 1.1 PER 100 WBC
PLATELET # BLD AUTO: 303 K/UL (ref 150–400)
PMV BLD AUTO: 9.1 FL (ref 8.9–12.9)
POTASSIUM SERPL-SCNC: 3 MMOL/L (ref 3.5–5.1)
RBC # BLD AUTO: 2.28 M/UL (ref 3.8–5.2)
RBC MORPH BLD: ABNORMAL
RBC MORPH BLD: ABNORMAL
SODIUM SERPL-SCNC: 134 MMOL/L (ref 136–145)
SPECIAL REQUESTS,SREQ: ABNORMAL
WBC # BLD AUTO: 10.5 K/UL (ref 3.6–11)

## 2023-01-02 PROCEDURE — 74011250636 HC RX REV CODE- 250/636: Performed by: ORTHOPAEDIC SURGERY

## 2023-01-02 PROCEDURE — 74011250637 HC RX REV CODE- 250/637: Performed by: ORTHOPAEDIC SURGERY

## 2023-01-02 PROCEDURE — 85025 COMPLETE CBC W/AUTO DIFF WBC: CPT

## 2023-01-02 PROCEDURE — 65270000029 HC RM PRIVATE

## 2023-01-02 PROCEDURE — 74011000250 HC RX REV CODE- 250: Performed by: ORTHOPAEDIC SURGERY

## 2023-01-02 PROCEDURE — 74011250636 HC RX REV CODE- 250/636: Performed by: INTERNAL MEDICINE

## 2023-01-02 PROCEDURE — 92610 EVALUATE SWALLOWING FUNCTION: CPT

## 2023-01-02 PROCEDURE — 74011250637 HC RX REV CODE- 250/637: Performed by: INTERNAL MEDICINE

## 2023-01-02 PROCEDURE — 74011000250 HC RX REV CODE- 250: Performed by: INTERNAL MEDICINE

## 2023-01-02 PROCEDURE — 36415 COLL VENOUS BLD VENIPUNCTURE: CPT

## 2023-01-02 PROCEDURE — 80048 BASIC METABOLIC PNL TOTAL CA: CPT

## 2023-01-02 RX ORDER — DILTIAZEM HYDROCHLORIDE 30 MG/1
60 TABLET, FILM COATED ORAL 2 TIMES DAILY
Status: DISCONTINUED | OUTPATIENT
Start: 2023-01-02 | End: 2023-01-05 | Stop reason: HOSPADM

## 2023-01-02 RX ORDER — DIGOXIN 125 MCG
0.12 TABLET ORAL DAILY
Status: DISCONTINUED | OUTPATIENT
Start: 2023-01-03 | End: 2023-01-05 | Stop reason: HOSPADM

## 2023-01-02 RX ORDER — POTASSIUM CHLORIDE 1.5 G/1.77G
40 POWDER, FOR SOLUTION ORAL EVERY 6 HOURS
Status: COMPLETED | OUTPATIENT
Start: 2023-01-02 | End: 2023-01-02

## 2023-01-02 RX ADMIN — HYDROCODONE BITARTRATE AND ACETAMINOPHEN 1 TABLET: 5; 325 TABLET ORAL at 15:40

## 2023-01-02 RX ADMIN — DILTIAZEM HYDROCHLORIDE 60 MG: 30 TABLET, FILM COATED ORAL at 22:23

## 2023-01-02 RX ADMIN — SODIUM CHLORIDE, PRESERVATIVE FREE 10 ML: 5 INJECTION INTRAVENOUS at 22:32

## 2023-01-02 RX ADMIN — ENOXAPARIN SODIUM 30 MG: 100 INJECTION SUBCUTANEOUS at 09:49

## 2023-01-02 RX ADMIN — DILTIAZEM HYDROCHLORIDE 60 MG: 30 TABLET, FILM COATED ORAL at 13:53

## 2023-01-02 RX ADMIN — SODIUM CHLORIDE, PRESERVATIVE FREE 10 ML: 5 INJECTION INTRAVENOUS at 13:53

## 2023-01-02 RX ADMIN — POTASSIUM CHLORIDE 40 MEQ: 1.5 FOR SOLUTION ORAL at 12:00

## 2023-01-02 RX ADMIN — SODIUM BICARBONATE: 84 INJECTION, SOLUTION INTRAVENOUS at 15:40

## 2023-01-02 RX ADMIN — MULTIPLE VITAMINS W/ MINERALS TAB 1 TABLET: TAB at 09:00

## 2023-01-02 RX ADMIN — SODIUM CHLORIDE, PRESERVATIVE FREE 10 ML: 5 INJECTION INTRAVENOUS at 06:03

## 2023-01-02 RX ADMIN — HYDROCODONE BITARTRATE AND ACETAMINOPHEN 1 TABLET: 5; 325 TABLET ORAL at 22:36

## 2023-01-02 RX ADMIN — SODIUM BICARBONATE: 84 INJECTION, SOLUTION INTRAVENOUS at 01:28

## 2023-01-02 RX ADMIN — POTASSIUM CHLORIDE 40 MEQ: 1.5 FOR SOLUTION ORAL at 17:11

## 2023-01-02 RX ADMIN — DILTIAZEM HYDROCHLORIDE 120 MG: 120 CAPSULE, COATED, EXTENDED RELEASE ORAL at 09:49

## 2023-01-02 NOTE — PROGRESS NOTES
Hospitalist Progress Note         Jenna Dickens MD          Daily Progress Note: 1/2/2023      Subjective: The patient is seen for follow  up. 75 y.o. female with history of alcohol abuse who fell in her home last night. Since then she complains of severe pain all over as well as extreme left arm pain and difficulty walking. In the ED she was hypertensive and mildly tachycardic. Labs showed a sodium of 126, creatinine 1.36 and a magnesium of 1.1.  ----     X-ray of the left arm showed a left proximal to mid humeral shaft fracture with impaction and varus angulation. POD#3 s/p fracture repair. Resting comfortably. NG tube placed yesterday due to concerns of dysphagia and aspiration.     Problem List:  Problem List as of 1/2/2023 Date Reviewed: 9/22/2020            Codes Class Noted - Resolved    Dehydration ICD-10-CM: E86.0  ICD-9-CM: 276.51  12/25/2022 - Present        COPD exacerbation ([facility] ICD-10-CM: J44.1  ICD-9-CM: 491.21  9/22/2020 - Present        Respiratory failure with hypoxia [facility]) ICD-10-CM: J96.91  ICD-9-CM: 518.81  9/22/2020 - Present        Hypokalemia ICD-10-CM: E87.6  ICD-9-CM: 276.8  9/22/2020 - Present        Acute respiratory failure ([facility]) ICD-10-CM: J96.00  ICD-9-CM: 518.81  9/22/2020 - Present       Medications reviewed  Current Facility-Administered Medications   Medication Dose Route Frequency    sodium bicarbonate (8.4%) 100 mEq in dextrose 5% 1,000 mL infusion   IntraVENous CONTINUOUS    levoFLOXacin (LEVAQUIN) 750 mg in D5W IVPB  750 mg IntraVENous Q48H    dilTIAZem ER (CARDIZEM CD) capsule 120 mg  120 mg Oral DAILY    0.9% sodium chloride infusion 250 mL  250 mL IntraVENous PRN    sodium chloride (NS) flush 5-40 mL  5-40 mL IntraVENous Q8H    sodium chloride (NS) flush 5-40 mL  5-40 mL IntraVENous PRN    acetaminophen (TYLENOL) tablet 650 mg  650 mg Oral Q6H PRN    Or    acetaminophen (TYLENOL) suppository 650 mg  650 mg Rectal Q6H PRN polyethylene glycol (MIRALAX) packet 17 g  17 g Oral DAILY PRN    promethazine (PHENERGAN) tablet 12.5 mg  12.5 mg Oral Q6H PRN    Or    ondansetron (ZOFRAN) injection 4 mg  4 mg IntraVENous Q6H PRN    enoxaparin (LOVENOX) injection 30 mg  30 mg SubCUTAneous DAILY    diazePAM (VALIUM) tablet 10 mg  10 mg Oral Q1H PRN    diazePAM (VALIUM) tablet 20 mg  20 mg Oral Q1H PRN    multivitamin, tx-iron-ca-min (THERA-M w/ IRON) tablet 1 Tablet  1 Tablet Oral DAILY    HYDROcodone-acetaminophen (NORCO) 5-325 mg per tablet 1 Tablet  1 Tablet Oral Q6H PRN    albuterol-ipratropium (DUO-NEB) 2.5 MG-0.5 MG/3 ML  3 mL Nebulization Q6H PRN       Review of Systems:   A comprehensive review of systems was negative except for that written in the HPI. Objective:   Physical Exam:     Visit Vitals  /67 (BP 1 Location: Right leg, BP Patient Position: Semi fowlers)   Pulse (!) 111   Temp 98.1 °F (36.7 °C)   Resp 18   Ht 5' 3\" (1.6 m)   Wt 51.5 kg (113 lb 8.6 oz)   SpO2 96%   BMI 20.11 kg/m²    O2 Flow Rate (L/min): 2 l/min O2 Device: None (Room air)    Temp (24hrs), Av.6 °F (36.4 °C), Min:97.3 °F (36.3 °C), Max:98.1 °F (36.7 °C)    No intake/output data recorded. 1901 -  0700  In: 340 [P.O.:240]  Out: 2400 [Urine:2400]    General:  Awake and alert   Lungs:   Clear to auscultation bilaterally. Chest wall:  No tenderness or deformity. Heart:  Regular rate and rhythm, S1, S2 normal, no murmur, click, rub or gallop. Abdomen:   Soft, non-tender. Bowel sounds normal. No masses,  No organomegaly. Extremities: Extremities normal, atraumatic, no cyanosis or edema. Pulses: 2+ and symmetric all extremities. Skin: Skin color, texture, turgor normal. No rashes or lesions   Neurologic: CNII-XII intact.  No gross sensory or motor deficits     Data Review:       Recent Days:  Recent Labs     23  0834 23  0711 22  0713   WBC 10.5 16.2* 11.8*   HGB 8.4* 8.5* 9.3*   HCT 23.9* 25.3* 27.7*    270 219 Recent Labs     01/02/23  0834 01/01/23  1134 12/31/22  0713   * 132* 131*   K 3.0* 3.5 Hemolyzed, recollect requested    106 110*   CO2 25 11* Hemolyzed, recollect requested   * 102* 119*   BUN 24* 30* 22*   CREA 1.23* 1.53* 1.22*   CA 7.7* 7.9* 7.2*       No results for input(s): PH, PCO2, PO2, HCO3, FIO2 in the last 72 hours. 24 Hour Results:  Recent Results (from the past 24 hour(s))   CBC WITH AUTOMATED DIFF    Collection Time: 01/02/23  8:34 AM   Result Value Ref Range    WBC 10.5 3.6 - 11.0 K/uL    RBC 2.28 (L) 3.80 - 5.20 M/uL    HGB 8.4 (L) 11.5 - 16.0 g/dL    HCT 23.9 (L) 35.0 - 47.0 %    .8 (H) 80.0 - 99.0 FL    MCH 36.8 (H) 26.0 - 34.0 PG    MCHC 35.1 30.0 - 36.5 g/dL    RDW 16.2 (H) 11.5 - 14.5 %    PLATELET 715 982 - 830 K/uL    MPV 9.1 8.9 - 12.9 FL    NRBC 1.1 (H) 0.0  WBC    ABSOLUTE NRBC 0.12 (H) 0.00 - 0.01 K/uL    NEUTROPHILS 78 (H) 32 - 75 %    LYMPHOCYTES 7 (L) 12 - 49 %    MONOCYTES 15 (H) 5 - 13 %    EOSINOPHILS 0 0 - 7 %    BASOPHILS 0 0 - 1 %    IMMATURE GRANULOCYTES 0 %    ABS. NEUTROPHILS 8.2 (H) 1.8 - 8.0 K/UL    ABS. LYMPHOCYTES 0.7 (L) 0.8 - 3.5 K/UL    ABS. MONOCYTES 1.6 (H) 0.0 - 1.0 K/UL    ABS. EOSINOPHILS 0.0 0.0 - 0.4 K/UL    ABS. BASOPHILS 0.0 0.0 - 0.1 K/UL    ABS. IMM. GRANS. 0.0 K/UL    DF Manual      RBC COMMENTS Anisocytosis  1+        RBC COMMENTS Macrocytosis  2+       METABOLIC PANEL, BASIC    Collection Time: 01/02/23  8:34 AM   Result Value Ref Range    Sodium 134 (L) 136 - 145 mmol/L    Potassium 3.0 (L) 3.5 - 5.1 mmol/L    Chloride 102 97 - 108 mmol/L    CO2 25 21 - 32 mmol/L    Anion gap 7 5 - 15 mmol/L    Glucose 120 (H) 65 - 100 mg/dL    BUN 24 (H) 6 - 20 mg/dL    Creatinine 1.23 (H) 0.55 - 1.02 mg/dL    BUN/Creatinine ratio 20 12 - 20      eGFR 48 (L) >60 ml/min/1.73m2    Calcium 7.7 (L) 8.5 - 10.1 mg/dL           Assessment/     Acute kidney injury, prerenal due to dehydration.  Improved    Acute hypoxic respiratory failure    Probably aspiration pneumonia    Dehydration with hyponatremia    Left humerus fracture s/p ORIF 12/30    Alcohol abuse    Hypomagnesemia     Tobacco abuse    Essential hypertension    Sinus tachycardia. Improved on cardizem    PCN allergy         Plan/MDM  Continue empiric levaquin  Continue supportive care  Monitor routine electrolytes  Replete potassium with 40 mEq of KCl x2 doses 6 hours apart  PTOT eval when clinically improved  Overall patient has slow clinical improvement. She will probably need to go to a skilled care facility for rehab post fracture repair        Care Plan discussed with: Patient/Family    Total time spent with patient: 30 minutes.     Kailey Condon MD

## 2023-01-02 NOTE — PROGRESS NOTES
Attempted PT services this morning, but pt refused due to pain. Will attempt at later time. Thank you.

## 2023-01-02 NOTE — PROGRESS NOTES
Progress Note      1/2/2023 1:34 PM  NAME: Lenard Tran   MRN:  [id number]   Admit Diagnosis: Dehydration [E86.0]      Problem List:   -Admitted to the hospital following fall and fractured left humerus  -Cardiology consult was invited secondary to increased troponin level. -Hypertension  -COPD2  -Current smoker  -No known established coronary artery disease and or no recent cardiac work-up has been done in the recent past     Assessment/Plan:   Note dictated January 2, 2023  -Patient is lying comfortably in the bed heart rate is still in 120s with rhythm being atrial fibrillation  -We will start digoxin 0.125 mg daily as it was well-tolerated by the patient. In addition we will replace potassium and carefully watch renal function. We will start digoxin tomorrow dated 1/3/2023.  -Switch Cardizem to twice a day. -Goal is to minimize heart rate and the risk of tachycardia induced cardiomyopathy  -Check echocardiogram.  -We will continue to follow while patient is in the hospital along with the team    Note dictated January 1, 2023  -Patient is complaining of nonspecific pain unable to communicate with me clearly.  -She is status post left humerus fracture intervention by orthopedics. -Heart rate increased up to 170 with rhythm being atrial fibrillation.  -Based on her electrolytes and creatinine level I will cautiously give 1 dose of digoxin 0.25 mg IV and then follow her closely. She is unable to tolerate Cardizem secondary to low blood pressure.  -We will continue to follow while she is in the hospital along with the team.  -We will check echocardiogram once results are available.  ---------------------------------------------------------------------------------------------------------------  -Follow-up visit from yesterday consultation  -Patient is lying in the bed complaining of left arm pain intervention of left humeral neck.   -Was clinically and hemodynamically stable per my cardiac assessment. No perioperative event and patient tolerated the surgery well.  -No further cardiovascular testing based on my clinical assessment and we will follow on as needed basis. []       High complexity decision making was performed in this patient at high risk for decompensation with multiple organ involvement. Subjective:     Martina Sanderson is a 75 y.o.  female who has no known established coronary artery disease and admitted to the hospital after she fractured her left humerus following fall. When I saw the patient she was sitting up in the bed comfortably and denies any chest pain shortness of breath or any history of coronary artery disease or any previous cardiac work-up being done. Her risk factors for heart disease are smoking and hypertension in addition to her age greater than 72 years. Borderline increased troponin could be multifactorial. We will check echocardiogram and make further recommendation but you may proceed with surgery as based on my overall clinical assessment she would be low to moderate risk. Review of Systems:    Symptom Y/N Comments  Symptom Y/N Comments   Fever/Chills N   Chest Pain N    Poor Appetite N   Edema N    Cough N   Abdominal Pain N    Sputum N   Joint Pain N    SOB/CHUNG N   Pruritis/Rash N    Nausea/vomit N   Tolerating PT/OT Y    Diarrhea N   Tolerating Diet Y    Constipation N   Other       Could NOT obtain due to:      Objective:      Physical Exam:    Last 24hrs VS reviewed since prior progress note.  Most recent are:    Visit Vitals  /71 (BP 1 Location: Right leg)   Pulse (!) 121   Temp 97.5 °F (36.4 °C)   Resp 20   Ht 5' 3\" (1.6 m)   Wt 51.5 kg (113 lb 8.6 oz)   SpO2 97%   BMI 20.11 kg/m²       Intake/Output Summary (Last 24 hours) at 1/2/2023 1254  Last data filed at 1/2/2023 0930  Gross per 24 hour   Intake 100 ml   Output 2400 ml   Net -2300 ml          General Appearance: Well developed, well nourished, alert & oriented x 3,    no acute distress. Ears/Nose/Mouth/Throat: Hearing grossly normal.  Neck: Supple. Chest: Lungs clear to auscultation bilaterally. Cardiovascular: Regular rate and rhythm, S1S2 normal, no murmur. Abdomen: Soft, non-tender, bowel sounds are active. Extremities: No edema bilaterally. Skin: Warm and dry. []         Post-cath site without hematoma, bruit, tenderness, or thrill. Distal pulses intact. PMH/SH reviewed - no change compared to H&P    Data Review    Telemetry: normal sinus rhythm     EKG:   []  No new EKG for review  XR CHEST PORT   Final Result      Interval placement of NG tube         XR CHEST PORT   Final Result      Interval development of small bilateral pleural effusions   Underlying lung hyperinflation         XR HUMERUS LT   Final Result   Postoperative images of left humeral internal fixation. XR FLUOROSCOPY UNDER 60 MINUTES   Final Result   Portable imaging during procedure. REPORT PROVIDED FOR COMPLIANCE ONLY AT NO CHARGE. CT HEAD WO CONT   Final Result   1. No definite acute maxillofacial fracture. Age-indeterminate bilateral nasal   bone fractures, correlate with symptomatology. 2.  No acute intracranial abnormality. .          CT SPINE CERV WO CONT   Final Result   No acute fracture nor subluxation. Chronic C6 spinous process fracture. Fat   stranding/contusion in the left supraclavicular region. CT MAXILLOFACIAL WO CONT   Final Result   1. No definite acute maxillofacial fracture. Age-indeterminate bilateral nasal   bone fractures, correlate with symptomatology. 2.  No acute intracranial abnormality. .          CT CHEST WO CONT   Final Result   1. Redemonstrated acute left humerus fracture. No definite acute rib fracture. Several chronic bilateral rib deformities. Chronic right proximal humerus   probably. 2.  No acute cardiopulmonary abnormality.       XR SHOULDER LT AP/LAT MIN 2 V   Final Result      Acute right proximal to mid humeral shaft fracture as described above. XR HUMERUS LT   Final Result      Acute right proximal to mid humeral shaft fracture as described above. XR CHEST PORT   Final Result      No acute process on portable chest.         XR SWALLOW FUNC VIDEO    (Results Pending)        Lab Data Personally Reviewed:    Recent Labs     01/02/23  0834 01/01/23  0711   WBC 10.5 16.2*   HGB 8.4* 8.5*   HCT 23.9* 25.3*    270       No results for input(s): INR, PTP, APTT, INREXT, INREXT in the last 72 hours. Recent Labs     01/02/23  0834 01/01/23  1134 12/31/22  0713   * 132* 131*   K 3.0* 3.5 Hemolyzed, recollect requested    106 110*   CO2 25 11* Hemolyzed, recollect requested   BUN 24* 30* 22*   CREA 1.23* 1.53* 1.22*   * 102* 119*   CA 7.7* 7.9* 7.2*       No results for input(s): CPK, CKNDX, TROIQ in the last 72 hours. No lab exists for component: CPKMB  No results found for: CHOL, CHOLX, CHLST, CHOLV, HDL, HDLP, LDL, LDLC, DLDLP, TGLX, TRIGL, TRIGP, CHHD, CHHDX    No results for input(s): AP, TBIL, TP, ALB, GLOB, GGT, AML, LPSE in the last 72 hours. No lab exists for component: SGOT, GPT, AMYP, HLPSE  No results for input(s): PH, PCO2, PO2 in the last 72 hours.     Medications Personally Reviewed:    Current Facility-Administered Medications   Medication Dose Route Frequency    potassium chloride (KLOR-CON) packet for solution 40 mEq  40 mEq Oral Q6H    sodium bicarbonate (8.4%) 100 mEq in dextrose 5% 1,000 mL infusion   IntraVENous CONTINUOUS    levoFLOXacin (LEVAQUIN) 750 mg in D5W IVPB  750 mg IntraVENous Q48H    dilTIAZem ER (CARDIZEM CD) capsule 120 mg  120 mg Oral DAILY    0.9% sodium chloride infusion 250 mL  250 mL IntraVENous PRN    sodium chloride (NS) flush 5-40 mL  5-40 mL IntraVENous Q8H    sodium chloride (NS) flush 5-40 mL  5-40 mL IntraVENous PRN    acetaminophen (TYLENOL) tablet 650 mg  650 mg Oral Q6H PRN    Or    acetaminophen (TYLENOL) suppository 650 mg  650 mg Rectal Q6H PRN polyethylene glycol (MIRALAX) packet 17 g  17 g Oral DAILY PRN    promethazine (PHENERGAN) tablet 12.5 mg  12.5 mg Oral Q6H PRN    Or    ondansetron (ZOFRAN) injection 4 mg  4 mg IntraVENous Q6H PRN    enoxaparin (LOVENOX) injection 30 mg  30 mg SubCUTAneous DAILY    diazePAM (VALIUM) tablet 10 mg  10 mg Oral Q1H PRN    diazePAM (VALIUM) tablet 20 mg  20 mg Oral Q1H PRN    multivitamin, tx-iron-ca-min (THERA-M w/ IRON) tablet 1 Tablet  1 Tablet Oral DAILY    HYDROcodone-acetaminophen (NORCO) 5-325 mg per tablet 1 Tablet  1 Tablet Oral Q6H PRN    albuterol-ipratropium (DUO-NEB) 2.5 MG-0.5 MG/3 ML  3 mL Nebulization Q6H PRN         Thao Guzman MD

## 2023-01-02 NOTE — PROGRESS NOTES
Problem: Pressure Injury - Risk of  Goal: *Prevention of pressure injury  Description: Document Barrie Scale and appropriate interventions in the flowsheet. Outcome: Progressing Towards Goal  Note: Pressure Injury Interventions:  Sensory Interventions: Assess changes in LOC, Check visual cues for pain, Keep linens dry and wrinkle-free, Minimize linen layers, Turn and reposition approx. every two hours (pillows and wedges if needed)    Moisture Interventions: Absorbent underpads, Contain wound drainage, Internal/External urinary devices, Minimize layers    Activity Interventions: Assess need for specialty bed    Mobility Interventions: Assess need for specialty bed    Nutrition Interventions: Document food/fluid/supplement intake, Discuss nutritional consult with provider, Offer support with meals,snacks and hydration    Friction and Shear Interventions: Foam dressings/transparent film/skin sealants, Minimize layers                Problem: Patient Education: Go to Patient Education Activity  Goal: Patient/Family Education  Outcome: Progressing Towards Goal     Problem: Falls - Risk of  Goal: *Absence of Falls  Description: Document Casper Diehl Fall Risk and appropriate interventions in the flowsheet.   Outcome: Progressing Towards Goal  Note: Fall Risk Interventions:  Mobility Interventions: Bed/chair exit alarm         Medication Interventions: Bed/chair exit alarm    Elimination Interventions: Bed/chair exit alarm, Call light in reach    History of Falls Interventions: Bed/chair exit alarm         Problem: Patient Education: Go to Patient Education Activity  Goal: Patient/Family Education  Outcome: Progressing Towards Goal     Problem: Pain  Goal: *Control of Pain  Outcome: Progressing Towards Goal     Problem: Patient Education: Go to Patient Education Activity  Goal: Patient/Family Education  Outcome: Progressing Towards Goal     Problem: Patient Education: Go to Patient Education Activity  Goal: Patient/Family Education  Outcome: Progressing Towards Goal     Problem: Patient Education: Go to Patient Education Activity  Goal: Patient/Family Education  Outcome: Progressing Towards Goal

## 2023-01-02 NOTE — PROGRESS NOTES
Orders received, chart review, and bedside swallow evaluation completed. Per nsg report: patient removed NGT this date; MD requesting eval prior to replacing NG. Patient coughing with thin liquids previous date and not appropriate for eval at that time; some improvement this date. Patient appears appropriate for initiation of puree diet and thin liquids with STRICT aspiration precautions, 1:1 feeding assistance with ALL po intake, monitor for s/sx asp/pen, verbal cues for slow rate of intake if patient impulsive, small SINGLE sips, small bites, HOB elevation 90 degrees during and for at least 30 minutes after meals. Recommend MBS tomorrow to further assess swallow function and rule out aspiration. MD provided consent via phone for initiation of diet and MBS orders. ST to continue to follow. .     Full report to follow. Thank you for this consult.

## 2023-01-02 NOTE — PROGRESS NOTES
Problem: Pressure Injury - Risk of  Goal: *Prevention of pressure injury  Description: Document Barrie Scale and appropriate interventions in the flowsheet. Outcome: Progressing Towards Goal  Note: Pressure Injury Interventions:  Sensory Interventions: Assess changes in LOC, Check visual cues for pain, Float heels, Keep linens dry and wrinkle-free, Minimize linen layers    Moisture Interventions: Absorbent underpads, Internal/External urinary devices, Minimize layers, Assess need for specialty bed    Activity Interventions: Assess need for specialty bed, PT/OT evaluation    Mobility Interventions: Assess need for specialty bed, PT/OT evaluation, Turn and reposition approx. every two hours(pillow and wedges)    Nutrition Interventions: Document food/fluid/supplement intake, Discuss nutritional consult with provider, Offer support with meals,snacks and hydration    Friction and Shear Interventions: Apply protective barrier, creams and emollients, Minimize layers                Problem: Patient Education: Go to Patient Education Activity  Goal: Patient/Family Education  Outcome: Progressing Towards Goal     Problem: Falls - Risk of  Goal: *Absence of Falls  Description: Document Carri Phipps Fall Risk and appropriate interventions in the flowsheet.   Outcome: Progressing Towards Goal  Note: Fall Risk Interventions:  Mobility Interventions: Bed/chair exit alarm         Medication Interventions: Bed/chair exit alarm    Elimination Interventions: Bed/chair exit alarm, Call light in reach    History of Falls Interventions: Bed/chair exit alarm

## 2023-01-02 NOTE — PROGRESS NOTES
OT attempted to see pt at 11:39 am however, pt declined d/t pain 8/10 in lower back and L UE. Pt reported \"It just best to leave me be\". Will try again at a later time.

## 2023-01-02 NOTE — PROGRESS NOTES
SPEECH LANGUAGE PATHOLOGY BEDSIDE SWALLOW EVALUATION  Patient: Lenard Tran (10 y.o. female)  Date: 1/2/2023  Primary Diagnosis: Dehydration [E86.0]  Procedure(s) (LRB):  OPEN REDUCTION INTERNAL FIXATION LEFT  HUMERUS (Left) 3 Days Post-Op   Precautions: aspiration       ASSESSMENT :  Based on the objective data described below, the patient presents with mild oropharyngeal dysphagia. Per nsg report: patient removed NGT this date; MD requesting eval prior to replacing NG. Patient coughing with thin liquids previous date and not appropriate for eval at that time; some improvement this date. Repositioned upright in bed. Patient with very slow rate of speech. Confusion noted. Baseline unknown. Patient accepts thin liquids and pudding; declined solids. Required verbal cues to swallow before speaking. Oral phase c/b reduced bolus awareness, prolonged bolus manipulation with puree, rapid oral transit with thin liquid. Pharyngeal phase c/b mild swallow delay and HLE appears reduced but adequate upon palpation. Double swallow observed intermittently. Cough x1 trial thin. Patient will benefit from skilled intervention to address the above impairments. Patients rehabilitation potential is considered to be good. PLAN :  Recommendations and Planned Interventions:  Patient appears appropriate for initiation of puree diet and thin liquids with STRICT aspiration precautions, 1:1 feeding assistance with ALL po intake, monitor for s/sx asp/pen, verbal cues for slow rate of intake if patient impulsive, small SINGLE sips, small bites, HOB elevation 90 degrees during and for at least 30 minutes after meals. Recommend MBS tomorrow to further assess swallow function and rule out aspiration. MD provided consent via phone for initiation of diet and MBS orders. Cog-linguistic eval if/as indicated.      Frequency/Duration: Patient will be followed by speech-language pathology 5 times a week to address goals.    Discharge Recommendations: To Be Determined     SUBJECTIVE:   Patient awakes to verbal cues, agreeable to bedside swallow evaluation. OBJECTIVE:     CXR Results  (Last 48 hours)                 01/01/23 1454  XR CHEST PORT Final result    Impression:      Interval placement of NG tube           Narrative:  EXAM:  XR CHEST PORT       INDICATION: NG tube placement       COMPARISON: 1048 hours       TECHNIQUE: portable chest AP view at 1448 hours       FINDINGS: The cardiac silhouette is within normal limits. The pulmonary   vasculature is within normal limits. The NG tube extends into the stomach. There   is tiny bilateral pleural effusions. The posterior rib fractures are well-healed. 01/01/23 1059  XR CHEST PORT Final result    Impression:      Interval development of small bilateral pleural effusions   Underlying lung hyperinflation           Narrative:  EXAM:  XR CHEST PORT       INDICATION: Shortness of breath       COMPARISON: 12/25/2022       TECHNIQUE: portable chest AP view obtained portably at 1048 hours       FINDINGS: The cardiac silhouette is within normal limits. The lungs are   hyperinflated and there are new small bilateral pleural effusions. Healed   bilateral posterior rib deformities are seen. The right humeral head remains   small and deformed and there is been interval ORIF of the left humeral neck.                    CT Results  (Last 48 hours)      None             Past Medical History:   Diagnosis Date    HTN (hypertension)     Smoker      Past Surgical History:   Procedure Laterality Date    HX SHOULDER REPLACEMENT Right 11/06/2011    Has fracture surgical neck, has ORIF DONE     Prior Level of Function/Home Situation:   Home Situation  Home Environment: Private residence  One/Two Story Residence: One story  Living Alone: No  Support Systems: Spouse/Significant Other  Patient Expects to be Discharged to[de-identified] Home with home health  Current DME Used/Available at Home: None  Diet prior to admission: unknown  Current Diet:  NPO, NG Tfs; however, NG not in place     Cognitive and Communication Status:  Neurologic State: Drowsy, Eyes open to voice, Confused  Orientation Level: Oriented to person, Oriented to place, Other (Comment), Disoriented to situation, Disoriented to time (states \"hospital\" but unable to recall name/city)  Cognition: Decreased attention/concentration, Follows commands, Impaired decision making, Impulsive, Memory loss, Poor safety awareness        Safety/Judgement: Decreased awareness of need for assistance, Lack of insight into deficits                                              Pain:  Pain Scale 1: Numeric (0 - 10)  Pain Intensity 1: 0       After treatment:   Patient left in no apparent distress in bed, Call bell within reach, and Nursing notified    COMMUNICATION/EDUCATION:   Patient was educated regarding purpose of SLP assessment, POC, diet recs and sw safety precautions. Patient demonstrated [address] understanding as evidenced by decreased attention/concentration, reduced engagement. The patient's plan of care including recommendations, planned interventions, and recommended diet changes were discussed with: Registered nurse and Physician. Patient is unable to participate in goal setting and plan of care. Thank you for this referral.  Gabriela Oconnell M.S. CCC-SLP                Problem: Dysphagia (Adult)  Goal: *Acute Goals and Plan of Care (Insert Text)  Description: Speech Therapy Swallow Goals  Initiated 1/2/2023  -patient stated goal: unable to state s/t confusion    -Patient will tolerate puree diet with thin liquids without clinical indicators of aspiration given moderate cues within 7 day(s). -Patient will tolerate PO trials without clinical indicators of aspiration given moderate cues within 7 day(s). -Patient will participate in modified barium swallow study within 7 day(s).       -Patient will demonstrate understanding of swallow safety precautions and aspiration precautions, diet recs with moderate cues within 7 day(s).           Note: initial

## 2023-01-03 ENCOUNTER — APPOINTMENT (OUTPATIENT)
Dept: NON INVASIVE DIAGNOSTICS | Age: 68
DRG: 981 | End: 2023-01-03
Attending: INTERNAL MEDICINE
Payer: MEDICARE

## 2023-01-03 ENCOUNTER — APPOINTMENT (OUTPATIENT)
Dept: GENERAL RADIOLOGY | Age: 68
DRG: 981 | End: 2023-01-03
Attending: INTERNAL MEDICINE
Payer: MEDICARE

## 2023-01-03 LAB
ABO + RH BLD: NORMAL
ANION GAP SERPL CALC-SCNC: 7 MMOL/L (ref 5–15)
BASOPHILS # BLD: 0 K/UL (ref 0–0.1)
BASOPHILS NFR BLD: 0 % (ref 0–1)
BLD PROD TYP BPU: NORMAL
BLD PROD TYP BPU: NORMAL
BLOOD GROUP ANTIBODIES SERPL: NEGATIVE
BPU ID: NORMAL
BPU ID: NORMAL
BUN SERPL-MCNC: 22 MG/DL (ref 6–20)
BUN/CREAT SERPL: 22 (ref 12–20)
CA-I BLD-MCNC: 7.4 MG/DL (ref 8.5–10.1)
CHLORIDE SERPL-SCNC: 99 MMOL/L (ref 97–108)
CO2 SERPL-SCNC: 27 MMOL/L (ref 21–32)
CREAT SERPL-MCNC: 0.98 MG/DL (ref 0.55–1.02)
CROSSMATCH RESULT,%XM: NORMAL
CROSSMATCH RESULT,%XM: NORMAL
DIFFERENTIAL METHOD BLD: ABNORMAL
EOSINOPHIL # BLD: 0 K/UL (ref 0–0.4)
EOSINOPHIL NFR BLD: 0 % (ref 0–7)
ERYTHROCYTE [DISTWIDTH] IN BLOOD BY AUTOMATED COUNT: 16 % (ref 11.5–14.5)
GLUCOSE SERPL-MCNC: 107 MG/DL (ref 65–100)
HCT VFR BLD AUTO: 23.9 % (ref 35–47)
HGB BLD-MCNC: 8.1 G/DL (ref 11.5–16)
IMM GRANULOCYTES # BLD AUTO: 0 K/UL
IMM GRANULOCYTES NFR BLD AUTO: 0 %
LYMPHOCYTES # BLD: 0.4 K/UL (ref 0.8–3.5)
LYMPHOCYTES NFR BLD: 5 % (ref 12–49)
MCH RBC QN AUTO: 36 PG (ref 26–34)
MCHC RBC AUTO-ENTMCNC: 33.9 G/DL (ref 30–36.5)
MCV RBC AUTO: 106.2 FL (ref 80–99)
MONOCYTES # BLD: 0.7 K/UL (ref 0–1)
MONOCYTES NFR BLD: 9 % (ref 5–13)
MYELOCYTES NFR BLD MANUAL: 1 %
NEUTS BAND NFR BLD MANUAL: 3 % (ref 0–6)
NEUTS SEG # BLD: 7.1 K/UL (ref 1.8–8)
NEUTS SEG NFR BLD: 82 % (ref 32–75)
NRBC # BLD: 0.05 K/UL (ref 0–0.01)
NRBC BLD-RTO: 0.6 PER 100 WBC
PLATELET # BLD AUTO: 297 K/UL (ref 150–400)
PMV BLD AUTO: 9.3 FL (ref 8.9–12.9)
POTASSIUM SERPL-SCNC: 4.4 MMOL/L (ref 3.5–5.1)
RBC # BLD AUTO: 2.25 M/UL (ref 3.8–5.2)
RBC MORPH BLD: ABNORMAL
SODIUM SERPL-SCNC: 133 MMOL/L (ref 136–145)
SPECIMEN EXP DATE BLD: NORMAL
STATUS OF UNIT,%ST: NORMAL
STATUS OF UNIT,%ST: NORMAL
TRANSFUSION STATUS PATIENT QL: NORMAL
TRANSFUSION STATUS PATIENT QL: NORMAL
UNIT DIVISION, %UDIV: 0
UNIT DIVISION, %UDIV: 0
WBC # BLD AUTO: 8.3 K/UL (ref 3.6–11)

## 2023-01-03 PROCEDURE — 85025 COMPLETE CBC W/AUTO DIFF WBC: CPT

## 2023-01-03 PROCEDURE — 65270000029 HC RM PRIVATE

## 2023-01-03 PROCEDURE — 74011250636 HC RX REV CODE- 250/636: Performed by: INTERNAL MEDICINE

## 2023-01-03 PROCEDURE — 74011000250 HC RX REV CODE- 250: Performed by: INTERNAL MEDICINE

## 2023-01-03 PROCEDURE — 97530 THERAPEUTIC ACTIVITIES: CPT

## 2023-01-03 PROCEDURE — 74011250636 HC RX REV CODE- 250/636: Performed by: ORTHOPAEDIC SURGERY

## 2023-01-03 PROCEDURE — 74011250637 HC RX REV CODE- 250/637: Performed by: ORTHOPAEDIC SURGERY

## 2023-01-03 PROCEDURE — 74011250637 HC RX REV CODE- 250/637: Performed by: INTERNAL MEDICINE

## 2023-01-03 PROCEDURE — 74011000250 HC RX REV CODE- 250: Performed by: ORTHOPAEDIC SURGERY

## 2023-01-03 PROCEDURE — 92611 MOTION FLUOROSCOPY/SWALLOW: CPT

## 2023-01-03 PROCEDURE — 36415 COLL VENOUS BLD VENIPUNCTURE: CPT

## 2023-01-03 PROCEDURE — 93308 TTE F-UP OR LMTD: CPT

## 2023-01-03 PROCEDURE — 74230 X-RAY XM SWLNG FUNCJ C+: CPT

## 2023-01-03 PROCEDURE — 80048 BASIC METABOLIC PNL TOTAL CA: CPT

## 2023-01-03 PROCEDURE — 74011250637 HC RX REV CODE- 250/637: Performed by: PHYSICIAN ASSISTANT

## 2023-01-03 RX ORDER — LEVOFLOXACIN 250 MG/1
750 TABLET ORAL
Status: DISCONTINUED | OUTPATIENT
Start: 2023-01-03 | End: 2023-01-05 | Stop reason: HOSPADM

## 2023-01-03 RX ORDER — OXYCODONE HYDROCHLORIDE 5 MG/1
5 TABLET ORAL
Status: DISCONTINUED | OUTPATIENT
Start: 2023-01-03 | End: 2023-01-05 | Stop reason: HOSPADM

## 2023-01-03 RX ADMIN — BARIUM SULFATE 20 ML: 400 PASTE ORAL at 10:45

## 2023-01-03 RX ADMIN — OXYCODONE 5 MG: 5 TABLET ORAL at 19:48

## 2023-01-03 RX ADMIN — DIGOXIN 0.12 MG: 125 TABLET ORAL at 09:33

## 2023-01-03 RX ADMIN — SODIUM CHLORIDE, PRESERVATIVE FREE 10 ML: 5 INJECTION INTRAVENOUS at 21:39

## 2023-01-03 RX ADMIN — DILTIAZEM HYDROCHLORIDE 60 MG: 30 TABLET, FILM COATED ORAL at 09:33

## 2023-01-03 RX ADMIN — BARIUM SULFATE 5 ML: 400 SUSPENSION ORAL at 10:45

## 2023-01-03 RX ADMIN — LEVOFLOXACIN 750 MG: 250 TABLET, FILM COATED ORAL at 15:00

## 2023-01-03 RX ADMIN — BARIUM SULFATE 20 ML: 400 SUSPENSION ORAL at 10:45

## 2023-01-03 RX ADMIN — SODIUM CHLORIDE, PRESERVATIVE FREE 10 ML: 5 INJECTION INTRAVENOUS at 13:56

## 2023-01-03 RX ADMIN — HYDROCODONE BITARTRATE AND ACETAMINOPHEN 1 TABLET: 5; 325 TABLET ORAL at 12:01

## 2023-01-03 RX ADMIN — ENOXAPARIN SODIUM 30 MG: 100 INJECTION SUBCUTANEOUS at 09:33

## 2023-01-03 RX ADMIN — MULTIPLE VITAMINS W/ MINERALS TAB 1 TABLET: TAB at 09:33

## 2023-01-03 RX ADMIN — BARIUM SULFATE 45 ML: 0.81 POWDER, FOR SUSPENSION ORAL at 10:46

## 2023-01-03 RX ADMIN — SODIUM BICARBONATE: 84 INJECTION, SOLUTION INTRAVENOUS at 05:07

## 2023-01-03 RX ADMIN — SODIUM CHLORIDE, PRESERVATIVE FREE 10 ML: 5 INJECTION INTRAVENOUS at 05:13

## 2023-01-03 RX ADMIN — DILTIAZEM HYDROCHLORIDE 60 MG: 30 TABLET, FILM COATED ORAL at 21:39

## 2023-01-03 NOTE — PROGRESS NOTES
Problem: Dysphagia (Adult)  Goal: *Acute Goals and Plan of Care (Insert Text)  Description: Speech Therapy Swallow Goals  Initiated 1/2/2023  -patient stated goal: unable to state s/t confusion    -Patient will tolerate puree diet with thin liquids without clinical indicators of aspiration given moderate cues within 7 day(s). MET  -Patient will tolerate soft and bite size diet with thin liquids without clinical indicators of aspiration given moderate cues within 7 day(s). REVISED  -Patient will tolerate PO trials without clinical indicators of aspiration given moderate cues within 7 day(s). -Patient will participate in modified barium swallow study within 7 day(s). MET      -Patient will demonstrate understanding of swallow safety precautions and aspiration precautions, diet recs with moderate cues within 7 day(s). Outcome: Progressing Towards Goal  SPEECH PATHOLOGY MODIFIED BARIUM SWALLOW STUDY  Patient: Sneha Mcmullen (48 y.o. female)  1955   Date: 1/3/2023  Primary Diagnosis: Dehydration [E86.0]  Procedure(s) (LRB):  OPEN REDUCTION INTERNAL FIXATION LEFT  HUMERUS (Left) 4 Days Post-Op   Precautions: aspiration       ASSESSMENT :  Based on the objective data described below, the patient presents with oropharyngeal sw function WFL, concerns for esophageal dysfunction. Oral phase c/b mildly reduced efficacy of mastication and oral bolus control, largely WFL. Intermittent oral bolus fragmentation. Pt is able to clear oral cavity independently. Pharyngeal phase c/b intermittent delay in pharyngeal phase initiation related to oral bolus fragmentation. Once initiated, pharyngeal phase WNL. Pen/asp is not observed with any trials. However, there is reduced degree and duration of UES relaxation with CP prominence intermittently present. There is TRACE upper esophageal residue with thin via cup/straw. This was not observed to enter the airway.   Significant coughing is noted after thin and nectar trials as well as after completion of MBS. Trace pharyngeal residue (pyriform) intermittently present. Patient will benefit from skilled intervention to address the above impairments. Patients rehabilitation potential is considered to be Good     PLAN :  Recommendations and Planned Interventions: At this time, recommend diet upgrade to soft and bite size/thin with aspiration and GERD precautions, recommend meds crushed if able in puree. Pt may benefit from further esophageal assessment and GERD management if/as medically appropriate. Recommend cont SLP follow-up to ensure diet tolerance and sw safety due to ongoing aspiration concerns. Frequency/Duration: Patient will be followed by speech-language pathology 4 times a week to address goals. Discharge Recommendations: cont SLP tx at this time     SUBJECTIVE:   Patient seen for MBS, alert, agreeable. MBS purpose and protocol discussed with pt prior to initiation of study. OBJECTIVE:     Past Medical History:   Diagnosis Date    HTN (hypertension)     Smoker      Past Surgical History:   Procedure Laterality Date    HX SHOULDER REPLACEMENT Right 11/06/2011    Has fracture surgical neck, has ORIF DONE        CXR Results  (Last 48 hours)                 01/01/23 1454  XR CHEST PORT Final result    Impression:      Interval placement of NG tube           Narrative:  EXAM:  XR CHEST PORT       INDICATION: NG tube placement       COMPARISON: 1048 hours       TECHNIQUE: portable chest AP view at 1448 hours       FINDINGS: The cardiac silhouette is within normal limits. The pulmonary   vasculature is within normal limits. The NG tube extends into the stomach. There   is tiny bilateral pleural effusions. The posterior rib fractures are well-healed.            01/01/23 1059  XR CHEST PORT Final result    Impression:      Interval development of small bilateral pleural effusions   Underlying lung hyperinflation           Narrative:  EXAM:  XR CHEST PORT INDICATION: Shortness of breath       COMPARISON: 12/25/2022       TECHNIQUE: portable chest AP view obtained portably at 1048 hours       FINDINGS: The cardiac silhouette is within normal limits. The lungs are   hyperinflated and there are new small bilateral pleural effusions. Healed   bilateral posterior rib deformities are seen. The right humeral head remains   small and deformed and there is been interval ORIF of the left humeral neck. Video Flouroscopic Procedures  [x] Lateral View   [] A-P View [] Scanned to level of Sternum    [x] Seated at 90 deg.   [] Other:    Presentation:   [x] Spoon   [x] Cup   [x] Straw   [] Syringe   [x] Consecutive Swallows  [] Other:    Consistencies:   [x] Ba+ liquid   [x] Ba+ liquid (nectar)   [x] Ba+ liquid (honey)     [x] Ba+ pudding   [] Ba+ crunched cookie   [x] Ba+ cracker   [] Other:     Testing Discontinued: [] Due to:    Treatment Techniques Attempted     [] Head Turn: [] Right [] Left     [] Head Tilt: [] Right [] Left     [] Chin Down:  [] Thermal Sensitization:  [] Supraglottic Swallow:  [] Mendelson's Maneuver:  [] Other:    Results  Dysphagia Present:    [x] Yes  [] No    Ratings of Dysphagia:    [] Mild  [] Moderate [] Severe    Stages of Breakdown:   [] Oral [] Pharyngeal   [x] Esophageal    Aspiration: [] Yes    [x] No  [] At Risk  [] Trace (<10%) [] Significant (>10%):     %  [] Penetration:  Level of:   Cough: [] Yes      [] No    Consistency Aspirated:  [] Thin Liquid   [] Nectar   [] Honey   [] Pureed     [] Mech-soft  [] Solid    Motility Problems with:  [] Lip Closure:   [] Sucking:   [x] Mastication:   [] Bolus Formation:   [x] Bolus Control:  [] A-P Transport:  [] Posterior Tongue Elevation:  [] Swallow Response (delayed):  [] Velopharyngeal Closure:  [] Epiglottic inversion  [] Laryngeal Elevation:  [] Laryngeal Adduction:  [x] Cricopharyngeal Relaxation:  [] Esophageal Peristalsis:  [] Reflux:  [] Other:    Timing of Aspiration:  [] Before Swallow:  [] During Swallow:  [] After Swallow:    Transit Time Delay:  [] >1 Second  Oral  [] >1 Second Pharyngeal  [] >20 Second Esophageal     Residuals:  [] Buccal Cavity   [] Velum/posterior pharyngeal wall  [] Valleculae  [] Pyriforms      COMMUNICATION/EDUCATION:   Patient receptive of education regarding MBS results, diet recs, swallow safety precautions. Patient demonstrated Good understanding as evidenced by verbal responsiveness, needs reinforcement. The patients plan of care including findings from [facility], recommendations, planned interventions, and recommended diet changes were discussed with: Physician. Patient/family have participated as able in goal setting and plan of care. Patient/family agree to work toward stated goals and plan of care.     Thank you for this referral.  Steve Del Toro M.S., CCC-SLP  Time Calculation: 23 mins

## 2023-01-03 NOTE — PROGRESS NOTES
PHYSICAL THERAPY TREATMENT  Patient: Sherrell Georges (2 y.o. female)  Date: 1/3/2023  Diagnosis: Dehydration [E86.0] <principal problem not specified>  Procedure(s) (LRB):  OPEN REDUCTION INTERNAL FIXATION LEFT  HUMERUS (Left) 4 Days Post-Op  Precautions:    Chart, physical therapy assessment, plan of care and goals were reviewed. ASSESSMENT  Patient continues with skilled PT services and is progressing towards goals. Pt supine in bed upon PT arrival, agreeable to session. (See below for objective details and assist levels). Overall pt tolerated session fairly today. Patient came to EOB and sat for 10-13 minutes total. Patient came to standing at beronica walker 3 times for 5 seconds, 15 seconds and 5 minutes(for liya care). Patient required min-CGA for standing balance while liya care performed. Patient then took 2 slide steps to [facility] demonstrating unsteady shuffled gait with no knee buckling but minor LOB. Patient then returned to supine in bed with call bell within reach and all needs meet. Current Level of Function Impacting Discharge (mobility/balance): impaired balance, general weakness poor activity tolerance    Other factors to consider for discharge: PLOF, assistance at home, level of defcits, acute medical state         PLAN :  Patient continues to benefit from skilled intervention to address the above impairments. Continue treatment per established plan of care to address goals. Recommendation for discharge: (in order for the patient to meet his/her long term goals)  Ernesto Hayes    This discharge recommendation:  Has been made in collaboration with the attending provider and/or case management    IF patient discharges home will need the following DME: to be determined (TBD)       SUBJECTIVE:   Patient stated my legs are so weak.     OBJECTIVE DATA SUMMARY:   Critical Behavior:  Neurologic State: Alert  Orientation Level: Oriented X4  Cognition: Follows commands  Safety/Judgement: Decreased awareness of need for assistance, Lack of insight into deficits  Functional Mobility Training:  Bed Mobility:  Rolling: Minimum assistance  Supine to Sit: Minimum assistance;Assist x2  Sit to Supine: Moderate assistance;Assist x2  Scooting: Moderate assistance;Assist x1  Transfers:  Sit to Stand: Moderate assistance;Assist x2  Stand to Sit: Moderate assistance;Assist x2  Balance:  Sitting: Impaired; Without support  Sitting - Static: Fair (occasional)  Sitting - Dynamic: Fair (occasional)  Standing: Impaired  Standing - Static: Fair;Constant support  Standing - Dynamic : Fair;Constant support  Ambulation/Gait Training:  Distance (ft): 2 Feet (ft)  Assistive Device: Gait belt;Walker beronica  Ambulation - Level of Assistance: Minimal assistance;Assist x2    Therapeutic Exercises:   1x15 LAQ  1x15 seated marches     Pain Rating:  No pain reported during session    Activity Tolerance:   Fair and requires rest breaks    After treatment patient left in no apparent distress:   Bed locked and returned to lowest position, Supine in bed, Call bell within reach, Bed / chair alarm activated, and Side rails x 3      COMMUNICATION/COLLABORATION:   The patients plan of care was discussed with: Registered nurse. GOALS:    Problem: Mobility Impaired (Adult and Pediatric)  Goal: *Acute Goals and Plan of Care (Insert Text)  Description: Patient stated goal: get better  Patient will move from supine to sit and sit to supine , scoot up and down, and roll side to side in bed with supervision/set-up within 7 day(s). Patient will transfer from bed to chair and chair to bed with supervision/set-up using the least restrictive device within 7 day(s). Patient will improve static standing balance to supervision within 1 week(s). Patient will ambulate 50 feet with supervision with least restrictive device within 1 weeks.      Outcome: Progressing Towards Goal       Yeni Boswer PTA, PT   Time Calculation: 40 mins

## 2023-01-03 NOTE — PROGRESS NOTES
Problem: Pressure Injury - Risk of  Goal: *Prevention of pressure injury  Description: Document Barrie Scale and appropriate interventions in the flowsheet. Outcome: Progressing Towards Goal  Note: Pressure Injury Interventions:  Sensory Interventions: Assess changes in LOC, Check visual cues for pain, Keep linens dry and wrinkle-free, Float heels, Minimize linen layers, Turn and reposition approx. every two hours (pillows and wedges if needed)    Moisture Interventions: Absorbent underpads, Apply protective barrier, creams and emollients, Internal/External urinary devices, Minimize layers    Activity Interventions: Assess need for specialty bed, PT/OT evaluation, Increase time out of bed    Mobility Interventions: Assess need for specialty bed, PT/OT evaluation, Turn and reposition approx. every two hours(pillow and wedges)    Nutrition Interventions: Document food/fluid/supplement intake, Offer support with meals,snacks and hydration    Friction and Shear Interventions: Apply protective barrier, creams and emollients, Minimize layers                Problem: Patient Education: Go to Patient Education Activity  Goal: Patient/Family Education  Outcome: Progressing Towards Goal     Problem: Falls - Risk of  Goal: *Absence of Falls  Description: Document Newton Fall Risk and appropriate interventions in the flowsheet.   Outcome: Progressing Towards Goal  Note: Fall Risk Interventions:  Mobility Interventions: Bed/chair exit alarm         Medication Interventions: Bed/chair exit alarm    Elimination Interventions: Bed/chair exit alarm, Call light in reach    History of Falls Interventions: Bed/chair exit alarm

## 2023-01-03 NOTE — PROGRESS NOTES
Orthopedic progress note    Date:1/3/2023       Room:[facility]  Patient Cheyenne Chambers     YOB: 1955     Age:72 y.o. Subjective    70-year-old female status post ORIF left proximal humerus. Postop day #4. Patient is lying in bed comfortably. Somewhat lethargic today. She is conversive and responsive to commands. She complains of moderate pain of the left upper extremity. She does feel pain medication helps some. Slow progress of therapy. No other complaints at this time.   Objective           Vitals Last 24 Hours:  TEMPERATURE:  Temp  Av.5 °F (36.4 °C)  Min: 97.3 °F (36.3 °C)  Max: 98 °F (36.7 °C)  RESPIRATIONS RANGE: Resp  Av.3  Min: 16  Max: 20  PULSE OXIMETRY RANGE: SpO2  Av.7 %  Min: 79 %  Max: 99 %  PULSE RANGE: Pulse  Av.1  Min: 62  Max: 108  BLOOD PRESSURE RANGE: Systolic (03CSV), JESSE:154 , Min:106 , CFH:158   ; Diastolic (77KEI), VBL:40, Min:59, Max:80    Current Facility-Administered Medications   Medication Dose Route Frequency    levoFLOXacin (LEVAQUIN) tablet 750 mg  750 mg Oral Q48H    dilTIAZem IR (CARDIZEM) tablet 60 mg  60 mg Oral BID    digoxin (LANOXIN) tablet 0.125 mg  0.125 mg Oral DAILY    0.9% sodium chloride infusion 250 mL  250 mL IntraVENous PRN    sodium chloride (NS) flush 5-40 mL  5-40 mL IntraVENous Q8H    sodium chloride (NS) flush 5-40 mL  5-40 mL IntraVENous PRN    acetaminophen (TYLENOL) tablet 650 mg  650 mg Oral Q6H PRN    Or    acetaminophen (TYLENOL) suppository 650 mg  650 mg Rectal Q6H PRN    polyethylene glycol (MIRALAX) packet 17 g  17 g Oral DAILY PRN    promethazine (PHENERGAN) tablet 12.5 mg  12.5 mg Oral Q6H PRN    Or    ondansetron (ZOFRAN) injection 4 mg  4 mg IntraVENous Q6H PRN    enoxaparin (LOVENOX) injection 30 mg  30 mg SubCUTAneous DAILY    diazePAM (VALIUM) tablet 10 mg  10 mg Oral Q1H PRN    diazePAM (VALIUM) tablet 20 mg  20 mg Oral Q1H PRN    multivitamin, tx-iron-ca-min (THERA-M w/ IRON) tablet 1 Tablet  1 Tablet Oral DAILY    HYDROcodone-acetaminophen (NORCO) 5-325 mg per tablet 1 Tablet  1 Tablet Oral Q6H PRN    albuterol-ipratropium (DUO-NEB) 2.5 MG-0.5 MG/3 ML  3 mL Nebulization Q6H PRN      Review of Systems   Constitutional: Negative for malaise/fatigue. Respiratory: Negative for cough, shortness of breath and wheezing. Cardiovascular: Negative for chest pain and palpitations. Gastrointestinal: Negative for abdominal pain, heartburn, nausea and vomiting. Neurological: Negative for headaches. Musculoskeletal: Denies any numbness/tingling of operative extremity     I/O (24Hr): Intake/Output Summary (Last 24 hours) at 1/3/2023 1403  Last data filed at 1/3/2023 1047  Gross per 24 hour   Intake 600 ml   Output 450 ml   Net 150 ml     Objective:  Vital signs: (most recent): Blood pressure 137/80, pulse 98, temperature 97.6 °F (36.4 °C), resp. rate 20, height 5' 3\" (1.6 m), weight 51.8 kg (114 lb 3.2 oz), SpO2 92 %. Labs/Imaging/Diagnostics    Labs:  CBC:  Recent Labs     01/03/23  0608 01/02/23  0834 01/01/23  0711   WBC 8.3 10.5 16.2*   RBC 2.25* 2.28* 2.31*   HGB 8.1* 8.4* 8.5*   HCT 23.9* 23.9* 25.3*   .2* 104.8* 109.5*   RDW 16.0* 16.2* 16.6*    303 270     CHEMISTRIES:  Recent Labs     01/03/23  0608 01/02/23  0834 01/01/23  1134   * 134* 132*   K 4.4 3.0* 3.5   CL 99 102 106   CO2 27 25 11*   BUN 22* 24* 30*   CREA 0.98 1.23* 1.53*   CA 7.4* 7.7* 7.9*   PT/INR:No results for input(s): INR, INREXT in the last 72 hours. No lab exists for component: PROTIME  APTT:No results for input(s): APTT in the last 72 hours. LIVER PROFILE:No results for input(s): AST, ALT in the last 72 hours. No lab exists for component: STEPH Johnson  Lab Results   Component Value Date/Time    ALT (SGPT) 28 12/25/2022 02:44 PM    AST (SGOT) 53 (H) 12/25/2022 02:44 PM    Alk.  phosphatase 93 12/25/2022 02:44 PM    Bilirubin, total 0.9 12/25/2022 02:44 PM       Physical Exam:  Left upper extremity: Dressing show moderate serosanguineous drainage particularly at the posterior aspect along the elbow. Mild swelling seen of the left hand. Dressing was removed by me. Incision site is healing well. There is a large skin tear seen over the distal lateral biceps region. Superficial.  Mild bloody drainage seen at this site. Moderate ecchymosis seen throughout left upper extremity. Mild swelling seen throughout left upper extremity. Sensation intact throughout left upper extremity. Radial pulse palpable. Cap refill is 2 seconds. EPL intact.  strength is 3 out of 5. Limited range of motion of fingers left hand secondary to pain. No tenderness to passive extension of flexion of the fingers of left hand. Left upper extremity neurovascular intact. Assessment//Plan           Patient Active Problem List    Diagnosis Date Noted    Dehydration 12/25/2022    COPD exacerbation ([facility] [address] 09/22/2020    Respiratory failure with hypoxia ([facility] [address] 09/22/2020    Hypokalemia 09/22/2020    Acute respiratory failure ([facility] [address] 09/22/2020     Status post ORIF left proximal humerus. Postop day #4. New dressing applied today consisting of Adaptic, 4 x 4's, ABD pad, Kerlix and Ace wrap. We will continue with dressing changes as needed left upper extremity. If swelling does not improve over the next day or 2 may apply shoulder sling for comfort to help with elevation. Will adjust pain medication accordingly. Continue ice therapy left upper extremity. I had a long discussion with the patient about discharge planning. The patient is adamant that she would like to go home as she feels she does have some assistance with urgent she is living with as well as a close friend. I did express to her based off of her slow progress with therapy and the nature of her injury she may be better suited for discharge to a rehab facility. The patient would like time to think about this.   Patient follow-up with Dr. Miriam Cardenas as outpatient in approximately 2 weeks once discharged.       Electronically signed by Wilner Graham PA-C on 1/3/2023 at 2:03 PM

## 2023-01-03 NOTE — PROGRESS NOTES
OCCUPATIONAL THERAPY TREATMENT  Patient: Jeb Farfan (53 y.o. female)  Date: 1/3/2023  Diagnosis: Dehydration [E86.0] <principal problem not specified>  Procedure(s) (LRB):  OPEN REDUCTION INTERNAL FIXATION LEFT  HUMERUS (Left) 4 Days Post-Op  Precautions:    Chart, occupational therapy assessment, plan of care, and goals were reviewed. ASSESSMENT  Pt continues with skilled OT/PT services and is progressing towards goals. Pt received semi-supine in bed upon arrival, AXO x4 and agreeable to LORD/ PTA tx at this time. Overall, pt continues to present with deficits in generalized strength/AROM, coordination, bed mobility, static/dynamic sitting and standing balance and functional activity tolerance during performance of ADLs/mobility (see below for objective details and assist levels). Pt tolerated session fair, required freq rest break. Pt pleasant and cooperative during session. Pt appears motivated to improve functional status. Pt supine to eob w/ Min A x 2 . Pt asked for bed pan. Pt stood 2x's for placement of bedpan w/ mod A x2 . Pt able to have small bm and void bladder. Pt stood long enough for removal of bedpan and total care for perineal cleanup. Pt sat eob and completed U/L body therex. See PTA noted for LB exercises. Pt completed r ue therex, see grid below for details, to increase strength/ endurance to aid in adl performance. Pt returned to semisupine w/ mod a x 2. L UE positioned on pillow for comfort and to decrease risk of edema. Will continue to progress. Recommend d/c to [facility] once medically appropriate. Other factors to consider for discharge: PLOF, time since onset, decline from functional baseline. PLAN :  Patient continues to benefit from skilled intervention to address the above impairments. Continue treatment per established plan of care. to address goals.     Recommend with staff: Out of bed to chair for meals and Encourage HEP in prep for ADLs/mobility    Recommend next session: Toileting and Seated grooming    Recommendation for discharge: (in order for the patient to meet his/her long term goals)  Inpatient Rehabilitation Facility     This discharge recommendation:  Has been made in collaboration with the attending provider and/or case management    IF patient discharges home will need the following DME: TBD       SUBJECTIVE:   Patient stated I need to pee.     OBJECTIVE DATA SUMMARY:   Cognitive/Behavioral Status:  Neurologic State: Alert  Orientation Level: Oriented X4  Cognition: Follows commands    Functional Mobility and Transfers for ADLs:  Bed Mobility:  Rolling: Minimum assistance  Supine to Sit: Minimum assistance;Assist x2  Sit to Supine: Moderate assistance;Assist x2  Scooting: Moderate assistance;Assist x1    Transfers:  Sit to Stand: Moderate assistance;Assist x2    Balance:  Sitting: Impaired; Without support  Sitting - Static: Fair (occasional)  Sitting - Dynamic: Fair (occasional)  Standing: Impaired  Standing - Static: Fair;Constant support  Standing - Dynamic : Fair;Constant support    ADL Intervention:    Toileting  Bladder Hygiene: Maximum assistance; Total assistance (dependent)  Bowel Hygiene: Maximum assistance; Total assistance (dependent)    Therapeutic Exercises:   Exercise Sets Reps AROM AAROM PROM Self PROM Comments   Shoulder flex/ext  Forward and overhead. 1 10 [x]  [] [] [] R UE only. Elbow flex/ext 1 15 [x] [] [] []        Pain:  No c/o pain    Activity Tolerance:   Fair and requires frequent rest breaks    After treatment patient left in no apparent distress:   Supine in bed, Call bell within reach, Bed / chair alarm activated, and Side rails x 3, bed locked and in lowest position    COMMUNICATION/COLLABORATION:   The patients plan of care was discussed with: Physical therapy assistant and Registered nurse. PT/OT sessions occurred together for increased safety of pt and clinician.      NADEEM Moncada Calculation: 40 mins     Problem: Self Care Deficits Care Plan (Adult)  Goal: *Acute Goals and Plan of Care (Insert Text)  Description:   FUNCTIONAL STATUS PRIOR TO ADMISSION: Patient was independent and active without use of DME. Patient was independent for basic and instrumental ADLs. HOME SUPPORT: The patient lived with her boyfriend but did not require assist.    Occupational Therapy Goals  Initiated 12/26/2022  Patient Goal: Move and do things with less pain in LUE. 1.  Patient will perform lower body dressing with min A within 7 day(s). 2.  Patient will perform grooming with min A within 7 day(s). 3.  Patient will perform bathing with min A within 7 day(s). 4.  Patient will perform toilet transfers with min A within 7 day(s). 5.  Patient will perform all aspects of toileting with SBA within 7 day(s). 6.  Patient will participate in upper extremity therapeutic exercise/activities with modified independence within 7 day(s).     Outcome: Progressing Towards Goal

## 2023-01-03 NOTE — PROGRESS NOTES
CM in to speak to patient at the bedside. Patient notified that therapy is recommending SNF placement for her at discharge. Patient states she is adamantly saying no, no, no to going to a facility. She lives with someone and has plenty of friends that have offered to help her. CM explained to patient she would not be able to receive [facility] services until seen by new PCP after discharge. Once that appt takes place, [facility] will see patient. Patient verbalized understanding.

## 2023-01-04 PROBLEM — S42.302A FRACTURE OF HUMERAL SHAFT, LEFT, CLOSED: Status: ACTIVE | Noted: 2023-01-04

## 2023-01-04 LAB
ANION GAP SERPL CALC-SCNC: 7 MMOL/L (ref 5–15)
BASOPHILS # BLD: 0 K/UL (ref 0–0.1)
BASOPHILS NFR BLD: 0 % (ref 0–1)
BUN SERPL-MCNC: 18 MG/DL (ref 6–20)
BUN/CREAT SERPL: 21 (ref 12–20)
CA-I BLD-MCNC: 7.7 MG/DL (ref 8.5–10.1)
CHLORIDE SERPL-SCNC: 99 MMOL/L (ref 97–108)
CO2 SERPL-SCNC: 29 MMOL/L (ref 21–32)
CREAT SERPL-MCNC: 0.85 MG/DL (ref 0.55–1.02)
DIFFERENTIAL METHOD BLD: ABNORMAL
EOSINOPHIL # BLD: 0 K/UL (ref 0–0.4)
EOSINOPHIL NFR BLD: 0 % (ref 0–7)
ERYTHROCYTE [DISTWIDTH] IN BLOOD BY AUTOMATED COUNT: 15.6 % (ref 11.5–14.5)
GLUCOSE SERPL-MCNC: 89 MG/DL (ref 65–100)
HCT VFR BLD AUTO: 25.6 % (ref 35–47)
HGB BLD-MCNC: 8.7 G/DL (ref 11.5–16)
IMM GRANULOCYTES # BLD AUTO: 0 K/UL
IMM GRANULOCYTES NFR BLD AUTO: 0 %
LYMPHOCYTES # BLD: 1 K/UL (ref 0.8–3.5)
LYMPHOCYTES NFR BLD: 12 % (ref 12–49)
MCH RBC QN AUTO: 36.6 PG (ref 26–34)
MCHC RBC AUTO-ENTMCNC: 34 G/DL (ref 30–36.5)
MCV RBC AUTO: 107.6 FL (ref 80–99)
MONOCYTES # BLD: 0.4 K/UL (ref 0–1)
MONOCYTES NFR BLD: 5 % (ref 5–13)
MYELOCYTES NFR BLD MANUAL: 4 %
NEUTS BAND NFR BLD MANUAL: 2 % (ref 0–6)
NEUTS SEG # BLD: 6.6 K/UL (ref 1.8–8)
NEUTS SEG NFR BLD: 77 % (ref 32–75)
NRBC # BLD: 0.02 K/UL (ref 0–0.01)
NRBC BLD-RTO: 0.2 PER 100 WBC
PLATELET # BLD AUTO: 335 K/UL (ref 150–400)
PMV BLD AUTO: 9.3 FL (ref 8.9–12.9)
POTASSIUM SERPL-SCNC: 4.6 MMOL/L (ref 3.5–5.1)
RBC # BLD AUTO: 2.38 M/UL (ref 3.8–5.2)
RBC MORPH BLD: ABNORMAL
SODIUM SERPL-SCNC: 135 MMOL/L (ref 136–145)
WBC # BLD AUTO: 8.3 K/UL (ref 3.6–11)

## 2023-01-04 PROCEDURE — 74011250637 HC RX REV CODE- 250/637: Performed by: ORTHOPAEDIC SURGERY

## 2023-01-04 PROCEDURE — 74011000250 HC RX REV CODE- 250: Performed by: ORTHOPAEDIC SURGERY

## 2023-01-04 PROCEDURE — 80048 BASIC METABOLIC PNL TOTAL CA: CPT

## 2023-01-04 PROCEDURE — 92526 ORAL FUNCTION THERAPY: CPT

## 2023-01-04 PROCEDURE — 74011250637 HC RX REV CODE- 250/637: Performed by: INTERNAL MEDICINE

## 2023-01-04 PROCEDURE — 74011250636 HC RX REV CODE- 250/636: Performed by: ORTHOPAEDIC SURGERY

## 2023-01-04 PROCEDURE — 36415 COLL VENOUS BLD VENIPUNCTURE: CPT

## 2023-01-04 PROCEDURE — 97530 THERAPEUTIC ACTIVITIES: CPT

## 2023-01-04 PROCEDURE — 85025 COMPLETE CBC W/AUTO DIFF WBC: CPT

## 2023-01-04 PROCEDURE — 65270000029 HC RM PRIVATE

## 2023-01-04 RX ORDER — LEVOFLOXACIN 750 MG/1
750 TABLET ORAL
Qty: 3 TABLET | Refills: 0 | Status: SHIPPED | OUTPATIENT
Start: 2023-01-05 | End: 2023-01-11

## 2023-01-04 RX ORDER — DILTIAZEM HYDROCHLORIDE 60 MG/1
60 TABLET, FILM COATED ORAL 2 TIMES DAILY
Qty: 60 TABLET | Refills: 0 | Status: SHIPPED | OUTPATIENT
Start: 2023-01-04 | End: 2023-02-03

## 2023-01-04 RX ORDER — DIGOXIN 125 MCG
0.12 TABLET ORAL DAILY
Qty: 30 TABLET | Refills: 0 | Status: SHIPPED | OUTPATIENT
Start: 2023-01-05 | End: 2023-02-04

## 2023-01-04 RX ADMIN — DILTIAZEM HYDROCHLORIDE 60 MG: 30 TABLET, FILM COATED ORAL at 20:22

## 2023-01-04 RX ADMIN — DIGOXIN 0.12 MG: 125 TABLET ORAL at 09:46

## 2023-01-04 RX ADMIN — ENOXAPARIN SODIUM 30 MG: 100 INJECTION SUBCUTANEOUS at 09:46

## 2023-01-04 RX ADMIN — SODIUM CHLORIDE, PRESERVATIVE FREE 10 ML: 5 INJECTION INTRAVENOUS at 15:33

## 2023-01-04 RX ADMIN — SODIUM CHLORIDE, PRESERVATIVE FREE 10 ML: 5 INJECTION INTRAVENOUS at 06:03

## 2023-01-04 RX ADMIN — ACETAMINOPHEN 650 MG: 325 TABLET ORAL at 09:46

## 2023-01-04 RX ADMIN — SODIUM CHLORIDE, PRESERVATIVE FREE 10 ML: 5 INJECTION INTRAVENOUS at 20:22

## 2023-01-04 RX ADMIN — DILTIAZEM HYDROCHLORIDE 60 MG: 30 TABLET, FILM COATED ORAL at 09:46

## 2023-01-04 RX ADMIN — MULTIPLE VITAMINS W/ MINERALS TAB 1 TABLET: TAB at 09:46

## 2023-01-04 NOTE — PROGRESS NOTES
SPEECH LANGUAGE PATHOLOGY DYSPHAGIA TREATMENT  Patient: Ashly Mueller (89 y.o. female)  Date: 1/4/2023  Diagnosis: Dehydration [E86.0] Fracture of humeral shaft, left, closed  Procedure(s) (LRB):  OPEN REDUCTION INTERNAL FIXATION LEFT  HUMERUS (Left) 5 Days Post-Op  Precautions: aspiration      ASSESSMENT:  Patient seen at bedside. She is requesting coffee. Patient declined solids but agreeable to liquids. Administered coffee. Patient ingested w/ small sips. Swallow initiated w/ minimal delay. HLE and protraction adequate to digital palpation. No clinical indicators of penetration or aspiration. W/ trial of thin H20 via cup. Patient w/ significant cough response after the swallow. Patient stated, \" it feels like there is a feather tickling my throat. \" No other clinical indicators of penetration or aspiration followed w/ additional H20 trials. Educated patient to compensatory swallow strategies of small bites/sips, slow rate and GERD precautions. MBS results reviewed. PLAN:  Recommendations and Planned Interventions:  SBS, thin liquids. Small bites, sips. Slow rate. GERD precautions. Encourage p.o. intake. Patient continues to benefit from skilled intervention to address the above impairments. Continue treatment per established plan of care. Frequency/Duration: Patient will be followed by speech-language pathology 3 times a week to address goals. Discharge Recommendations: To Be Determined     SUBJECTIVE:   Patient seen at bedside.     OBJECTIVE:     CXR Results  (Last 48 hours)      None          CT Results  (Last 48 hours)      None           Cognitive and Communication Status:  Neurologic State: Alert  Orientation Level: Oriented X4  Cognition: Follows commands        Safety/Judgement: Decreased awareness of need for assistance, Lack of insight into deficits       After treatment:   Patient left in no apparent distress in bed and Call bell within reach    COMMUNICATION/EDUCATION: Patient was educated regarding her deficit(s) of dysphagia, swallow safety precautions, diet recs and POC. She demonstrated Fair understanding as evidenced by verbal understanding. The patient's plan of care including recommendations, planned interventions, and recommended diet changes were discussed with: Physician. Marybeth Pulido, SLP M.S. CCC-SLP  Time Calculation: 15 mins           Problem: Dysphagia (Adult)  Goal: *Acute Goals and Plan of Care (Insert Text)  Description: Speech Therapy Swallow Goals  Initiated 1/2/2023  -patient stated goal: unable to state s/t confusion    -Patient will tolerate puree diet with thin liquids without clinical indicators of aspiration given moderate cues within 7 day(s). MET  -Patient will tolerate soft and bite size diet with thin liquids without clinical indicators of aspiration given moderate cues within 7 day(s). REVISED  -Patient will tolerate PO trials without clinical indicators of aspiration given moderate cues within 7 day(s). -Patient will participate in modified barium swallow study within 7 day(s). MET      -Patient will demonstrate understanding of swallow safety precautions and aspiration precautions, diet recs with moderate cues within 7 day(s).           Outcome: Progressing Towards Goal

## 2023-01-04 NOTE — DISCHARGE SUMMARY
Admit date: 12/25/2022   Admitting Provider: Virginie Gould MD    Discharge date: 1/4/2023  Discharging Provider: Myesha Wilson MD      * Admission Diagnoses: Dehydration [E86.0]    * Discharge Diagnoses:    Hospital Problems as of 1/4/2023 Date Reviewed: 1/4/2023            Codes Class Noted - Resolved POA    * (Principal) Fracture of humeral shaft, left, closed ICD-10-CM: S42.302A  ICD-9-CM: 812.21  1/4/2023 - Present Yes        Dehydration ICD-10-CM: E86.0  ICD-9-CM: 276.51  12/25/2022 - Present Yes        Respiratory failure with hypoxia ([facility] [address] ICD-10-CM: J96.91  ICD-9-CM: 518.81  9/22/2020 - Present Yes        Acute respiratory failure ([facility] [address] ICD-10-CM: J96.00  ICD-9-CM: 518.81  9/22/2020 - Present Yes           * Hospital Course:     75 y.o. female with history of alcohol abuse who fell in her home last night. Since then she complains of severe pain all over as well as extreme left arm pain and difficulty walking. In the ED she was hypertensive and mildly tachycardic. Labs showed a sodium of 126, creatinine 1.36 and a magnesium of 1.1. X-ray of the left arm showed a left proximal to mid humeral shaft fracture with impaction and varus angulation. Underwent operative management with Dr Ashanti Rodriguez    1/4/23 discharge to [facility] for rehabilitation  Outpatient follow up with PCP cardiology orthopedics      * Procedures:   Procedure(s):  OPEN REDUCTION INTERNAL FIXATION LEFT  HUMERUS   Operative Note     Patient: Etelvina Schwab  YOB: 1955  MRN: [id number]     Date of Procedure: 12/30/2022      Pre-Op Diagnosis:  Comminuted fracture of left proximal humerus shaft, with associated surgical neck fracture     Additional diagnoses: 1) Chronic alcoholism  2) Tobacco abuse disorder  3) Severe osteoporosis  4) Malnutrition  5) Acute blood loss anemia  6) Vitamin D deficiency     Post-Op Diagnosis: Same as preoperative diagnosis.        Procedure(s):  OPEN REDUCTION & INTERNAL FIXATION OF LEFT HUMERUS FRACTURE     Surgeon(s):  Unique Reese MD     Surgical Assistant: Surg Asst-1: Meredith Coffman     Anesthesia: General      Estimated Blood Loss (mL):  248      Complications: None    Consults: Cardiology and Orthopedic Surgery    CARDIOLOGY  Problem List:   -Admitted to the hospital following fall and fractured left humerus  -Cardiology consult was invited secondary to increased troponin level. -Hypertension  -COPD2  -Current smoker  -No known established coronary artery disease and or no recent cardiac work-up has been done in the recent past      Assessment/Plan:       Note dictated January 2, 2023  -Patient is lying comfortably in the bed heart rate is still in 120s with rhythm being atrial fibrillation  -We will start digoxin 0.125 mg daily as it was well-tolerated by the patient. In addition we will replace potassium and carefully watch renal function. We will start digoxin tomorrow dated 1/3/2023.  -Switch Cardizem to twice a day. -Goal is to minimize heart rate and the risk of tachycardia induced cardiomyopathy  -Check echocardiogram.  -We will continue to follow while patient is in the hospital along with the team     Note dictated January 1, 2023  -Patient is complaining of nonspecific pain unable to communicate with me clearly.  -She is status post left humerus fracture intervention by orthopedics. -Heart rate increased up to 170 with rhythm being atrial fibrillation.  -Based on her electrolytes and creatinine level I will cautiously give 1 dose of digoxin 0.25 mg IV and then follow her closely.   She is unable to tolerate Cardizem secondary to low blood pressure.  -We will continue to follow while she is in the hospital along with the team.  -We will check echocardiogram once results are available.  ---------------------------------------------------------------------------------------------------------------  -Follow-up visit from yesterday consultation  -Patient is lying in the bed complaining of left arm pain intervention of left humeral neck. -Was clinically and hemodynamically stable per my cardiac assessment. No perioperative event and patient tolerated the surgery well.  -No further cardiovascular testing based on my clinical assessment and we will follow on as needed basis. Significant Diagnostic Studies:   Recent Results (from the past 24 hour(s))   ECHO ADULT FOLLOW-UP OR LIMITED    Collection Time: 01/03/23  3:30 PM   Result Value Ref Range    LA [place] 3.6 cm    LA Area 4C 10.5 cm2    LA Diameter 2.6 cm    RA Area 4C 9.1 cm2    RA Volume 19 ml    Aortic Root 3.0 cm    IVSd 1.0 (A) 0.6 - 0.9 cm    LVIDd 4.1 3.9 - 5.3 cm    LVIDs 2.1 cm    LVOT Diameter 2.0 cm    LVPWd 0.9 0.6 - 0.9 cm    LVOT Area 3.1 cm2    TR Max Velocity 2.49 m/s    TR Peak Gradient 25 mmHg    Fractional Shortening 2D 49 28 - 44 %    LVIDd Index 2.70 cm/m2    LVIDs Index 1.38 cm/m2    LV RWT Ratio 0.44     LV Mass 2D 123.0 67 - 162 g    LV Mass 2D Index 80.9 43 - 95 g/m2    LA Size Index 1.71 cm/m2    LA/AO Root Ratio 0.87     RA Volume Index A4C 13 mL/m2    Ao Root Index 1.97 cm/m2    EF BP 75 55 - 100 %    Est. RA Pressure 8 mmHg    RVSP 33 mmHg    PASP 33 mmHg   CBC WITH AUTOMATED DIFF    Collection Time: 01/04/23  6:55 AM   Result Value Ref Range    WBC 8.3 3.6 - 11.0 K/uL    RBC 2.38 (L) 3.80 - 5.20 M/uL    HGB 8.7 (L) 11.5 - 16.0 g/dL    HCT 25.6 (L) 35.0 - 47.0 %    .6 (H) 80.0 - 99.0 FL    MCH 36.6 (H) 26.0 - 34.0 PG    MCHC 34.0 30.0 - 36.5 g/dL    RDW 15.6 (H) 11.5 - 14.5 %    PLATELET 207 930 - 571 K/uL    MPV 9.3 8.9 - 12.9 FL    NRBC 0.2 (H) 0.0  WBC    ABSOLUTE NRBC 0.02 (H) 0.00 - 0.01 K/uL    NEUTROPHILS 77 (H) 32 - 75 %    BAND NEUTROPHILS 2 0 - 6 %    LYMPHOCYTES 12 12 - 49 %    MONOCYTES 5 5 - 13 %    EOSINOPHILS 0 0 - 7 %    BASOPHILS 0 0 - 1 %    MYELOCYTES 4 (H) 0 %    IMMATURE GRANULOCYTES 0 %    ABS. NEUTROPHILS 6.6 1.8 - 8.0 K/UL    ABS.  LYMPHOCYTES 1.0 0.8 - 3.5 K/UL    ABS. MONOCYTES 0.4 0.0 - 1.0 K/UL    ABS. EOSINOPHILS 0.0 0.0 - 0.4 K/UL    ABS. BASOPHILS 0.0 0.0 - 0.1 K/UL    ABS. IMM. GRANS. 0.0 K/UL    DF Manual      RBC COMMENTS Anisocytosis  2+        RBC COMMENTS Macrocytosis  2+        RBC COMMENTS Polychromasia  1+       METABOLIC PANEL, BASIC    Collection Time: 01/04/23  6:55 AM   Result Value Ref Range    Sodium 135 (L) 136 - 145 mmol/L    Potassium 4.6 3.5 - 5.1 mmol/L    Chloride 99 97 - 108 mmol/L    CO2 29 21 - 32 mmol/L    Anion gap 7 5 - 15 mmol/L    Glucose 89 65 - 100 mg/dL    BUN 18 6 - 20 mg/dL    Creatinine 0.85 0.55 - 1.02 mg/dL    BUN/Creatinine ratio 21 (H) 12 - 20      eGFR >60 >60 ml/min/1.73m2    Calcium 7.7 (L) 8.5 - 10.1 mg/dL         Discharge Exam:  Patient Vitals for the past 24 hrs:   Temp Pulse Resp BP SpO2   01/04/23 1200 -- 95 -- -- --   01/04/23 1143 97.4 °F (36.3 °C) 95 18 128/75 95 %   01/04/23 0800 -- 98 -- -- --   01/04/23 0748 97.4 °F (36.3 °C) 98 20 119/66 92 %   01/04/23 0307 97.4 °F (36.3 °C) (!) 105 20 (!) 142/73 90 %   01/04/23 0034 97.6 °F (36.4 °C) (!) 121 20 133/65 94 %   01/03/23 2023 97.4 °F (36.3 °C) 98 20 135/80 96 %   01/03/23 1600 -- 91 -- -- --   01/03/23 1519 98.1 °F (36.7 °C) (!) 122 19 115/69 97 %   General Appearance: Well developed, well nourished, alert & oriented x 3,               no acute distress. Ears/Nose/Mouth/Throat: Hearing grossly normal.  Neck: Supple. Chest: Lungs clear to auscultation bilaterally. Cardiovascular: Regular rate and rhythm, S1S2 normal, no murmur. Abdomen: Soft, non-tender, bowel sounds are active. Extremities: No edema bilaterally. Skin: Warm and dry. * Discharge Condition: stable  * Disposition: [place] ([facility])    Discharge Medications:  Current Discharge Medication List        START taking these medications    Details   digoxin (LANOXIN) 0.125 mg tablet Take 1 Tablet by mouth daily for 30 days.   Qty: 30 Tablet, Refills: 0  Start date: 1/5/2023, End date: 2/4/2023      dilTIAZem IR (CARDIZEM) 60 mg tablet Take 1 Tablet by mouth two (2) times a day for 30 days. Qty: 60 Tablet, Refills: 0  Start date: 1/4/2023, End date: 2/3/2023      levoFLOXacin (LEVAQUIN) 750 mg tablet Take 1 Tablet by mouth every fourty-eight (48) hours for 6 days. Qty: 3 Tablet, Refills: 0  Start date: 1/5/2023, End date: 1/11/2023      HYDROcodone-acetaminophen (NORCO) 5-325 mg per tablet Take 1 Tablet by mouth every six (6) hours as needed for Pain for up to 3 days. Max Daily Amount: 4 Tablets. Qty: 20 Tablet, Refills: 0  Start date: 12/28/2022, End date: 12/31/2022    Associated Diagnoses: Other closed displaced fracture of proximal end of left humerus, initial encounter           CONTINUE these medications which have NOT CHANGED    Details   nebivoloL (BYSTOLIC) 10 mg tablet Take 10 mg by mouth daily. Indications: high blood pressure      multivitamin, tx-iron-ca-min (THERA-M w/ IRON) 9 mg iron-400 mcg tab tablet Take 1 Tab by mouth daily. Indications: treatment to prevent mineral deficiency, treatment to prevent vitamin deficiency             * Follow-up Care/Patient Instructions:   Activity: Activity as tolerated and no driving for today  Diet: Resume previous diet  Wound Care: As directed    Follow-up Information       Follow up With Specialties Details Why Contact Info    None    None ([phone]) Patient stated that they have no PCP      Adair Macias MD Orthopedic Surgery Follow up in 2 week(s)  [place] [address] [place] [address]      Miguel Ángel Magallon  [facility] Vascular Surgery, Cardiovascular Disease Physician, Interventional Cardiology Physician Follow up in 2 week(s)  [address]  [phone]            Discharge time 35 mins    Signed:  Vasu Eduardo MD  1/4/2023  1:49 PM

## 2023-01-04 NOTE — PROGRESS NOTES
Dual skin assessment performed. Patient has bruising from a fall on the LUE from her shoulder to the tips of her fingers. There is a large purple bruise on the back on the left side. Scattered bruising on BLE and right upper extremity. Otherwise skin is clean, dry and intact.

## 2023-01-04 NOTE — PROGRESS NOTES
Attempted to see patient. Patient had to have a BM. Assisted patient by placing on bedpan. SLP to follow-up at a later time if time permits.

## 2023-01-04 NOTE — PROGRESS NOTES
PHYSICAL THERAPY TREATMENT  Patient: Merrill Bhagat (88 y.o. female)  Date: 1/4/2023  Diagnosis: Dehydration [E86.0] Fracture of humeral shaft, left, closed  Procedure(s) (LRB):  OPEN REDUCTION INTERNAL FIXATION LEFT  HUMERUS (Left) 5 Days Post-Op  Precautions:    Chart, physical therapy assessment, plan of care and goals were reviewed. ASSESSMENT  Patient continues with skilled PT services and is progressing towards goals. Pt seated in the chair upon PT arrival, agreeable to session. Less assist needed today with mobility however demos limited OOB/gait secondary to pain and weakness. Patient did stand 3x at bedside. (See below for objective details and assist levels). Min A needed overall for mobility with CGA while PTA assisted with pericare upon standing and getting on bed pan. Patient fatigued at end of tx and took 1-2 side steps but declined sitting in chair at this time. Overall pt tolerated session well today with improved mobility req less assistance. Will continue to benefit from skilled PT services, and will continue to progress as tolerated. Rec d/c to SNF. Current Level of Function Impacting Discharge (mobility/balance): safety, weakness, pain, min A    Other factors to consider for discharge: safety, PLOF, limited mobility         PLAN :  Patient continues to benefit from skilled intervention to address the above impairments. Continue treatment per established plan of care to address goals.       Recommendation for discharge: (in order for the patient to meet his/her long term goals)  Ernesto Hayes    This discharge recommendation:  Has been made in collaboration with the attending provider and/or case management    IF patient discharges home will need the following DME: to be determined (TBD)       SUBJECTIVE:   Patient stated I am feeling better    OBJECTIVE DATA SUMMARY:   Critical Behavior:  Neurologic State: Alert  Orientation Level: Oriented X4  Cognition: Follows commands  Safety/Judgement: Decreased awareness of need for assistance, Lack of insight into deficits  Functional Mobility Training:  Bed Mobility:  Rolling: Minimum assistance  Supine to Sit: Minimum assistance  Sit to Supine: Minimum assistance  Scooting: Minimum assistance    Transfers:  Sit to Stand: Minimum assistance  Stand to Sit: Minimum assistance    Balance:  Sitting: Impaired  Sitting - Static: Good (unsupported)  Sitting - Dynamic: Fair (occasional)  Standing: Impaired; With support  Standing - Static: Constant support; Fair  Standing - Dynamic : Constant support; Fair         Pain Ratin/10 L arm     Activity Tolerance:   Good and requires rest breaks    After treatment patient left in no apparent distress:   Bed locked and returned to lowest position, Supine in bed and Call bell within reach    COMMUNICATION/COLLABORATION:   The patients plan of care was discussed with: Registered nurse. Guillermo Metzger, PT   Time Calculation: 26 mins         Problem: Mobility Impaired (Adult and Pediatric)  Goal: *Acute Goals and Plan of Care (Insert Text)  Description: Patient stated goal: get better  Patient will move from supine to sit and sit to supine , scoot up and down, and roll side to side in bed with supervision/set-up within 7 day(s). Patient will transfer from bed to chair and chair to bed with supervision/set-up using the least restrictive device within 7 day(s). Patient will improve static standing balance to supervision within 1 week(s). Patient will ambulate 50 feet with supervision with least restrictive device within 1 weeks.      Outcome: Progressing Towards Goal

## 2023-01-04 NOTE — PROGRESS NOTES
CM in to see patient and discuss SNF placement after attending stated patient now agreeable for placement. Patient states she knows she is super weak and needs further therapy. Choice letter signed for [address] [place] [address] and Rehab. Referral sent.

## 2023-01-05 VITALS
SYSTOLIC BLOOD PRESSURE: 118 MMHG | RESPIRATION RATE: 20 BRPM | OXYGEN SATURATION: 97 % | WEIGHT: 102.95 LBS | HEIGHT: 63 IN | DIASTOLIC BLOOD PRESSURE: 68 MMHG | BODY MASS INDEX: 18.24 KG/M2 | HEART RATE: 97 BPM | TEMPERATURE: 98.6 F

## 2023-01-05 LAB
ANION GAP SERPL CALC-SCNC: 8 MMOL/L (ref 5–15)
BASOPHILS # BLD: 0 K/UL (ref 0–0.1)
BASOPHILS NFR BLD: 0 % (ref 0–1)
BUN SERPL-MCNC: 15 MG/DL (ref 6–20)
BUN/CREAT SERPL: 19 (ref 12–20)
CA-I BLD-MCNC: 7.8 MG/DL (ref 8.5–10.1)
CHLORIDE SERPL-SCNC: 97 MMOL/L (ref 97–108)
CO2 SERPL-SCNC: 29 MMOL/L (ref 21–32)
CREAT SERPL-MCNC: 0.8 MG/DL (ref 0.55–1.02)
DIFFERENTIAL METHOD BLD: ABNORMAL
EOSINOPHIL # BLD: 0 K/UL (ref 0–0.4)
EOSINOPHIL NFR BLD: 0 % (ref 0–7)
ERYTHROCYTE [DISTWIDTH] IN BLOOD BY AUTOMATED COUNT: 15.2 % (ref 11.5–14.5)
GLUCOSE SERPL-MCNC: 85 MG/DL (ref 65–100)
HCT VFR BLD AUTO: 27.6 % (ref 35–47)
HGB BLD-MCNC: 9.3 G/DL (ref 11.5–16)
IMM GRANULOCYTES # BLD AUTO: 0 K/UL
IMM GRANULOCYTES NFR BLD AUTO: 0 %
LYMPHOCYTES # BLD: 1.1 K/UL (ref 0.8–3.5)
LYMPHOCYTES NFR BLD: 13 % (ref 12–49)
MCH RBC QN AUTO: 36.6 PG (ref 26–34)
MCHC RBC AUTO-ENTMCNC: 33.7 G/DL (ref 30–36.5)
MCV RBC AUTO: 108.7 FL (ref 80–99)
METAMYELOCYTES NFR BLD MANUAL: 2 %
MONOCYTES # BLD: 0.4 K/UL (ref 0–1)
MONOCYTES NFR BLD: 5 % (ref 5–13)
MYELOCYTES NFR BLD MANUAL: 3 %
NEUTS SEG # BLD: 6.5 K/UL (ref 1.8–8)
NEUTS SEG NFR BLD: 77 % (ref 32–75)
NRBC # BLD: 0.02 K/UL (ref 0–0.01)
NRBC BLD-RTO: 0.2 PER 100 WBC
PLATELET # BLD AUTO: 369 K/UL (ref 150–400)
PMV BLD AUTO: 9.3 FL (ref 8.9–12.9)
POTASSIUM SERPL-SCNC: 4.4 MMOL/L (ref 3.5–5.1)
RBC # BLD AUTO: 2.54 M/UL (ref 3.8–5.2)
RBC MORPH BLD: ABNORMAL
SODIUM SERPL-SCNC: 134 MMOL/L (ref 136–145)
WBC # BLD AUTO: 8.5 K/UL (ref 3.6–11)

## 2023-01-05 PROCEDURE — 74011250637 HC RX REV CODE- 250/637: Performed by: PHYSICIAN ASSISTANT

## 2023-01-05 PROCEDURE — 80048 BASIC METABOLIC PNL TOTAL CA: CPT

## 2023-01-05 PROCEDURE — 92526 ORAL FUNCTION THERAPY: CPT

## 2023-01-05 PROCEDURE — 74011000250 HC RX REV CODE- 250: Performed by: ORTHOPAEDIC SURGERY

## 2023-01-05 PROCEDURE — 74011250637 HC RX REV CODE- 250/637: Performed by: INTERNAL MEDICINE

## 2023-01-05 PROCEDURE — 85025 COMPLETE CBC W/AUTO DIFF WBC: CPT

## 2023-01-05 PROCEDURE — 97530 THERAPEUTIC ACTIVITIES: CPT

## 2023-01-05 PROCEDURE — 74011250636 HC RX REV CODE- 250/636: Performed by: ORTHOPAEDIC SURGERY

## 2023-01-05 PROCEDURE — 36415 COLL VENOUS BLD VENIPUNCTURE: CPT

## 2023-01-05 PROCEDURE — 74011250637 HC RX REV CODE- 250/637: Performed by: ORTHOPAEDIC SURGERY

## 2023-01-05 RX ADMIN — MULTIPLE VITAMINS W/ MINERALS TAB 1 TABLET: TAB at 09:17

## 2023-01-05 RX ADMIN — ACETAMINOPHEN 650 MG: 325 TABLET ORAL at 09:16

## 2023-01-05 RX ADMIN — ENOXAPARIN SODIUM 30 MG: 100 INJECTION SUBCUTANEOUS at 09:23

## 2023-01-05 RX ADMIN — OXYCODONE 5 MG: 5 TABLET ORAL at 12:19

## 2023-01-05 RX ADMIN — DIGOXIN 0.12 MG: 125 TABLET ORAL at 09:16

## 2023-01-05 RX ADMIN — SODIUM CHLORIDE, PRESERVATIVE FREE 10 ML: 5 INJECTION INTRAVENOUS at 05:31

## 2023-01-05 RX ADMIN — SODIUM CHLORIDE, PRESERVATIVE FREE 10 ML: 5 INJECTION INTRAVENOUS at 15:18

## 2023-01-05 RX ADMIN — DILTIAZEM HYDROCHLORIDE 60 MG: 30 TABLET, FILM COATED ORAL at 09:17

## 2023-01-05 NOTE — PROGRESS NOTES
SPEECH LANGUAGE PATHOLOGY DYSPHAGIA TREATMENT  Patient: Reymundo Gomez (1 y.o. female)  Date: 1/5/2023  Diagnosis: Dehydration [E86.0] Fracture of humeral shaft, left, closed  Procedure(s) (LRB):  OPEN REDUCTION INTERNAL FIXATION LEFT  HUMERUS (Left) 6 Days Post-Op  Precautions: aspiration      ASSESSMENT:  Patient seen w/ breakfast tray. Patient w/ c/o of diet and taste of food. Patient is more alert to date. Patient ingested soft peaches w/ functional mastication. Pharyngeal swallow WFL. No clinical indicators of penetration or aspiration. She ingested thin liquids via cup w/ independent small sips. Swallow initiation timely, pharyngeal response adequate and no clinical indicators of penetration or aspiration. Will advance diet at this time and follow-up for tolerance. Patient educated to [facility] results and dysphagia. PLAN:  Recommendations and Planned Interventions:  Upgrade to easy to chew, thin. Aspiration precautions. Assist w/ tray set-up and opening containers, cutting up food. SLP to follow -up for diet tolerance. Patient continues to benefit from skilled intervention to address the above impairments. Continue treatment per established plan of care. Frequency/Duration: Patient will be followed by speech-language pathology 3 times a week to address goals. Discharge Recommendations:   To Be Determined     SUBJECTIVE:   Patient seen sitting up in bed and consuming breakfast.     OBJECTIVE:     CXR Results  (Last 48 hours)      None          CT Results  (Last 48 hours)      None           Cognitive and Communication Status:  Neurologic State: Alert  Orientation Level: Oriented X4  Cognition: Follows commands        Safety/Judgement: Decreased awareness of need for assistance, Lack of insight into deficits            Pain:  Pain Scale 1: Numeric (0 - 10)  Pain Intensity 1: 0       After treatment:   Patient left in no apparent distress in bed and Call bell within reach    COMMUNICATION/EDUCATION:   Patient was educated regarding her deficit(s) of dysphagia, swallow safety precautions, diet recs and POC. She demonstrated Good understanding as evidenced by verbal understanding. The patient's plan of care including recommendations, planned interventions, and recommended diet changes were discussed with: PhysicianAlma Oshea SLP M.S. CCC-SLP  Time Calculation: 13 mins           Problem: Dysphagia (Adult)  Goal: *Acute Goals and Plan of Care (Insert Text)  Description: Speech Therapy Swallow Goals  Initiated 1/2/2023  -patient stated goal: unable to state s/t confusion    -Patient will tolerate puree diet with thin liquids without clinical indicators of aspiration given moderate cues within 7 day(s). MET  -Patient will tolerate soft and bite size diet with thin liquids without clinical indicators of aspiration given moderate cues within 7 day(s). REVISED  -Patient will tolerate PO trials without clinical indicators of aspiration given moderate cues within 7 day(s). -Patient will participate in modified barium swallow study within 7 day(s). MET      -Patient will demonstrate understanding of swallow safety precautions and aspiration precautions, diet recs with moderate cues within 7 day(s).           Outcome: Progressing Towards Goal

## 2023-01-05 NOTE — PROGRESS NOTES
CM received message from [facility] and R that they may not have a bed until Monday. CM informed patient they have no beds at his time and we need other choices. Patient gave choice of [facility]. Referral sent through [facility]. 1545 pm  Patient accepted at [facility] today and can go to room  226 A. Report can be called to [phone]. Patient will require ambulance transport. Medicare pt has received, reviewed, and signed 2nd IM letter informing them of their right to appeal the discharge. Signed copied has been placed on pt bedside chart. Discharge plan of care/case management plan validated with provider discharge order.

## 2023-01-05 NOTE — PROGRESS NOTES
I have reviewed discharge instructions with the patient. The patient verbalized understanding. Discharge plan of care/case management plan validated with provider discharge order. IV catheter discontinued intact. Site without signs and symptoms of complications. Dressing and pressure applied. Telemetry is removed.

## 2023-01-05 NOTE — PROGRESS NOTES
OCCUPATIONAL THERAPY TREATMENT  Patient: Magalis Raymond (63 y.o. female)  Date: 1/5/2023  Diagnosis: Dehydration [E86.0] Fracture of humeral shaft, left, closed  Procedure(s) (LRB):  OPEN REDUCTION INTERNAL FIXATION LEFT  HUMERUS (Left) 6 Days Post-Op  Precautions: FALL  Chart, occupational therapy assessment, plan of care, and goals were reviewed. ASSESSMENT  Pt continues with skilled OT services and is progressing towards goals. Pt received semi-supine in bed upon arrival, AXO x4 and agreeable to LORD/PTA tx at this time. Pt cooperative/demanding and demonstrated fair effort during activities. Pt c/o pain in L UE and back area. Pt continues to need assistance with LB dressing d/t decreased anterior reach and decreased LUE function. Pt continues to need physical assistance with most of her bed mobility<>EOB using bed rail and STS tf in prep for toileting with vc's for proper technique. Pt able to complete simple grooming EOB with fair  dynamic sitting balance. Pt engaged in sandy UE exercises with increased reps and added exercises with vc's for proper technique and maximize ROM, see grid below. Pt continues to be limited by pain, impaired cognition, decreased L UE function and weakness that interferes with performance in Adl's. Overall, pt continues to present with deficits in generalized strength/AROM, dynamic sitting balance, static/dynamic standing balance, pain and functional activity tolerance during performance of ADLs/mobility (see below for objective details and assist levels). Will continue to progress. Recommend d/c to SNF once medically appropriate. Other factors to consider for discharge: Time of onset, medical prognosis/diagnosis, severity of deficits, PLOF, functional baseline, home environment, and family support          PLAN :  Patient continues to benefit from skilled intervention to address the above impairments. Continue treatment per established plan of care.   to address goals. Recommend with staff: Out of bed to chair for meals, Encourage HEP in prep for ADLs/mobility, and Frequent repositioning to prevent skin breakdown    Recommend next session: Toileting    Recommendation for discharge: (in order for the patient to meet his/her long term goals)  Ernesto Freddy    This discharge recommendation:  Has been made in collaboration with the attending provider and/or case management       IF patient discharges home will need the following DME: TBD       SUBJECTIVE:   Patient stated I feel pain all over.     OBJECTIVE DATA SUMMARY:   Cognitive/Behavioral Status:  Neurologic State: Alert  Orientation Level: Oriented X4  Cognition: Follows commands             Functional Mobility and Transfers for ADLs:  Bed Mobility:  Rolling: Minimum assistance  Supine to Sit: Minimum assistance;Assist x2  Sit to Supine: Minimum assistance;Assist x2  Scooting: Minimum assistance; Moderate assistance    Transfers:  Sit to Stand: Minimum assistance;Assist x2          Balance:  Sitting: Impaired; With support  Sitting - Static: Good (unsupported)  Sitting - Dynamic: Fair (occasional)  Standing: Impaired; With support  Standing - Static: Constant support; Fair  Standing - Dynamic : Constant support; Fair    ADL Intervention:       Grooming  Grooming Assistance: Contact guard assistance  Position Performed: Seated edge of bed  Washing Face: Contact guard assistance;Set-up          Lower Body Dressing Assistance  Dressing Assistance: Total assistance(dependent)  Socks:  Total assistance (dependent)  Leg Crossed Method Used: No  Position Performed: Long sitting on bed         Exercise Sets Reps AROM AAROM PROM Self PROM Comments   Elbow E/F 1 20 [x]RUE [] [] [] EOB   Chest press 1 20 [x]RUE [] [] []    Digit abd/add 2 10 L hand       Opposition each finger 1 5 L hand            Pain:  5/10 L UE and 4/10 back    Activity Tolerance:   Fair and requires rest breaks    After treatment patient left in no apparent distress:   Supine in bed, Call bell within reach, Bed / chair alarm activated, and Side rails x 3, bed locked and in lowest position    COMMUNICATION/COLLABORATION:   The patients plan of care was discussed with: Physical therapy assistant and Registered nurse. Co-tx with PTA for increased pt/clinician safety with OOB activity     RISA Hutson  Time Calculation: 32 mins     Problem: Self Care Deficits Care Plan (Adult)  Goal: *Acute Goals and Plan of Care (Insert Text)  Description:   FUNCTIONAL STATUS PRIOR TO ADMISSION: Patient was independent and active without use of DME. Patient was independent for basic and instrumental ADLs. HOME SUPPORT: The patient lived with her boyfriend but did not require assist.    Occupational Therapy Goals  Initiated 12/26/2022  Patient Goal: Move and do things with less pain in LUE. 1.  Patient will perform lower body dressing with min A within 7 day(s). 2.  Patient will perform grooming with min A within 7 day(s). 3.  Patient will perform bathing with min A within 7 day(s). 4.  Patient will perform toilet transfers with min A within 7 day(s). 5.  Patient will perform all aspects of toileting with SBA within 7 day(s). 6.  Patient will participate in upper extremity therapeutic exercise/activities with modified independence within 7 day(s).     Outcome: Progressing Towards Goal

## 2023-01-05 NOTE — PROGRESS NOTES
Report is called to Nurse Rowan Aguilar. Patient to go to room 226A. All questions have been answered. Patient will be transported by [facility]. Estimated  time is 7pm.    Update: 1945pm.. [facility] arrives for transport. [facility] was called to verify. Patient is discharged.

## 2023-01-05 NOTE — DISCHARGE SUMMARY
Admit date: 12/25/2022   Admitting Provider: Pamela Huizar MD    Discharge date: 1/5/2023  Discharging Provider: Denis Johnson MD      * Admission Diagnoses: Dehydration [E86.0]    * Discharge Diagnoses:    Hospital Problems as of 1/5/2023 Date Reviewed: 1/4/2023            Codes Class Noted - Resolved POA    * (Principal) Fracture of humeral shaft, left, closed ICD-10-CM: S42.302A  ICD-9-CM: 812.21  1/4/2023 - Present Yes        Dehydration ICD-10-CM: E86.0  ICD-9-CM: 276.51  12/25/2022 - Present Yes        Respiratory failure with hypoxia ([facility] [address] ICD-10-CM: J96.91  ICD-9-CM: 518.81  9/22/2020 - Present Yes        Acute respiratory failure ([facility] [address] ICD-10-CM: J96.00  ICD-9-CM: 518.81  9/22/2020 - Present Yes         * Hospital Course:     75 y.o. female with history of alcohol abuse who fell in her home last night. Since then she complains of severe pain all over as well as extreme left arm pain and difficulty walking. In the ED she was hypertensive and mildly tachycardic. Labs showed a sodium of 126, creatinine 1.36 and a magnesium of 1.1. X-ray of the left arm showed a left proximal to mid humeral shaft fracture with impaction and varus angulation. Underwent operative management with Dr Camie Cooks    1/5/23 discharge to SNF for rehabilitation  Outpatient follow up with PCP cardiology orthopedics      * Procedures:   Procedure(s):  OPEN REDUCTION INTERNAL FIXATION LEFT  HUMERUS   Operative Note     Patient: Isaías Fofana  YOB: 1955  MRN: [id number]     Date of Procedure: 12/30/2022      Pre-Op Diagnosis:  Comminuted fracture of left proximal humerus shaft, with associated surgical neck fracture     Additional diagnoses: 1) Chronic alcoholism  2) Tobacco abuse disorder  3) Severe osteoporosis  4) Malnutrition  5) Acute blood loss anemia  6) Vitamin D deficiency     Post-Op Diagnosis: Same as preoperative diagnosis.        Procedure(s):  OPEN REDUCTION & INTERNAL FIXATION OF LEFT HUMERUS FRACTURE     Surgeon(s):  Hiral Johnson MD     Surgical Assistant: Surg Asst-1: Connie Coffman     Anesthesia: General      Estimated Blood Loss (mL):  911      Complications: None    Consults: Cardiology and Orthopedic Surgery    CARDIOLOGY  Problem List:   -Admitted to the hospital following fall and fractured left humerus  -Cardiology consult was invited secondary to increased troponin level. -Hypertension  -COPD2  -Current smoker  -No known established coronary artery disease and or no recent cardiac work-up has been done in the recent past      Assessment/Plan:       Note dictated January 2, 2023  -Patient is lying comfortably in the bed heart rate is still in 120s with rhythm being atrial fibrillation  -We will start digoxin 0.125 mg daily as it was well-tolerated by the patient. In addition we will replace potassium and carefully watch renal function. We will start digoxin tomorrow dated 1/3/2023.  -Switch Cardizem to twice a day. -Goal is to minimize heart rate and the risk of tachycardia induced cardiomyopathy  -Check echocardiogram.  -We will continue to follow while patient is in the hospital along with the team     Note dictated January 1, 2023  -Patient is complaining of nonspecific pain unable to communicate with me clearly.  -She is status post left humerus fracture intervention by orthopedics. -Heart rate increased up to 170 with rhythm being atrial fibrillation.  -Based on her electrolytes and creatinine level I will cautiously give 1 dose of digoxin 0.25 mg IV and then follow her closely.   She is unable to tolerate Cardizem secondary to low blood pressure.  -We will continue to follow while she is in the hospital along with the team.  -We will check echocardiogram once results are available.  ---------------------------------------------------------------------------------------------------------------  -Follow-up visit from yesterday consultation  -Patient is lying in the bed complaining of left arm pain intervention of left humeral neck. -Was clinically and hemodynamically stable per my cardiac assessment. No perioperative event and patient tolerated the surgery well.  -No further cardiovascular testing based on my clinical assessment and we will follow on as needed basis. Significant Diagnostic Studies:   Recent Results (from the past 24 hour(s))   CBC WITH AUTOMATED DIFF    Collection Time: 01/05/23  7:01 AM   Result Value Ref Range    WBC 8.5 3.6 - 11.0 K/uL    RBC 2.54 (L) 3.80 - 5.20 M/uL    HGB 9.3 (L) 11.5 - 16.0 g/dL    HCT 27.6 (L) 35.0 - 47.0 %    .7 (H) 80.0 - 99.0 FL    MCH 36.6 (H) 26.0 - 34.0 PG    MCHC 33.7 30.0 - 36.5 g/dL    RDW 15.2 (H) 11.5 - 14.5 %    PLATELET 614 093 - 306 K/uL    MPV 9.3 8.9 - 12.9 FL    NRBC 0.2 (H) 0.0  WBC    ABSOLUTE NRBC 0.02 (H) 0.00 - 0.01 K/uL    NEUTROPHILS 77 (H) 32 - 75 %    LYMPHOCYTES 13 12 - 49 %    MONOCYTES 5 5 - 13 %    EOSINOPHILS 0 0 - 7 %    BASOPHILS 0 0 - 1 %    METAMYELOCYTES 2 (H) 0 %    MYELOCYTES 3 (H) 0 %    IMMATURE GRANULOCYTES 0 %    ABS. NEUTROPHILS 6.5 1.8 - 8.0 K/UL    ABS. LYMPHOCYTES 1.1 0.8 - 3.5 K/UL    ABS. MONOCYTES 0.4 0.0 - 1.0 K/UL    ABS. EOSINOPHILS 0.0 0.0 - 0.4 K/UL    ABS. BASOPHILS 0.0 0.0 - 0.1 K/UL    ABS. IMM.  GRANS. 0.0 K/UL    DF Manual      RBC COMMENTS Anisocytosis  2+        RBC COMMENTS Macrocytosis  2+        RBC COMMENTS Ovalocytes  1+       METABOLIC PANEL, BASIC    Collection Time: 01/05/23  7:01 AM   Result Value Ref Range    Sodium 134 (L) 136 - 145 mmol/L    Potassium 4.4 3.5 - 5.1 mmol/L    Chloride 97 97 - 108 mmol/L    CO2 29 21 - 32 mmol/L    Anion gap 8 5 - 15 mmol/L    Glucose 85 65 - 100 mg/dL    BUN 15 6 - 20 mg/dL    Creatinine 0.80 0.55 - 1.02 mg/dL    BUN/Creatinine ratio 19 12 - 20      eGFR >60 >60 ml/min/1.73m2    Calcium 7.8 (L) 8.5 - 10.1 mg/dL         Discharge Exam:  Patient Vitals for the past 24 hrs:   Temp Pulse Resp BP SpO2 01/05/23 0800 97.3 °F (36.3 °C) 95 20 (!) 152/77 92 %   01/05/23 0426 98.2 °F (36.8 °C) 98 18 132/60 95 %   01/04/23 2328 97.9 °F (36.6 °C) 97 18 94/67 95 %   01/04/23 2004 98.2 °F (36.8 °C) 97 18 130/84 97 %   01/04/23 1959 -- (!) 110 -- -- --   01/04/23 1642 97.7 °F (36.5 °C) 95 18 134/63 95 %   01/04/23 1600 -- 95 -- -- --   01/04/23 1200 -- 95 -- -- --     General Appearance: Well developed, well nourished, alert & oriented x 3,               no acute distress. Ears/Nose/Mouth/Throat: Hearing grossly normal.  Neck: Supple. Chest: Lungs clear to auscultation bilaterally. Cardiovascular: Regular rate and rhythm, S1S2 normal, no murmur. Abdomen: Soft, non-tender, bowel sounds are active. Extremities: No edema bilaterally. Skin: Warm and dry. * Discharge Condition: stable  * Disposition: [facility])    Discharge Medications:  Current Discharge Medication List        START taking these medications    Details   digoxin (LANOXIN) 0.125 mg tablet Take 1 Tablet by mouth daily for 30 days. Qty: 30 Tablet, Refills: 0  Start date: 1/5/2023, End date: 2/4/2023      dilTIAZem IR (CARDIZEM) 60 mg tablet Take 1 Tablet by mouth two (2) times a day for 30 days. Qty: 60 Tablet, Refills: 0  Start date: 1/4/2023, End date: 2/3/2023      levoFLOXacin (LEVAQUIN) 750 mg tablet Take 1 Tablet by mouth every fourty-eight (48) hours for 6 days. Qty: 3 Tablet, Refills: 0  Start date: 1/5/2023, End date: 1/11/2023      HYDROcodone-acetaminophen (NORCO) 5-325 mg per tablet Take 1 Tablet by mouth every six (6) hours as needed for Pain for up to 3 days. Max Daily Amount: 4 Tablets. Qty: 20 Tablet, Refills: 0  Start date: 12/28/2022, End date: 12/31/2022    Associated Diagnoses: Other closed displaced fracture of proximal end of left humerus, initial encounter           CONTINUE these medications which have NOT CHANGED    Details   nebivoloL (BYSTOLIC) 10 mg tablet Take 10 mg by mouth daily.  Indications: high blood pressure      multivitamin, tx-iron-ca-min (THERA-M w/ IRON) 9 mg iron-400 mcg tab tablet Take 1 Tab by mouth daily. Indications: treatment to prevent mineral deficiency, treatment to prevent vitamin deficiency             * Follow-up Care/Patient Instructions:   Activity: Activity as tolerated and no driving for today  Diet: Resume previous diet  Wound Care: As directed    Follow-up Information       Follow up With Specialties Details Why Contact Info    None    None ([phone]) Patient stated that they have no PCP      Evelyne Adames MD Orthopedic Surgery Follow up in 2 week(s)  [facility] [address] [place] [address]      Jorge Duarte  [facility] Vascular Surgery, Cardiovascular Disease Physician, Interventional Cardiology Physician Follow up in 2 week(s)  [address]  [phone]            Discharge time 35 mins    Signed:  Vasu Sahu MD  1/5/2023  1:49 PM

## 2023-01-05 NOTE — PROGRESS NOTES
Orthopedic progress note    Date:2023       Room:[facility] [place]  Patient Chante Salazar     YOB: 1955     Age:72 y.o. Subjective    Status post ORIF left proximal humerus. Postop day #6. Patient still complains of moderate pain of her left upper extremity. She does feel pain medication helps some. Complaints at this time.   Objective           Vitals Last 24 Hours:  TEMPERATURE:  Temp  Av.8 °F (36.6 °C)  Min: 97.3 °F (36.3 °C)  Max: 98.2 °F (36.8 °C)  RESPIRATIONS RANGE: Resp  Av.7  Min: 18  Max: 20  PULSE OXIMETRY RANGE: SpO2  Av.3 %  Min: 92 %  Max: 97 %  PULSE RANGE: Pulse  Av.6  Min: 86  Max: 110  BLOOD PRESSURE RANGE: Systolic (39OPR), TRACEY:260 , Min:94 , MOV:878   ; Diastolic (39FTK), PKM:79, Min:60, Max:93    Current Facility-Administered Medications   Medication Dose Route Frequency    levoFLOXacin (LEVAQUIN) tablet 750 mg  750 mg Oral Q48H    oxyCODONE IR (ROXICODONE) tablet 5 mg  5 mg Oral Q4H PRN    dilTIAZem IR (CARDIZEM) tablet 60 mg  60 mg Oral BID    digoxin (LANOXIN) tablet 0.125 mg  0.125 mg Oral DAILY    0.9% sodium chloride infusion 250 mL  250 mL IntraVENous PRN    sodium chloride (NS) flush 5-40 mL  5-40 mL IntraVENous Q8H    sodium chloride (NS) flush 5-40 mL  5-40 mL IntraVENous PRN    acetaminophen (TYLENOL) tablet 650 mg  650 mg Oral Q6H PRN    Or    acetaminophen (TYLENOL) suppository 650 mg  650 mg Rectal Q6H PRN    polyethylene glycol (MIRALAX) packet 17 g  17 g Oral DAILY PRN    promethazine (PHENERGAN) tablet 12.5 mg  12.5 mg Oral Q6H PRN    Or    ondansetron (ZOFRAN) injection 4 mg  4 mg IntraVENous Q6H PRN    enoxaparin (LOVENOX) injection 30 mg  30 mg SubCUTAneous DAILY    diazePAM (VALIUM) tablet 10 mg  10 mg Oral Q1H PRN    diazePAM (VALIUM) tablet 20 mg  20 mg Oral Q1H PRN    multivitamin, tx-iron-ca-min (THERA-M w/ IRON) tablet 1 Tablet  1 Tablet Oral DAILY    albuterol-ipratropium (DUO-NEB) 2.5 MG-0.5 MG/3 ML  3 mL Nebulization Q6H PRN          I/O (24Hr): Intake/Output Summary (Last 24 hours) at 1/5/2023 1226  Last data filed at 1/5/2023 0538  Gross per 24 hour   Intake --   Output 750 ml   Net -750 ml     Objective:  Vital signs: (most recent): Blood pressure (!) 157/93, pulse 86, temperature 97.4 °F (36.3 °C), resp. rate 20, height 5' 3\" (1.6 m), weight 46.7 kg (102 lb 15.3 oz), SpO2 92 %. Labs/Imaging/Diagnostics    Labs:  CBC:  Recent Labs     01/05/23  0701 01/04/23  0655 01/03/23  0608   WBC 8.5 8.3 8.3   RBC 2.54* 2.38* 2.25*   HGB 9.3* 8.7* 8.1*   HCT 27.6* 25.6* 23.9*   .7* 107.6* 106.2*   RDW 15.2* 15.6* 16.0*    335 297     CHEMISTRIES:  Recent Labs     01/05/23  0701 01/04/23  0655 01/03/23  0608   * 135* 133*   K 4.4 4.6 4.4   CL 97 99 99   CO2 29 29 27   BUN 15 18 22*   CREA 0.80 0.85 0.98   CA 7.8* 7.7* 7.4*   PT/INR:No results for input(s): INR, INREXT in the last 72 hours. No lab exists for component: PROTIME  APTT:No results for input(s): APTT in the last 72 hours. LIVER PROFILE:No results for input(s): AST, ALT in the last 72 hours. No lab exists for component: Gutierrez Anthony, ALKPHOS  Lab Results   Component Value Date/Time    ALT (SGPT) 28 12/25/2022 02:44 PM    AST (SGOT) 53 (H) 12/25/2022 02:44 PM    Alk. phosphatase 93 12/25/2022 02:44 PM    Bilirubin, total 0.9 12/25/2022 02:44 PM       Physical Exam:  Left upper extremity: Dressing remains intact. Mild serosanguineous drainage seen on anterior medial aspect at her elbow region. Still moderate swelling seen of her left hand has improved since Tuesday. Good range of motion of fingers of hand without tenderness. Cap refill is 2 seconds. EPL intact.     Assessment//Plan           Patient Active Problem List    Diagnosis Date Noted    Fracture of humeral shaft, left, closed 01/04/2023    Dehydration 12/25/2022    COPD exacerbation ([facility] [address] 09/22/2020    Respiratory failure with hypoxia ([facility] [address] 09/22/2020    Hypokalemia 09/22/2020    Acute respiratory failure ([facility] [address] 09/22/2020     Status post ORIF left proximal humerus. Postop day #6. Continue with pain management as tolerated. Okay to discharge from orthopedics when cleared by medicine and placement has been made. Patient has again agreed to SNF placement. [facility] rehab referral pending.       Electronically signed by Tobias Pozo PA-C on 1/5/2023 at 12:26 PM

## 2023-01-06 LAB
ECHO AO ROOT DIAM: 3 CM
ECHO AO ROOT INDEX: 1.97 CM/M2
ECHO EST RA PRESSURE: 8 MMHG
ECHO LA AREA 4C: 10.5 CM2
ECHO LA DIAMETER INDEX: 1.71 CM/M2
ECHO LA DIAMETER: 2.6 CM
ECHO LA MAJOR AXIS: 3.6 CM
ECHO LA TO AORTIC ROOT RATIO: 0.87
ECHO LV EJECTION FRACTION BIPLANE: 75 % (ref 55–100)
ECHO LV FRACTIONAL SHORTENING: 49 % (ref 28–44)
ECHO LV INTERNAL DIMENSION DIASTOLE INDEX: 2.7 CM/M2
ECHO LV INTERNAL DIMENSION DIASTOLIC: 4.1 CM (ref 3.9–5.3)
ECHO LV INTERNAL DIMENSION SYSTOLIC INDEX: 1.38 CM/M2
ECHO LV INTERNAL DIMENSION SYSTOLIC: 2.1 CM
ECHO LV IVSD: 1 CM (ref 0.6–0.9)
ECHO LV MASS 2D: 123 G (ref 67–162)
ECHO LV MASS INDEX 2D: 80.9 G/M2 (ref 43–95)
ECHO LV POSTERIOR WALL DIASTOLIC: 0.9 CM (ref 0.6–0.9)
ECHO LV RELATIVE WALL THICKNESS RATIO: 0.44
ECHO LVOT AREA: 3.1 CM2
ECHO LVOT DIAM: 2 CM
ECHO PULMONARY ARTERY SYSTOLIC PRESSURE (PASP): 33 MMHG
ECHO RA AREA 4C: 9.1 CM2
ECHO RA END SYSTOLIC VOLUME APICAL 4 CHAMBER INDEX BSA: 13 ML/M2
ECHO RA VOLUME: 19 ML
ECHO RIGHT VENTRICULAR SYSTOLIC PRESSURE (RVSP): 33 MMHG
ECHO TV REGURGITANT MAX VELOCITY: 2.49 M/S
ECHO TV REGURGITANT PEAK GRADIENT: 25 MMHG

## 2023-01-06 NOTE — PROGRESS NOTES
Insurance company has changed transport. Josafat Lu has been called to clarify new trasnport. [facility] has [facility] set up for transport to pick pt up to take to [facility]. Discharge paperwork signed. Midline has been removed, and tele box. Pt has all belongings.

## 2023-02-03 PROBLEM — E86.0 DEHYDRATION: Status: RESOLVED | Noted: 2022-12-25 | Resolved: 2023-02-03

## 2023-04-25 ENCOUNTER — APPOINTMENT (OUTPATIENT)
Dept: GENERAL RADIOLOGY | Age: 68
End: 2023-04-25
Attending: EMERGENCY MEDICINE
Payer: MEDICARE

## 2023-04-25 ENCOUNTER — HOSPITAL ENCOUNTER (INPATIENT)
Age: 68
LOS: 11 days | Discharge: STILL ADMITTED OR ADMINISTRATIVE | End: 2023-05-06
Attending: EMERGENCY MEDICINE | Admitting: FAMILY MEDICINE
Payer: MEDICARE

## 2023-04-25 ENCOUNTER — HOSPITAL ENCOUNTER (INPATIENT)
Facility: HOSPITAL | Age: 68
LOS: 17 days | Discharge: HOME HEALTH | DRG: 273 | End: 2023-05-12
Attending: FAMILY MEDICINE | Admitting: FAMILY MEDICINE
Payer: MEDICARE

## 2023-04-25 DIAGNOSIS — I42.9 CARDIOMYOPATHY, UNSPECIFIED TYPE (HCC): ICD-10-CM

## 2023-04-25 DIAGNOSIS — I48.91 ATRIAL FIBRILLATION WITH RAPID VENTRICULAR RESPONSE (HCC): Primary | ICD-10-CM

## 2023-04-25 DIAGNOSIS — I50.9 CONGESTIVE HEART FAILURE, UNSPECIFIED HF CHRONICITY, UNSPECIFIED HEART FAILURE TYPE (HCC): ICD-10-CM

## 2023-04-25 DIAGNOSIS — R07.9 CHEST PAIN, UNSPECIFIED TYPE: ICD-10-CM

## 2023-04-25 LAB
ALBUMIN SERPL-MCNC: 2.4 G/DL (ref 3.5–5)
ALBUMIN/GLOB SERPL: 0.6 (ref 1.1–2.2)
ALP SERPL-CCNC: 104 U/L (ref 45–117)
ALT SERPL-CCNC: 24 U/L (ref 12–78)
ANION GAP SERPL CALC-SCNC: 4 MMOL/L (ref 5–15)
AST SERPL W P-5'-P-CCNC: 39 U/L (ref 15–37)
AST SERPL W P-5'-P-CCNC: ABNORMAL U/L (ref 15–37)
BASOPHILS # BLD: 0 K/UL (ref 0–0.1)
BASOPHILS NFR BLD: 1 % (ref 0–1)
BILIRUB SERPL-MCNC: 0.3 MG/DL (ref 0.2–1)
BNP SERPL-MCNC: ABNORMAL PG/ML
BUN SERPL-MCNC: 20 MG/DL (ref 6–20)
BUN/CREAT SERPL: 16 (ref 12–20)
CA-I BLD-MCNC: 8.3 MG/DL (ref 8.5–10.1)
CHLORIDE SERPL-SCNC: 104 MMOL/L (ref 97–108)
CO2 SERPL-SCNC: 22 MMOL/L (ref 21–32)
CREAT SERPL-MCNC: 1.23 MG/DL (ref 0.55–1.02)
DIFFERENTIAL METHOD BLD: ABNORMAL
EOSINOPHIL # BLD: 0.1 K/UL (ref 0–0.4)
EOSINOPHIL NFR BLD: 1 % (ref 0–7)
ERYTHROCYTE [DISTWIDTH] IN BLOOD BY AUTOMATED COUNT: 14.2 % (ref 11.5–14.5)
ETHANOL SERPL-MCNC: <10 MG/DL
GLOBULIN SER CALC-MCNC: 4.1 G/DL (ref 2–4)
GLUCOSE SERPL-MCNC: 93 MG/DL (ref 65–100)
HCT VFR BLD AUTO: 46 % (ref 35–47)
HGB BLD-MCNC: 16.4 G/DL (ref 11.5–16)
IMM GRANULOCYTES # BLD AUTO: 0 K/UL (ref 0–0.04)
IMM GRANULOCYTES NFR BLD AUTO: 1 % (ref 0–0.5)
LYMPHOCYTES # BLD: 1.3 K/UL (ref 0.8–3.5)
LYMPHOCYTES NFR BLD: 19 % (ref 12–49)
MAGNESIUM SERPL-MCNC: 1.7 MG/DL (ref 1.6–2.4)
MAGNESIUM SERPL-MCNC: NORMAL MG/DL (ref 1.6–2.4)
MCH RBC QN AUTO: 36.3 PG (ref 26–34)
MCHC RBC AUTO-ENTMCNC: 35.7 G/DL (ref 30–36.5)
MCV RBC AUTO: 101.8 FL (ref 80–99)
MONOCYTES # BLD: 0.8 K/UL (ref 0–1)
MONOCYTES NFR BLD: 11 % (ref 5–13)
NEUTS SEG # BLD: 4.6 K/UL (ref 1.8–8)
NEUTS SEG NFR BLD: 67 % (ref 32–75)
NRBC # BLD: 0 K/UL (ref 0–0.01)
NRBC BLD-RTO: 0 PER 100 WBC
PLATELET # BLD AUTO: 209 K/UL (ref 150–400)
PMV BLD AUTO: 10.7 FL (ref 8.9–12.9)
POTASSIUM SERPL-SCNC: 4.2 MMOL/L (ref 3.5–5.1)
POTASSIUM SERPL-SCNC: ABNORMAL MMOL/L (ref 3.5–5.1)
PROT SERPL-MCNC: 6.5 G/DL (ref 6.4–8.2)
RBC # BLD AUTO: 4.52 M/UL (ref 3.8–5.2)
SODIUM SERPL-SCNC: 130 MMOL/L (ref 136–145)
TROPONIN I SERPL HS-MCNC: 49 NG/L (ref 0–51)
WBC # BLD AUTO: 6.8 K/UL (ref 3.6–11)

## 2023-04-25 PROCEDURE — 84132 ASSAY OF SERUM POTASSIUM: CPT

## 2023-04-25 PROCEDURE — 99285 EMERGENCY DEPT VISIT HI MDM: CPT

## 2023-04-25 PROCEDURE — 85025 COMPLETE CBC W/AUTO DIFF WBC: CPT

## 2023-04-25 PROCEDURE — 82077 ASSAY SPEC XCP UR&BREATH IA: CPT

## 2023-04-25 PROCEDURE — 74011250636 HC RX REV CODE- 250/636: Performed by: EMERGENCY MEDICINE

## 2023-04-25 PROCEDURE — 84450 TRANSFERASE (AST) (SGOT): CPT

## 2023-04-25 PROCEDURE — 74011000250 HC RX REV CODE- 250: Performed by: EMERGENCY MEDICINE

## 2023-04-25 PROCEDURE — 93005 ELECTROCARDIOGRAM TRACING: CPT

## 2023-04-25 PROCEDURE — 74011250637 HC RX REV CODE- 250/637: Performed by: EMERGENCY MEDICINE

## 2023-04-25 PROCEDURE — 83880 ASSAY OF NATRIURETIC PEPTIDE: CPT

## 2023-04-25 PROCEDURE — 83735 ASSAY OF MAGNESIUM: CPT

## 2023-04-25 PROCEDURE — 9990 CHARGE CONVERSION

## 2023-04-25 PROCEDURE — 71045 X-RAY EXAM CHEST 1 VIEW: CPT

## 2023-04-25 PROCEDURE — 84484 ASSAY OF TROPONIN QUANT: CPT

## 2023-04-25 PROCEDURE — 36415 COLL VENOUS BLD VENIPUNCTURE: CPT

## 2023-04-25 PROCEDURE — 65270000029 HC RM PRIVATE

## 2023-04-25 PROCEDURE — 96374 THER/PROPH/DIAG INJ IV PUSH: CPT

## 2023-04-25 RX ORDER — DILTIAZEM HYDROCHLORIDE 120 MG/1
240 CAPSULE, COATED, EXTENDED RELEASE ORAL
Status: COMPLETED | OUTPATIENT
Start: 2023-04-25 | End: 2023-04-25

## 2023-04-25 RX ORDER — FUROSEMIDE 10 MG/ML
40 INJECTION INTRAMUSCULAR; INTRAVENOUS DAILY
Status: DISCONTINUED | OUTPATIENT
Start: 2023-04-26 | End: 2023-05-06 | Stop reason: HOSPADM

## 2023-04-25 RX ORDER — DILTIAZEM HYDROCHLORIDE 30 MG/1
60 TABLET, FILM COATED ORAL
Status: DISCONTINUED | OUTPATIENT
Start: 2023-04-26 | End: 2023-05-01

## 2023-04-25 RX ORDER — ASPIRIN 325 MG/1
100 TABLET, FILM COATED ORAL DAILY
Status: DISCONTINUED | OUTPATIENT
Start: 2023-04-26 | End: 2023-05-06 | Stop reason: HOSPADM

## 2023-04-25 RX ORDER — IPRATROPIUM BROMIDE AND ALBUTEROL SULFATE 2.5; .5 MG/3ML; MG/3ML
3 SOLUTION RESPIRATORY (INHALATION)
Status: DISCONTINUED | OUTPATIENT
Start: 2023-04-26 | End: 2023-04-26

## 2023-04-25 RX ORDER — LORAZEPAM 1 MG/1
1 TABLET ORAL
Status: DISCONTINUED | OUTPATIENT
Start: 2023-04-25 | End: 2023-05-06 | Stop reason: HOSPADM

## 2023-04-25 RX ORDER — ONDANSETRON 2 MG/ML
4 INJECTION INTRAMUSCULAR; INTRAVENOUS
Status: DISCONTINUED | OUTPATIENT
Start: 2023-04-25 | End: 2023-05-06 | Stop reason: HOSPADM

## 2023-04-25 RX ORDER — POLYETHYLENE GLYCOL 3350 17 G/17G
17 POWDER, FOR SOLUTION ORAL DAILY PRN
Status: DISCONTINUED | OUTPATIENT
Start: 2023-04-25 | End: 2023-05-06 | Stop reason: HOSPADM

## 2023-04-25 RX ORDER — DILTIAZEM HYDROCHLORIDE 5 MG/ML
15 INJECTION INTRAVENOUS
Status: COMPLETED | OUTPATIENT
Start: 2023-04-25 | End: 2023-04-25

## 2023-04-25 RX ORDER — SODIUM CHLORIDE 9 MG/ML
75 INJECTION, SOLUTION INTRAVENOUS CONTINUOUS
Status: DISCONTINUED | OUTPATIENT
Start: 2023-04-25 | End: 2023-04-25

## 2023-04-25 RX ORDER — ONDANSETRON 4 MG/1
4 TABLET, ORALLY DISINTEGRATING ORAL
Status: DISCONTINUED | OUTPATIENT
Start: 2023-04-25 | End: 2023-05-06 | Stop reason: HOSPADM

## 2023-04-25 RX ORDER — BUDESONIDE AND FORMOTEROL FUMARATE DIHYDRATE 160; 4.5 UG/1; UG/1
2 AEROSOL RESPIRATORY (INHALATION) 2 TIMES DAILY
Status: DISCONTINUED | OUTPATIENT
Start: 2023-04-25 | End: 2023-05-06 | Stop reason: HOSPADM

## 2023-04-25 RX ORDER — FUROSEMIDE 10 MG/ML
60 INJECTION INTRAMUSCULAR; INTRAVENOUS
Status: COMPLETED | OUTPATIENT
Start: 2023-04-25 | End: 2023-04-25

## 2023-04-25 RX ORDER — FOLIC ACID 1 MG/1
1 TABLET ORAL DAILY
Status: DISCONTINUED | OUTPATIENT
Start: 2023-04-26 | End: 2023-05-06 | Stop reason: HOSPADM

## 2023-04-25 RX ADMIN — DILTIAZEM HYDROCHLORIDE 15 MG: 5 INJECTION INTRAVENOUS at 18:06

## 2023-04-25 RX ADMIN — DILTIAZEM HYDROCHLORIDE 240 MG: 120 CAPSULE, COATED, EXTENDED RELEASE ORAL at 20:22

## 2023-04-25 RX ADMIN — FUROSEMIDE 60 MG: 10 INJECTION, SOLUTION INTRAMUSCULAR; INTRAVENOUS at 20:23

## 2023-04-25 RX ADMIN — DILTIAZEM HYDROCHLORIDE 15 MG: 5 INJECTION INTRAVENOUS at 22:23

## 2023-04-26 ENCOUNTER — APPOINTMENT (OUTPATIENT)
Dept: NON INVASIVE DIAGNOSTICS | Age: 68
End: 2023-04-26
Attending: FAMILY MEDICINE
Payer: MEDICARE

## 2023-04-26 LAB
ALBUMIN SERPL-MCNC: 2.2 G/DL (ref 3.5–5)
ALBUMIN/GLOB SERPL: 0.7 (ref 1.1–2.2)
ALP SERPL-CCNC: 79 U/L (ref 45–117)
ALT SERPL-CCNC: 16 U/L (ref 12–78)
ANION GAP SERPL CALC-SCNC: 7 MMOL/L (ref 5–15)
AST SERPL W P-5'-P-CCNC: 20 U/L (ref 15–37)
ATRIAL RATE: 163 BPM
BASOPHILS # BLD: 0 K/UL (ref 0–0.1)
BASOPHILS NFR BLD: 1 % (ref 0–1)
BILIRUB SERPL-MCNC: 0.2 MG/DL (ref 0.2–1)
BNP SERPL-MCNC: ABNORMAL PG/ML
BUN SERPL-MCNC: 18 MG/DL (ref 6–20)
BUN/CREAT SERPL: 15 (ref 12–20)
CA-I BLD-MCNC: 8.2 MG/DL (ref 8.5–10.1)
CALCULATED R AXIS, ECG10: 0 DEGREES
CALCULATED T AXIS, ECG11: -164 DEGREES
CHLORIDE SERPL-SCNC: 104 MMOL/L (ref 97–108)
CO2 SERPL-SCNC: 23 MMOL/L (ref 21–32)
CREAT SERPL-MCNC: 1.23 MG/DL (ref 0.55–1.02)
DIAGNOSIS, 93000: NORMAL
DIFFERENTIAL METHOD BLD: ABNORMAL
ECHO AO ASC DIAM: 3 CM
ECHO AO ASCENDING AORTA INDEX: 2.08 CM/M2
ECHO AO ROOT DIAM: 3.1 CM
ECHO AO ROOT INDEX: 2.15 CM/M2
ECHO AV AREA PEAK VELOCITY: 2 CM2
ECHO AV AREA VTI: 2.1 CM2
ECHO AV AREA/BSA PEAK VELOCITY: 1.4 CM2/M2
ECHO AV AREA/BSA VTI: 1.5 CM2/M2
ECHO AV MEAN GRADIENT: 2 MMHG
ECHO AV MEAN VELOCITY: 0.6 M/S
ECHO AV PEAK GRADIENT: 3 MMHG
ECHO AV PEAK VELOCITY: 0.8 M/S
ECHO AV VELOCITY RATIO: 0.63
ECHO AV VTI: 13.8 CM
ECHO EST RA PRESSURE: 8 MMHG
ECHO IVC PROX: 1.9 CM
ECHO LA AREA 2C: 13.6 CM2
ECHO LA AREA 4C: 13.2 CM2
ECHO LA DIAMETER INDEX: 2.29 CM/M2
ECHO LA DIAMETER: 3.3 CM
ECHO LA MAJOR AXIS: 4.7 CM
ECHO LA MINOR AXIS: 5.2 CM
ECHO LA TO AORTIC ROOT RATIO: 1.06
ECHO LA VOL BP: 31 ML (ref 22–52)
ECHO LA VOL/BSA BIPLANE: 22 ML/M2 (ref 16–34)
ECHO LV E' LATERAL VELOCITY: 8 CM/S
ECHO LV E' SEPTAL VELOCITY: 9 CM/S
ECHO LV EDV A2C: 26 ML
ECHO LV EDV A4C: 34 ML
ECHO LV EDV INDEX A4C: 24 ML/M2
ECHO LV EDV NDEX A2C: 18 ML/M2
ECHO LV EJECTION FRACTION A2C: 42 %
ECHO LV EJECTION FRACTION A4C: 37 %
ECHO LV EJECTION FRACTION BIPLANE: 41 % (ref 55–100)
ECHO LV ESV A2C: 15 ML
ECHO LV ESV A4C: 21 ML
ECHO LV ESV INDEX A2C: 10 ML/M2
ECHO LV ESV INDEX A4C: 15 ML/M2
ECHO LV FRACTIONAL SHORTENING: 31 % (ref 28–44)
ECHO LV INTERNAL DIMENSION DIASTOLE INDEX: 2.92 CM/M2
ECHO LV INTERNAL DIMENSION DIASTOLIC: 4.2 CM (ref 3.9–5.3)
ECHO LV INTERNAL DIMENSION SYSTOLIC INDEX: 2.01 CM/M2
ECHO LV INTERNAL DIMENSION SYSTOLIC: 2.9 CM
ECHO LV IVSD: 0.8 CM (ref 0.6–0.9)
ECHO LV MASS 2D: 109.8 G (ref 67–162)
ECHO LV MASS INDEX 2D: 76.3 G/M2 (ref 43–95)
ECHO LV POSTERIOR WALL DIASTOLIC: 0.9 CM (ref 0.6–0.9)
ECHO LV RELATIVE WALL THICKNESS RATIO: 0.43
ECHO LVOT AREA: 3.1 CM2
ECHO LVOT AV VTI INDEX: 0.66
ECHO LVOT DIAM: 2 CM
ECHO LVOT MEAN GRADIENT: 1 MMHG
ECHO LVOT PEAK GRADIENT: 1 MMHG
ECHO LVOT PEAK VELOCITY: 0.5 M/S
ECHO LVOT STROKE VOLUME INDEX: 19.8 ML/M2
ECHO LVOT SV: 28.6 ML
ECHO LVOT VTI: 9.1 CM
ECHO MV E DECELERATION TIME (DT): 121 MS
ECHO MV E VELOCITY: 0.78 M/S
ECHO MV E/E' LATERAL: 9.75
ECHO MV E/E' RATIO (AVERAGED): 9.21
ECHO MV E/E' SEPTAL: 8.67
ECHO PV MAX VELOCITY: 0.4 M/S
ECHO PV PEAK GRADIENT: 1 MMHG
ECHO RA AREA 4C: 13.2 CM2
ECHO RA END SYSTOLIC VOLUME APICAL 4 CHAMBER INDEX BSA: 24 ML/M2
ECHO RA VOLUME: 34 ML
ECHO RIGHT VENTRICULAR SYSTOLIC PRESSURE (RVSP): 35 MMHG
ECHO RV BASAL DIMENSION: 3.3 CM
ECHO RV TAPSE: 1.1 CM (ref 1.7–?)
ECHO TV REGURGITANT MAX VELOCITY: 2.6 M/S
ECHO TV REGURGITANT PEAK GRADIENT: 27 MMHG
EOSINOPHIL # BLD: 0.1 K/UL (ref 0–0.4)
EOSINOPHIL NFR BLD: 2 % (ref 0–7)
ERYTHROCYTE [DISTWIDTH] IN BLOOD BY AUTOMATED COUNT: 14 % (ref 11.5–14.5)
GLOBULIN SER CALC-MCNC: 3.2 G/DL (ref 2–4)
GLUCOSE SERPL-MCNC: 97 MG/DL (ref 65–100)
HCT VFR BLD AUTO: 41.6 % (ref 35–47)
HGB BLD-MCNC: 14.5 G/DL (ref 11.5–16)
IMM GRANULOCYTES # BLD AUTO: 0 K/UL (ref 0–0.04)
IMM GRANULOCYTES NFR BLD AUTO: 0 % (ref 0–0.5)
LYMPHOCYTES # BLD: 1.1 K/UL (ref 0.8–3.5)
LYMPHOCYTES NFR BLD: 21 % (ref 12–49)
MCH RBC QN AUTO: 35.4 PG (ref 26–34)
MCHC RBC AUTO-ENTMCNC: 34.9 G/DL (ref 30–36.5)
MCV RBC AUTO: 101.5 FL (ref 80–99)
MONOCYTES # BLD: 0.6 K/UL (ref 0–1)
MONOCYTES NFR BLD: 11 % (ref 5–13)
NEUTS SEG # BLD: 3.5 K/UL (ref 1.8–8)
NEUTS SEG NFR BLD: 65 % (ref 32–75)
NRBC # BLD: 0 K/UL (ref 0–0.01)
NRBC BLD-RTO: 0 PER 100 WBC
PLATELET # BLD AUTO: 183 K/UL (ref 150–400)
PMV BLD AUTO: 10.3 FL (ref 8.9–12.9)
POTASSIUM SERPL-SCNC: 2.9 MMOL/L (ref 3.5–5.1)
PROT SERPL-MCNC: 5.4 G/DL (ref 6.4–8.2)
Q-T INTERVAL, ECG07: 248 MS
QRS DURATION, ECG06: 60 MS
QTC CALCULATION (BEZET), ECG08: 407 MS
RBC # BLD AUTO: 4.1 M/UL (ref 3.8–5.2)
SODIUM SERPL-SCNC: 134 MMOL/L (ref 136–145)
TROPONIN I SERPL HS-MCNC: 40 NG/L (ref 0–51)
VENTRICULAR RATE, ECG03: 162 BPM
WBC # BLD AUTO: 5.3 K/UL (ref 3.6–11)

## 2023-04-26 PROCEDURE — 84484 ASSAY OF TROPONIN QUANT: CPT

## 2023-04-26 PROCEDURE — 74011250637 HC RX REV CODE- 250/637: Performed by: FAMILY MEDICINE

## 2023-04-26 PROCEDURE — 93306 TTE W/DOPPLER COMPLETE: CPT

## 2023-04-26 PROCEDURE — 80053 COMPREHEN METABOLIC PANEL: CPT

## 2023-04-26 PROCEDURE — 85025 COMPLETE CBC W/AUTO DIFF WBC: CPT

## 2023-04-26 PROCEDURE — 65270000029 HC RM PRIVATE

## 2023-04-26 PROCEDURE — 74011250637 HC RX REV CODE- 250/637: Performed by: INTERNAL MEDICINE

## 2023-04-26 PROCEDURE — 83880 ASSAY OF NATRIURETIC PEPTIDE: CPT

## 2023-04-26 PROCEDURE — 36415 COLL VENOUS BLD VENIPUNCTURE: CPT

## 2023-04-26 PROCEDURE — 74011250636 HC RX REV CODE- 250/636: Performed by: FAMILY MEDICINE

## 2023-04-26 PROCEDURE — 94640 AIRWAY INHALATION TREATMENT: CPT

## 2023-04-26 PROCEDURE — 74011000250 HC RX REV CODE- 250: Performed by: FAMILY MEDICINE

## 2023-04-26 PROCEDURE — 9990 CHARGE CONVERSION

## 2023-04-26 RX ORDER — POTASSIUM CHLORIDE 750 MG/1
40 TABLET, FILM COATED, EXTENDED RELEASE ORAL
Status: COMPLETED | OUTPATIENT
Start: 2023-04-26 | End: 2023-04-26

## 2023-04-26 RX ORDER — POTASSIUM CHLORIDE 750 MG/1
20 TABLET, FILM COATED, EXTENDED RELEASE ORAL 2 TIMES DAILY WITH MEALS
Status: DISCONTINUED | OUTPATIENT
Start: 2023-04-26 | End: 2023-04-27

## 2023-04-26 RX ORDER — IPRATROPIUM BROMIDE AND ALBUTEROL SULFATE 2.5; .5 MG/3ML; MG/3ML
3 SOLUTION RESPIRATORY (INHALATION)
Status: DISCONTINUED | OUTPATIENT
Start: 2023-04-26 | End: 2023-05-06 | Stop reason: HOSPADM

## 2023-04-26 RX ORDER — POTASSIUM CHLORIDE 1.5 G/1.77G
20 POWDER, FOR SOLUTION ORAL 2 TIMES DAILY WITH MEALS
Status: DISCONTINUED | OUTPATIENT
Start: 2023-04-26 | End: 2023-04-26

## 2023-04-26 RX ADMIN — DILTIAZEM HYDROCHLORIDE 60 MG: 30 TABLET, FILM COATED ORAL at 11:10

## 2023-04-26 RX ADMIN — THIAMINE HCL TAB 100 MG 100 MG: 100 TAB at 09:24

## 2023-04-26 RX ADMIN — DILTIAZEM HYDROCHLORIDE 60 MG: 30 TABLET, FILM COATED ORAL at 07:45

## 2023-04-26 RX ADMIN — POTASSIUM CHLORIDE 20 MEQ: 1.5 FOR SOLUTION ORAL at 09:24

## 2023-04-26 RX ADMIN — POTASSIUM CHLORIDE 40 MEQ: 750 TABLET, EXTENDED RELEASE ORAL at 12:36

## 2023-04-26 RX ADMIN — MULTIPLE VITAMINS W/ MINERALS TAB 1 TABLET: TAB at 09:24

## 2023-04-26 RX ADMIN — BUDESONIDE AND FORMOTEROL FUMARATE DIHYDRATE 2 PUFF: 160; 4.5 AEROSOL RESPIRATORY (INHALATION) at 21:15

## 2023-04-26 RX ADMIN — FUROSEMIDE 40 MG: 10 INJECTION, SOLUTION INTRAMUSCULAR; INTRAVENOUS at 09:24

## 2023-04-26 RX ADMIN — IPRATROPIUM BROMIDE AND ALBUTEROL SULFATE 3 ML: .5; 3 SOLUTION RESPIRATORY (INHALATION) at 01:19

## 2023-04-26 RX ADMIN — POTASSIUM CHLORIDE 20 MEQ: 750 TABLET, EXTENDED RELEASE ORAL at 16:23

## 2023-04-26 RX ADMIN — APIXABAN 5 MG: 5 TABLET, FILM COATED ORAL at 09:24

## 2023-04-26 RX ADMIN — ONDANSETRON 4 MG: 4 TABLET, ORALLY DISINTEGRATING ORAL at 16:26

## 2023-04-26 RX ADMIN — FOLIC ACID 1 MG: 1 TABLET ORAL at 09:24

## 2023-04-27 LAB
ALBUMIN SERPL-MCNC: 2.3 G/DL (ref 3.5–5)
ALBUMIN/GLOB SERPL: 0.6 (ref 1.1–2.2)
ALP SERPL-CCNC: 87 U/L (ref 45–117)
ALT SERPL-CCNC: 22 U/L (ref 12–78)
ANION GAP SERPL CALC-SCNC: 5 MMOL/L (ref 5–15)
AST SERPL W P-5'-P-CCNC: 23 U/L (ref 15–37)
BILIRUB SERPL-MCNC: 0.3 MG/DL (ref 0.2–1)
BUN SERPL-MCNC: 27 MG/DL (ref 6–20)
BUN/CREAT SERPL: 14 (ref 12–20)
CA-I BLD-MCNC: 8.3 MG/DL (ref 8.5–10.1)
CHLORIDE SERPL-SCNC: 102 MMOL/L (ref 97–108)
CO2 SERPL-SCNC: 25 MMOL/L (ref 21–32)
CREAT SERPL-MCNC: 1.91 MG/DL (ref 0.55–1.02)
ERYTHROCYTE [DISTWIDTH] IN BLOOD BY AUTOMATED COUNT: 14.4 % (ref 11.5–14.5)
GLOBULIN SER CALC-MCNC: 3.6 G/DL (ref 2–4)
GLUCOSE SERPL-MCNC: 107 MG/DL (ref 65–100)
HCT VFR BLD AUTO: 43.2 % (ref 35–47)
HGB BLD-MCNC: 14.8 G/DL (ref 11.5–16)
MCH RBC QN AUTO: 35.7 PG (ref 26–34)
MCHC RBC AUTO-ENTMCNC: 34.3 G/DL (ref 30–36.5)
MCV RBC AUTO: 104.3 FL (ref 80–99)
NRBC # BLD: 0 K/UL (ref 0–0.01)
NRBC BLD-RTO: 0 PER 100 WBC
PLATELET # BLD AUTO: 187 K/UL (ref 150–400)
PMV BLD AUTO: 10.2 FL (ref 8.9–12.9)
POTASSIUM SERPL-SCNC: 5.1 MMOL/L (ref 3.5–5.1)
PROT SERPL-MCNC: 5.9 G/DL (ref 6.4–8.2)
RBC # BLD AUTO: 4.14 M/UL (ref 3.8–5.2)
SODIUM SERPL-SCNC: 132 MMOL/L (ref 136–145)
WBC # BLD AUTO: 5.9 K/UL (ref 3.6–11)

## 2023-04-27 PROCEDURE — 9990 CHARGE CONVERSION

## 2023-04-27 PROCEDURE — 94640 AIRWAY INHALATION TREATMENT: CPT

## 2023-04-27 PROCEDURE — 74011250637 HC RX REV CODE- 250/637: Performed by: FAMILY MEDICINE

## 2023-04-27 PROCEDURE — 85027 COMPLETE CBC AUTOMATED: CPT

## 2023-04-27 PROCEDURE — 80053 COMPREHEN METABOLIC PANEL: CPT

## 2023-04-27 PROCEDURE — 36415 COLL VENOUS BLD VENIPUNCTURE: CPT

## 2023-04-27 PROCEDURE — 65270000029 HC RM PRIVATE

## 2023-04-27 RX ADMIN — FOLIC ACID 1 MG: 1 TABLET ORAL at 08:48

## 2023-04-27 RX ADMIN — BUDESONIDE AND FORMOTEROL FUMARATE DIHYDRATE 2 PUFF: 160; 4.5 AEROSOL RESPIRATORY (INHALATION) at 20:20

## 2023-04-27 RX ADMIN — BUDESONIDE AND FORMOTEROL FUMARATE DIHYDRATE 2 PUFF: 160; 4.5 AEROSOL RESPIRATORY (INHALATION) at 08:28

## 2023-04-27 RX ADMIN — DILTIAZEM HYDROCHLORIDE 60 MG: 30 TABLET, FILM COATED ORAL at 08:48

## 2023-04-27 RX ADMIN — POTASSIUM CHLORIDE 20 MEQ: 750 TABLET, EXTENDED RELEASE ORAL at 08:48

## 2023-04-27 RX ADMIN — DILTIAZEM HYDROCHLORIDE 60 MG: 30 TABLET, FILM COATED ORAL at 16:35

## 2023-04-27 RX ADMIN — DILTIAZEM HYDROCHLORIDE 60 MG: 30 TABLET, FILM COATED ORAL at 11:40

## 2023-04-27 RX ADMIN — THIAMINE HCL TAB 100 MG 100 MG: 100 TAB at 08:48

## 2023-04-27 RX ADMIN — MULTIPLE VITAMINS W/ MINERALS TAB 1 TABLET: TAB at 08:54

## 2023-04-27 NOTE — PROGRESS NOTES
Attempted PT/OT 1040 however nursing requesting to hold at this time. Will continue to follow and attempt at a later time. Thank you. Discussed medication questions  patient verbalized understanding and agreed to plan

## 2023-04-28 LAB
ALBUMIN SERPL-MCNC: 2.4 G/DL (ref 3.5–5)
ALBUMIN/GLOB SERPL: 0.6 (ref 1.1–2.2)
ALP SERPL-CCNC: 89 U/L (ref 45–117)
ALT SERPL-CCNC: 24 U/L (ref 12–78)
ANION GAP SERPL CALC-SCNC: 4 MMOL/L (ref 5–15)
AST SERPL W P-5'-P-CCNC: 26 U/L (ref 15–37)
BILIRUB SERPL-MCNC: 0.4 MG/DL (ref 0.2–1)
BUN SERPL-MCNC: 27 MG/DL (ref 6–20)
BUN/CREAT SERPL: 17 (ref 12–20)
CA-I BLD-MCNC: 8.6 MG/DL (ref 8.5–10.1)
CHLORIDE SERPL-SCNC: 104 MMOL/L (ref 97–108)
CO2 SERPL-SCNC: 26 MMOL/L (ref 21–32)
CREAT SERPL-MCNC: 1.59 MG/DL (ref 0.55–1.02)
ERYTHROCYTE [DISTWIDTH] IN BLOOD BY AUTOMATED COUNT: 14.5 % (ref 11.5–14.5)
GLOBULIN SER CALC-MCNC: 3.8 G/DL (ref 2–4)
GLUCOSE SERPL-MCNC: 95 MG/DL (ref 65–100)
HCT VFR BLD AUTO: 45.6 % (ref 35–47)
HGB BLD-MCNC: 15.4 G/DL (ref 11.5–16)
MCH RBC QN AUTO: 35.8 PG (ref 26–34)
MCHC RBC AUTO-ENTMCNC: 33.8 G/DL (ref 30–36.5)
MCV RBC AUTO: 106 FL (ref 80–99)
NRBC # BLD: 0 K/UL (ref 0–0.01)
NRBC BLD-RTO: 0 PER 100 WBC
PLATELET # BLD AUTO: 197 K/UL (ref 150–400)
PMV BLD AUTO: 10 FL (ref 8.9–12.9)
POTASSIUM SERPL-SCNC: 4.9 MMOL/L (ref 3.5–5.1)
PROT SERPL-MCNC: 6.2 G/DL (ref 6.4–8.2)
RBC # BLD AUTO: 4.3 M/UL (ref 3.8–5.2)
SODIUM SERPL-SCNC: 134 MMOL/L (ref 136–145)
WBC # BLD AUTO: 7.4 K/UL (ref 3.6–11)

## 2023-04-28 PROCEDURE — 80053 COMPREHEN METABOLIC PANEL: CPT

## 2023-04-28 PROCEDURE — 36415 COLL VENOUS BLD VENIPUNCTURE: CPT

## 2023-04-28 PROCEDURE — 85027 COMPLETE CBC AUTOMATED: CPT

## 2023-04-28 PROCEDURE — 9990 CHARGE CONVERSION

## 2023-04-28 PROCEDURE — 94640 AIRWAY INHALATION TREATMENT: CPT

## 2023-04-28 PROCEDURE — 65270000029 HC RM PRIVATE

## 2023-04-28 PROCEDURE — 74011250637 HC RX REV CODE- 250/637: Performed by: FAMILY MEDICINE

## 2023-04-28 PROCEDURE — 74011000250 HC RX REV CODE- 250: Performed by: FAMILY MEDICINE

## 2023-04-28 RX ORDER — TRAMADOL HYDROCHLORIDE 50 MG/1
50 TABLET ORAL
Status: COMPLETED | OUTPATIENT
Start: 2023-04-28 | End: 2023-04-28

## 2023-04-28 RX ORDER — DILTIAZEM HYDROCHLORIDE 5 MG/ML
10 INJECTION INTRAVENOUS ONCE
Status: COMPLETED | OUTPATIENT
Start: 2023-04-28 | End: 2023-04-28

## 2023-04-28 RX ADMIN — DILTIAZEM HYDROCHLORIDE 10 MG: 5 INJECTION INTRAVENOUS at 10:02

## 2023-04-28 RX ADMIN — MULTIPLE VITAMINS W/ MINERALS TAB 1 TABLET: TAB at 09:01

## 2023-04-28 RX ADMIN — DILTIAZEM HYDROCHLORIDE 60 MG: 30 TABLET, FILM COATED ORAL at 12:12

## 2023-04-28 RX ADMIN — BUDESONIDE AND FORMOTEROL FUMARATE DIHYDRATE 2 PUFF: 160; 4.5 AEROSOL RESPIRATORY (INHALATION) at 20:10

## 2023-04-28 RX ADMIN — TRAMADOL HYDROCHLORIDE 50 MG: 50 TABLET, COATED ORAL at 12:11

## 2023-04-28 RX ADMIN — DILTIAZEM HYDROCHLORIDE 60 MG: 30 TABLET, FILM COATED ORAL at 09:01

## 2023-04-28 RX ADMIN — FOLIC ACID 1 MG: 1 TABLET ORAL at 09:01

## 2023-04-28 RX ADMIN — DILTIAZEM HYDROCHLORIDE 60 MG: 30 TABLET, FILM COATED ORAL at 17:12

## 2023-04-28 RX ADMIN — THIAMINE HCL TAB 100 MG 100 MG: 100 TAB at 09:01

## 2023-04-29 LAB
ALBUMIN SERPL-MCNC: 2.1 G/DL (ref 3.5–5)
ALBUMIN SERPL-MCNC: 2.1 G/DL (ref 3.5–5)
ALBUMIN/GLOB SERPL: 0.6 (ref 1.1–2.2)
ALP SERPL-CCNC: 77 U/L (ref 45–117)
ALT SERPL-CCNC: 22 U/L (ref 12–78)
ANION GAP SERPL CALC-SCNC: 4 MMOL/L (ref 5–15)
ANION GAP SERPL CALC-SCNC: 6 MMOL/L (ref 5–15)
AST SERPL W P-5'-P-CCNC: 18 U/L (ref 15–37)
BILIRUB SERPL-MCNC: 0.4 MG/DL (ref 0.2–1)
BUN SERPL-MCNC: 28 MG/DL (ref 6–20)
BUN SERPL-MCNC: 30 MG/DL (ref 6–20)
BUN/CREAT SERPL: 18 (ref 12–20)
BUN/CREAT SERPL: 20 (ref 12–20)
CA-I BLD-MCNC: 8.2 MG/DL (ref 8.5–10.1)
CA-I BLD-MCNC: 8.6 MG/DL (ref 8.5–10.1)
CHLORIDE SERPL-SCNC: 106 MMOL/L (ref 97–108)
CHLORIDE SERPL-SCNC: 106 MMOL/L (ref 97–108)
CO2 SERPL-SCNC: 22 MMOL/L (ref 21–32)
CO2 SERPL-SCNC: 22 MMOL/L (ref 21–32)
CREAT SERPL-MCNC: 1.48 MG/DL (ref 0.55–1.02)
CREAT SERPL-MCNC: 1.54 MG/DL (ref 0.55–1.02)
ERYTHROCYTE [DISTWIDTH] IN BLOOD BY AUTOMATED COUNT: 14.3 % (ref 11.5–14.5)
GLOBULIN SER CALC-MCNC: 3.6 G/DL (ref 2–4)
GLUCOSE SERPL-MCNC: 100 MG/DL (ref 65–100)
GLUCOSE SERPL-MCNC: 97 MG/DL (ref 65–100)
HCT VFR BLD AUTO: 41.8 % (ref 35–47)
HGB BLD-MCNC: 14 G/DL (ref 11.5–16)
MCH RBC QN AUTO: 35.4 PG (ref 26–34)
MCHC RBC AUTO-ENTMCNC: 33.5 G/DL (ref 30–36.5)
MCV RBC AUTO: 105.8 FL (ref 80–99)
NRBC # BLD: 0 K/UL (ref 0–0.01)
NRBC BLD-RTO: 0 PER 100 WBC
PHOSPHATE SERPL-MCNC: 3.9 MG/DL (ref 2.6–4.7)
PLATELET # BLD AUTO: 183 K/UL (ref 150–400)
PMV BLD AUTO: 10.2 FL (ref 8.9–12.9)
POTASSIUM SERPL-SCNC: 5 MMOL/L (ref 3.5–5.1)
POTASSIUM SERPL-SCNC: 5.2 MMOL/L (ref 3.5–5.1)
PROT SERPL-MCNC: 5.7 G/DL (ref 6.4–8.2)
RBC # BLD AUTO: 3.95 M/UL (ref 3.8–5.2)
SODIUM SERPL-SCNC: 132 MMOL/L (ref 136–145)
SODIUM SERPL-SCNC: 134 MMOL/L (ref 136–145)
WBC # BLD AUTO: 5 K/UL (ref 3.6–11)

## 2023-04-29 PROCEDURE — 36415 COLL VENOUS BLD VENIPUNCTURE: CPT

## 2023-04-29 PROCEDURE — 85027 COMPLETE CBC AUTOMATED: CPT

## 2023-04-29 PROCEDURE — 80069 RENAL FUNCTION PANEL: CPT

## 2023-04-29 PROCEDURE — 80053 COMPREHEN METABOLIC PANEL: CPT

## 2023-04-29 PROCEDURE — 74011250637 HC RX REV CODE- 250/637: Performed by: FAMILY MEDICINE

## 2023-04-29 PROCEDURE — 9990 CHARGE CONVERSION

## 2023-04-29 PROCEDURE — 65270000029 HC RM PRIVATE

## 2023-04-29 PROCEDURE — 94640 AIRWAY INHALATION TREATMENT: CPT

## 2023-04-29 PROCEDURE — 74011000250 HC RX REV CODE- 250: Performed by: FAMILY MEDICINE

## 2023-04-29 PROCEDURE — 74011250637 HC RX REV CODE- 250/637: Performed by: INTERNAL MEDICINE

## 2023-04-29 RX ORDER — BENZONATATE 100 MG/1
100 CAPSULE ORAL 3 TIMES DAILY
Status: DISCONTINUED | OUTPATIENT
Start: 2023-04-29 | End: 2023-05-06 | Stop reason: HOSPADM

## 2023-04-29 RX ADMIN — MULTIPLE VITAMINS W/ MINERALS TAB 1 TABLET: TAB at 08:54

## 2023-04-29 RX ADMIN — THIAMINE HCL TAB 100 MG 100 MG: 100 TAB at 08:54

## 2023-04-29 RX ADMIN — DILTIAZEM HYDROCHLORIDE 60 MG: 30 TABLET, FILM COATED ORAL at 10:35

## 2023-04-29 RX ADMIN — IPRATROPIUM BROMIDE AND ALBUTEROL SULFATE 3 ML: .5; 3 SOLUTION RESPIRATORY (INHALATION) at 19:32

## 2023-04-29 RX ADMIN — BENZONATATE 100 MG: 100 CAPSULE ORAL at 13:51

## 2023-04-29 RX ADMIN — FOLIC ACID 1 MG: 1 TABLET ORAL at 08:54

## 2023-04-29 RX ADMIN — BENZONATATE 100 MG: 100 CAPSULE ORAL at 20:09

## 2023-04-29 RX ADMIN — BUDESONIDE AND FORMOTEROL FUMARATE DIHYDRATE 2 PUFF: 160; 4.5 AEROSOL RESPIRATORY (INHALATION) at 08:25

## 2023-04-29 RX ADMIN — DILTIAZEM HYDROCHLORIDE 60 MG: 30 TABLET, FILM COATED ORAL at 06:18

## 2023-04-29 RX ADMIN — DILTIAZEM HYDROCHLORIDE 60 MG: 30 TABLET, FILM COATED ORAL at 16:36

## 2023-04-29 RX ADMIN — BUDESONIDE AND FORMOTEROL FUMARATE DIHYDRATE 2 PUFF: 160; 4.5 AEROSOL RESPIRATORY (INHALATION) at 20:13

## 2023-04-30 PROCEDURE — 74011250637 HC RX REV CODE- 250/637: Performed by: FAMILY MEDICINE

## 2023-04-30 PROCEDURE — 74011250637 HC RX REV CODE- 250/637: Performed by: INTERNAL MEDICINE

## 2023-04-30 PROCEDURE — 65270000029 HC RM PRIVATE

## 2023-04-30 PROCEDURE — 9990 CHARGE CONVERSION

## 2023-04-30 PROCEDURE — 94640 AIRWAY INHALATION TREATMENT: CPT

## 2023-04-30 RX ORDER — DIGOXIN 125 MCG
0.12 TABLET ORAL ONCE
Status: COMPLETED | OUTPATIENT
Start: 2023-04-30 | End: 2023-04-30

## 2023-04-30 RX ADMIN — FOLIC ACID 1 MG: 1 TABLET ORAL at 08:56

## 2023-04-30 RX ADMIN — BENZONATATE 100 MG: 100 CAPSULE ORAL at 14:15

## 2023-04-30 RX ADMIN — BUDESONIDE AND FORMOTEROL FUMARATE DIHYDRATE 2 PUFF: 160; 4.5 AEROSOL RESPIRATORY (INHALATION) at 20:38

## 2023-04-30 RX ADMIN — DILTIAZEM HYDROCHLORIDE 60 MG: 30 TABLET, FILM COATED ORAL at 06:06

## 2023-04-30 RX ADMIN — BUDESONIDE AND FORMOTEROL FUMARATE DIHYDRATE 2 PUFF: 160; 4.5 AEROSOL RESPIRATORY (INHALATION) at 07:41

## 2023-04-30 RX ADMIN — DILTIAZEM HYDROCHLORIDE 60 MG: 30 TABLET, FILM COATED ORAL at 17:00

## 2023-04-30 RX ADMIN — MULTIPLE VITAMINS W/ MINERALS TAB 1 TABLET: TAB at 08:56

## 2023-04-30 RX ADMIN — BENZONATATE 100 MG: 100 CAPSULE ORAL at 08:56

## 2023-04-30 RX ADMIN — DIGOXIN 0.12 MG: 125 TABLET ORAL at 14:15

## 2023-04-30 RX ADMIN — BENZONATATE 100 MG: 100 CAPSULE ORAL at 19:34

## 2023-04-30 RX ADMIN — THIAMINE HCL TAB 100 MG 100 MG: 100 TAB at 08:56

## 2023-04-30 RX ADMIN — DILTIAZEM HYDROCHLORIDE 60 MG: 30 TABLET, FILM COATED ORAL at 12:09

## 2023-05-01 LAB
ALBUMIN SERPL-MCNC: 2 G/DL (ref 3.5–5)
ANION GAP SERPL CALC-SCNC: 4 MMOL/L (ref 5–15)
BUN SERPL-MCNC: 24 MG/DL (ref 6–20)
BUN/CREAT SERPL: 20 (ref 12–20)
CA-I BLD-MCNC: 8.4 MG/DL (ref 8.5–10.1)
CHLORIDE SERPL-SCNC: 105 MMOL/L (ref 97–108)
CO2 SERPL-SCNC: 26 MMOL/L (ref 21–32)
CREAT SERPL-MCNC: 1.2 MG/DL (ref 0.55–1.02)
GLUCOSE SERPL-MCNC: 95 MG/DL (ref 65–100)
PHOSPHATE SERPL-MCNC: 3.7 MG/DL (ref 2.6–4.7)
POTASSIUM SERPL-SCNC: 3.4 MMOL/L (ref 3.5–5.1)
SODIUM SERPL-SCNC: 135 MMOL/L (ref 136–145)

## 2023-05-01 PROCEDURE — 74011250637 HC RX REV CODE- 250/637: Performed by: FAMILY MEDICINE

## 2023-05-01 PROCEDURE — 74011250636 HC RX REV CODE- 250/636: Performed by: INTERNAL MEDICINE

## 2023-05-01 PROCEDURE — 74011000250 HC RX REV CODE- 250: Performed by: FAMILY MEDICINE

## 2023-05-01 PROCEDURE — 77030029997 HC DEV COM RDL R BND TELE -B: Performed by: INTERNAL MEDICINE

## 2023-05-01 PROCEDURE — 80069 RENAL FUNCTION PANEL: CPT

## 2023-05-01 PROCEDURE — 77030016700 HC CATH ANGI DX INFN2 CARD -B: Performed by: INTERNAL MEDICINE

## 2023-05-01 PROCEDURE — 74011000258 HC RX REV CODE- 258: Performed by: FAMILY MEDICINE

## 2023-05-01 PROCEDURE — 93458 L HRT ARTERY/VENTRICLE ANGIO: CPT | Performed by: INTERNAL MEDICINE

## 2023-05-01 PROCEDURE — 99152 MOD SED SAME PHYS/QHP 5/>YRS: CPT | Performed by: INTERNAL MEDICINE

## 2023-05-01 PROCEDURE — 2709999900 HC NON-CHARGEABLE SUPPLY: Performed by: INTERNAL MEDICINE

## 2023-05-01 PROCEDURE — 76210000006 HC OR PH I REC 0.5 TO 1 HR: Performed by: INTERNAL MEDICINE

## 2023-05-01 PROCEDURE — 99153 MOD SED SAME PHYS/QHP EA: CPT

## 2023-05-01 PROCEDURE — B2111ZZ FLUOROSCOPY OF MULTIPLE CORONARY ARTERIES USING LOW OSMOLAR CONTRAST: ICD-10-PCS | Performed by: INTERNAL MEDICINE

## 2023-05-01 PROCEDURE — 76937 US GUIDE VASCULAR ACCESS: CPT

## 2023-05-01 PROCEDURE — 9990 CHARGE CONVERSION

## 2023-05-01 PROCEDURE — 4A023N7 MEASUREMENT OF CARDIAC SAMPLING AND PRESSURE, LEFT HEART, PERCUTANEOUS APPROACH: ICD-10-PCS | Performed by: INTERNAL MEDICINE

## 2023-05-01 PROCEDURE — 99153 MOD SED SAME PHYS/QHP EA: CPT | Performed by: INTERNAL MEDICINE

## 2023-05-01 PROCEDURE — C1769 GUIDE WIRE: HCPCS | Performed by: INTERNAL MEDICINE

## 2023-05-01 PROCEDURE — C1769 GUIDE WIRE: HCPCS

## 2023-05-01 PROCEDURE — C1894 INTRO/SHEATH, NON-LASER: HCPCS | Performed by: INTERNAL MEDICINE

## 2023-05-01 PROCEDURE — 36415 COLL VENOUS BLD VENIPUNCTURE: CPT

## 2023-05-01 PROCEDURE — 65270000029 HC RM PRIVATE

## 2023-05-01 PROCEDURE — 99152 MOD SED SAME PHYS/QHP 5/>YRS: CPT

## 2023-05-01 PROCEDURE — 93458 L HRT ARTERY/VENTRICLE ANGIO: CPT

## 2023-05-01 PROCEDURE — 74011000636 HC RX REV CODE- 636: Performed by: INTERNAL MEDICINE

## 2023-05-01 PROCEDURE — 74011000250 HC RX REV CODE- 250: Performed by: INTERNAL MEDICINE

## 2023-05-01 PROCEDURE — 76937 US GUIDE VASCULAR ACCESS: CPT | Performed by: INTERNAL MEDICINE

## 2023-05-01 PROCEDURE — 74011250637 HC RX REV CODE- 250/637: Performed by: INTERNAL MEDICINE

## 2023-05-01 PROCEDURE — C1894 INTRO/SHEATH, NON-LASER: HCPCS

## 2023-05-01 PROCEDURE — B246ZZ4 ULTRASONOGRAPHY OF RIGHT AND LEFT HEART, TRANSESOPHAGEAL: ICD-10-PCS | Performed by: INTERNAL MEDICINE

## 2023-05-01 RX ORDER — DILTIAZEM HYDROCHLORIDE 30 MG/1
60 TABLET, FILM COATED ORAL 4 TIMES DAILY
Status: DISCONTINUED | OUTPATIENT
Start: 2023-05-01 | End: 2023-05-02

## 2023-05-01 RX ORDER — HEPARIN SODIUM 1000 [USP'U]/ML
INJECTION, SOLUTION INTRAVENOUS; SUBCUTANEOUS AS NEEDED
Status: DISCONTINUED | OUTPATIENT
Start: 2023-05-01 | End: 2023-05-01 | Stop reason: HOSPADM

## 2023-05-01 RX ORDER — DILTIAZEM HCL/D5W 125 MG/125
5 PLASTIC BAG, INJECTION (ML) INTRAVENOUS CONTINUOUS
Status: DISCONTINUED | OUTPATIENT
Start: 2023-05-01 | End: 2023-05-01

## 2023-05-01 RX ORDER — DILTIAZEM HYDROCHLORIDE 5 MG/ML
5 INJECTION INTRAVENOUS
Status: DISCONTINUED | OUTPATIENT
Start: 2023-05-01 | End: 2023-05-01

## 2023-05-01 RX ORDER — POTASSIUM CHLORIDE 750 MG/1
40 TABLET, FILM COATED, EXTENDED RELEASE ORAL
Status: ACTIVE | OUTPATIENT
Start: 2023-05-01 | End: 2023-05-01

## 2023-05-01 RX ORDER — MIDAZOLAM HYDROCHLORIDE 1 MG/ML
INJECTION INTRAMUSCULAR; INTRAVENOUS AS NEEDED
Status: DISCONTINUED | OUTPATIENT
Start: 2023-05-01 | End: 2023-05-01 | Stop reason: HOSPADM

## 2023-05-01 RX ORDER — DILTIAZEM HYDROCHLORIDE 30 MG/1
60 TABLET, FILM COATED ORAL EVERY 6 HOURS
Status: DISCONTINUED | OUTPATIENT
Start: 2023-05-01 | End: 2023-05-01

## 2023-05-01 RX ORDER — LIDOCAINE HYDROCHLORIDE 10 MG/ML
INJECTION INFILTRATION; PERINEURAL AS NEEDED
Status: DISCONTINUED | OUTPATIENT
Start: 2023-05-01 | End: 2023-05-01 | Stop reason: HOSPADM

## 2023-05-01 RX ORDER — FENTANYL CITRATE 50 UG/ML
INJECTION, SOLUTION INTRAMUSCULAR; INTRAVENOUS AS NEEDED
Status: DISCONTINUED | OUTPATIENT
Start: 2023-05-01 | End: 2023-05-01 | Stop reason: HOSPADM

## 2023-05-01 RX ORDER — NITROGLYCERIN 5 MG/ML
INJECTION, SOLUTION INTRAVENOUS AS NEEDED
Status: DISCONTINUED | OUTPATIENT
Start: 2023-05-01 | End: 2023-05-01 | Stop reason: HOSPADM

## 2023-05-01 RX ORDER — HEPARIN SODIUM 200 [USP'U]/100ML
INJECTION, SOLUTION INTRAVENOUS
Status: COMPLETED | OUTPATIENT
Start: 2023-05-01 | End: 2023-05-01

## 2023-05-01 RX ORDER — CHOLECALCIFEROL (VITAMIN D3) 125 MCG
5 CAPSULE ORAL
Status: DISCONTINUED | OUTPATIENT
Start: 2023-05-01 | End: 2023-05-06 | Stop reason: HOSPADM

## 2023-05-01 RX ADMIN — DILTIAZEM HYDROCHLORIDE 5 MG/HR: 5 INJECTION INTRAVENOUS at 18:25

## 2023-05-01 RX ADMIN — DILTIAZEM HYDROCHLORIDE 60 MG: 30 TABLET, FILM COATED ORAL at 08:08

## 2023-05-01 RX ADMIN — BENZONATATE 100 MG: 100 CAPSULE ORAL at 21:41

## 2023-05-01 RX ADMIN — Medication 5 MG: at 21:41

## 2023-05-01 RX ADMIN — THIAMINE HCL TAB 100 MG 100 MG: 100 TAB at 08:08

## 2023-05-01 RX ADMIN — FOLIC ACID 1 MG: 1 TABLET ORAL at 08:08

## 2023-05-01 RX ADMIN — DILTIAZEM HYDROCHLORIDE 60 MG: 30 TABLET, FILM COATED ORAL at 17:50

## 2023-05-01 RX ADMIN — BENZONATATE 100 MG: 100 CAPSULE ORAL at 08:09

## 2023-05-01 NOTE — PROGRESS NOTES
General Daily Progress Note          Patient Name:   Mariela Jon       YOB: 1955       Age:  70 y.o. Admit Date: 4/25/2023      Subjective:   CHIEF COMPLAINT:      Shortness of breath     HISTORY OF PRESENT ILLNESS:        Patient is a 70y.o. year old female with past medical history of alcohol dependency COPD came to emergency room because of generalized weakness and shortness of breath patient also history of A-fib in the past seen by the ER physician which shows possible CHF BNP was elevated and patient EKG shows A-fib with rapid ventricular rate patient received digoxin switch to Cardizem received 1 dose of IV Cardizem heart rate improved patient recommend to be admitted for further ration treatment patient stated that she drinks 3 to 4 days a week at least 4-5 beers throughout the day     Patient denies any fever chills cough congestion nausea vomiting diarrhea constipation    4/26  Resting the bed alert awake  Per nursing the patient is still in Afib but rate is controlled   K 2.9 - will replace   BP 13,292, troponin negative   ECHO pending   Per cardiology continue with diltiazem 60 mg 3 times a day     4/27   Pt alert in bed with only complaints of cough. Denies chest pain, palpitations, SOB, abdominal pain. Per cardiovascular surgery HR has improved, BNP trending down, will receive cardiac cath tomorrow morning. ECHO shows EF 30-35%   Creatinine 1.91 and BUN 27 and eGFR 28     4/28   Pt alert in bed with complaints of cough, headache, general malaise. Denies chest pain, palpitations, SOB, nausea, diarrhea. Per nursing pt went down for cardiac cath today, but it was cancelled due to elevated creatinine, no BNP done today. Per nephrology no IV fluids, avoid ACE and ARBs for now. Discontinue K Cl PO daily. Lasix held for cath. Today's labs pending. Repeat blood work.      Patient have episode of A-fib with rapid ventricular rate received Cardizem 10 mg IV    5/1  Pt alert in bed with complaints of cough and general malaise. Denies chest pain, palpitations, SOB, nausea, diarrhea. HR and BP poorly controlled. K 3.4  Renal function improving   Per CV surgery will proceed with cardiac cath. Did not start metoprolol due to atenolol allergy. Objective:     Visit Vitals  BP (!) 179/109 (BP 1 Location: Left upper arm, BP Patient Position: At rest)   Pulse (!) 132   Temp 98.6 °F (37 °C)   Resp (!) 34   Ht 5' 2.99\" (1.6 m)   Wt 57 kg (125 lb 10.6 oz)   SpO2 93%   BMI 22.27 kg/m²        Recent Results (from the past 24 hour(s))   RENAL FUNCTION PANEL    Collection Time: 05/01/23  6:38 AM   Result Value Ref Range    Sodium 135 (L) 136 - 145 mmol/L    Potassium 3.4 (L) 3.5 - 5.1 mmol/L    Chloride 105 97 - 108 mmol/L    CO2 26 21 - 32 mmol/L    Anion gap 4 (L) 5 - 15 mmol/L    Glucose 95 65 - 100 mg/dL    BUN 24 (H) 6 - 20 mg/dL    Creatinine 1.20 (H) 0.55 - 1.02 mg/dL    BUN/Creatinine ratio 20 12 - 20      eGFR 49 (L) >60 ml/min/1.73m2    Calcium 8.4 (L) 8.5 - 10.1 mg/dL    Phosphorus 3.7 2.6 - 4.7 mg/dL    Albumin 2.0 (L) 3.5 - 5.0 g/dL       [unfilled]      Review of Systems:   Constitutional: Positive for malaise. HENT: Denies   Eyes: Negative. Respiratory: Positive for cough, denies SOB   cardiovascular: Negative. Gastrointestinal: Negative for abdominal pain and nausea. Skin: Negative. Neurological: Negative. Physical Exam:  Constitutional: pt is oriented to person, place, and time. HENT:   Head: Normocephalic and atraumatic. Eyes: Pupils are equal, round, and reactive to light. EOM are normal.   Cardiovascular: Normal rate, regular rhythm and normal heart sounds. Pulmonary/Chest: Expiratory wheeze noted BL. No rales. Exhibits no tenderness. Abdominal: Soft. Bowel sounds are normal. There is no abdominal tenderness. There is no rebound and no guarding. Musculoskeletal: Normal range of motion.    Neurological: pt is alert and oriented to person, place, and time. Alert. Normal strength. No cranial nerve deficit or sensory deficit. Displays a negative Romberg sign. XR CHEST PORT   Final Result   1. Hyperinflated lungs, no acute cardiopulmonary disease               Recent Results (from the past 24 hour(s))   RENAL FUNCTION PANEL    Collection Time: 05/01/23  6:38 AM   Result Value Ref Range    Sodium 135 (L) 136 - 145 mmol/L    Potassium 3.4 (L) 3.5 - 5.1 mmol/L    Chloride 105 97 - 108 mmol/L    CO2 26 21 - 32 mmol/L    Anion gap 4 (L) 5 - 15 mmol/L    Glucose 95 65 - 100 mg/dL    BUN 24 (H) 6 - 20 mg/dL    Creatinine 1.20 (H) 0.55 - 1.02 mg/dL    BUN/Creatinine ratio 20 12 - 20      eGFR 49 (L) >60 ml/min/1.73m2    Calcium 8.4 (L) 8.5 - 10.1 mg/dL    Phosphorus 3.7 2.6 - 4.7 mg/dL    Albumin 2.0 (L) 3.5 - 5.0 g/dL         Results       ** No results found for the last 336 hours. **             Labs:     Recent Labs     04/29/23  0503   WBC 5.0   HGB 14.0   HCT 41.8          Recent Labs     05/01/23  0638 04/29/23  0503   * 132*  134*   K 3.4* 5.0  5.2*    106  106   CO2 26 22  22   BUN 24* 28*  30*   CREA 1.20* 1.54*  1.48*   GLU 95 100  97   CA 8.4* 8.6  8.2*   PHOS 3.7 3.9       Recent Labs     05/01/23  0638 04/29/23  0503   ALT  --  22   AP  --  77   TBILI  --  0.4   TP  --  5.7*   ALB 2.0* 2.1*  2.1*   GLOB  --  3.6       No results for input(s): INR, PTP, APTT, INREXT, INREXT in the last 72 hours. No results for input(s): FE, TIBC, PSAT, FERR in the last 72 hours. No results found for: FOL, RBCF   No results for input(s): PH, PCO2, PO2 in the last 72 hours. No results for input(s): CPK, CKNDX, TROIQ in the last 72 hours.     No lab exists for component: CPKMB  No results found for: CHOL, CHOLX, CHLST, CHOLV, HDL, HDLP, LDL, LDLC, DLDLP, TGLX, TRIGL, TRIGP, CHHD, CHHDX  No results found for: GLUCPOC  No results found for: COLOR, APPRN, SPGRU, REFSG, CHRIS, PROTU, GLUCU, KETU, BILU, UROU, DALTON, LEUKU, GLUKE, EPSU, BACTU, WBCU, RBCU, CASTS, UCRY      Assessment and Plan:       A-fib with rapid ventricular rate  Admit to telemetry   Eliquis 5 mg BID - HELD   Cardizem 60 mg 4x a day   ECHO shows EF 30-35% went down for cardiac cath today, but it was cancelled due to elevated creatinine, no BNP done today. Rate is controlled, chronic A fib   CHF/chronic heart failure with ejection fraction with 30 to 35%  Lasix 40 mg IV daily - HELD   BNP trending down   Alcohol dependency  Thiamine 623 mg daily  Folic acid 1 mg daily   Ativan 1 mg PO PRN   COPD  Duo-neb 3 mL q 6 hours   Symbicort 2 puffs daily   Hyponatremia   LUIS ANGEL  Per nephrology no IV fluids, avoid ACE and ARBs for now. Discontinue K Cl PO daily.    Repeat labs   Hypokalemia (3.4)   Cough   Benzonatate 100 mg TID          Multivitamin 1 tab daily  Ativan 1 mg daily      seizure precautions and fall precautions     Repeat the labs in the morning    Follow-up with the cardiology and nephrology for further treatment plan                            Current Facility-Administered Medications:     dilTIAZem IR (CARDIZEM) tablet 60 mg, 60 mg, Oral, QID, Remigio Wahl MD, 60 mg at 05/01/23 8608    benzonatate (TESSALON) capsule 100 mg, 100 mg, Oral, TID, Jorie Bumpers, MD, 100 mg at 05/01/23 0809    albuterol-ipratropium (DUO-NEB) 2.5 MG-0.5 MG/3 ML, 3 mL, Nebulization, Q6H PRN, Lisbeth Solis MD, 3 mL at 04/29/23 1932    multivitamin, tx-iron-ca-min (THERA-M w/ IRON) tablet 1 Tablet, 1 Tablet, Oral, DAILY, Remigio Wahl MD, 1 Tablet at 04/30/23 0856    polyethylene glycol (MIRALAX) packet 17 g, 17 g, Oral, DAILY PRN, Remigio Wahl MD    ondansetron (ZOFRAN ODT) tablet 4 mg, 4 mg, Oral, Q8H PRN, 4 mg at 04/26/23 1626 **OR** ondansetron (ZOFRAN) injection 4 mg, 4 mg, IntraVENous, Q6H PRN, Lisbeth Solis MD    [Held by provider] apixaban (ELIQUIS) tablet 5 mg, 5 mg, Oral, BID, Remigio Wahl MD, 5 mg at 04/26/23 0810    [Held by provider] furosemide (LASIX) injection 40 mg, 40 mg, IntraVENous, DAILY, Remigio Wahl MD, 40 mg at 04/26/23 7432    budesonide-formoteroL (SYMBICORT) 160-4.5 mcg/actuation HFA inhaler 2 Puff, 2 Puff, Inhalation, BID, Remigio Wahl MD, 2 Puff at 04/30/23 2038    thiamine mononitrate (B-1) tablet 100 mg, 100 mg, Oral, DAILY, Remigio Wahl MD, 100 mg at 81/54/32 5739    folic acid (FOLVITE) tablet 1 mg, 1 mg, Oral, DAILY, Remigio Wahl MD, 1 mg at 05/01/23 0808    LORazepam (ATIVAN) tablet 1 mg, 1 mg, Oral, Q4H PRN, Alayna Gomez MD

## 2023-05-01 NOTE — ROUTINE PROCESS
Bedside shift change report given to [address] [place] [address] (oncoming nurse) by Starr Castillo RN (offgoing nurse). Report included the following information SBAR, Intake/Output, MAR, and Recent Results.

## 2023-05-01 NOTE — PROGRESS NOTES
Patient DCP is home with her life partner, and patient continues to decline [facility] services.

## 2023-05-01 NOTE — ROUTINE PROCESS
Vitals completed, patient blood pressure 171/95. Dr. Anjali Bennett notified, orders to increase diltiazem 60mg to four times a day with parameters to hold if SBP <120, or HR <60.

## 2023-05-01 NOTE — PROGRESS NOTES
Progress Note    Patient: Monique Iniguez MRN: [id number]  SSN: [id number]    YOB: 1955  Age: 70 y.o. Sex: female      Admit Date: 4/25/2023    LOS: 6 days     Subjective:     No acute events overnight    Objective:     Vitals:    04/30/23 2352 05/01/23 0400 05/01/23 0449 05/01/23 0757   BP: (!) 171/95  (!) 166/88 (!) 179/109   Pulse: (!) 112  (!) 120 (!) 132   Resp: 19  19 (!) 34   Temp: 98.1 °F (36.7 °C)  97.5 °F (36.4 °C) 98.6 °F (37 °C)   SpO2: 92%  91% 93%   Weight:  57 kg (125 lb 10.6 oz)     Height:            Intake and Output:  Current Shift: No intake/output data recorded. Last three shifts: 04/29 1901 - 05/01 0700  In: 350 [P.O.:350]  Out: 1750 [Urine:1750]    Physical Exam:   General:  Alert, cooperative, no distress, appears stated age. Eyes:  Conjunctivae/corneas clear. PERRL, EOMs intact. Fundi benign   Ears:  Normal TMs and external ear canals both ears. Nose: Nares normal. Septum midline. Mucosa normal. No drainage or sinus tenderness. Mouth/Throat: Lips, mucosa, and tongue normal. Teeth and gums normal.   Neck: Supple, symmetrical, trachea midline, no adenopathy, thyroid: no enlargment/tenderness/nodules, no carotid bruit and no JVD. Back:   Symmetric, no curvature. ROM normal. No CVA tenderness. Lungs:   Clear to auscultation bilaterally. Heart:  Regular rate and rhythm, S1, S2 normal, no murmur, click, rub or gallop. Abdomen:   Soft, non-tender. Bowel sounds normal. No masses,  No organomegaly. Extremities: Extremities normal, atraumatic, no cyanosis or edema. Pulses: 2+ and symmetric all extremities. Skin: Skin color, texture, turgor normal. No rashes or lesions   Lymph nodes: Cervical, supraclavicular, and axillary nodes normal.   Neurologic: CNII-XII intact. Normal strength, sensation and reflexes throughout. Lab/Data Review: All lab results for the last 24 hours reviewed.          Assessment:     Active Problems:    Atrial fibrillation, rapid [facility]) (4/25/2023)      At[facility] fibrillation ([facility] (4/25/2023)      CHF (congestive heart failure) ([facility] (4/25/2023)    Patient is a 69-year-old white female with:  1. A-fib with RVR  2. COPD exacerbation  3. Heart failure  4. Hypokalemia  5. Hyponatremia  6. Acute kidney injury  7. Mild aortic regurgitation  8. Moderate tricuspid regurgitation    Plan:     Creatinine has improved. Since the Eliquis is on hold we will proceed with cardiac catheterization. Continue to monitor kidney function after catheterization. Blood pressure is elevated and heart rate is poorly controlled. She has a history of angioedema with atenolol. The reason why I did not start metoprolol.   Signed By: Jessica Bingham MD     May 1, 2023

## 2023-05-01 NOTE — PROGRESS NOTES
General Daily Progress Note          Patient Name:   Juan Calderon       YOB: 1955       Age:  70 y.o. Admit Date: 4/25/2023      Subjective:   CHIEF COMPLAINT:      Shortness of breath     HISTORY OF PRESENT ILLNESS:        Patient is a 70y.o. year old female with past medical history of alcohol dependency COPD came to emergency room because of generalized weakness and shortness of breath patient also history of A-fib in the past seen by the ER physician which shows possible CHF BNP was elevated and patient EKG shows A-fib with rapid ventricular rate patient received digoxin switch to Cardizem received 1 dose of IV Cardizem heart rate improved patient recommend to be admitted for further ration treatment patient stated that she drinks 3 to 4 days a week at least 4-5 beers throughout the day     Patient denies any fever chills cough congestion nausea vomiting diarrhea constipation    4/26  Resting the bed alert awake  Per nursing the patient is still in Afib but rate is controlled   K 2.9 - will replace   BP 13,292, troponin negative   ECHO pending   Per cardiology continue with diltiazem 60 mg 3 times a day     4/27   Pt alert in bed with only complaints of cough. Denies chest pain, palpitations, SOB, abdominal pain. Per cardiovascular surgery HR has improved, BNP trending down, will receive cardiac cath tomorrow morning. ECHO shows EF 30-35%   Creatinine 1.91 and BUN 27 and eGFR 28     4/28   Pt alert in bed with complaints of cough, headache, general malaise. Denies chest pain, palpitations, SOB, nausea, diarrhea. Per nursing pt went down for cardiac cath today, but it was cancelled due to elevated creatinine, no BNP done today. Per nephrology no IV fluids, avoid ACE and ARBs for now. Discontinue K Cl PO daily. Lasix held for cath. Today's labs pending. Repeat blood work.      Patient have episode of A-fib with rapid ventricular rate received Cardizem 10 mg IV    5/1  Pt alert in bed with complaints of cough and general malaise. Denies chest pain, palpitations, SOB, nausea, diarrhea. HR and BP poorly controlled. K 3.4  Renal function improving   Per CV surgery will proceed with cardiac cath. Did not start metoprolol due to atenolol allergy. Objective:     Visit Vitals  BP (!) 179/109 (BP 1 Location: Left upper arm, BP Patient Position: At rest)   Pulse (!) 132   Temp 98.6 °F (37 °C)   Resp (!) 34   Ht 5' 2.99\" (1.6 m)   Wt 57 kg (125 lb 10.6 oz)   SpO2 93%   BMI 22.27 kg/m²        Recent Results (from the past 24 hour(s))   RENAL FUNCTION PANEL    Collection Time: 05/01/23  6:38 AM   Result Value Ref Range    Sodium 135 (L) 136 - 145 mmol/L    Potassium 3.4 (L) 3.5 - 5.1 mmol/L    Chloride 105 97 - 108 mmol/L    CO2 26 21 - 32 mmol/L    Anion gap 4 (L) 5 - 15 mmol/L    Glucose 95 65 - 100 mg/dL    BUN 24 (H) 6 - 20 mg/dL    Creatinine 1.20 (H) 0.55 - 1.02 mg/dL    BUN/Creatinine ratio 20 12 - 20      eGFR 49 (L) >60 ml/min/1.73m2    Calcium 8.4 (L) 8.5 - 10.1 mg/dL    Phosphorus 3.7 2.6 - 4.7 mg/dL    Albumin 2.0 (L) 3.5 - 5.0 g/dL       [unfilled]      Review of Systems:   Constitutional: Positive for malaise. HENT: Denies   Eyes: Negative. Respiratory: Positive for cough, denies SOB   cardiovascular: Negative. Gastrointestinal: Negative for abdominal pain and nausea. Skin: Negative. Neurological: Negative. Physical Exam:  Constitutional: pt is oriented to person, place, and time. HENT:   Head: Normocephalic and atraumatic. Eyes: Pupils are equal, round, and reactive to light. EOM are normal.   Cardiovascular: Normal rate, regular rhythm and normal heart sounds. Pulmonary/Chest: Expiratory wheeze noted BL. No rales. Exhibits no tenderness. Abdominal: Soft. Bowel sounds are normal. There is no abdominal tenderness. There is no rebound and no guarding. Musculoskeletal: Normal range of motion.    Neurological: pt is alert and oriented to person, place, and time. Alert. Normal strength. No cranial nerve deficit or sensory deficit. Displays a negative Romberg sign. XR CHEST PORT   Final Result   1. Hyperinflated lungs, no acute cardiopulmonary disease               Recent Results (from the past 24 hour(s))   RENAL FUNCTION PANEL    Collection Time: 05/01/23  6:38 AM   Result Value Ref Range    Sodium 135 (L) 136 - 145 mmol/L    Potassium 3.4 (L) 3.5 - 5.1 mmol/L    Chloride 105 97 - 108 mmol/L    CO2 26 21 - 32 mmol/L    Anion gap 4 (L) 5 - 15 mmol/L    Glucose 95 65 - 100 mg/dL    BUN 24 (H) 6 - 20 mg/dL    Creatinine 1.20 (H) 0.55 - 1.02 mg/dL    BUN/Creatinine ratio 20 12 - 20      eGFR 49 (L) >60 ml/min/1.73m2    Calcium 8.4 (L) 8.5 - 10.1 mg/dL    Phosphorus 3.7 2.6 - 4.7 mg/dL    Albumin 2.0 (L) 3.5 - 5.0 g/dL         Results       ** No results found for the last 336 hours. **             Labs:     Recent Labs     04/29/23  0503   WBC 5.0   HGB 14.0   HCT 41.8          Recent Labs     05/01/23  0638 04/29/23  0503   * 132*  134*   K 3.4* 5.0  5.2*    106  106   CO2 26 22  22   BUN 24* 28*  30*   CREA 1.20* 1.54*  1.48*   GLU 95 100  97   CA 8.4* 8.6  8.2*   PHOS 3.7 3.9       Recent Labs     05/01/23  0638 04/29/23  0503   ALT  --  22   AP  --  77   TBILI  --  0.4   TP  --  5.7*   ALB 2.0* 2.1*  2.1*   GLOB  --  3.6       No results for input(s): INR, PTP, APTT, INREXT, INREXT in the last 72 hours. No results for input(s): FE, TIBC, PSAT, FERR in the last 72 hours. No results found for: FOL, RBCF   No results for input(s): PH, PCO2, PO2 in the last 72 hours. No results for input(s): CPK, CKNDX, TROIQ in the last 72 hours.     No lab exists for component: CPKMB  No results found for: CHOL, CHOLX, CHLST, CHOLV, HDL, HDLP, LDL, LDLC, DLDLP, TGLX, TRIGL, TRIGP, CHHD, CHHDX  No results found for: GLUCPOC  No results found for: COLOR, APPRN, SPGRU, REFSG, CHRIS, PROTU, GLUCU, KETU, BILU, UROU, DALTON, LEUKU, GLUKE, EPSU, BACTU, WBCU, RBCU, CASTS, UCRY      Assessment and Plan:       A-fib with rapid ventricular rate  Admit to telemetry   Eliquis 5 mg BID - HELD   Cardizem 60 mg 4x a day   ECHO shows EF 30-35% went down for cardiac cath today, but it was cancelled due to elevated creatinine, no BNP done today. Rate is controlled, chronic A fib   CHF/chronic heart failure with ejection fraction with 30 to 35%  Lasix 40 mg IV daily - HELD   BNP trending down   Alcohol dependency  Thiamine 365 mg daily  Folic acid 1 mg daily   Ativan 1 mg PO PRN   COPD  Duo-neb 3 mL q 6 hours   Symbicort 2 puffs daily   Hyponatremia   LUIS ANGEL  Per nephrology no IV fluids, avoid ACE and ARBs for now. Discontinue K Cl PO daily. Repeat labs   Hypokalemia (3.4)   Cough   Benzonatate 100 mg TID          Multivitamin 1 tab daily  Ativan 1 mg daily      seizure precautions and fall precautions     Repeat the labs in the morning    Follow-up with the cardiology and nephrology for further treatment plan  .   Replace  Replace K and possible cardiac cath                            Current Facility-Administered Medications:     dilTIAZem IR (CARDIZEM) tablet 60 mg, 60 mg, Oral, QID, Remigio Wahl MD, 60 mg at 05/01/23 8757    benzonatate (TESSALON) capsule 100 mg, 100 mg, Oral, TID, Tyrone Walton MD, 100 mg at 05/01/23 0809    albuterol-ipratropium (DUO-NEB) 2.5 MG-0.5 MG/3 ML, 3 mL, Nebulization, Q6H PRN, Remigio Wahl MD, 3 mL at 04/29/23 1932    multivitamin, tx-iron-ca-min (THERA-M w/ IRON) tablet 1 Tablet, 1 Tablet, Oral, DAILY, Remigio Wahl MD, 1 Tablet at 04/30/23 0856    polyethylene glycol (MIRALAX) packet 17 g, 17 g, Oral, DAILY PRN, Remigio Wahl MD    ondansetron (ZOFRAN ODT) tablet 4 mg, 4 mg, Oral, Q8H PRN, 4 mg at 04/26/23 1626 **OR** ondansetron (ZOFRAN) injection 4 mg, 4 mg, IntraVENous, Q6H PRN, Remigio Wahl MD    [Held by provider] apixaban (ELIQUIS) tablet 5 mg, 5 mg, Oral, BID, Remigio Wahl MD, 5 mg at 04/26/23 1249    [Held by provider] furosemide (LASIX) injection 40 mg, 40 mg, IntraVENous, DAILY, Remigio Wahl MD, 40 mg at 04/26/23 8971    budesonide-formoteroL (SYMBICORT) 160-4.5 mcg/actuation HFA inhaler 2 Puff, 2 Puff, Inhalation, BID, Remigio Wahl MD, 2 Puff at 04/30/23 2038    thiamine mononitrate (B-1) tablet 100 mg, 100 mg, Oral, DAILY, Remigio Wahl MD, 100 mg at 93/20/84 6942    folic acid (FOLVITE) tablet 1 mg, 1 mg, Oral, DAILY, Remigio Wahl MD, 1 mg at 05/01/23 0808    LORazepam (ATIVAN) tablet 1 mg, 1 mg, Oral, Q4H PRN, Adriano Barboza MD

## 2023-05-01 NOTE — PROGRESS NOTES
Problem: Falls - Risk of  Goal: *Absence of Falls  Description: Document Ifeanyi Wilson Fall Risk and appropriate interventions in the flowsheet. Outcome: Progressing Towards Goal  Note: Fall Risk Interventions:       Problem: Patient Education: Go to Patient Education Activity  Goal: Patient/Family Education  Outcome: Progressing Towards Goal     Problem: Falls - Risk of  Goal: *Absence of Falls  Description: Document Ifeanyi Wilson Fall Risk and appropriate interventions in the flowsheet.   Outcome: Progressing Towards Goal  Note: Fall Risk Interventions:          Problem: Patient Education: Go to Patient Education Activity  Goal: Patient/Family Education  Outcome: Progressing Towards Goal

## 2023-05-02 ENCOUNTER — ANESTHESIA EVENT (OUTPATIENT)
Dept: NON INVASIVE DIAGNOSTICS | Age: 68
DRG: 640 | End: 2023-05-02
Payer: MEDICARE

## 2023-05-02 ENCOUNTER — APPOINTMENT (OUTPATIENT)
Dept: NON INVASIVE DIAGNOSTICS | Age: 68
End: 2023-05-02
Attending: INTERNAL MEDICINE
Payer: MEDICARE

## 2023-05-02 ENCOUNTER — ANESTHESIA (OUTPATIENT)
Dept: NON INVASIVE DIAGNOSTICS | Age: 68
DRG: 640 | End: 2023-05-02
Payer: MEDICARE

## 2023-05-02 LAB
ALBUMIN SERPL-MCNC: 2.1 G/DL (ref 3.5–5)
ALBUMIN SERPL-MCNC: 2.2 G/DL (ref 3.5–5)
ALBUMIN/GLOB SERPL: 0.5 (ref 1.1–2.2)
ALP SERPL-CCNC: 91 U/L (ref 45–117)
ALT SERPL-CCNC: 32 U/L (ref 12–78)
ANION GAP SERPL CALC-SCNC: 4 MMOL/L (ref 5–15)
ANION GAP SERPL CALC-SCNC: 5 MMOL/L (ref 5–15)
AST SERPL W P-5'-P-CCNC: 32 U/L (ref 15–37)
BILIRUB SERPL-MCNC: 0.4 MG/DL (ref 0.2–1)
BUN SERPL-MCNC: 18 MG/DL (ref 6–20)
BUN SERPL-MCNC: 18 MG/DL (ref 6–20)
BUN/CREAT SERPL: 13 (ref 12–20)
BUN/CREAT SERPL: 13 (ref 12–20)
CA-I BLD-MCNC: 8.6 MG/DL (ref 8.5–10.1)
CA-I BLD-MCNC: 8.6 MG/DL (ref 8.5–10.1)
CHLORIDE SERPL-SCNC: 105 MMOL/L (ref 97–108)
CHLORIDE SERPL-SCNC: 105 MMOL/L (ref 97–108)
CO2 SERPL-SCNC: 27 MMOL/L (ref 21–32)
CO2 SERPL-SCNC: 28 MMOL/L (ref 21–32)
CREAT SERPL-MCNC: 1.35 MG/DL (ref 0.55–1.02)
CREAT SERPL-MCNC: 1.36 MG/DL (ref 0.55–1.02)
ERYTHROCYTE [DISTWIDTH] IN BLOOD BY AUTOMATED COUNT: 13.8 % (ref 11.5–14.5)
GLOBULIN SER CALC-MCNC: 4.1 G/DL (ref 2–4)
GLUCOSE SERPL-MCNC: 94 MG/DL (ref 65–100)
GLUCOSE SERPL-MCNC: 95 MG/DL (ref 65–100)
HCT VFR BLD AUTO: 44.4 % (ref 35–47)
HGB BLD-MCNC: 15.3 G/DL (ref 11.5–16)
MCH RBC QN AUTO: 35.5 PG (ref 26–34)
MCHC RBC AUTO-ENTMCNC: 34.5 G/DL (ref 30–36.5)
MCV RBC AUTO: 103 FL (ref 80–99)
NRBC # BLD: 0 K/UL (ref 0–0.01)
NRBC BLD-RTO: 0 PER 100 WBC
PHOSPHATE SERPL-MCNC: 3.3 MG/DL (ref 2.6–4.7)
PLATELET # BLD AUTO: 219 K/UL (ref 150–400)
PMV BLD AUTO: 9.3 FL (ref 8.9–12.9)
POTASSIUM SERPL-SCNC: 3.4 MMOL/L (ref 3.5–5.1)
POTASSIUM SERPL-SCNC: 3.4 MMOL/L (ref 3.5–5.1)
PROT SERPL-MCNC: 6.3 G/DL (ref 6.4–8.2)
RBC # BLD AUTO: 4.31 M/UL (ref 3.8–5.2)
SODIUM SERPL-SCNC: 137 MMOL/L (ref 136–145)
SODIUM SERPL-SCNC: 137 MMOL/L (ref 136–145)
WBC # BLD AUTO: 4.8 K/UL (ref 3.6–11)

## 2023-05-02 PROCEDURE — 94761 N-INVAS EAR/PLS OXIMETRY MLT: CPT

## 2023-05-02 PROCEDURE — 94640 AIRWAY INHALATION TREATMENT: CPT

## 2023-05-02 PROCEDURE — 80069 RENAL FUNCTION PANEL: CPT

## 2023-05-02 PROCEDURE — 36415 COLL VENOUS BLD VENIPUNCTURE: CPT

## 2023-05-02 PROCEDURE — 9990 CHARGE CONVERSION

## 2023-05-02 PROCEDURE — 74011000250 HC RX REV CODE- 250: Performed by: NURSE ANESTHETIST, CERTIFIED REGISTERED

## 2023-05-02 PROCEDURE — 85027 COMPLETE CBC AUTOMATED: CPT

## 2023-05-02 PROCEDURE — 80053 COMPREHEN METABOLIC PANEL: CPT

## 2023-05-02 PROCEDURE — 74011250636 HC RX REV CODE- 250/636: Performed by: NURSE ANESTHETIST, CERTIFIED REGISTERED

## 2023-05-02 PROCEDURE — 74011000250 HC RX REV CODE- 250: Performed by: INTERNAL MEDICINE

## 2023-05-02 PROCEDURE — 93325 DOPPLER ECHO COLOR FLOW MAPG: CPT

## 2023-05-02 PROCEDURE — 93320 DOPPLER ECHO COMPLETE: CPT

## 2023-05-02 PROCEDURE — 74011250636 HC RX REV CODE- 250/636

## 2023-05-02 PROCEDURE — 74011250637 HC RX REV CODE- 250/637: Performed by: INTERNAL MEDICINE

## 2023-05-02 PROCEDURE — 93005 ELECTROCARDIOGRAM TRACING: CPT

## 2023-05-02 PROCEDURE — 74011250636 HC RX REV CODE- 250/636: Performed by: INTERNAL MEDICINE

## 2023-05-02 PROCEDURE — 77010033678 HC OXYGEN DAILY

## 2023-05-02 PROCEDURE — 93312 ECHO TRANSESOPHAGEAL: CPT

## 2023-05-02 PROCEDURE — 74011250637 HC RX REV CODE- 250/637: Performed by: FAMILY MEDICINE

## 2023-05-02 PROCEDURE — 5A2204Z RESTORATION OF CARDIAC RHYTHM, SINGLE: ICD-10-PCS | Performed by: INTERNAL MEDICINE

## 2023-05-02 PROCEDURE — 65270000029 HC RM PRIVATE

## 2023-05-02 PROCEDURE — 92960 CARDIOVERSION ELECTRIC EXT: CPT

## 2023-05-02 RX ORDER — PHENYLEPHRINE HCL IN 0.9% NACL 1 MG/10 ML
SYRINGE (ML) INTRAVENOUS
Status: DISCONTINUED
Start: 2023-05-02 | End: 2023-05-02 | Stop reason: ALTCHOICE

## 2023-05-02 RX ORDER — LIDOCAINE HYDROCHLORIDE 20 MG/ML
INJECTION, SOLUTION EPIDURAL; INFILTRATION; INTRACAUDAL; PERINEURAL AS NEEDED
Status: DISCONTINUED | OUTPATIENT
Start: 2023-05-02 | End: 2023-05-02 | Stop reason: HOSPADM

## 2023-05-02 RX ORDER — SODIUM CHLORIDE 0.9 % (FLUSH) 0.9 %
5-40 SYRINGE (ML) INJECTION AS NEEDED
Status: DISCONTINUED | OUTPATIENT
Start: 2023-05-02 | End: 2023-05-06 | Stop reason: HOSPADM

## 2023-05-02 RX ORDER — ETOMIDATE 2 MG/ML
INJECTION INTRAVENOUS
Status: COMPLETED
Start: 2023-05-02 | End: 2023-05-02

## 2023-05-02 RX ORDER — ETOMIDATE 2 MG/ML
INJECTION INTRAVENOUS AS NEEDED
Status: DISCONTINUED | OUTPATIENT
Start: 2023-05-02 | End: 2023-05-02 | Stop reason: HOSPADM

## 2023-05-02 RX ORDER — POTASSIUM CHLORIDE 20 MEQ/1
40 TABLET, EXTENDED RELEASE ORAL
Status: COMPLETED | OUTPATIENT
Start: 2023-05-02 | End: 2023-05-02

## 2023-05-02 RX ORDER — SODIUM CHLORIDE 0.9 % (FLUSH) 0.9 %
5-40 SYRINGE (ML) INJECTION EVERY 8 HOURS
Status: DISCONTINUED | OUTPATIENT
Start: 2023-05-02 | End: 2023-05-06 | Stop reason: HOSPADM

## 2023-05-02 RX ORDER — SODIUM CHLORIDE, SODIUM LACTATE, POTASSIUM CHLORIDE, CALCIUM CHLORIDE 600; 310; 30; 20 MG/100ML; MG/100ML; MG/100ML; MG/100ML
INJECTION, SOLUTION INTRAVENOUS
Status: DISCONTINUED | OUTPATIENT
Start: 2023-05-02 | End: 2023-05-02 | Stop reason: HOSPADM

## 2023-05-02 RX ORDER — AMIODARONE HYDROCHLORIDE 150 MG/3ML
INJECTION, SOLUTION INTRAVENOUS
Status: COMPLETED
Start: 2023-05-02 | End: 2023-05-02

## 2023-05-02 RX ORDER — LIDOCAINE HYDROCHLORIDE 10 MG/ML
INJECTION, SOLUTION EPIDURAL; INFILTRATION; INTRACAUDAL; PERINEURAL
Status: DISPENSED
Start: 2023-05-02 | End: 2023-05-03

## 2023-05-02 RX ORDER — PROPOFOL 10 MG/ML
INJECTION, EMULSION INTRAVENOUS
Status: COMPLETED
Start: 2023-05-02 | End: 2023-05-02

## 2023-05-02 RX ORDER — PROPOFOL 10 MG/ML
INJECTION, EMULSION INTRAVENOUS AS NEEDED
Status: DISCONTINUED | OUTPATIENT
Start: 2023-05-02 | End: 2023-05-02 | Stop reason: HOSPADM

## 2023-05-02 RX ADMIN — SODIUM CHLORIDE, PRESERVATIVE FREE 10 ML: 5 INJECTION INTRAVENOUS at 21:21

## 2023-05-02 RX ADMIN — AMIODARONE HYDROCHLORIDE 0.5 MG/MIN: 1.8 INJECTION, SOLUTION INTRAVENOUS at 21:16

## 2023-05-02 RX ADMIN — LIDOCAINE HYDROCHLORIDE 5 ML: 20 INJECTION, SOLUTION EPIDURAL; INFILTRATION; INTRACAUDAL; PERINEURAL at 13:10

## 2023-05-02 RX ADMIN — BENZONATATE 100 MG: 100 CAPSULE ORAL at 14:46

## 2023-05-02 RX ADMIN — ETOMIDATE 2 MCG: 2 INJECTION INTRAVENOUS at 13:15

## 2023-05-02 RX ADMIN — MULTIPLE VITAMINS W/ MINERALS TAB 1 TABLET: TAB at 08:12

## 2023-05-02 RX ADMIN — SODIUM CHLORIDE, PRESERVATIVE FREE 10 ML: 5 INJECTION INTRAVENOUS at 14:47

## 2023-05-02 RX ADMIN — BENZONATATE 100 MG: 100 CAPSULE ORAL at 21:16

## 2023-05-02 RX ADMIN — SODIUM CHLORIDE, POTASSIUM CHLORIDE, SODIUM LACTATE AND CALCIUM CHLORIDE: 600; 310; 30; 20 INJECTION, SOLUTION INTRAVENOUS at 13:08

## 2023-05-02 RX ADMIN — AMIODARONE HYDROCHLORIDE 1 MG/MIN: 1.8 INJECTION, SOLUTION INTRAVENOUS at 15:11

## 2023-05-02 RX ADMIN — PROPOFOL 25 MG: 10 INJECTION, EMULSION INTRAVENOUS at 13:10

## 2023-05-02 RX ADMIN — PROPOFOL 25 MG: 10 INJECTION, EMULSION INTRAVENOUS at 13:21

## 2023-05-02 RX ADMIN — Medication 5 MG: at 21:16

## 2023-05-02 RX ADMIN — APIXABAN 5 MG: 5 TABLET, FILM COATED ORAL at 21:16

## 2023-05-02 RX ADMIN — BUDESONIDE AND FORMOTEROL FUMARATE DIHYDRATE 2 PUFF: 160; 4.5 AEROSOL RESPIRATORY (INHALATION) at 07:46

## 2023-05-02 RX ADMIN — ETOMIDATE 2 MCG: 2 INJECTION INTRAVENOUS at 13:20

## 2023-05-02 RX ADMIN — AMIODARONE HYDROCHLORIDE 150 MG: 1.5 INJECTION, SOLUTION INTRAVENOUS at 14:46

## 2023-05-02 RX ADMIN — THIAMINE HCL TAB 100 MG 100 MG: 100 TAB at 08:12

## 2023-05-02 RX ADMIN — FOLIC ACID 1 MG: 1 TABLET ORAL at 08:12

## 2023-05-02 RX ADMIN — BENZONATATE 100 MG: 100 CAPSULE ORAL at 08:12

## 2023-05-02 RX ADMIN — PROPOFOL 50 MG: 10 INJECTION, EMULSION INTRAVENOUS at 13:17

## 2023-05-02 RX ADMIN — POTASSIUM CHLORIDE 40 MEQ: 1500 TABLET, EXTENDED RELEASE ORAL at 16:47

## 2023-05-02 RX ADMIN — ETOMIDATE 2 MCG: 2 INJECTION INTRAVENOUS at 13:17

## 2023-05-02 RX ADMIN — BUDESONIDE AND FORMOTEROL FUMARATE DIHYDRATE 2 PUFF: 160; 4.5 AEROSOL RESPIRATORY (INHALATION) at 19:42

## 2023-05-02 RX ADMIN — ETOMIDATE 2 MCG: 2 INJECTION INTRAVENOUS at 13:12

## 2023-05-02 RX ADMIN — AMIODARONE HYDROCHLORIDE 150 MG: 50 INJECTION, SOLUTION INTRAVENOUS at 13:24

## 2023-05-02 NOTE — PROGRESS NOTES
2100 - Notified MD that cardizem drip running and oral dose is scheduled. Dr. Erick Storm instructed to hold oral dose. Patient NPO since midnight for scheduled SHEREE today. End of Shift Note    Bedside shift change report given to Frances Doss RN (oncoming nurse) by Yasmin Palomino RN (offgoing nurse). Report included the following information SBAR, MAR, Recent Results, and Cardiac Rhythm AFIB    Shift worked:  7p-7a   Shift summary and any significant changes:     See note above     Concerns for physician to address:  N/A     Zone phone for oncoming shift:          Activity:  Activity Level:  Up with Assistance  Number times ambulated in hallways past shift: 0  Number of times OOB to chair past shift: 1    Cardiac:   Cardiac Monitoring: Yes      Cardiac Rhythm: Atrial Fib    Access:  Current line(s): PIV     Genitourinary:   Urinary status: voiding and external catheter    Respiratory:   O2 Device: None (Room air)  Chronic home O2 use?: NO  Incentive spirometer at bedside: NO       GI:  Last Bowel Movement Date: 04/27/23  Current diet:  ADULT ORAL NUTRITION SUPPLEMENT Breakfast, Dinner; Standard High Calorie/High Protein  ADULT DIET Regular; No Salt Added (3-4 gm)  DIET NPO  Passing flatus: YES  Tolerating current diet: YES       Pain Management:   Patient states pain is manageable on current regimen: YES    Skin:  Barrie Score: 19  Interventions: PT/OT consult and internal/external urinary devices    Patient Safety:  Fall Score:    Interventions: bed/chair alarm, gripper socks, pt to call before getting OOB, and stay with me (per policy)       Length of Stay:  Expected LOS: 3d 21h  Actual LOS: 6      Yasmin Palomino RN

## 2023-05-03 LAB
ALBUMIN SERPL-MCNC: 2.1 G/DL (ref 3.5–5)
ANION GAP SERPL CALC-SCNC: 5 MMOL/L (ref 5–15)
BUN SERPL-MCNC: 22 MG/DL (ref 6–20)
BUN/CREAT SERPL: 17 (ref 12–20)
CA-I BLD-MCNC: 8.3 MG/DL (ref 8.5–10.1)
CHLORIDE SERPL-SCNC: 107 MMOL/L (ref 97–108)
CO2 SERPL-SCNC: 23 MMOL/L (ref 21–32)
CREAT SERPL-MCNC: 1.32 MG/DL (ref 0.55–1.02)
GLUCOSE SERPL-MCNC: 96 MG/DL (ref 65–100)
PHOSPHATE SERPL-MCNC: 3.8 MG/DL (ref 2.6–4.7)
POTASSIUM SERPL-SCNC: 3.8 MMOL/L (ref 3.5–5.1)
SODIUM SERPL-SCNC: 135 MMOL/L (ref 136–145)

## 2023-05-03 PROCEDURE — 36415 COLL VENOUS BLD VENIPUNCTURE: CPT

## 2023-05-03 PROCEDURE — 74011250636 HC RX REV CODE- 250/636: Performed by: FAMILY MEDICINE

## 2023-05-03 PROCEDURE — 74011250636 HC RX REV CODE- 250/636: Performed by: INTERNAL MEDICINE

## 2023-05-03 PROCEDURE — 80069 RENAL FUNCTION PANEL: CPT

## 2023-05-03 PROCEDURE — 94640 AIRWAY INHALATION TREATMENT: CPT

## 2023-05-03 PROCEDURE — 65270000029 HC RM PRIVATE

## 2023-05-03 PROCEDURE — 74011250637 HC RX REV CODE- 250/637: Performed by: FAMILY MEDICINE

## 2023-05-03 PROCEDURE — 74011250637 HC RX REV CODE- 250/637: Performed by: INTERNAL MEDICINE

## 2023-05-03 PROCEDURE — 9990 CHARGE CONVERSION

## 2023-05-03 PROCEDURE — 74011000250 HC RX REV CODE- 250: Performed by: INTERNAL MEDICINE

## 2023-05-03 RX ORDER — ACETAMINOPHEN 325 MG/1
650 TABLET ORAL
Status: DISCONTINUED | OUTPATIENT
Start: 2023-05-03 | End: 2023-05-06 | Stop reason: HOSPADM

## 2023-05-03 RX ADMIN — APIXABAN 5 MG: 5 TABLET, FILM COATED ORAL at 08:48

## 2023-05-03 RX ADMIN — ONDANSETRON 4 MG: 4 TABLET, ORALLY DISINTEGRATING ORAL at 16:44

## 2023-05-03 RX ADMIN — SODIUM CHLORIDE, PRESERVATIVE FREE 10 ML: 5 INJECTION INTRAVENOUS at 13:07

## 2023-05-03 RX ADMIN — AMIODARONE HYDROCHLORIDE 0.5 MG/MIN: 1.8 INJECTION, SOLUTION INTRAVENOUS at 07:39

## 2023-05-03 RX ADMIN — BUDESONIDE AND FORMOTEROL FUMARATE DIHYDRATE 2 PUFF: 160; 4.5 AEROSOL RESPIRATORY (INHALATION) at 08:27

## 2023-05-03 RX ADMIN — MULTIPLE VITAMINS W/ MINERALS TAB 1 TABLET: TAB at 08:48

## 2023-05-03 RX ADMIN — ACETAMINOPHEN 650 MG: 325 TABLET ORAL at 13:05

## 2023-05-03 RX ADMIN — FOLIC ACID 1 MG: 1 TABLET ORAL at 08:48

## 2023-05-03 RX ADMIN — AMIODARONE HYDROCHLORIDE 0.5 MG/MIN: 1.8 INJECTION, SOLUTION INTRAVENOUS at 18:00

## 2023-05-03 RX ADMIN — APIXABAN 5 MG: 5 TABLET, FILM COATED ORAL at 20:27

## 2023-05-03 RX ADMIN — SODIUM CHLORIDE, PRESERVATIVE FREE 10 ML: 5 INJECTION INTRAVENOUS at 05:36

## 2023-05-03 RX ADMIN — THIAMINE HCL TAB 100 MG 100 MG: 100 TAB at 08:48

## 2023-05-03 RX ADMIN — SODIUM CHLORIDE, PRESERVATIVE FREE 10 ML: 5 INJECTION INTRAVENOUS at 20:27

## 2023-05-03 RX ADMIN — BUDESONIDE AND FORMOTEROL FUMARATE DIHYDRATE 2 PUFF: 160; 4.5 AEROSOL RESPIRATORY (INHALATION) at 20:26

## 2023-05-03 RX ADMIN — BENZONATATE 100 MG: 100 CAPSULE ORAL at 20:27

## 2023-05-03 RX ADMIN — BENZONATATE 100 MG: 100 CAPSULE ORAL at 13:05

## 2023-05-03 RX ADMIN — Medication 5 MG: at 20:27

## 2023-05-03 RX ADMIN — BENZONATATE 100 MG: 100 CAPSULE ORAL at 08:48

## 2023-05-04 DIAGNOSIS — I48.0 PAROXYSMAL A-FIB (HCC): ICD-10-CM

## 2023-05-04 DIAGNOSIS — I50.811 ACUTE RIGHT-SIDED CONGESTIVE HEART FAILURE (HCC): Primary | ICD-10-CM

## 2023-05-04 LAB
ALBUMIN SERPL-MCNC: 2 G/DL (ref 3.5–5)
ALBUMIN SERPL-MCNC: 2 G/DL (ref 3.5–5)
ALBUMIN SERPL-MCNC: 2.2 G/DL (ref 3.5–5)
ALBUMIN/GLOB SERPL: 0.5 (ref 1.1–2.2)
ALBUMIN/GLOB SERPL: 0.5 (ref 1.1–2.2)
ALP SERPL-CCNC: 91 U/L (ref 45–117)
ALP SERPL-CCNC: 97 U/L (ref 45–117)
ALT SERPL-CCNC: 55 U/L (ref 12–78)
ALT SERPL-CCNC: 61 U/L (ref 12–78)
ANION GAP SERPL CALC-SCNC: 3 MMOL/L (ref 5–15)
ANION GAP SERPL CALC-SCNC: 7 MMOL/L (ref 5–15)
ANION GAP SERPL CALC-SCNC: 8 MMOL/L (ref 5–15)
AST SERPL W P-5'-P-CCNC: 63 U/L (ref 15–37)
AST SERPL W P-5'-P-CCNC: 69 U/L (ref 15–37)
BILIRUB SERPL-MCNC: 0.2 MG/DL (ref 0.2–1)
BILIRUB SERPL-MCNC: 0.3 MG/DL (ref 0.2–1)
BUN SERPL-MCNC: 23 MG/DL (ref 6–20)
BUN SERPL-MCNC: 23 MG/DL (ref 6–20)
BUN SERPL-MCNC: 24 MG/DL (ref 6–20)
BUN/CREAT SERPL: 19 (ref 12–20)
BUN/CREAT SERPL: 20 (ref 12–20)
BUN/CREAT SERPL: 20 (ref 12–20)
CA-I BLD-MCNC: 8.5 MG/DL (ref 8.5–10.1)
CA-I BLD-MCNC: 8.5 MG/DL (ref 8.5–10.1)
CA-I BLD-MCNC: 8.6 MG/DL (ref 8.5–10.1)
CHLORIDE SERPL-SCNC: 104 MMOL/L (ref 97–108)
CHLORIDE SERPL-SCNC: 105 MMOL/L (ref 97–108)
CHLORIDE SERPL-SCNC: 106 MMOL/L (ref 97–108)
CO2 SERPL-SCNC: 21 MMOL/L (ref 21–32)
CO2 SERPL-SCNC: 21 MMOL/L (ref 21–32)
CO2 SERPL-SCNC: 24 MMOL/L (ref 21–32)
CREAT SERPL-MCNC: 1.14 MG/DL (ref 0.55–1.02)
CREAT SERPL-MCNC: 1.17 MG/DL (ref 0.55–1.02)
CREAT SERPL-MCNC: 1.26 MG/DL (ref 0.55–1.02)
ERYTHROCYTE [DISTWIDTH] IN BLOOD BY AUTOMATED COUNT: 14.1 % (ref 11.5–14.5)
GLOBULIN SER CALC-MCNC: 4 G/DL (ref 2–4)
GLOBULIN SER CALC-MCNC: 4.4 G/DL (ref 2–4)
GLUCOSE SERPL-MCNC: 112 MG/DL (ref 65–100)
GLUCOSE SERPL-MCNC: 98 MG/DL (ref 65–100)
GLUCOSE SERPL-MCNC: 99 MG/DL (ref 65–100)
HCT VFR BLD AUTO: 42.1 % (ref 35–47)
HGB BLD-MCNC: 14.1 G/DL (ref 11.5–16)
MCH RBC QN AUTO: 35.3 PG (ref 26–34)
MCHC RBC AUTO-ENTMCNC: 33.5 G/DL (ref 30–36.5)
MCV RBC AUTO: 105.3 FL (ref 80–99)
NRBC # BLD: 0 K/UL (ref 0–0.01)
NRBC BLD-RTO: 0 PER 100 WBC
PHOSPHATE SERPL-MCNC: 4 MG/DL (ref 2.6–4.7)
PLATELET # BLD AUTO: 188 K/UL (ref 150–400)
PMV BLD AUTO: 9.4 FL (ref 8.9–12.9)
POTASSIUM SERPL-SCNC: 4.1 MMOL/L (ref 3.5–5.1)
POTASSIUM SERPL-SCNC: 4.2 MMOL/L (ref 3.5–5.1)
POTASSIUM SERPL-SCNC: 4.3 MMOL/L (ref 3.5–5.1)
PROT SERPL-MCNC: 6 G/DL (ref 6.4–8.2)
PROT SERPL-MCNC: 6.6 G/DL (ref 6.4–8.2)
RBC # BLD AUTO: 4 M/UL (ref 3.8–5.2)
SODIUM SERPL-SCNC: 131 MMOL/L (ref 136–145)
SODIUM SERPL-SCNC: 134 MMOL/L (ref 136–145)
SODIUM SERPL-SCNC: 134 MMOL/L (ref 136–145)
WBC # BLD AUTO: 5.5 K/UL (ref 3.6–11)

## 2023-05-04 PROCEDURE — 94761 N-INVAS EAR/PLS OXIMETRY MLT: CPT

## 2023-05-04 PROCEDURE — 36415 COLL VENOUS BLD VENIPUNCTURE: CPT

## 2023-05-04 PROCEDURE — 80069 RENAL FUNCTION PANEL: CPT

## 2023-05-04 PROCEDURE — 74011250637 HC RX REV CODE- 250/637: Performed by: INTERNAL MEDICINE

## 2023-05-04 PROCEDURE — 74011250636 HC RX REV CODE- 250/636: Performed by: INTERNAL MEDICINE

## 2023-05-04 PROCEDURE — 74011000250 HC RX REV CODE- 250: Performed by: INTERNAL MEDICINE

## 2023-05-04 PROCEDURE — 74011250637 HC RX REV CODE- 250/637: Performed by: FAMILY MEDICINE

## 2023-05-04 PROCEDURE — 85027 COMPLETE CBC AUTOMATED: CPT

## 2023-05-04 PROCEDURE — 9990 CHARGE CONVERSION

## 2023-05-04 PROCEDURE — 65270000029 HC RM PRIVATE

## 2023-05-04 PROCEDURE — 80053 COMPREHEN METABOLIC PANEL: CPT

## 2023-05-04 PROCEDURE — 77010033678 HC OXYGEN DAILY

## 2023-05-04 PROCEDURE — 94640 AIRWAY INHALATION TREATMENT: CPT

## 2023-05-04 RX ORDER — FUROSEMIDE 10 MG/ML
40 INJECTION INTRAMUSCULAR; INTRAVENOUS ONCE
Status: COMPLETED | OUTPATIENT
Start: 2023-05-04 | End: 2023-05-04

## 2023-05-04 RX ORDER — AMIODARONE HYDROCHLORIDE 200 MG/1
200 TABLET ORAL DAILY
Status: DISCONTINUED | OUTPATIENT
Start: 2023-05-05 | End: 2023-05-06 | Stop reason: HOSPADM

## 2023-05-04 RX ORDER — DILTIAZEM HYDROCHLORIDE 30 MG/1
30 TABLET, FILM COATED ORAL
Status: DISCONTINUED | OUTPATIENT
Start: 2023-05-04 | End: 2023-05-06 | Stop reason: HOSPADM

## 2023-05-04 RX ADMIN — SODIUM CHLORIDE, PRESERVATIVE FREE 10 ML: 5 INJECTION INTRAVENOUS at 20:25

## 2023-05-04 RX ADMIN — APIXABAN 5 MG: 5 TABLET, FILM COATED ORAL at 08:37

## 2023-05-04 RX ADMIN — SODIUM CHLORIDE, PRESERVATIVE FREE 10 ML: 5 INJECTION INTRAVENOUS at 13:39

## 2023-05-04 RX ADMIN — Medication 5 MG: at 20:23

## 2023-05-04 RX ADMIN — AMIODARONE HYDROCHLORIDE 0.5 MG/MIN: 1.8 INJECTION, SOLUTION INTRAVENOUS at 04:57

## 2023-05-04 RX ADMIN — BENZONATATE 100 MG: 100 CAPSULE ORAL at 13:39

## 2023-05-04 RX ADMIN — APIXABAN 5 MG: 5 TABLET, FILM COATED ORAL at 20:23

## 2023-05-04 RX ADMIN — SODIUM CHLORIDE, PRESERVATIVE FREE 10 ML: 5 INJECTION INTRAVENOUS at 05:00

## 2023-05-04 RX ADMIN — BUDESONIDE AND FORMOTEROL FUMARATE DIHYDRATE 2 PUFF: 160; 4.5 AEROSOL RESPIRATORY (INHALATION) at 09:00

## 2023-05-04 RX ADMIN — FUROSEMIDE 40 MG: 10 INJECTION, SOLUTION INTRAMUSCULAR; INTRAVENOUS at 22:49

## 2023-05-04 RX ADMIN — MULTIPLE VITAMINS W/ MINERALS TAB 1 TABLET: TAB at 08:36

## 2023-05-04 RX ADMIN — DILTIAZEM HYDROCHLORIDE 30 MG: 30 TABLET, FILM COATED ORAL at 17:08

## 2023-05-04 RX ADMIN — THIAMINE HCL TAB 100 MG 100 MG: 100 TAB at 08:36

## 2023-05-04 RX ADMIN — BUDESONIDE AND FORMOTEROL FUMARATE DIHYDRATE 2 PUFF: 160; 4.5 AEROSOL RESPIRATORY (INHALATION) at 20:53

## 2023-05-04 RX ADMIN — BENZONATATE 100 MG: 100 CAPSULE ORAL at 20:23

## 2023-05-04 RX ADMIN — BENZONATATE 100 MG: 100 CAPSULE ORAL at 08:37

## 2023-05-04 RX ADMIN — FOLIC ACID 1 MG: 1 TABLET ORAL at 08:37

## 2023-05-04 RX ADMIN — BUDESONIDE AND FORMOTEROL FUMARATE DIHYDRATE 2 PUFF: 160; 4.5 AEROSOL RESPIRATORY (INHALATION) at 07:48

## 2023-05-05 VITALS
HEIGHT: 63 IN | OXYGEN SATURATION: 98 % | HEART RATE: 100 BPM | DIASTOLIC BLOOD PRESSURE: 91 MMHG | BODY MASS INDEX: 18.52 KG/M2 | RESPIRATION RATE: 19 BRPM | TEMPERATURE: 98.1 F | SYSTOLIC BLOOD PRESSURE: 135 MMHG | WEIGHT: 104.5 LBS

## 2023-05-05 LAB
ALBUMIN SERPL-MCNC: 2.1 G/DL (ref 3.5–5)
ANION GAP SERPL CALC-SCNC: 4 MMOL/L (ref 5–15)
ATRIAL RATE: 110 BPM
ATRIAL RATE: 388 BPM
BUN SERPL-MCNC: 23 MG/DL (ref 6–20)
BUN/CREAT SERPL: 19 (ref 12–20)
CA-I BLD-MCNC: 8.6 MG/DL (ref 8.5–10.1)
CALCULATED R AXIS, ECG10: -55 DEGREES
CALCULATED R AXIS, ECG10: 35 DEGREES
CALCULATED T AXIS, ECG11: -117 DEGREES
CALCULATED T AXIS, ECG11: 130 DEGREES
CHLORIDE SERPL-SCNC: 104 MMOL/L (ref 97–108)
CO2 SERPL-SCNC: 25 MMOL/L (ref 21–32)
CREAT SERPL-MCNC: 1.23 MG/DL (ref 0.55–1.02)
DIAGNOSIS, 93000: NORMAL
DIAGNOSIS, 93000: NORMAL
GLUCOSE SERPL-MCNC: 95 MG/DL (ref 65–100)
PHOSPHATE SERPL-MCNC: 4.5 MG/DL (ref 2.6–4.7)
POTASSIUM SERPL-SCNC: 4.1 MMOL/L (ref 3.5–5.1)
Q-T INTERVAL, ECG07: 376 MS
Q-T INTERVAL, ECG07: 402 MS
QRS DURATION, ECG06: 66 MS
QRS DURATION, ECG06: 68 MS
QTC CALCULATION (BEZET), ECG08: 482 MS
QTC CALCULATION (BEZET), ECG08: 533 MS
SODIUM SERPL-SCNC: 133 MMOL/L (ref 136–145)
VENTRICULAR RATE, ECG03: 106 BPM
VENTRICULAR RATE, ECG03: 99 BPM

## 2023-05-05 PROCEDURE — 74011250637 HC RX REV CODE- 250/637: Performed by: INTERNAL MEDICINE

## 2023-05-05 PROCEDURE — 65270000029 HC RM PRIVATE

## 2023-05-05 PROCEDURE — 36415 COLL VENOUS BLD VENIPUNCTURE: CPT

## 2023-05-05 PROCEDURE — 9990 CHARGE CONVERSION

## 2023-05-05 PROCEDURE — 80069 RENAL FUNCTION PANEL: CPT

## 2023-05-05 PROCEDURE — 74011250637 HC RX REV CODE- 250/637: Performed by: FAMILY MEDICINE

## 2023-05-05 PROCEDURE — 94761 N-INVAS EAR/PLS OXIMETRY MLT: CPT

## 2023-05-05 PROCEDURE — 77010033678 HC OXYGEN DAILY

## 2023-05-05 PROCEDURE — 94640 AIRWAY INHALATION TREATMENT: CPT

## 2023-05-05 PROCEDURE — 74011000250 HC RX REV CODE- 250: Performed by: INTERNAL MEDICINE

## 2023-05-05 RX ORDER — BENZONATATE 100 MG/1
100 CAPSULE ORAL 3 TIMES DAILY
Status: DISCONTINUED | OUTPATIENT
Start: 2023-05-06 | End: 2023-05-13 | Stop reason: HOSPADM

## 2023-05-05 RX ORDER — ACETAMINOPHEN 325 MG/1
650 TABLET ORAL EVERY 6 HOURS PRN
Status: DISCONTINUED | OUTPATIENT
Start: 2023-05-05 | End: 2023-05-13 | Stop reason: HOSPADM

## 2023-05-05 RX ORDER — SODIUM CHLORIDE 0.9 % (FLUSH) 0.9 %
5-40 SYRINGE (ML) INJECTION PRN
Status: DISCONTINUED | OUTPATIENT
Start: 2023-05-06 | End: 2023-05-11 | Stop reason: SDUPTHER

## 2023-05-05 RX ORDER — GAUZE BANDAGE 2" X 2"
100 BANDAGE TOPICAL DAILY
Status: DISCONTINUED | OUTPATIENT
Start: 2023-05-06 | End: 2023-05-13 | Stop reason: HOSPADM

## 2023-05-05 RX ORDER — SODIUM CHLORIDE 0.9 % (FLUSH) 0.9 %
5-40 SYRINGE (ML) INJECTION EVERY 8 HOURS
Status: DISCONTINUED | OUTPATIENT
Start: 2023-05-06 | End: 2023-05-11 | Stop reason: SDUPTHER

## 2023-05-05 RX ORDER — ONDANSETRON 2 MG/ML
4 INJECTION INTRAMUSCULAR; INTRAVENOUS EVERY 6 HOURS PRN
Status: DISCONTINUED | OUTPATIENT
Start: 2023-05-06 | End: 2023-05-13 | Stop reason: HOSPADM

## 2023-05-05 RX ORDER — POLYETHYLENE GLYCOL 3350 17 G/17G
17 POWDER, FOR SOLUTION ORAL DAILY PRN
Status: DISCONTINUED | OUTPATIENT
Start: 2023-05-06 | End: 2023-05-13 | Stop reason: HOSPADM

## 2023-05-05 RX ORDER — THERA TABS 400 MCG
1 TAB ORAL DAILY
Status: DISCONTINUED | OUTPATIENT
Start: 2023-05-06 | End: 2023-05-06 | Stop reason: HOSPADM

## 2023-05-05 RX ORDER — IPRATROPIUM BROMIDE AND ALBUTEROL SULFATE 2.5; .5 MG/3ML; MG/3ML
1 SOLUTION RESPIRATORY (INHALATION) EVERY 6 HOURS PRN
Status: DISCONTINUED | OUTPATIENT
Start: 2023-05-06 | End: 2023-05-13 | Stop reason: HOSPADM

## 2023-05-05 RX ORDER — ONDANSETRON 4 MG/1
4 TABLET, ORALLY DISINTEGRATING ORAL EVERY 8 HOURS PRN
Status: DISCONTINUED | OUTPATIENT
Start: 2023-05-06 | End: 2023-05-13 | Stop reason: HOSPADM

## 2023-05-05 RX ORDER — LORAZEPAM 1 MG/1
1 TABLET ORAL EVERY 4 HOURS PRN
Status: DISCONTINUED | OUTPATIENT
Start: 2023-05-06 | End: 2023-05-13 | Stop reason: HOSPADM

## 2023-05-05 RX ORDER — MULTIVITAMIN WITH IRON
1 TABLET ORAL DAILY
Status: DISCONTINUED | OUTPATIENT
Start: 2023-05-06 | End: 2023-05-13 | Stop reason: HOSPADM

## 2023-05-05 RX ORDER — CHOLECALCIFEROL (VITAMIN D3) 125 MCG
5 CAPSULE ORAL NIGHTLY
Status: DISCONTINUED | OUTPATIENT
Start: 2023-05-06 | End: 2023-05-13 | Stop reason: HOSPADM

## 2023-05-05 RX ORDER — FUROSEMIDE 10 MG/ML
40 INJECTION INTRAMUSCULAR; INTRAVENOUS DAILY
Status: DISCONTINUED | OUTPATIENT
Start: 2023-05-06 | End: 2023-05-06

## 2023-05-05 RX ORDER — FOLIC ACID 1 MG/1
1 TABLET ORAL DAILY
Status: DISCONTINUED | OUTPATIENT
Start: 2023-05-06 | End: 2023-05-13 | Stop reason: HOSPADM

## 2023-05-05 RX ORDER — BUDESONIDE AND FORMOTEROL FUMARATE DIHYDRATE 160; 4.5 UG/1; UG/1
2 AEROSOL RESPIRATORY (INHALATION) 2 TIMES DAILY
Status: DISCONTINUED | OUTPATIENT
Start: 2023-05-06 | End: 2023-05-13 | Stop reason: HOSPADM

## 2023-05-05 RX ORDER — AMIODARONE HYDROCHLORIDE 200 MG/1
200 TABLET ORAL DAILY
Status: DISCONTINUED | OUTPATIENT
Start: 2023-05-06 | End: 2023-05-09

## 2023-05-05 RX ADMIN — AMIODARONE HYDROCHLORIDE 200 MG: 200 TABLET ORAL at 08:59

## 2023-05-05 RX ADMIN — DILTIAZEM HYDROCHLORIDE 30 MG: 30 TABLET, FILM COATED ORAL at 08:59

## 2023-05-05 RX ADMIN — DILTIAZEM HYDROCHLORIDE 30 MG: 30 TABLET, FILM COATED ORAL at 17:10

## 2023-05-05 RX ADMIN — SODIUM CHLORIDE, PRESERVATIVE FREE 10 ML: 5 INJECTION INTRAVENOUS at 22:25

## 2023-05-05 RX ADMIN — BENZONATATE 100 MG: 100 CAPSULE ORAL at 22:29

## 2023-05-05 RX ADMIN — MULTIPLE VITAMINS W/ MINERALS TAB 1 TABLET: TAB at 08:59

## 2023-05-05 RX ADMIN — BENZONATATE 100 MG: 100 CAPSULE ORAL at 08:59

## 2023-05-05 RX ADMIN — DILTIAZEM HYDROCHLORIDE 30 MG: 30 TABLET, FILM COATED ORAL at 12:19

## 2023-05-05 RX ADMIN — BUDESONIDE AND FORMOTEROL FUMARATE DIHYDRATE 2 PUFF: 160; 4.5 AEROSOL RESPIRATORY (INHALATION) at 19:52

## 2023-05-05 RX ADMIN — APIXABAN 5 MG: 5 TABLET, FILM COATED ORAL at 08:59

## 2023-05-05 RX ADMIN — FOLIC ACID 1 MG: 1 TABLET ORAL at 08:59

## 2023-05-05 RX ADMIN — APIXABAN 5 MG: 5 TABLET, FILM COATED ORAL at 22:23

## 2023-05-05 RX ADMIN — BUDESONIDE AND FORMOTEROL FUMARATE DIHYDRATE 2 PUFF: 160; 4.5 AEROSOL RESPIRATORY (INHALATION) at 07:48

## 2023-05-05 RX ADMIN — SODIUM CHLORIDE, PRESERVATIVE FREE 10 ML: 5 INJECTION INTRAVENOUS at 14:34

## 2023-05-05 RX ADMIN — Medication 5 MG: at 22:23

## 2023-05-05 RX ADMIN — BENZONATATE 100 MG: 100 CAPSULE ORAL at 17:12

## 2023-05-05 RX ADMIN — SODIUM CHLORIDE, PRESERVATIVE FREE 10 ML: 5 INJECTION INTRAVENOUS at 05:08

## 2023-05-05 RX ADMIN — THIAMINE HCL TAB 100 MG 100 MG: 100 TAB at 08:59

## 2023-05-06 LAB
ALBUMIN SERPL-MCNC: 2.1 G/DL (ref 3.5–5)
ANION GAP SERPL CALC-SCNC: 2 MMOL/L (ref 5–15)
BUN SERPL-MCNC: 26 MG/DL (ref 6–20)
BUN/CREAT SERPL: 21 (ref 12–20)
CA-I BLD-MCNC: 8.5 MG/DL (ref 8.5–10.1)
CHLORIDE SERPL-SCNC: 104 MMOL/L (ref 97–108)
CO2 SERPL-SCNC: 25 MMOL/L (ref 21–32)
CREAT SERPL-MCNC: 1.26 MG/DL (ref 0.55–1.02)
GLUCOSE SERPL-MCNC: 93 MG/DL (ref 65–100)
PHOSPHATE SERPL-MCNC: 4.9 MG/DL (ref 2.6–4.7)
POTASSIUM SERPL-SCNC: 4.1 MMOL/L (ref 3.5–5.1)
SODIUM SERPL-SCNC: 131 MMOL/L (ref 136–145)

## 2023-05-06 PROCEDURE — 6370000000 HC RX 637 (ALT 250 FOR IP)

## 2023-05-06 PROCEDURE — 36415 COLL VENOUS BLD VENIPUNCTURE: CPT

## 2023-05-06 PROCEDURE — 6370000000 HC RX 637 (ALT 250 FOR IP): Performed by: FAMILY MEDICINE

## 2023-05-06 PROCEDURE — 2580000003 HC RX 258: Performed by: FAMILY MEDICINE

## 2023-05-06 PROCEDURE — 94760 N-INVAS EAR/PLS OXIMETRY 1: CPT

## 2023-05-06 PROCEDURE — 2709999900 HC NON-CHARGEABLE SUPPLY

## 2023-05-06 PROCEDURE — 1100000000 HC RM PRIVATE

## 2023-05-06 PROCEDURE — 94640 AIRWAY INHALATION TREATMENT: CPT

## 2023-05-06 PROCEDURE — 76937 US GUIDE VASCULAR ACCESS: CPT

## 2023-05-06 PROCEDURE — 80069 RENAL FUNCTION PANEL: CPT

## 2023-05-06 PROCEDURE — C1751 CATH, INF, PER/CENT/MIDLINE: HCPCS

## 2023-05-06 PROCEDURE — 2700000000 HC OXYGEN THERAPY PER DAY

## 2023-05-06 RX ORDER — AMIODARONE HYDROCHLORIDE 200 MG/1
TABLET ORAL
Status: COMPLETED
Start: 2023-05-06 | End: 2023-05-06

## 2023-05-06 RX ORDER — BENZONATATE 100 MG/1
CAPSULE ORAL
Status: COMPLETED
Start: 2023-05-06 | End: 2023-05-06

## 2023-05-06 RX ORDER — CHOLECALCIFEROL (VITAMIN D3) 125 MCG
CAPSULE ORAL
Status: COMPLETED
Start: 2023-05-06 | End: 2023-05-06

## 2023-05-06 RX ORDER — FUROSEMIDE 10 MG/ML
INJECTION INTRAMUSCULAR; INTRAVENOUS
Status: DISPENSED
Start: 2023-05-06 | End: 2023-05-06

## 2023-05-06 RX ORDER — POLYETHYLENE GLYCOL 3350 17 G/17G
POWDER, FOR SOLUTION ORAL
Status: COMPLETED
Start: 2023-05-06 | End: 2023-05-06

## 2023-05-06 RX ORDER — GAUZE BANDAGE 2" X 2"
BANDAGE TOPICAL
Status: COMPLETED
Start: 2023-05-06 | End: 2023-05-06

## 2023-05-06 RX ORDER — MULTIVITAMIN WITH IRON
TABLET ORAL
Status: COMPLETED
Start: 2023-05-06 | End: 2023-05-06

## 2023-05-06 RX ORDER — FUROSEMIDE 40 MG/1
40 TABLET ORAL DAILY
Status: DISCONTINUED | OUTPATIENT
Start: 2023-05-06 | End: 2023-05-13 | Stop reason: HOSPADM

## 2023-05-06 RX ORDER — FOLIC ACID 1 MG/1
TABLET ORAL
Status: COMPLETED
Start: 2023-05-06 | End: 2023-05-06

## 2023-05-06 RX ORDER — LORAZEPAM 1 MG/1
TABLET ORAL
Status: COMPLETED
Start: 2023-05-06 | End: 2023-05-06

## 2023-05-06 RX ADMIN — BENZONATATE 100 MG: 100 CAPSULE ORAL at 09:08

## 2023-05-06 RX ADMIN — BENZONATATE 100 MG: 100 CAPSULE ORAL at 15:16

## 2023-05-06 RX ADMIN — APIXABAN 5 MG: 5 TABLET, FILM COATED ORAL at 09:12

## 2023-05-06 RX ADMIN — DILTIAZEM HYDROCHLORIDE 30 MG: 30 TABLET, FILM COATED ORAL at 16:53

## 2023-05-06 RX ADMIN — FUROSEMIDE 40 MG: 40 TABLET ORAL at 16:53

## 2023-05-06 RX ADMIN — THERA TABS 1 TABLET: TAB at 09:14

## 2023-05-06 RX ADMIN — SODIUM CHLORIDE, PRESERVATIVE FREE 5 ML: 5 INJECTION INTRAVENOUS at 09:15

## 2023-05-06 RX ADMIN — BENZONATATE 100 MG: 100 CAPSULE ORAL at 21:59

## 2023-05-06 RX ADMIN — LORAZEPAM 1 MG: 1 TABLET ORAL at 15:15

## 2023-05-06 RX ADMIN — BUDESONIDE AND FORMOTEROL FUMARATE DIHYDRATE 2 PUFF: 160; 4.5 AEROSOL RESPIRATORY (INHALATION) at 22:34

## 2023-05-06 RX ADMIN — Medication 5 MG: at 21:58

## 2023-05-06 RX ADMIN — POLYETHYLENE GLYCOL 3350 17 G: 17 POWDER, FOR SOLUTION ORAL at 13:44

## 2023-05-06 RX ADMIN — DILTIAZEM HYDROCHLORIDE 30 MG: 30 TABLET, FILM COATED ORAL at 13:43

## 2023-05-06 RX ADMIN — FOLIC ACID 1 MG: 1 TABLET ORAL at 09:10

## 2023-05-06 RX ADMIN — THIAMINE HCL TAB 100 MG 100 MG: 100 TAB at 09:12

## 2023-05-06 RX ADMIN — APIXABAN 5 MG: 5 TABLET, FILM COATED ORAL at 21:59

## 2023-05-06 RX ADMIN — AMIODARONE HYDROCHLORIDE 200 MG: 200 TABLET ORAL at 09:11

## 2023-05-06 RX ADMIN — DILTIAZEM HYDROCHLORIDE 30 MG: 30 TABLET, FILM COATED ORAL at 09:13

## 2023-05-06 RX ADMIN — BUDESONIDE AND FORMOTEROL FUMARATE DIHYDRATE 2 PUFF: 160; 4.5 AEROSOL RESPIRATORY (INHALATION) at 08:07

## 2023-05-06 ASSESSMENT — PAIN SCALES - GENERAL
PAINLEVEL_OUTOF10: 0

## 2023-05-07 PROCEDURE — 94760 N-INVAS EAR/PLS OXIMETRY 1: CPT

## 2023-05-07 PROCEDURE — 6370000000 HC RX 637 (ALT 250 FOR IP): Performed by: FAMILY MEDICINE

## 2023-05-07 PROCEDURE — 6370000000 HC RX 637 (ALT 250 FOR IP)

## 2023-05-07 PROCEDURE — 94640 AIRWAY INHALATION TREATMENT: CPT

## 2023-05-07 PROCEDURE — 6370000000 HC RX 637 (ALT 250 FOR IP): Performed by: INTERNAL MEDICINE

## 2023-05-07 PROCEDURE — 2709999900 HC NON-CHARGEABLE SUPPLY

## 2023-05-07 PROCEDURE — 1100000000 HC RM PRIVATE

## 2023-05-07 PROCEDURE — 76937 US GUIDE VASCULAR ACCESS: CPT

## 2023-05-07 RX ORDER — BENZONATATE 100 MG/1
CAPSULE ORAL
Status: COMPLETED
Start: 2023-05-07 | End: 2023-05-07

## 2023-05-07 RX ORDER — SORBITOL SOLUTION 70 %
30 SOLUTION, ORAL MISCELLANEOUS ONCE
Status: DISCONTINUED | OUTPATIENT
Start: 2023-05-07 | End: 2023-05-13 | Stop reason: HOSPADM

## 2023-05-07 RX ORDER — AMIODARONE HYDROCHLORIDE 200 MG/1
TABLET ORAL
Status: COMPLETED
Start: 2023-05-07 | End: 2023-05-07

## 2023-05-07 RX ORDER — GAUZE BANDAGE 2" X 2"
BANDAGE TOPICAL
Status: COMPLETED
Start: 2023-05-07 | End: 2023-05-07

## 2023-05-07 RX ORDER — FOLIC ACID 1 MG/1
TABLET ORAL
Status: COMPLETED
Start: 2023-05-07 | End: 2023-05-07

## 2023-05-07 RX ORDER — PRAVASTATIN SODIUM 20 MG
10 TABLET ORAL NIGHTLY
Status: DISCONTINUED | OUTPATIENT
Start: 2023-05-07 | End: 2023-05-13 | Stop reason: HOSPADM

## 2023-05-07 RX ORDER — SORBITOL SOLUTION 70 %
30 SOLUTION, ORAL MISCELLANEOUS DAILY PRN
Status: DISCONTINUED | OUTPATIENT
Start: 2023-05-07 | End: 2023-05-13 | Stop reason: HOSPADM

## 2023-05-07 RX ORDER — SENNOSIDES 8.6 MG
CAPSULE ORAL
Status: COMPLETED
Start: 2023-05-07 | End: 2023-05-07

## 2023-05-07 RX ADMIN — BENZONATATE 100 MG: 100 CAPSULE ORAL at 17:27

## 2023-05-07 RX ADMIN — FOLIC ACID 1 MG: 1 TABLET ORAL at 09:29

## 2023-05-07 RX ADMIN — APIXABAN 5 MG: 5 TABLET, FILM COATED ORAL at 09:29

## 2023-05-07 RX ADMIN — THIAMINE HCL TAB 100 MG 100 MG: 100 TAB at 09:30

## 2023-05-07 RX ADMIN — THERA TABS 1 TABLET: TAB at 09:28

## 2023-05-07 RX ADMIN — ACETAMINOPHEN 650 MG: 650 TABLET, FILM COATED, EXTENDED RELEASE ORAL at 11:28

## 2023-05-07 RX ADMIN — SORBITOL SOLUTION (BULK) 30 ML: 70 SOLUTION at 11:28

## 2023-05-07 RX ADMIN — BUDESONIDE AND FORMOTEROL FUMARATE DIHYDRATE 2 PUFF: 160; 4.5 AEROSOL RESPIRATORY (INHALATION) at 20:54

## 2023-05-07 RX ADMIN — AMIODARONE HYDROCHLORIDE 200 MG: 200 TABLET ORAL at 09:30

## 2023-05-07 RX ADMIN — APIXABAN 5 MG: 5 TABLET, FILM COATED ORAL at 20:31

## 2023-05-07 RX ADMIN — Medication 5 MG: at 20:31

## 2023-05-07 RX ADMIN — DILTIAZEM HYDROCHLORIDE 30 MG: 30 TABLET, FILM COATED ORAL at 09:27

## 2023-05-07 RX ADMIN — BENZONATATE 100 MG: 100 CAPSULE ORAL at 09:29

## 2023-05-07 RX ADMIN — DILTIAZEM HYDROCHLORIDE 30 MG: 30 TABLET, FILM COATED ORAL at 12:50

## 2023-05-07 RX ADMIN — FUROSEMIDE 40 MG: 40 TABLET ORAL at 09:28

## 2023-05-07 RX ADMIN — PRAVASTATIN SODIUM 10 MG: 20 TABLET ORAL at 20:31

## 2023-05-07 RX ADMIN — DILTIAZEM HYDROCHLORIDE 30 MG: 30 TABLET, FILM COATED ORAL at 17:27

## 2023-05-07 RX ADMIN — LORAZEPAM 1 MG: 1 TABLET ORAL at 20:12

## 2023-05-07 RX ADMIN — BENZONATATE 100 MG: 100 CAPSULE ORAL at 20:31

## 2023-05-07 ASSESSMENT — PAIN DESCRIPTION - LOCATION: LOCATION: HEAD

## 2023-05-07 ASSESSMENT — PAIN SCALES - GENERAL
PAINLEVEL_OUTOF10: 0
PAINLEVEL_OUTOF10: 0
PAINLEVEL_OUTOF10: 6

## 2023-05-08 ENCOUNTER — APPOINTMENT (OUTPATIENT)
Facility: HOSPITAL | Age: 68
DRG: 273 | End: 2023-05-08
Attending: FAMILY MEDICINE
Payer: MEDICARE

## 2023-05-08 ENCOUNTER — ANESTHESIA (OUTPATIENT)
Facility: HOSPITAL | Age: 68
DRG: 273 | End: 2023-05-08
Payer: MEDICARE

## 2023-05-08 ENCOUNTER — ANESTHESIA EVENT (OUTPATIENT)
Facility: HOSPITAL | Age: 68
DRG: 273 | End: 2023-05-08
Payer: MEDICARE

## 2023-05-08 LAB
ACT BLD: 323 SEC (ref 74–125)
ACT BLD: 329 SEC (ref 74–125)
ACT BLD: 348 SEC (ref 74–125)
ACT BLD: 413 SEC (ref 74–125)
ALBUMIN SERPL-MCNC: 2.1 G/DL (ref 3.5–5)
ANION GAP SERPL CALC-SCNC: 4 MMOL/L (ref 5–15)
BUN SERPL-MCNC: 22 MG/DL (ref 6–20)
BUN/CREAT SERPL: 19 (ref 12–20)
CA-I BLD-MCNC: 8.1 MG/DL (ref 8.5–10.1)
CHLORIDE SERPL-SCNC: 103 MMOL/L (ref 97–108)
CO2 SERPL-SCNC: 27 MMOL/L (ref 21–32)
CREAT SERPL-MCNC: 1.16 MG/DL (ref 0.55–1.02)
ECHO BSA: 1.45 M2
EKG ATRIAL RATE: 83 BPM
EKG DIAGNOSIS: NORMAL
EKG P AXIS: 84 DEGREES
EKG P-R INTERVAL: 180 MS
EKG Q-T INTERVAL: 410 MS
EKG QRS DURATION: 70 MS
EKG QTC CALCULATION (BAZETT): 481 MS
EKG R AXIS: 56 DEGREES
EKG T AXIS: 84 DEGREES
EKG VENTRICULAR RATE: 83 BPM
ERYTHROCYTE [DISTWIDTH] IN BLOOD BY AUTOMATED COUNT: 13.1 % (ref 11.5–14.5)
GLUCOSE SERPL-MCNC: 97 MG/DL (ref 65–100)
HCT VFR BLD AUTO: 40.1 % (ref 35–47)
HGB BLD-MCNC: 13.9 G/DL (ref 11.5–16)
MCH RBC QN AUTO: 36 PG (ref 26–34)
MCHC RBC AUTO-ENTMCNC: 34.7 G/DL (ref 30–36.5)
MCV RBC AUTO: 103.9 FL (ref 80–99)
NRBC # BLD: 0 K/UL (ref 0–0.01)
NRBC BLD-RTO: 0 PER 100 WBC
PERFORMED BY:: ABNORMAL
PHOSPHATE SERPL-MCNC: 4.1 MG/DL (ref 2.6–4.7)
PLATELET # BLD AUTO: 248 K/UL (ref 150–400)
PMV BLD AUTO: 10 FL (ref 8.9–12.9)
POTASSIUM SERPL-SCNC: 3.3 MMOL/L (ref 3.5–5.1)
RBC # BLD AUTO: 3.86 M/UL (ref 3.8–5.2)
SODIUM SERPL-SCNC: 134 MMOL/L (ref 136–145)
WBC # BLD AUTO: 6.7 K/UL (ref 3.6–11)

## 2023-05-08 PROCEDURE — 70490 CT SOFT TISSUE NECK W/O DYE: CPT

## 2023-05-08 PROCEDURE — 02573ZK DESTRUCTION OF LEFT ATRIAL APPENDAGE, PERCUTANEOUS APPROACH: ICD-10-PCS | Performed by: INTERNAL MEDICINE

## 2023-05-08 PROCEDURE — C1769 GUIDE WIRE: HCPCS | Performed by: INTERNAL MEDICINE

## 2023-05-08 PROCEDURE — 76937 US GUIDE VASCULAR ACCESS: CPT | Performed by: INTERNAL MEDICINE

## 2023-05-08 PROCEDURE — C1893 INTRO/SHEATH, FIXED,NON-PEEL: HCPCS | Performed by: INTERNAL MEDICINE

## 2023-05-08 PROCEDURE — C1732 CATH, EP, DIAG/ABL, 3D/VECT: HCPCS | Performed by: INTERNAL MEDICINE

## 2023-05-08 PROCEDURE — C1760 CLOSURE DEV, VASC: HCPCS | Performed by: INTERNAL MEDICINE

## 2023-05-08 PROCEDURE — 2500000003 HC RX 250 WO HCPCS: Performed by: NURSE ANESTHETIST, CERTIFIED REGISTERED

## 2023-05-08 PROCEDURE — C1759 CATH, INTRA ECHOCARDIOGRAPHY: HCPCS | Performed by: INTERNAL MEDICINE

## 2023-05-08 PROCEDURE — 7100000001 HC PACU RECOVERY - ADDTL 15 MIN: Performed by: INTERNAL MEDICINE

## 2023-05-08 PROCEDURE — 3700000001 HC ADD 15 MINUTES (ANESTHESIA): Performed by: INTERNAL MEDICINE

## 2023-05-08 PROCEDURE — 93657 TX L/R ATRIAL FIB ADDL: CPT | Performed by: INTERNAL MEDICINE

## 2023-05-08 PROCEDURE — 7100000010 HC PHASE II RECOVERY - FIRST 15 MIN: Performed by: INTERNAL MEDICINE

## 2023-05-08 PROCEDURE — C1730 CATH, EP, 19 OR FEW ELECT: HCPCS | Performed by: INTERNAL MEDICINE

## 2023-05-08 PROCEDURE — 6370000000 HC RX 637 (ALT 250 FOR IP): Performed by: INTERNAL MEDICINE

## 2023-05-08 PROCEDURE — 6370000000 HC RX 637 (ALT 250 FOR IP): Performed by: FAMILY MEDICINE

## 2023-05-08 PROCEDURE — 2580000003 HC RX 258: Performed by: INTERNAL MEDICINE

## 2023-05-08 PROCEDURE — 1100000000 HC RM PRIVATE

## 2023-05-08 PROCEDURE — 93656 COMPRE EP EVAL ABLTJ ATR FIB: CPT | Performed by: INTERNAL MEDICINE

## 2023-05-08 PROCEDURE — 6360000002 HC RX W HCPCS: Performed by: NURSE ANESTHETIST, CERTIFIED REGISTERED

## 2023-05-08 PROCEDURE — 94640 AIRWAY INHALATION TREATMENT: CPT

## 2023-05-08 PROCEDURE — 2580000003 HC RX 258: Performed by: FAMILY MEDICINE

## 2023-05-08 PROCEDURE — 93005 ELECTROCARDIOGRAM TRACING: CPT | Performed by: INTERNAL MEDICINE

## 2023-05-08 PROCEDURE — 36415 COLL VENOUS BLD VENIPUNCTURE: CPT

## 2023-05-08 PROCEDURE — 2580000003 HC RX 258: Performed by: ANESTHESIOLOGY

## 2023-05-08 PROCEDURE — 80069 RENAL FUNCTION PANEL: CPT

## 2023-05-08 PROCEDURE — C1894 INTRO/SHEATH, NON-LASER: HCPCS | Performed by: INTERNAL MEDICINE

## 2023-05-08 PROCEDURE — 2580000003 HC RX 258: Performed by: NURSE ANESTHETIST, CERTIFIED REGISTERED

## 2023-05-08 PROCEDURE — 3700000000 HC ANESTHESIA ATTENDED CARE: Performed by: INTERNAL MEDICINE

## 2023-05-08 PROCEDURE — 2720000010 HC SURG SUPPLY STERILE: Performed by: INTERNAL MEDICINE

## 2023-05-08 PROCEDURE — 71250 CT THORAX DX C-: CPT

## 2023-05-08 PROCEDURE — 85027 COMPLETE CBC AUTOMATED: CPT

## 2023-05-08 PROCEDURE — 7100000000 HC PACU RECOVERY - FIRST 15 MIN: Performed by: INTERNAL MEDICINE

## 2023-05-08 PROCEDURE — 2709999900 HC NON-CHARGEABLE SUPPLY: Performed by: INTERNAL MEDICINE

## 2023-05-08 PROCEDURE — C1766 INTRO/SHEATH,STRBLE,NON-PEEL: HCPCS | Performed by: INTERNAL MEDICINE

## 2023-05-08 PROCEDURE — 85347 COAGULATION TIME ACTIVATED: CPT

## 2023-05-08 RX ORDER — DEXAMETHASONE SODIUM PHOSPHATE 4 MG/ML
INJECTION, SOLUTION INTRA-ARTICULAR; INTRALESIONAL; INTRAMUSCULAR; INTRAVENOUS; SOFT TISSUE PRN
Status: DISCONTINUED | OUTPATIENT
Start: 2023-05-08 | End: 2023-05-08 | Stop reason: SDUPTHER

## 2023-05-08 RX ORDER — IPRATROPIUM BROMIDE AND ALBUTEROL SULFATE 2.5; .5 MG/3ML; MG/3ML
1 SOLUTION RESPIRATORY (INHALATION)
Status: DISCONTINUED | OUTPATIENT
Start: 2023-05-08 | End: 2023-05-08 | Stop reason: HOSPADM

## 2023-05-08 RX ORDER — SODIUM CHLORIDE 9 MG/ML
INJECTION, SOLUTION INTRAVENOUS PRN
Status: DISCONTINUED | OUTPATIENT
Start: 2023-05-08 | End: 2023-05-13 | Stop reason: HOSPADM

## 2023-05-08 RX ORDER — LORAZEPAM 2 MG/ML
0.5 INJECTION INTRAMUSCULAR
Status: DISCONTINUED | OUTPATIENT
Start: 2023-05-08 | End: 2023-05-08 | Stop reason: HOSPADM

## 2023-05-08 RX ORDER — SODIUM CHLORIDE, SODIUM LACTATE, POTASSIUM CHLORIDE, CALCIUM CHLORIDE 600; 310; 30; 20 MG/100ML; MG/100ML; MG/100ML; MG/100ML
INJECTION, SOLUTION INTRAVENOUS CONTINUOUS PRN
Status: DISCONTINUED | OUTPATIENT
Start: 2023-05-08 | End: 2023-05-08 | Stop reason: SDUPTHER

## 2023-05-08 RX ORDER — ROCURONIUM BROMIDE 10 MG/ML
INJECTION, SOLUTION INTRAVENOUS PRN
Status: DISCONTINUED | OUTPATIENT
Start: 2023-05-08 | End: 2023-05-08 | Stop reason: SDUPTHER

## 2023-05-08 RX ORDER — OXYCODONE HYDROCHLORIDE 5 MG/1
10 TABLET ORAL PRN
Status: DISCONTINUED | OUTPATIENT
Start: 2023-05-08 | End: 2023-05-08 | Stop reason: HOSPADM

## 2023-05-08 RX ORDER — METOCLOPRAMIDE HYDROCHLORIDE 5 MG/ML
10 INJECTION INTRAMUSCULAR; INTRAVENOUS
Status: DISCONTINUED | OUTPATIENT
Start: 2023-05-08 | End: 2023-05-08 | Stop reason: HOSPADM

## 2023-05-08 RX ORDER — SODIUM CHLORIDE 0.9 % (FLUSH) 0.9 %
5-40 SYRINGE (ML) INJECTION PRN
Status: DISCONTINUED | OUTPATIENT
Start: 2023-05-08 | End: 2023-05-13 | Stop reason: HOSPADM

## 2023-05-08 RX ORDER — FENTANYL CITRATE 50 UG/ML
50 INJECTION, SOLUTION INTRAMUSCULAR; INTRAVENOUS EVERY 5 MIN PRN
Status: DISCONTINUED | OUTPATIENT
Start: 2023-05-08 | End: 2023-05-08 | Stop reason: HOSPADM

## 2023-05-08 RX ORDER — HYDROMORPHONE HYDROCHLORIDE 1 MG/ML
0.5 INJECTION, SOLUTION INTRAMUSCULAR; INTRAVENOUS; SUBCUTANEOUS EVERY 5 MIN PRN
Status: DISCONTINUED | OUTPATIENT
Start: 2023-05-08 | End: 2023-05-08 | Stop reason: HOSPADM

## 2023-05-08 RX ORDER — SODIUM CHLORIDE 9 MG/ML
INJECTION, SOLUTION INTRAVENOUS PRN
Status: DISCONTINUED | OUTPATIENT
Start: 2023-05-08 | End: 2023-05-08 | Stop reason: HOSPADM

## 2023-05-08 RX ORDER — SODIUM CHLORIDE 0.9 % (FLUSH) 0.9 %
5-40 SYRINGE (ML) INJECTION EVERY 12 HOURS SCHEDULED
Status: DISCONTINUED | OUTPATIENT
Start: 2023-05-08 | End: 2023-05-13 | Stop reason: HOSPADM

## 2023-05-08 RX ORDER — SUCCINYLCHOLINE/SOD CL,ISO/PF 200MG/10ML
SYRINGE (ML) INTRAVENOUS PRN
Status: DISCONTINUED | OUTPATIENT
Start: 2023-05-08 | End: 2023-05-08 | Stop reason: SDUPTHER

## 2023-05-08 RX ORDER — ACETAMINOPHEN 325 MG/1
650 TABLET ORAL EVERY 4 HOURS PRN
Status: DISCONTINUED | OUTPATIENT
Start: 2023-05-08 | End: 2023-05-13 | Stop reason: HOSPADM

## 2023-05-08 RX ORDER — HYDRALAZINE HYDROCHLORIDE 20 MG/ML
10 INJECTION INTRAMUSCULAR; INTRAVENOUS
Status: DISCONTINUED | OUTPATIENT
Start: 2023-05-08 | End: 2023-05-08 | Stop reason: HOSPADM

## 2023-05-08 RX ORDER — FENTANYL CITRATE 50 UG/ML
INJECTION, SOLUTION INTRAMUSCULAR; INTRAVENOUS PRN
Status: DISCONTINUED | OUTPATIENT
Start: 2023-05-08 | End: 2023-05-08 | Stop reason: SDUPTHER

## 2023-05-08 RX ORDER — MIDAZOLAM HYDROCHLORIDE 1 MG/ML
INJECTION INTRAMUSCULAR; INTRAVENOUS PRN
Status: DISCONTINUED | OUTPATIENT
Start: 2023-05-08 | End: 2023-05-08 | Stop reason: SDUPTHER

## 2023-05-08 RX ORDER — SODIUM CHLORIDE 0.9 % (FLUSH) 0.9 %
5-40 SYRINGE (ML) INJECTION EVERY 12 HOURS SCHEDULED
Status: DISCONTINUED | OUTPATIENT
Start: 2023-05-08 | End: 2023-05-08 | Stop reason: HOSPADM

## 2023-05-08 RX ORDER — SODIUM CHLORIDE, SODIUM LACTATE, POTASSIUM CHLORIDE, CALCIUM CHLORIDE 600; 310; 30; 20 MG/100ML; MG/100ML; MG/100ML; MG/100ML
INJECTION, SOLUTION INTRAVENOUS CONTINUOUS
Status: DISCONTINUED | OUTPATIENT
Start: 2023-05-08 | End: 2023-05-09

## 2023-05-08 RX ORDER — HEPARIN SODIUM 1000 [USP'U]/ML
INJECTION, SOLUTION INTRAVENOUS; SUBCUTANEOUS PRN
Status: DISCONTINUED | OUTPATIENT
Start: 2023-05-08 | End: 2023-05-08 | Stop reason: SDUPTHER

## 2023-05-08 RX ORDER — HEPARIN SODIUM 10000 [USP'U]/100ML
INJECTION, SOLUTION INTRAVENOUS CONTINUOUS PRN
Status: DISCONTINUED | OUTPATIENT
Start: 2023-05-08 | End: 2023-05-08 | Stop reason: SDUPTHER

## 2023-05-08 RX ORDER — PROPOFOL 10 MG/ML
INJECTION, EMULSION INTRAVENOUS PRN
Status: DISCONTINUED | OUTPATIENT
Start: 2023-05-08 | End: 2023-05-08 | Stop reason: SDUPTHER

## 2023-05-08 RX ORDER — ONDANSETRON 2 MG/ML
INJECTION INTRAMUSCULAR; INTRAVENOUS PRN
Status: DISCONTINUED | OUTPATIENT
Start: 2023-05-08 | End: 2023-05-08 | Stop reason: SDUPTHER

## 2023-05-08 RX ORDER — LIDOCAINE HYDROCHLORIDE 10 MG/ML
INJECTION, SOLUTION INFILTRATION; PERINEURAL PRN
Status: DISCONTINUED | OUTPATIENT
Start: 2023-05-08 | End: 2023-05-08 | Stop reason: SDUPTHER

## 2023-05-08 RX ORDER — OXYCODONE HYDROCHLORIDE 5 MG/1
5 TABLET ORAL PRN
Status: DISCONTINUED | OUTPATIENT
Start: 2023-05-08 | End: 2023-05-08 | Stop reason: HOSPADM

## 2023-05-08 RX ORDER — ETOMIDATE 2 MG/ML
INJECTION INTRAVENOUS PRN
Status: DISCONTINUED | OUTPATIENT
Start: 2023-05-08 | End: 2023-05-08 | Stop reason: SDUPTHER

## 2023-05-08 RX ORDER — MEPERIDINE HYDROCHLORIDE 25 MG/ML
12.5 INJECTION INTRAMUSCULAR; INTRAVENOUS; SUBCUTANEOUS EVERY 5 MIN PRN
Status: DISCONTINUED | OUTPATIENT
Start: 2023-05-08 | End: 2023-05-08 | Stop reason: HOSPADM

## 2023-05-08 RX ORDER — LABETALOL HYDROCHLORIDE 5 MG/ML
10 INJECTION, SOLUTION INTRAVENOUS
Status: DISCONTINUED | OUTPATIENT
Start: 2023-05-08 | End: 2023-05-08 | Stop reason: HOSPADM

## 2023-05-08 RX ORDER — HYDROCODONE BITARTRATE AND ACETAMINOPHEN 5; 325 MG/1; MG/1
1 TABLET ORAL ONCE
Status: COMPLETED | OUTPATIENT
Start: 2023-05-08 | End: 2023-05-08

## 2023-05-08 RX ORDER — DIPHENHYDRAMINE HYDROCHLORIDE 50 MG/ML
12.5 INJECTION INTRAMUSCULAR; INTRAVENOUS
Status: DISCONTINUED | OUTPATIENT
Start: 2023-05-08 | End: 2023-05-08 | Stop reason: HOSPADM

## 2023-05-08 RX ORDER — SODIUM CHLORIDE 0.9 % (FLUSH) 0.9 %
5-40 SYRINGE (ML) INJECTION PRN
Status: DISCONTINUED | OUTPATIENT
Start: 2023-05-08 | End: 2023-05-08 | Stop reason: HOSPADM

## 2023-05-08 RX ORDER — LIDOCAINE HYDROCHLORIDE 10 MG/ML
INJECTION, SOLUTION INFILTRATION; PERINEURAL
Status: COMPLETED
Start: 2023-05-08 | End: 2023-05-08

## 2023-05-08 RX ORDER — ONDANSETRON 2 MG/ML
4 INJECTION INTRAMUSCULAR; INTRAVENOUS
Status: DISCONTINUED | OUTPATIENT
Start: 2023-05-08 | End: 2023-05-08 | Stop reason: HOSPADM

## 2023-05-08 RX ADMIN — SODIUM CHLORIDE, PRESERVATIVE FREE 10 ML: 5 INJECTION INTRAVENOUS at 18:13

## 2023-05-08 RX ADMIN — ETOMIDATE INJECTION 10 MG: 2 SOLUTION INTRAVENOUS at 09:06

## 2023-05-08 RX ADMIN — APIXABAN 5 MG: 5 TABLET, FILM COATED ORAL at 13:37

## 2023-05-08 RX ADMIN — HYDROCODONE BITARTRATE AND ACETAMINOPHEN 1 TABLET: 5; 325 TABLET ORAL at 21:33

## 2023-05-08 RX ADMIN — HEPARIN SODIUM 6000 UNITS: 1000 INJECTION, SOLUTION INTRAVENOUS; SUBCUTANEOUS at 09:22

## 2023-05-08 RX ADMIN — ROCURONIUM BROMIDE 5 MG: 10 INJECTION, SOLUTION INTRAVENOUS at 09:06

## 2023-05-08 RX ADMIN — THERA TABS 1 TABLET: TAB at 13:37

## 2023-05-08 RX ADMIN — DILTIAZEM HYDROCHLORIDE 30 MG: 30 TABLET, FILM COATED ORAL at 13:41

## 2023-05-08 RX ADMIN — THIAMINE HCL TAB 100 MG 100 MG: 100 TAB at 13:41

## 2023-05-08 RX ADMIN — FUROSEMIDE 40 MG: 40 TABLET ORAL at 13:37

## 2023-05-08 RX ADMIN — FENTANYL CITRATE 25 MCG: 50 INJECTION, SOLUTION INTRAMUSCULAR; INTRAVENOUS at 09:16

## 2023-05-08 RX ADMIN — DEXAMETHASONE SODIUM PHOSPHATE 4 MG: 4 INJECTION, SOLUTION INTRA-ARTICULAR; INTRALESIONAL; INTRAMUSCULAR; INTRAVENOUS; SOFT TISSUE at 09:22

## 2023-05-08 RX ADMIN — FOLIC ACID 1 MG: 1 TABLET ORAL at 13:37

## 2023-05-08 RX ADMIN — BUDESONIDE AND FORMOTEROL FUMARATE DIHYDRATE 2 PUFF: 160; 4.5 AEROSOL RESPIRATORY (INHALATION) at 20:09

## 2023-05-08 RX ADMIN — FENTANYL CITRATE 25 MCG: 50 INJECTION, SOLUTION INTRAMUSCULAR; INTRAVENOUS at 09:06

## 2023-05-08 RX ADMIN — SODIUM CHLORIDE, POTASSIUM CHLORIDE, SODIUM LACTATE AND CALCIUM CHLORIDE: 600; 310; 30; 20 INJECTION, SOLUTION INTRAVENOUS at 13:41

## 2023-05-08 RX ADMIN — SODIUM CHLORIDE, PRESERVATIVE FREE 10 ML: 5 INJECTION INTRAVENOUS at 05:58

## 2023-05-08 RX ADMIN — SODIUM CHLORIDE, POTASSIUM CHLORIDE, SODIUM LACTATE AND CALCIUM CHLORIDE: 600; 310; 30; 20 INJECTION, SOLUTION INTRAVENOUS at 08:39

## 2023-05-08 RX ADMIN — PROPOFOL 30 MG: 10 INJECTION, EMULSION INTRAVENOUS at 09:06

## 2023-05-08 RX ADMIN — HEPARIN SODIUM 20 UNITS/KG/HR: 10000 INJECTION, SOLUTION INTRAVENOUS at 09:46

## 2023-05-08 RX ADMIN — Medication 100 MG: at 09:06

## 2023-05-08 RX ADMIN — ROCURONIUM BROMIDE 25 MG: 10 INJECTION, SOLUTION INTRAVENOUS at 09:11

## 2023-05-08 RX ADMIN — AMIODARONE HYDROCHLORIDE 200 MG: 200 TABLET ORAL at 13:37

## 2023-05-08 RX ADMIN — SODIUM CHLORIDE, PRESERVATIVE FREE 10 ML: 5 INJECTION INTRAVENOUS at 21:36

## 2023-05-08 RX ADMIN — HEPARIN SODIUM 3000 UNITS: 1000 INJECTION, SOLUTION INTRAVENOUS; SUBCUTANEOUS at 09:41

## 2023-05-08 RX ADMIN — BENZONATATE 100 MG: 100 CAPSULE ORAL at 21:33

## 2023-05-08 RX ADMIN — LIDOCAINE HYDROCHLORIDE 5 MG: 10 INJECTION, SOLUTION INFILTRATION; PERINEURAL at 09:06

## 2023-05-08 RX ADMIN — ONDANSETRON 4 MG: 2 INJECTION INTRAMUSCULAR; INTRAVENOUS at 09:22

## 2023-05-08 RX ADMIN — PRAVASTATIN SODIUM 10 MG: 20 TABLET ORAL at 21:33

## 2023-05-08 RX ADMIN — MIDAZOLAM HYDROCHLORIDE 1 MG: 2 INJECTION, SOLUTION INTRAMUSCULAR; INTRAVENOUS at 08:52

## 2023-05-08 RX ADMIN — Medication 5 MG: at 21:33

## 2023-05-08 RX ADMIN — MIDAZOLAM HYDROCHLORIDE 1 MG: 2 INJECTION, SOLUTION INTRAMUSCULAR; INTRAVENOUS at 08:39

## 2023-05-08 RX ADMIN — BENZONATATE 100 MG: 100 CAPSULE ORAL at 13:37

## 2023-05-08 RX ADMIN — APIXABAN 5 MG: 5 TABLET, FILM COATED ORAL at 21:33

## 2023-05-08 RX ADMIN — HEPARIN SODIUM 3000 UNITS: 1000 INJECTION, SOLUTION INTRAVENOUS; SUBCUTANEOUS at 10:29

## 2023-05-08 ASSESSMENT — PAIN SCALES - GENERAL
PAINLEVEL_OUTOF10: 0
PAINLEVEL_OUTOF10: 4

## 2023-05-08 ASSESSMENT — PAIN SCALES - WONG BAKER: WONGBAKER_NUMERICALRESPONSE: 0

## 2023-05-08 ASSESSMENT — ENCOUNTER SYMPTOMS: SHORTNESS OF BREATH: 1

## 2023-05-08 ASSESSMENT — PAIN DESCRIPTION - LOCATION: LOCATION: INCISION

## 2023-05-08 NOTE — PROCEDURES
Ultrasound-guided Peripheral IV (PIV) Placement    Vascular Access Team Consult Note: received consult for PIV replacement prior to ablation procedure on Monday morning, all client medications changed to PO per primary care nurse, Edison Wharton. Client has had several PIV and IV attempts since admission over 12 days. Nephrology declined PICC placement per nursing reports due to elevated renal functions at this time. Ultrasound-guided PIV Placement: see electronic health record flowsheet and [facility]/assessments for additional procedure and vascular device details. 20 g 1.75 inch PIV placement with US-guidance x 1 attempt in right lateral ibhion-zi-kis forearm vein. Brisk blood return noted and flushed easily with 10 mL NS. Dressed with skin barrier prep wipe and transparent Tegaderm dressing, tape utilized to secure saline lock IV extension. Needle free connector and alcohol swab end cap utilized. Of note, client has right mid-forearm lateral wound, client reported \"skin tear,\" and dressing previously removed since last IV assessment. Area dried with thick scab. Multiple areas of scattered scars and ecchymosis noted bilateral arms. Client reported history of previously breaking right arm this evening during vein assessment. Client tolerated well, requested PRN Ativan prior to procedure start. Positioning assistance required x 1 staff member, primary care nurse, Tamiko Cevallos, to keep client from pulling arm back. Client tolerated procedure with frequent distraction and therapeutic breathing techniques with frequent reminders to breath. No client complaints or discomfort reported after placement. Client continues to benefit from US-guided PIV placement due to difficult IV placement, limited suitable veins for cannulation, vein depth, and/or small vein diameter. Primary care nurse, Tamiko Cevallos, notified at bedside.      Pebbles Zambrano RN

## 2023-05-08 NOTE — ANESTHESIA PRE PROCEDURE
Department of Anesthesiology  Preprocedure Note       Name:  Rema Arango   Age:  70 y.o.  :  1955                                          MRN:  [id number]         Date:  2023      Surgeon: Chanelle Ahn):  Chavo Gibson MD    Procedure: Procedure(s):  Ablation A-fib w complete ep study  Ep 3d mapping  Ablation following A-fib addl  Ep cardioversion    Medications prior to admission:   Prior to Admission medications    Medication Sig Start Date End Date Taking?  Authorizing Provider   nebivolol (BYSTOLIC) 10 MG tablet Take 1 tablet by mouth daily  Patient not taking: Reported on 2023    Ar Automatic Reconciliation       Current medications:    Current Facility-Administered Medications   Medication Dose Route Frequency Provider Last Rate Last Admin    sodium chloride flush 0.9 % injection 5-40 mL  5-40 mL IntraVENous 2 times per day Alejandro Alamo MD        sodium chloride flush 0.9 % injection 5-40 mL  5-40 mL IntraVENous PRN Alejandro Alamo MD        0.9 % sodium chloride infusion   IntraVENous PRN Alejandro Alamo MD        acetaminophen (TYLENOL) tablet 650 mg  650 mg Oral Q4H PRN Alejandro Alamo MD        sorbitol 70 % liquid 30 mL  30 mL Oral Daily PRN Sruthi Claudio MD   30 mL at 23 1128    pravastatin (PRAVACHOL) tablet 10 mg  10 mg Oral Nightly [employer]rian[employer] GABRIEL Jerez MD   10 mg at 23    sorbitol 70 % liquid 30 mL  30 mL Oral Once Tierra Costello MD        furosemide (LASIX) tablet 40 mg  40 mg Oral Daily Sruthi Claudio MD   40 mg at 23 9781    acetaminophen (TYLENOL) tablet 650 mg  650 mg Oral Q6H PRN Tierra Costello MD        ipratropium-albuterol (DUONEB) nebulizer solution 1 ampule  1 ampule Inhalation Q6H PRN Tierra Costello MD        amiodarone (CORDARONE) tablet 200 mg  200 mg Oral Daily Sruthi Claudio MD   200 mg at 23 0930    apixaban (ELIQUIS) tablet 5 mg  5 mg Oral BID Tierra Costello MD   5 mg at 23    benzonatate

## 2023-05-08 NOTE — CONSULTS
[address] S/P ABLATION WITH EP STUDY THIS AM.     PT. LYING IN BED, NO DISTRESS. O2 VIA NC @ 2 L/M. PT REPORTS PAINFUL SWELLING UNDER HER CHIN. UNABLE TO SWALLOW AND DIFFICULTY SPEAKING.   0/E OVAL SWELLING IN ANTERIOR NECK. CONGESTED AND HYPEREMIC. TENDER TO TOUCH. O2 SAT 96-97% ON O2 2 L/M NC. I REVIEW ANESTHESIA NOTE SPOKE WITH MD RIA. AND DISCUSSED STAT CT NECK AND CHEST TO RULE OUT MASS EFFECT ON THE AIRWAY. I WILL AWAIT CT RESULTS.

## 2023-05-08 NOTE — DISCHARGE INSTRUCTIONS
Procedure name: You had an atrial fibrillation ablation with Dr. Gunner Hernandez on 05/08/23. Activity restrictions for the next 5 days:    - No lifting greater than 10 pounds  - Do no strain or participate in vigorous activity  - Do not sit in a bathtub, hot tub, or go into a swimming pool. Driving Instructions:    - No driving for 24 hours after your procedure    Symptom management - What to expect:    - Soreness or tenderness at the site that may last for several weeks. - Bruising at the site that may take 2-3 weeks to go away. - A small lump or bump (dime to quarter size), which may last up to 6 weeks. Please let us know if you have new or increasing pain at the groin site. - For MINOR pain: You may take acetaminophen (Tylenol®) 325 mg tablets every 4-6 hours. You may place an ice pack or warm pack over the site for 20 minutes every 2 hours. Gently wipe the site after you remove the pack if it is wet. - If you are prescribed colchicine (an anti-inflammatory) after your ablation, please be aware this could cause loose stools. If you develop loose stools, please decrease your dose to 1 tablet daily. If loose stools continue after you reduce your dose, then you may discontinue this medication. What you may feel after the ablation:    - In the first 3-6 months after ablation it is not uncommon to experience transient recurrence of atrial fibrillation. [facility] if these episodes last longer than 24 hours or if they are causing bothersome symptoms.  - For several days to weeks following the ablation it is not uncommon for patients to feel new palpitations, sense of heart beat irregularly, and/or as though the arrhythmia is \"trying\" to recur. This usually resolves with time, as the inflammation and irritation improve from procedure. - Please let us know if your symptoms are increasingly bothersome or persistent. Wound care:    - You may shower 24 hours after the procedure.   - Remove

## 2023-05-08 NOTE — CARE COORDINATION
Chart reviewed, patient current DCP is to return home self care however CM awaiting PT/OT reccs to determine further DCP needs. CM continues to follow and monitor for needs.

## 2023-05-08 NOTE — PERIOP NOTE
TRANSFER - OUT REPORT:    Verbal/bedside  report given to [employer] (name) on Viri Kennedy  being transferred to [facility](unit) for routine post-op       Report consisted of patients Situation, Background, Assessment and   Recommendations(SBAR). Information from the following report(s) Surgery Report was reviewed with the receiving nurse. post EP     Opportunity for questions and clarification was provided. Patient transported with:   Registered Nurse    Lines:  Help Text      This SmartLink retrieves the last documented value for LDA assessment data, retrieved by either LDA type or by LDA group ID. This SmartLink should be used in a SmartText or SmartPhrase. If one is not available, please contact your .

## 2023-05-09 ENCOUNTER — APPOINTMENT (OUTPATIENT)
Facility: HOSPITAL | Age: 68
DRG: 273 | End: 2023-05-09
Attending: FAMILY MEDICINE
Payer: MEDICARE

## 2023-05-09 PROCEDURE — 0W9B30Z DRAINAGE OF LEFT PLEURAL CAVITY WITH DRAINAGE DEVICE, PERCUTANEOUS APPROACH: ICD-10-PCS | Performed by: INTERNAL MEDICINE

## 2023-05-09 PROCEDURE — 71046 X-RAY EXAM CHEST 2 VIEWS: CPT

## 2023-05-09 PROCEDURE — 97530 THERAPEUTIC ACTIVITIES: CPT

## 2023-05-09 PROCEDURE — 6370000000 HC RX 637 (ALT 250 FOR IP): Performed by: INTERNAL MEDICINE

## 2023-05-09 PROCEDURE — 94640 AIRWAY INHALATION TREATMENT: CPT

## 2023-05-09 PROCEDURE — 94761 N-INVAS EAR/PLS OXIMETRY MLT: CPT

## 2023-05-09 PROCEDURE — 1100000000 HC RM PRIVATE

## 2023-05-09 PROCEDURE — 6370000000 HC RX 637 (ALT 250 FOR IP): Performed by: FAMILY MEDICINE

## 2023-05-09 PROCEDURE — C1729 CATH, DRAINAGE: HCPCS

## 2023-05-09 PROCEDURE — 97165 OT EVAL LOW COMPLEX 30 MIN: CPT

## 2023-05-09 PROCEDURE — 97161 PT EVAL LOW COMPLEX 20 MIN: CPT

## 2023-05-09 PROCEDURE — 71045 X-RAY EXAM CHEST 1 VIEW: CPT

## 2023-05-09 PROCEDURE — 2580000003 HC RX 258: Performed by: INTERNAL MEDICINE

## 2023-05-09 PROCEDURE — 2580000003 HC RX 258: Performed by: FAMILY MEDICINE

## 2023-05-09 PROCEDURE — 2700000000 HC OXYGEN THERAPY PER DAY

## 2023-05-09 RX ORDER — HYDROCODONE BITARTRATE AND ACETAMINOPHEN 5; 325 MG/1; MG/1
1 TABLET ORAL EVERY 6 HOURS PRN
Status: DISCONTINUED | OUTPATIENT
Start: 2023-05-09 | End: 2023-05-13 | Stop reason: HOSPADM

## 2023-05-09 RX ORDER — AMIODARONE HYDROCHLORIDE 200 MG/1
200 TABLET ORAL 2 TIMES DAILY
Status: DISCONTINUED | OUTPATIENT
Start: 2023-05-09 | End: 2023-05-13 | Stop reason: HOSPADM

## 2023-05-09 RX ADMIN — FOLIC ACID 1 MG: 1 TABLET ORAL at 08:36

## 2023-05-09 RX ADMIN — DILTIAZEM HYDROCHLORIDE 30 MG: 30 TABLET, FILM COATED ORAL at 16:42

## 2023-05-09 RX ADMIN — DILTIAZEM HYDROCHLORIDE 30 MG: 30 TABLET, FILM COATED ORAL at 08:39

## 2023-05-09 RX ADMIN — THERA TABS 1 TABLET: TAB at 08:36

## 2023-05-09 RX ADMIN — FUROSEMIDE 40 MG: 40 TABLET ORAL at 08:36

## 2023-05-09 RX ADMIN — SODIUM CHLORIDE, PRESERVATIVE FREE 10 ML: 5 INJECTION INTRAVENOUS at 16:40

## 2023-05-09 RX ADMIN — SODIUM CHLORIDE, PRESERVATIVE FREE 10 ML: 5 INJECTION INTRAVENOUS at 09:08

## 2023-05-09 RX ADMIN — AMIODARONE HYDROCHLORIDE 200 MG: 200 TABLET ORAL at 21:21

## 2023-05-09 RX ADMIN — HYDROCODONE BITARTRATE AND ACETAMINOPHEN 1 TABLET: 5; 325 TABLET ORAL at 21:21

## 2023-05-09 RX ADMIN — BUDESONIDE AND FORMOTEROL FUMARATE DIHYDRATE 2 PUFF: 160; 4.5 AEROSOL RESPIRATORY (INHALATION) at 21:28

## 2023-05-09 RX ADMIN — BENZONATATE 100 MG: 100 CAPSULE ORAL at 08:36

## 2023-05-09 RX ADMIN — AMIODARONE HYDROCHLORIDE 200 MG: 200 TABLET ORAL at 08:43

## 2023-05-09 RX ADMIN — DILTIAZEM HYDROCHLORIDE 30 MG: 30 TABLET, FILM COATED ORAL at 11:17

## 2023-05-09 RX ADMIN — PRAVASTATIN SODIUM 10 MG: 20 TABLET ORAL at 21:21

## 2023-05-09 RX ADMIN — SODIUM CHLORIDE, PRESERVATIVE FREE 10 ML: 5 INJECTION INTRAVENOUS at 21:21

## 2023-05-09 RX ADMIN — Medication 5 MG: at 21:21

## 2023-05-09 RX ADMIN — ACETAMINOPHEN 650 MG: 325 TABLET ORAL at 16:42

## 2023-05-09 RX ADMIN — THIAMINE HCL TAB 100 MG 100 MG: 100 TAB at 08:39

## 2023-05-09 RX ADMIN — BENZONATATE 100 MG: 100 CAPSULE ORAL at 21:21

## 2023-05-09 RX ADMIN — HYDROCODONE BITARTRATE AND ACETAMINOPHEN 1 TABLET: 5; 325 TABLET ORAL at 11:17

## 2023-05-09 RX ADMIN — BENZONATATE 100 MG: 100 CAPSULE ORAL at 16:42

## 2023-05-09 ASSESSMENT — PAIN DESCRIPTION - DESCRIPTORS: DESCRIPTORS: ACHING

## 2023-05-09 ASSESSMENT — PAIN DESCRIPTION - LOCATION
LOCATION: FLANK
LOCATION: HEAD

## 2023-05-09 ASSESSMENT — PAIN SCALES - GENERAL
PAINLEVEL_OUTOF10: 4
PAINLEVEL_OUTOF10: 8
PAINLEVEL_OUTOF10: 4
PAINLEVEL_OUTOF10: 7
PAINLEVEL_OUTOF10: 5

## 2023-05-09 ASSESSMENT — PAIN DESCRIPTION - ORIENTATION: ORIENTATION: LEFT

## 2023-05-09 ASSESSMENT — PAIN - FUNCTIONAL ASSESSMENT: PAIN_FUNCTIONAL_ASSESSMENT: ACTIVITIES ARE NOT PREVENTED

## 2023-05-10 LAB
ALBUMIN SERPL-MCNC: 1.9 G/DL (ref 3.5–5)
ALBUMIN/GLOB SERPL: 0.5 (ref 1.1–2.2)
ALP SERPL-CCNC: 75 U/L (ref 45–117)
ALT SERPL-CCNC: 23 U/L (ref 12–78)
ANION GAP SERPL CALC-SCNC: 5 MMOL/L (ref 5–15)
AST SERPL W P-5'-P-CCNC: 22 U/L (ref 15–37)
BILIRUB SERPL-MCNC: 0.3 MG/DL (ref 0.2–1)
BUN SERPL-MCNC: 23 MG/DL (ref 6–20)
BUN/CREAT SERPL: 17 (ref 12–20)
CA-I BLD-MCNC: 7.8 MG/DL (ref 8.5–10.1)
CHLORIDE SERPL-SCNC: 101 MMOL/L (ref 97–108)
CO2 SERPL-SCNC: 28 MMOL/L (ref 21–32)
CREAT SERPL-MCNC: 1.34 MG/DL (ref 0.55–1.02)
ERYTHROCYTE [DISTWIDTH] IN BLOOD BY AUTOMATED COUNT: 12.9 % (ref 11.5–14.5)
GLOBULIN SER CALC-MCNC: 3.7 G/DL (ref 2–4)
GLUCOSE SERPL-MCNC: 101 MG/DL (ref 65–100)
HCT VFR BLD AUTO: 35.6 % (ref 35–47)
HGB BLD-MCNC: 12 G/DL (ref 11.5–16)
MCH RBC QN AUTO: 35 PG (ref 26–34)
MCHC RBC AUTO-ENTMCNC: 33.7 G/DL (ref 30–36.5)
MCV RBC AUTO: 103.8 FL (ref 80–99)
NRBC # BLD: 0 K/UL (ref 0–0.01)
NRBC BLD-RTO: 0 PER 100 WBC
PLATELET # BLD AUTO: 226 K/UL (ref 150–400)
PMV BLD AUTO: 9.9 FL (ref 8.9–12.9)
POTASSIUM SERPL-SCNC: 3.3 MMOL/L (ref 3.5–5.1)
PROT SERPL-MCNC: 5.6 G/DL (ref 6.4–8.2)
RBC # BLD AUTO: 3.43 M/UL (ref 3.8–5.2)
SODIUM SERPL-SCNC: 134 MMOL/L (ref 136–145)
WBC # BLD AUTO: 5.5 K/UL (ref 3.6–11)

## 2023-05-10 PROCEDURE — 85027 COMPLETE CBC AUTOMATED: CPT

## 2023-05-10 PROCEDURE — 2580000003 HC RX 258: Performed by: FAMILY MEDICINE

## 2023-05-10 PROCEDURE — 2580000003 HC RX 258: Performed by: INTERNAL MEDICINE

## 2023-05-10 PROCEDURE — 94761 N-INVAS EAR/PLS OXIMETRY MLT: CPT

## 2023-05-10 PROCEDURE — 2700000000 HC OXYGEN THERAPY PER DAY

## 2023-05-10 PROCEDURE — 36415 COLL VENOUS BLD VENIPUNCTURE: CPT

## 2023-05-10 PROCEDURE — 80053 COMPREHEN METABOLIC PANEL: CPT

## 2023-05-10 PROCEDURE — 6370000000 HC RX 637 (ALT 250 FOR IP): Performed by: INTERNAL MEDICINE

## 2023-05-10 PROCEDURE — 6370000000 HC RX 637 (ALT 250 FOR IP): Performed by: FAMILY MEDICINE

## 2023-05-10 PROCEDURE — 94640 AIRWAY INHALATION TREATMENT: CPT

## 2023-05-10 PROCEDURE — 1100000000 HC RM PRIVATE

## 2023-05-10 RX ORDER — POTASSIUM CHLORIDE 750 MG/1
40 TABLET, FILM COATED, EXTENDED RELEASE ORAL ONCE
Status: COMPLETED | OUTPATIENT
Start: 2023-05-10 | End: 2023-05-10

## 2023-05-10 RX ADMIN — DILTIAZEM HYDROCHLORIDE 30 MG: 30 TABLET, FILM COATED ORAL at 15:05

## 2023-05-10 RX ADMIN — THIAMINE HCL TAB 100 MG 100 MG: 100 TAB at 08:58

## 2023-05-10 RX ADMIN — BUDESONIDE AND FORMOTEROL FUMARATE DIHYDRATE 2 PUFF: 160; 4.5 AEROSOL RESPIRATORY (INHALATION) at 09:01

## 2023-05-10 RX ADMIN — POTASSIUM CHLORIDE 40 MEQ: 750 TABLET, EXTENDED RELEASE ORAL at 12:56

## 2023-05-10 RX ADMIN — SODIUM CHLORIDE, PRESERVATIVE FREE 10 ML: 5 INJECTION INTRAVENOUS at 22:02

## 2023-05-10 RX ADMIN — BENZONATATE 100 MG: 100 CAPSULE ORAL at 08:59

## 2023-05-10 RX ADMIN — THERA TABS 1 TABLET: TAB at 08:57

## 2023-05-10 RX ADMIN — HYDROCODONE BITARTRATE AND ACETAMINOPHEN 1 TABLET: 5; 325 TABLET ORAL at 20:59

## 2023-05-10 RX ADMIN — AMIODARONE HYDROCHLORIDE 200 MG: 200 TABLET ORAL at 20:58

## 2023-05-10 RX ADMIN — HYDROCODONE BITARTRATE AND ACETAMINOPHEN 1 TABLET: 5; 325 TABLET ORAL at 12:56

## 2023-05-10 RX ADMIN — PRAVASTATIN SODIUM 10 MG: 20 TABLET ORAL at 20:58

## 2023-05-10 RX ADMIN — SODIUM CHLORIDE, PRESERVATIVE FREE 10 ML: 5 INJECTION INTRAVENOUS at 07:19

## 2023-05-10 RX ADMIN — BENZONATATE 100 MG: 100 CAPSULE ORAL at 20:58

## 2023-05-10 RX ADMIN — FOLIC ACID 1 MG: 1 TABLET ORAL at 08:58

## 2023-05-10 RX ADMIN — FUROSEMIDE 40 MG: 40 TABLET ORAL at 08:58

## 2023-05-10 RX ADMIN — DILTIAZEM HYDROCHLORIDE 30 MG: 30 TABLET, FILM COATED ORAL at 08:58

## 2023-05-10 RX ADMIN — SODIUM CHLORIDE, PRESERVATIVE FREE 5 ML: 5 INJECTION INTRAVENOUS at 08:58

## 2023-05-10 RX ADMIN — SODIUM CHLORIDE, PRESERVATIVE FREE 5 ML: 5 INJECTION INTRAVENOUS at 15:06

## 2023-05-10 RX ADMIN — SODIUM CHLORIDE, PRESERVATIVE FREE 10 ML: 5 INJECTION INTRAVENOUS at 23:49

## 2023-05-10 RX ADMIN — Medication 5 MG: at 20:58

## 2023-05-10 RX ADMIN — BENZONATATE 100 MG: 100 CAPSULE ORAL at 15:05

## 2023-05-10 RX ADMIN — AMIODARONE HYDROCHLORIDE 200 MG: 200 TABLET ORAL at 08:58

## 2023-05-10 RX ADMIN — APIXABAN 5 MG: 5 TABLET, FILM COATED ORAL at 20:59

## 2023-05-10 RX ADMIN — BUDESONIDE AND FORMOTEROL FUMARATE DIHYDRATE 2 PUFF: 160; 4.5 AEROSOL RESPIRATORY (INHALATION) at 19:53

## 2023-05-10 ASSESSMENT — PAIN SCALES - GENERAL
PAINLEVEL_OUTOF10: 2
PAINLEVEL_OUTOF10: 5
PAINLEVEL_OUTOF10: 0
PAINLEVEL_OUTOF10: 7
PAINLEVEL_OUTOF10: 0

## 2023-05-10 ASSESSMENT — PAIN DESCRIPTION - LOCATION
LOCATION: NECK
LOCATION: OTHER (COMMENT)

## 2023-05-10 ASSESSMENT — PAIN SCALES - WONG BAKER
WONGBAKER_NUMERICALRESPONSE: 0
WONGBAKER_NUMERICALRESPONSE: 2

## 2023-05-10 ASSESSMENT — PAIN DESCRIPTION - ORIENTATION: ORIENTATION: UPPER

## 2023-05-10 ASSESSMENT — PAIN DESCRIPTION - DESCRIPTORS
DESCRIPTORS: ACHING
DESCRIPTORS: ACHING

## 2023-05-10 NOTE — FLOWSHEET NOTE
Comprehensive Nutrition Assessment    Type and Reason for Visit:  Reassess (goal)    Nutrition Recommendations/Plan:   Continue current diet  Restart Ensure 2x/day  Continue MVI, thiamine, folic acid  Monitor and record PO intakes, supplement acceptance, and Bms in I/Os     Malnutrition Assessment:  Malnutrition Status:  Mild malnutrition (05/10/23 1336)    Context:  Acute Illness     Findings of the 6 clinical characteristics of malnutrition:  Energy Intake:  75% or less of estimated energy requirements for 7 or more days  Weight Loss:  No significant weight loss     Body Fat Loss:  Unable to assess     Muscle Mass Loss:  Unable to assess    Fluid Accumulation:  No significant fluid accumulation     Strength:  Not Performed    Nutrition Assessment:    Admitted for atrial fibrillation, CHF. Screened for low BMI, weight stable since admit x4 months ago. Pt endorses poor appetite/intake. Plan to add ONS to help meet needs. (5/3) S/p cardiac cath, LEIGH. Pt continues with fair appetite/intakes, plan to continue ONS. Weight gain likely r/t worsening edema. (5/10) s/p thoracentesis, 830mL removed. s/p ablation. Intakes remain ~50% - plan to continue ONS. Labs: Na 134, K 3.3, BUN 23., Creat 1.34, Gluc 101. Meds: folic acid, furosemide, KCl, pravastatin, thiamine    Nutrition Related Findings:    NFPE deferred. No n/v, d/c, or problems chewing/swallowing. No edema. BM 5/7. Wound Type: None       Current Nutrition Intake & Therapies:    Average Meal Intake: 26-50%  Average Supplements Intake: 26-50%  ADULT DIET; Regular; Low Fat/Low Chol/High Fiber/2 gm Na    Anthropometric Measures:  Height: 160 cm (5' 2.99\")  Ideal Body Weight (IBW): 115 lbs (52 kg)    Current Body Weight: 106 lb 4.2 oz (48.2 kg), 92.4 % IBW.  Weight Source: Bed Scale  Current BMI (kg/m2): 18.8    Estimated Daily Nutrient Needs:  Energy Requirements Based On: Kcal/kg  Weight Used for Energy Requirements: Current  Energy (kcal/day): 1446-1687kcal

## 2023-05-11 LAB
ALBUMIN SERPL-MCNC: 2.4 G/DL (ref 3.5–5)
ANION GAP SERPL CALC-SCNC: 2 MMOL/L (ref 5–15)
BUN SERPL-MCNC: 27 MG/DL (ref 6–20)
BUN/CREAT SERPL: 19 (ref 12–20)
CA-I BLD-MCNC: 8.6 MG/DL (ref 8.5–10.1)
CHLORIDE SERPL-SCNC: 101 MMOL/L (ref 97–108)
CO2 SERPL-SCNC: 28 MMOL/L (ref 21–32)
CREAT SERPL-MCNC: 1.44 MG/DL (ref 0.55–1.02)
GLUCOSE SERPL-MCNC: 96 MG/DL (ref 65–100)
PHOSPHATE SERPL-MCNC: 3.6 MG/DL (ref 2.6–4.7)
POTASSIUM SERPL-SCNC: 3.9 MMOL/L (ref 3.5–5.1)
SODIUM SERPL-SCNC: 131 MMOL/L (ref 136–145)

## 2023-05-11 PROCEDURE — 36415 COLL VENOUS BLD VENIPUNCTURE: CPT

## 2023-05-11 PROCEDURE — 80069 RENAL FUNCTION PANEL: CPT

## 2023-05-11 PROCEDURE — 97530 THERAPEUTIC ACTIVITIES: CPT

## 2023-05-11 PROCEDURE — 97116 GAIT TRAINING THERAPY: CPT

## 2023-05-11 PROCEDURE — 6370000000 HC RX 637 (ALT 250 FOR IP): Performed by: FAMILY MEDICINE

## 2023-05-11 PROCEDURE — 6370000000 HC RX 637 (ALT 250 FOR IP): Performed by: INTERNAL MEDICINE

## 2023-05-11 PROCEDURE — 94640 AIRWAY INHALATION TREATMENT: CPT

## 2023-05-11 PROCEDURE — 2580000003 HC RX 258: Performed by: INTERNAL MEDICINE

## 2023-05-11 PROCEDURE — 2580000003 HC RX 258: Performed by: FAMILY MEDICINE

## 2023-05-11 PROCEDURE — 1100000000 HC RM PRIVATE

## 2023-05-11 RX ORDER — FUROSEMIDE 10 MG/ML
40 SOLUTION ORAL
Status: COMPLETED | OUTPATIENT
Start: 2023-05-11 | End: 2023-05-11

## 2023-05-11 RX ADMIN — APIXABAN 5 MG: 5 TABLET, FILM COATED ORAL at 20:00

## 2023-05-11 RX ADMIN — BUDESONIDE AND FORMOTEROL FUMARATE DIHYDRATE 2 PUFF: 160; 4.5 AEROSOL RESPIRATORY (INHALATION) at 21:07

## 2023-05-11 RX ADMIN — ACETAMINOPHEN 650 MG: 325 TABLET ORAL at 12:19

## 2023-05-11 RX ADMIN — SODIUM CHLORIDE, PRESERVATIVE FREE 10 ML: 5 INJECTION INTRAVENOUS at 09:22

## 2023-05-11 RX ADMIN — FOLIC ACID 1 MG: 1 TABLET ORAL at 09:21

## 2023-05-11 RX ADMIN — BENZONATATE 100 MG: 100 CAPSULE ORAL at 20:01

## 2023-05-11 RX ADMIN — AMIODARONE HYDROCHLORIDE 200 MG: 200 TABLET ORAL at 20:01

## 2023-05-11 RX ADMIN — THIAMINE HCL TAB 100 MG 100 MG: 100 TAB at 09:21

## 2023-05-11 RX ADMIN — BENZONATATE 100 MG: 100 CAPSULE ORAL at 17:08

## 2023-05-11 RX ADMIN — THERA TABS 1 TABLET: TAB at 09:21

## 2023-05-11 RX ADMIN — BENZONATATE 100 MG: 100 CAPSULE ORAL at 09:21

## 2023-05-11 RX ADMIN — DILTIAZEM HYDROCHLORIDE 30 MG: 30 TABLET, FILM COATED ORAL at 17:09

## 2023-05-11 RX ADMIN — Medication 5 MG: at 20:00

## 2023-05-11 RX ADMIN — PRAVASTATIN SODIUM 10 MG: 20 TABLET ORAL at 20:01

## 2023-05-11 RX ADMIN — DILTIAZEM HYDROCHLORIDE 30 MG: 30 TABLET, FILM COATED ORAL at 12:17

## 2023-05-11 RX ADMIN — BUDESONIDE AND FORMOTEROL FUMARATE DIHYDRATE 2 PUFF: 160; 4.5 AEROSOL RESPIRATORY (INHALATION) at 07:38

## 2023-05-11 RX ADMIN — DILTIAZEM HYDROCHLORIDE 30 MG: 30 TABLET, FILM COATED ORAL at 09:20

## 2023-05-11 RX ADMIN — HYDROCODONE BITARTRATE AND ACETAMINOPHEN 1 TABLET: 5; 325 TABLET ORAL at 18:57

## 2023-05-11 RX ADMIN — FUROSEMIDE 40 MG: 40 SOLUTION ORAL at 12:17

## 2023-05-11 RX ADMIN — FUROSEMIDE 40 MG: 40 TABLET ORAL at 09:21

## 2023-05-11 RX ADMIN — APIXABAN 5 MG: 5 TABLET, FILM COATED ORAL at 09:21

## 2023-05-11 RX ADMIN — AMIODARONE HYDROCHLORIDE 200 MG: 200 TABLET ORAL at 09:21

## 2023-05-11 RX ADMIN — SODIUM CHLORIDE, PRESERVATIVE FREE 10 ML: 5 INJECTION INTRAVENOUS at 20:03

## 2023-05-11 ASSESSMENT — PAIN SCALES - GENERAL
PAINLEVEL_OUTOF10: 0
PAINLEVEL_OUTOF10: 5
PAINLEVEL_OUTOF10: 0

## 2023-05-11 ASSESSMENT — PAIN DESCRIPTION - DESCRIPTORS: DESCRIPTORS: ACHING

## 2023-05-11 ASSESSMENT — PAIN SCALES - WONG BAKER: WONGBAKER_NUMERICALRESPONSE: 0

## 2023-05-11 ASSESSMENT — PAIN DESCRIPTION - LOCATION: LOCATION: ABDOMEN

## 2023-05-12 LAB
ALBUMIN SERPL-MCNC: 2.4 G/DL (ref 3.5–5)
ALBUMIN/GLOB SERPL: 0.5 (ref 1.1–2.2)
ALP SERPL-CCNC: 87 U/L (ref 45–117)
ALT SERPL-CCNC: 29 U/L (ref 12–78)
ANION GAP SERPL CALC-SCNC: 4 MMOL/L (ref 5–15)
AST SERPL W P-5'-P-CCNC: 20 U/L (ref 15–37)
BILIRUB SERPL-MCNC: 0.5 MG/DL (ref 0.2–1)
BUN SERPL-MCNC: 31 MG/DL (ref 6–20)
BUN/CREAT SERPL: 21 (ref 12–20)
CA-I BLD-MCNC: 8.3 MG/DL (ref 8.5–10.1)
CHLORIDE SERPL-SCNC: 100 MMOL/L (ref 97–108)
CO2 SERPL-SCNC: 30 MMOL/L (ref 21–32)
CREAT SERPL-MCNC: 1.49 MG/DL (ref 0.55–1.02)
ERYTHROCYTE [DISTWIDTH] IN BLOOD BY AUTOMATED COUNT: 13.2 % (ref 11.5–14.5)
GLOBULIN SER CALC-MCNC: 4.5 G/DL (ref 2–4)
GLUCOSE SERPL-MCNC: 114 MG/DL (ref 65–100)
HCT VFR BLD AUTO: 37.9 % (ref 35–47)
HGB BLD-MCNC: 13 G/DL (ref 11.5–16)
MCH RBC QN AUTO: 35.6 PG (ref 26–34)
MCHC RBC AUTO-ENTMCNC: 34.3 G/DL (ref 30–36.5)
MCV RBC AUTO: 103.8 FL (ref 80–99)
NRBC # BLD: 0 K/UL (ref 0–0.01)
NRBC BLD-RTO: 0 PER 100 WBC
PLATELET # BLD AUTO: 315 K/UL (ref 150–400)
PMV BLD AUTO: 10 FL (ref 8.9–12.9)
POTASSIUM SERPL-SCNC: 3.3 MMOL/L (ref 3.5–5.1)
PROT SERPL-MCNC: 6.9 G/DL (ref 6.4–8.2)
RBC # BLD AUTO: 3.65 M/UL (ref 3.8–5.2)
SODIUM SERPL-SCNC: 134 MMOL/L (ref 136–145)
WBC # BLD AUTO: 6.9 K/UL (ref 3.6–11)

## 2023-05-12 PROCEDURE — 2580000003 HC RX 258: Performed by: INTERNAL MEDICINE

## 2023-05-12 PROCEDURE — 80053 COMPREHEN METABOLIC PANEL: CPT

## 2023-05-12 PROCEDURE — 1100000000 HC RM PRIVATE

## 2023-05-12 PROCEDURE — 6370000000 HC RX 637 (ALT 250 FOR IP): Performed by: INTERNAL MEDICINE

## 2023-05-12 PROCEDURE — 85027 COMPLETE CBC AUTOMATED: CPT

## 2023-05-12 PROCEDURE — 2700000000 HC OXYGEN THERAPY PER DAY

## 2023-05-12 PROCEDURE — 6370000000 HC RX 637 (ALT 250 FOR IP): Performed by: FAMILY MEDICINE

## 2023-05-12 PROCEDURE — 36415 COLL VENOUS BLD VENIPUNCTURE: CPT

## 2023-05-12 PROCEDURE — 94640 AIRWAY INHALATION TREATMENT: CPT

## 2023-05-12 RX ORDER — POTASSIUM CHLORIDE 20 MEQ/1
40 TABLET, EXTENDED RELEASE ORAL ONCE
Status: COMPLETED | OUTPATIENT
Start: 2023-05-12 | End: 2023-05-13

## 2023-05-12 RX ORDER — SPIRONOLACTONE 25 MG/1
25 TABLET ORAL DAILY
Status: DISCONTINUED | OUTPATIENT
Start: 2023-05-13 | End: 2023-05-13 | Stop reason: HOSPADM

## 2023-05-12 RX ADMIN — BENZONATATE 100 MG: 100 CAPSULE ORAL at 15:58

## 2023-05-12 RX ADMIN — SODIUM CHLORIDE, PRESERVATIVE FREE 5 ML: 5 INJECTION INTRAVENOUS at 08:53

## 2023-05-12 RX ADMIN — FUROSEMIDE 40 MG: 40 TABLET ORAL at 08:52

## 2023-05-12 RX ADMIN — PRAVASTATIN SODIUM 10 MG: 20 TABLET ORAL at 20:10

## 2023-05-12 RX ADMIN — BUDESONIDE AND FORMOTEROL FUMARATE DIHYDRATE 2 PUFF: 160; 4.5 AEROSOL RESPIRATORY (INHALATION) at 07:12

## 2023-05-12 RX ADMIN — DILTIAZEM HYDROCHLORIDE 30 MG: 30 TABLET, FILM COATED ORAL at 12:00

## 2023-05-12 RX ADMIN — FOLIC ACID 1 MG: 1 TABLET ORAL at 08:52

## 2023-05-12 RX ADMIN — THIAMINE HCL TAB 100 MG 100 MG: 100 TAB at 08:52

## 2023-05-12 RX ADMIN — DILTIAZEM HYDROCHLORIDE 30 MG: 30 TABLET, FILM COATED ORAL at 08:52

## 2023-05-12 RX ADMIN — APIXABAN 5 MG: 5 TABLET, FILM COATED ORAL at 20:11

## 2023-05-12 RX ADMIN — DILTIAZEM HYDROCHLORIDE 30 MG: 30 TABLET, FILM COATED ORAL at 15:58

## 2023-05-12 RX ADMIN — SACUBITRIL AND VALSARTAN 1 TABLET: 24; 26 TABLET, FILM COATED ORAL at 20:10

## 2023-05-12 RX ADMIN — SACUBITRIL AND VALSARTAN 1 TABLET: 24; 26 TABLET, FILM COATED ORAL at 12:00

## 2023-05-12 RX ADMIN — BUDESONIDE AND FORMOTEROL FUMARATE DIHYDRATE 2 PUFF: 160; 4.5 AEROSOL RESPIRATORY (INHALATION) at 19:33

## 2023-05-12 RX ADMIN — AMIODARONE HYDROCHLORIDE 200 MG: 200 TABLET ORAL at 08:52

## 2023-05-12 RX ADMIN — Medication 5 MG: at 20:10

## 2023-05-12 RX ADMIN — AMIODARONE HYDROCHLORIDE 200 MG: 200 TABLET ORAL at 20:10

## 2023-05-12 RX ADMIN — BENZONATATE 100 MG: 100 CAPSULE ORAL at 08:52

## 2023-05-12 RX ADMIN — APIXABAN 5 MG: 5 TABLET, FILM COATED ORAL at 08:52

## 2023-05-12 RX ADMIN — HYDROCODONE BITARTRATE AND ACETAMINOPHEN 1 TABLET: 5; 325 TABLET ORAL at 20:11

## 2023-05-12 RX ADMIN — BENZONATATE 100 MG: 100 CAPSULE ORAL at 20:10

## 2023-05-12 RX ADMIN — SODIUM CHLORIDE, PRESERVATIVE FREE 10 ML: 5 INJECTION INTRAVENOUS at 20:13

## 2023-05-12 RX ADMIN — THERA TABS 1 TABLET: TAB at 08:52

## 2023-05-12 ASSESSMENT — PAIN DESCRIPTION - DESCRIPTORS: DESCRIPTORS: ACHING;TENDER

## 2023-05-12 ASSESSMENT — PAIN DESCRIPTION - LOCATION: LOCATION: THROAT

## 2023-05-12 ASSESSMENT — PAIN SCALES - GENERAL
PAINLEVEL_OUTOF10: 7
PAINLEVEL_OUTOF10: 0

## 2023-05-12 NOTE — CARE COORDINATION
Chart reviewed, patient recc for HH, CM to meet with patient to obtain choice at a later time. CM continues to follow and monitor for needs.

## 2023-05-13 VITALS
SYSTOLIC BLOOD PRESSURE: 141 MMHG | DIASTOLIC BLOOD PRESSURE: 75 MMHG | RESPIRATION RATE: 19 BRPM | TEMPERATURE: 97.1 F | OXYGEN SATURATION: 95 % | BODY MASS INDEX: 18.96 KG/M2 | HEIGHT: 63 IN | HEART RATE: 100 BPM | WEIGHT: 107 LBS

## 2023-05-13 PROCEDURE — 6370000000 HC RX 637 (ALT 250 FOR IP): Performed by: INTERNAL MEDICINE

## 2023-05-13 PROCEDURE — 6370000000 HC RX 637 (ALT 250 FOR IP): Performed by: FAMILY MEDICINE

## 2023-05-13 PROCEDURE — 97530 THERAPEUTIC ACTIVITIES: CPT

## 2023-05-13 PROCEDURE — 94640 AIRWAY INHALATION TREATMENT: CPT

## 2023-05-13 PROCEDURE — 94761 N-INVAS EAR/PLS OXIMETRY MLT: CPT

## 2023-05-13 RX ORDER — AMIODARONE HYDROCHLORIDE 200 MG/1
200 TABLET ORAL 2 TIMES DAILY
Qty: 60 TABLET | Refills: 0 | Status: SHIPPED | OUTPATIENT
Start: 2023-05-13

## 2023-05-13 RX ORDER — FUROSEMIDE 40 MG/1
40 TABLET ORAL DAILY
Qty: 60 TABLET | Refills: 3 | Status: SHIPPED | OUTPATIENT
Start: 2023-05-13

## 2023-05-13 RX ORDER — SPIRONOLACTONE 25 MG/1
25 TABLET ORAL DAILY
Qty: 30 TABLET | Refills: 3 | Status: SHIPPED | OUTPATIENT
Start: 2023-05-14

## 2023-05-13 RX ORDER — BENZONATATE 100 MG/1
100 CAPSULE ORAL 3 TIMES DAILY
Qty: 21 CAPSULE | Refills: 0 | Status: SHIPPED | OUTPATIENT
Start: 2023-05-13 | End: 2023-05-20

## 2023-05-13 RX ORDER — HYDROCODONE BITARTRATE AND ACETAMINOPHEN 5; 325 MG/1; MG/1
1 TABLET ORAL EVERY 6 HOURS PRN
Qty: 10 TABLET | Refills: 0 | Status: SHIPPED | OUTPATIENT
Start: 2023-05-13 | End: 2023-05-16

## 2023-05-13 RX ORDER — IPRATROPIUM BROMIDE AND ALBUTEROL SULFATE 2.5; .5 MG/3ML; MG/3ML
3 SOLUTION RESPIRATORY (INHALATION) EVERY 6 HOURS PRN
Qty: 360 ML | Refills: 1 | Status: SHIPPED | OUTPATIENT
Start: 2023-05-13

## 2023-05-13 RX ORDER — THIAMINE MONONITRATE (VIT B1) 100 MG
100 TABLET ORAL DAILY
Qty: 30 TABLET | Refills: 0 | Status: SHIPPED | OUTPATIENT
Start: 2023-05-13

## 2023-05-13 RX ORDER — FOLIC ACID 1 MG/1
1 TABLET ORAL DAILY
Qty: 30 TABLET | Refills: 3 | Status: SHIPPED | OUTPATIENT
Start: 2023-05-13

## 2023-05-13 RX ORDER — CHOLECALCIFEROL (VITAMIN D3) 125 MCG
5 CAPSULE ORAL NIGHTLY
Qty: 30 TABLET | Refills: 0 | Status: SHIPPED | OUTPATIENT
Start: 2023-05-13

## 2023-05-13 RX ORDER — PRAVASTATIN SODIUM 10 MG
10 TABLET ORAL NIGHTLY
Qty: 30 TABLET | Refills: 3 | Status: SHIPPED | OUTPATIENT
Start: 2023-05-13

## 2023-05-13 RX ORDER — BUDESONIDE AND FORMOTEROL FUMARATE DIHYDRATE 160; 4.5 UG/1; UG/1
2 AEROSOL RESPIRATORY (INHALATION) 2 TIMES DAILY
Qty: 10.2 G | Refills: 3 | Status: SHIPPED | OUTPATIENT
Start: 2023-05-13

## 2023-05-13 RX ADMIN — DILTIAZEM HYDROCHLORIDE 30 MG: 30 TABLET, FILM COATED ORAL at 12:14

## 2023-05-13 RX ADMIN — SPIRONOLACTONE 25 MG: 25 TABLET ORAL at 09:10

## 2023-05-13 RX ADMIN — AMIODARONE HYDROCHLORIDE 200 MG: 200 TABLET ORAL at 09:10

## 2023-05-13 RX ADMIN — FOLIC ACID 1 MG: 1 TABLET ORAL at 09:10

## 2023-05-13 RX ADMIN — BENZONATATE 100 MG: 100 CAPSULE ORAL at 09:10

## 2023-05-13 RX ADMIN — THIAMINE HCL TAB 100 MG 100 MG: 100 TAB at 09:10

## 2023-05-13 RX ADMIN — SACUBITRIL AND VALSARTAN 1 TABLET: 24; 26 TABLET, FILM COATED ORAL at 09:10

## 2023-05-13 RX ADMIN — BUDESONIDE AND FORMOTEROL FUMARATE DIHYDRATE 2 PUFF: 160; 4.5 AEROSOL RESPIRATORY (INHALATION) at 08:03

## 2023-05-13 RX ADMIN — POTASSIUM CHLORIDE 40 MEQ: 1500 TABLET, EXTENDED RELEASE ORAL at 00:43

## 2023-05-13 RX ADMIN — FUROSEMIDE 40 MG: 40 TABLET ORAL at 09:10

## 2023-05-13 RX ADMIN — THERA TABS 1 TABLET: TAB at 09:10

## 2023-05-13 RX ADMIN — LORAZEPAM 1 MG: 1 TABLET ORAL at 00:45

## 2023-05-13 RX ADMIN — DILTIAZEM HYDROCHLORIDE 30 MG: 30 TABLET, FILM COATED ORAL at 09:09

## 2023-05-13 RX ADMIN — APIXABAN 5 MG: 5 TABLET, FILM COATED ORAL at 09:10

## 2023-05-13 NOTE — DISCHARGE SUMMARY
Discharge Summary       PATIENT ID: Marquis Aragon  MRN: [id number]   YOB: 1955    DATE OF ADMISSION: 4/25/2023  6:06 PM    DATE OF DISCHARGE:   PRIMARY CARE PROVIDER: None None     ATTENDING PHYSICIAN: Karthik Claudio  DISCHARGING PROVIDER: Karthik Claudio      CONSULTATIONS: IP CONSULT TO PICC TEAM  IP CONSULT TO PICC TEAM  IP CONSULT TO PICC TEAM    PROCEDURES/SURGERIES: Procedure(s) with comments:  Ablation A-fib w complete ep study - LEIGH/CARTO/  Ep 3d mapping  Ablation following A-fib addl  Ep cardioversion    ADMITTING DIAGNOSES:    Patient Active Problem List    Diagnosis Date Noted    Atrial fibrillation, rapid ([facility]) 04/25/2023    Atrial fibrillation ([facility]) 04/25/2023    CHF (congestive heart failure) ([facility]) 04/25/2023    Fracture of humeral shaft, left, closed 01/04/2023    Respiratory failure with hypoxia ([facility]) 09/22/2020    Hypokalemia 09/22/2020    Acute respiratory failure ([facility]) 09/22/2020    COPD exacerbation ([facility]) 09/22/2020       DISCHARGE DIAGNOSES / PLAN:       A-fib with suboptimal rate control s/p AF and AFL ablation 5/8/23  CHF with EF 25-30%  Alcohol dependency  COPD  Hyponatremia  HOLA on CKD (likely from hypotension and IV diuresis)  Hypokalemia  Cough  Pleural Effusion  Moderate bilateral pleural effusions and bilateral lower lobe volume loss   Status post thoracocentesis removed and 850 cc  HTN  Submandibular hematoma           DISCHARGE MEDICATIONS:     Medication List        START taking these medications      amiodarone 200 MG tablet  Commonly known as: CORDARONE  Take 1 tablet by mouth 2 times daily     apixaban 5 MG Tabs tablet  Commonly known as: ELIQUIS  Take 1 tablet by mouth 2 times daily     benzonatate 100 MG capsule  Commonly known as: TESSALON  Take 1 capsule by mouth in the morning, at noon, and at bedtime for 7 days     budesonide-formoterol 160-4.5 MCG/ACT Aero  Commonly known as: SYMBICORT  Inhale 2 puffs into the lungs in the morning and 2 puffs in the

## 2023-05-13 NOTE — PLAN OF CARE
OCCUPATIONAL THERAPY EVALUATION  Patient: Abdullahi Salazar (18 y.o. female)  Date: 5/9/2023  Primary Diagnosis: Atrial fibrillation, rapid ([facility] [I48.91]  Atrial fibrillation ([address] [I48.91]  Procedure(s) (LRB):  Ablation A-fib w complete ep study (N/A)  Ep 3d mapping (N/A)  Ablation following A-fib addl (N/A)  Ep cardioversion (N/A) 1 Day Post-Op   Precautions: Fall Risk                In place during session:Pure wick    ASSESSMENT  Pt is a 70 y.o. female presenting to [facility] with c/o SOB, admitted 4/25/23 and currently being treated for s/p cardioversion, afib. Pt received semi-supine in bed upon arrival, AXO x4, and agreeable to OT evaluation. Pt CG for supine:sit, sit:stand. Ambulated to bathroom with hand held assist. CG for toilet transfer, pericare. Ambulated back to bed with CG. All needs within reach. Based on current observations, pt presents with decreased  functional mobility, strength, activity tolerance, coordination, balance (see below for objective details and assist levels). Overall, pt tolerates session fairly well with rest breaks. Pt will benefit from continued skilled OT services to address current impairments and improve IND and safety with self cares and functional transfers/mobility. Current OT d/c recommendation Home with [facility] medically appropriate. Current Level of Function Impacting Discharge: Other factors to consider for discharge: family/social support, DME, time since onset, severity of deficits, functional baseline     Patient will benefit from skilled therapy intervention to address the above noted impairments. PLAN :  Recommendations and Planned Interventions: self care training, therapeutic activities, functional mobility training, balance training, and endurance activities    Recommend with staff: Out of bed to chair for meals and Encourage HEP in prep for ADLs/mobility    Recommend next session:  Toileting, UB dressing, LB dressing,
PHYSICAL THERAPY TREATMENT     Patient: Collins Lin (59 y.o. female)  Date: 5/13/2023  Diagnosis: Atrial fibrillation, rapid ([facility]) [I48.91]  Atrial fibrillation ([facility] [address] [I48.91] <principal problem not specified>  Procedure(s) (LRB):  Ablation A-fib w complete ep study (N/A)  Ep 3d mapping (N/A)  Ablation following A-fib addl (N/A)  Ep cardioversion (N/A) 5 Days Post-Op  Precautions:  Fall Risk, Bed Alarm                In place during session: EKG/telemetry   Chart, physical therapy assessment, plan of care and goals were reviewed. ASSESSMENT  Patient continues with skilled PT services and is progressing towards goals. Pt semi supine in bed upon PT arrival, agreeable to session. Pt A&O x 4. Patient expressing concerns for returning home. General education provided on importance of activity moderation and energy conservation with RPE scale. Bed mobility and transfer performed with Mod I, along with ambulation to restroom for BM. Standing balance and SEATED LE TE performed to improve weight acceptance, transfer, strength, and endurance. Difficulty tolerating standing heel raise, hs curl and march due to endurance. (See below for objective details and assist levels). Overall pt tolerated session well today with Bed mobility, transfer, ambulation, standing balance, seated LE TE. Will continue to benefit from skilled PT services, and will continue to progress as tolerated. Current Level of Function Impacting Discharge (mobility/balance): ENDURANCE, Strength, balance    Other factors to consider for discharge: concern for safely navigating or managing the home environment       PLAN :  Patient continues to benefit from skilled intervention to address impaired functional mobility. Continue treatment per established plan of care to address goals.     Recommend with staff: Out of bed to chair for meals, Encourage HEP in prep for ADLs/mobility, and Amb to bathroom for toileting with gt belt and
PHYSICAL THERAPY TREATMENT     Patient: Rema Arango (72 y.o. female)  Date: 5/11/2023  Diagnosis: Atrial fibrillation, rapid ([facility]) [I48.91]  Atrial fibrillation ([address] [I48.91] <principal problem not specified>  Procedure(s) (LRB):  Ablation A-fib w complete ep study (N/A)  Ep 3d mapping (N/A)  Ablation following A-fib addl (N/A)  Ep cardioversion (N/A) 3 Days Post-Op  Precautions: Fall Risk, Bed Alarm                In place during session: External Catheter  Chart, physical therapy assessment, plan of care and goals were reviewed. ASSESSMENT  Patient continues with skilled PT services and is progressing towards goals. Pt supine in bed upon PT arrival, agreeable to session. Pt A&O x 4. Pt progressed during treatment session today. Pt's overall mobility status CGA and ambulated approximately 125 ft in hallway with RW and no LOB noted. Upon returned to room Pt sat EOB. Pt educated on and performed therex, see chart below. Current recommendation is HHPT with family help. (See below for objective details and assist levels). Overall pt tolerated session well today. Will continue to benefit from skilled PT services, and will continue to progress as tolerated. Current Level of Function Impacting Discharge (mobility/balance): CGA    Other factors to consider for discharge: no additional factors       PLAN :  Patient continues to benefit from skilled intervention to address decreased activity tolerance. Continue treatment per established plan of care to address goals. Recommendation for discharge: (in order for the patient to meet his/her long term goals)  Home with [address] [place] [address] [place] and family assist    IF patient discharges home will need the following DME:rolling walker       SUBJECTIVE:   Patient stated Ai Plain you so much for coming to see me.     OBJECTIVE DATA SUMMARY:   Critical Behavior:  Orientation  Overall Orientation Status: Within Normal Limits  Orientation Level: Oriented
Problem: Safety - Adult  Goal: Free from fall injury  Outcome: Progressing     Problem: Safety - Adult  Goal: Free from fall injury  Outcome: Progressing     Problem: Pain  Goal: Verbalizes/displays adequate comfort level or baseline comfort level  Outcome: Progressing     Problem: Skin/Tissue Integrity  Goal: Absence of new skin breakdown  Description: 1. Monitor for areas of redness and/or skin breakdown  2. Assess vascular access sites hourly  3. Every 4-6 hours minimum:  Change oxygen saturation probe site  4. Every 4-6 hours:  If on nasal continuous positive airway pressure, respiratory therapy assess nares and determine need for appliance change or resting period.   Outcome: Progressing
present, pt c/o \"feeling lousy\". Based on the objective data described below, the patient currently presents with impaired functional mobility, decreased independence in ADLs, impaired strength, decreased activity tolerance, impaired balance, impaired posture. (See below for objective details and assist levels). Overall pt tolerated session fairly today with increased generalized fatigue and generalized soreness. Pt will benefit from continued skilled PT to address above deficits and return to PLOF. Current PT DC recommendation Home with Home Health Therapy once medically appropriate. Current Level of Function Impacting Discharge (mobility/balance): CGA    Other factors to consider for discharge: high risk for falls and concern for safely navigating or managing the home environment     PLAN :  Recommendations and Planned Interventions: bed mobility training, transfer training, gait training, therapeutic exercises, patient and family training/education, and therapeutic activities    Recommend for staff: Out of bed to chair for meals, Encourage HEP in prep for ADLs/mobility, Frequent repositioning to prevent skin breakdown, Amb to bathroom for toileting with gt belt and AD, and Use of bed/chair alarm for safety    Frequency/Duration: Patient will be followed by physical therapy:  3-5x/week to address goals. Recommendation for discharge: (in order for the patient to meet his/her long term goals)  Home with [address] [place] [address] [place] and family assist    IF patient discharges home will need the following DME: rolling walker         SUBJECTIVE:   Patient stated Im just feeling really lousy.     OBJECTIVE DATA SUMMARY:   HISTORY:    Past Medical History:   Diagnosis Date    HTN (hypertension)     Smoker      Past Surgical History:   Procedure Laterality Date    EP DEVICE PROCEDURE N/A 5/8/2023    Ablation A-fib w complete ep study performed by Parveen Duckworth MD at [facility]

## 2023-05-13 NOTE — CARE COORDINATION
Chart Reviewed:  Patient is potentially up for discharge. CM spoke with patient about possible home health choices if needed. Patient declined home health, stating that her boyfriend, son, and childhood friends will support her once she is discharged. Patient states if she needs more help after she gets home, she will call her PCP for [facility] at that point.

## 2023-05-13 NOTE — PROGRESS NOTES
1930. Received patient from day shift staff
Bedside report given to FABRICIO Cantu. REJI reviewed, sites viewed. Pt. Has some questions for Dr. Becky Vann, EP nurses have called Dr. Becky Vann and he is going to come up to pre-op and speak to pt.
CARDIAC ELECTROPHYSIOLOGY PROGRESS NOTE    PATIENT NAME: Manjeet Mauricio  YOB: 1955  AGE: 70 y.o. GENDER: female      Jakub Durbin is a 70 y.o. female with history of NICM with HFrEF, AF. She underwent AF ablation on 5/8/23, and reverted back into AF. Developed a submandibular hematoma for which anticoagulation was held per primary on 5/9. Doing better, mild increase in size of hematoma with associated pain. Breathing is better, underwent IR guided L sided thoracentesis 5/9    Telemetry reviewed AF, rare sinus beats    Pertinent review of systems items noted above, all other systems are negative. Current medications reviewed. PHYSICAL EXAMINATION:    /61   Pulse 95   Temp 98.2 °F (36.8 °C) (Oral)   Resp 20   Ht 1.6 m (5' 2.99\")   Wt 48.2 kg (106 lb 4.8 oz)   SpO2 95%   BMI 18.84 kg/m²     No apparent distress. Submandibular hematoma noted  No JVP  Heart is irregular rhythm. Normal S1, S2, no murmurs are appreciated. Lungs with decreased breath sounds  Abdomen is soft, nontender, normal bowel sounds. Extremities have no edema. No hematoma at femoral access sites    Recent labs results and imaging reviewed. IMPRESSION AND RECOMMENDATIONS:  Atrial fibrillation with suboptimal rate control   S/p AF and AFL ablation 5/8/23  HFrEF secondary to NICM  CHADSVASC 4  COPD  Hypertension  HOLA on CKD  Submandibular hematoma      - She is post AF ablation and therefore at high risk of CVA in first month. She has already had 3 doses of scheduled eliquis held, I believe it should be restarted with pm dose unless severe increase in size of hematoma noted. Pros and cons of approach explained to the patient  - Continue amiodarone at current dose, adequate rate control. DCCV can be be attempted at a later date. Please do not hesitate to call if additional questions arise.     Kj Osman MD  5/10/2023
CARDIAC ELECTROPHYSIOLOGY PROGRESS NOTE    PATIENT NAME: Royce Mauricio  YOB: 1955  AGE: 70 y.o. GENDER: female      Christina Parham is a 70 y.o. female with history of NICM with HFrEF, AF. She underwent AF ablation yesterday, remained in sinus up until this am when she reverted back into AF. Several hematomas, including submandibular and at right radial arterial line placement. In pain everywhere. Telemetry reviewed: Sinus with PACs till 730, then AF with RVR in 120s    Pertinent review of systems items noted above, all other systems are negative. Current medications reviewed. PHYSICAL EXAMINATION:    BP (!) 184/101 Comment: RN notified  Pulse 99   Temp 97.5 °F (36.4 °C) (Oral)   Resp 20   Ht 5' 2.99\" (1.6 m)   Wt 106 lb 4.8 oz (48.2 kg)   SpO2 (!) 87% Comment: placed on 2L NC  BMI 18.84 kg/m²     No apparent distress. No JVP  Heart is irregular rhythm. Normal S1, S2, no murmurs are appreciated. Lungs have decreased breath sounds  Abdomen is soft, nontender, normal bowel sounds. Extremities have no edema. Recent labs results and imaging reviewed. IMPRESSION AND RECOMMENDATIONS:  Atrial fibrillation with suboptimal rate control   S/p AF and AFL ablation 5/8/23  HFrEF secondary to NICM  CHADSVASC 4  COPD  Hypertension  HOLA on CKD  Submandibular hematoma      - I believe the AF recurrence is due to the persistent volume overload and due to the pain from the submandibular hematoma and arterial line site hematoma. Femoral access sites with mild tenderness but no hematoma present  - Increase amiodarone to 200 mg BID. Rate control <120 bpm is ok, will redo cardioversion in outpt  - HF management per Dr Filiberto Contreras. Conservative management for hematoma, expected to resolve. Please do not hesitate to call if additional questions arise.     Sherrie Mar MD  5/9/2023
Discharge instructions, followup instructions, post procedure care instructions provided and medications and side effects reviewed with patient. Family member at bedside. Verbalizes understanding of all instructions.
Discharged home with family without distress.
General Daily Progress Note      Patient Name:   Josiah Carroll       YOB: 1955       Age:  70 y.o. Admit Date: 4/25/2023      Subjective:      CHIEF COMPLAINT:        Shortness of breath       HISTORY OF PRESENT ILLNESS:          Patient is a 70y.o. year old female with past medical history of alcohol dependency COPD came to emergency room because of generalized weakness and shortness of breath patient also history of A-fib  in the past seen by the ER physician which shows possible CHF BNP was elevated and patient EKG shows A-fib with rapid ventricular rate patient received digoxin switch to Cardizem received 1 dose of IV Cardizem heart rate improved patient recommend to  be admitted for further ration treatment patient stated that she drinks 3 to 4 days a week at least 4-5 beers throughout the day       Patient denies any fever chills cough congestion nausea vomiting diarrhea constipation      4/26   Resting the bed alert awake   Per nursing the patient is still in Afib but rate is controlled    K 2.9 - will replace    BP 13,292, troponin negative    ECHO pending    Per cardiology continue with diltiazem 60 mg 3 times a day       4/27    Pt alert in bed with only complaints of cough. Denies chest pain, palpitations, SOB, abdominal pain. Per cardiovascular surgery HR has improved, BNP trending down, will receive cardiac cath tomorrow morning. ECHO shows EF 30-35%    Creatinine 1.91 and BUN 27 and eGFR 28       4/28    Pt alert in bed with complaints of cough, headache, general malaise. Denies chest pain, palpitations, SOB, nausea, diarrhea. Per nursing pt went down for cardiac cath today, but it was cancelled due to elevated creatinine, no BNP done today. Per nephrology no IV fluids, avoid ACE and ARBs for now. Discontinue K Cl PO daily. Lasix held for cath. Today's labs pending. Repeat blood work.        Patient have episode of A-fib with rapid ventricular rate received
General Daily Progress Note      Patient Name:   Shilpi Hilton       YOB: 1955       Age:  70 y.o. Admit Date: 4/25/2023      Subjective:      CHIEF COMPLAINT:        Shortness of breath       HISTORY OF PRESENT ILLNESS:          Patient is a 70y.o. year old female with past medical history of alcohol dependency COPD came to emergency room because of generalized weakness and shortness of breath patient also history of A-fib  in the past seen by the ER physician which shows possible CHF BNP was elevated and patient EKG shows A-fib with rapid ventricular rate patient received digoxin switch to Cardizem received 1 dose of IV Cardizem heart rate improved patient recommend to  be admitted for further ration treatment patient stated that she drinks 3 to 4 days a week at least 4-5 beers throughout the day       Patient denies any fever chills cough congestion nausea vomiting diarrhea constipation      4/26   Resting the bed alert awake   Per nursing the patient is still in Afib but rate is controlled    K 2.9 - will replace    BP 13,292, troponin negative    ECHO pending    Per cardiology continue with diltiazem 60 mg 3 times a day       4/27    Pt alert in bed with only complaints of cough. Denies chest pain, palpitations, SOB, abdominal pain. Per cardiovascular surgery HR has improved, BNP trending down, will receive cardiac cath tomorrow morning. ECHO shows EF 30-35%    Creatinine 1.91 and BUN 27 and eGFR 28       4/28    Pt alert in bed with complaints of cough, headache, general malaise. Denies chest pain, palpitations, SOB, nausea, diarrhea. Per nursing pt went down for cardiac cath today, but it was cancelled due to elevated creatinine, no BNP done today. Per nephrology no IV fluids, avoid ACE and ARBs for now. Discontinue K Cl PO daily. Lasix held for cath. Today's labs pending. Repeat blood work.        Patient have episode of A-fib with rapid ventricular rate received
Nursing concern for painful hematoma after atrial fibrillation ablation. On my evaluation, patient reports significant pain on light palpation, significant swelling noted. Ulnar pulse detectable with doppler. Border of the hematoma delineated, will continue to monitor overnight. Ice pack to affected area. One time norco for pain, subsequent pain medications per primary. If continue to worsened in AM, consider ultrasound to evaluate for pseudoaneurysm.      Veronica Jones MD  Internal Medicine  9:21 PM   05/08/23
OT eval order received and acknowledged. OT eval attempted at 8:42 AM, however, pt off the floor in PACU. OT will continue to follow and attempt eval at a later time. Thank you.
OT tx session attempted at [phone], however pt declining at this time reporting mornings are better than afternoons and does not wish to complete therapy at this time. Will continue to follow pt and attempt tx session at a later time as time allows. Thank you.
PT consult received and acknowledged. PT eval attempted, however, pt off the floor - PACU. PT will reattempt for PT eval at a later time. Thanks .
Patient back in room from IR. Bandage to left back from thoracentis. Report from [facility] in [place]. Patient had 750ml output. Patient gave a 4 out of 10 pain for the side. Tylenol given.
Patient has extensive hematoma on the right hand starting from area of puncture pressure applied pulses assessed with doppler noted to be present . Noted to be oozing on the left groin post puncture site pressure applied. MD notified came and assessed area plan apply ice, elevate arm  and monitor for progression of swelling in the arm.
Patient politely declined therapy this afternoon secondary to fatigue. Despite encouragement for OOB and in bed activities, pt declined. Requested therapists return tomorrow morning. Will follow up.
Patient still has complaints of neck pain and stated \"it feels bigger today\". Dr. Yuliana Valenzuela notified of hematoma and blood pressure of 184/101. Eliquis placed on hold at this time. Oxygen 87% on RA.  Patient placed on 2L NC.
Patient's left groin insertion site, oozing with blood. Old bandage removed and new 4x4 and tegarderm applied.
Progress Note          Patient: Jakub Durbin  MRN: [id number]   SSN: [id number]          YOB: 1955   Age: 70 y.o. Sex: female         Admit Date: 4/25/2023           Subjective:       Patient had A-fib ablation yesterday. After the procedure she was noted to have submandibular hematoma. Objective:          Vitals:             05/04/23 2055  05/04/23 2307  05/05/23 0321  05/05/23 0356           BP:    (!) 146/91  (!) 145/94       Pulse:    (!) 111  (!) 114       Resp:    25  25       Temp:    97.4 °F (36.3 °C)  97.5 °F (36.4 °C)       SpO2:  92%  90%  92%       Weight:        47.4 kg (104 lb 8 oz)           Height:                    Intake and Output:   Current Shift: No intake/output data recorded. Last three shifts: 05/03 1901 - 05/05 0700   In: 182 [I.V.:182]   Out: 800 [Urine:800]      Physical Exam:       General:   Alert, cooperative, no distress, appears stated age. Eyes:   Conjunctivae/corneas clear. PERRL, EOMs intact. Fundi benign        Ears:   Normal TMs and external ear canals both ears. Nose:  Nares normal. Septum midline. Mucosa normal. No drainage or sinus tenderness. Mouth/Throat:  Lips, mucosa, and tongue normal. Teeth and gums normal.        Neck:  Supple, symmetrical, trachea midline, no adenopathy, thyroid: no enlargment/tenderness/nodules, no carotid bruit and no JVD. Back:    Symmetric, no curvature. ROM normal. No CVA tenderness. Lungs:    Clear to auscultation bilaterally. Heart:   Irregular, tachycardic     Abdomen:    Soft, non-tender. Bowel sounds normal. No masses,  No organomegaly. Extremities:  Extremities normal, atraumatic, no cyanosis or edema. Pulses:  2+ and symmetric all extremities. Skin:  Skin color, texture, turgor normal. No rashes or lesions     Lymph nodes:  Cervical, supraclavicular, and axillary nodes normal.        Neurologic:  CNII-XII intact.  Normal strength, sensation and reflexes
Progress Note      5/11/2023 11:28 AM  NAME: Paulina Dockery   MRN:  [id number]   Admit Diagnosis: Atrial fibrillation, rapid [facility]) [I48.91]  Atrial fibrillation ([facility] [address] [I48.91]      Problem List:    Patient is a 69-year-old white female with:   1. A-fib with RVR   2. COPD exacerbation   3. Heart failure   4. Hypokalemia   5. Hyponatremia   6. Acute kidney injury   7. Mild aortic regurgitation   8. Moderate tricuspid regurgitation         Assessment/Plan:     Patient hematoma appears to be stable. Patient with lower extremity swelling. I will give her 1 dose of IV Lasix. Continue to monitor kidney function. Possible discharge in 24 hours. She is working with physical therapy. Currently in sinus rhythm.         []       High complexity decision making was performed in this patient at high risk for decompensation with multiple organ involvement. Subjective:     Paulina Dockery denies chest pain, dyspnea. Discussed with RN events overnight. Review of Systems:   Negative except for as noted above. Objective:      Physical Exam:    Last 24hrs VS reviewed since prior progress note. Most recent are:    /86   Pulse 87   Temp 98.4 °F (36.9 °C) (Oral)   Resp 18   Ht 1.6 m (5' 2.99\")   Wt 49.9 kg (110 lb)   SpO2 92%   BMI 19.49 kg/m²     Intake/Output Summary (Last 24 hours) at 5/11/2023 1128  Last data filed at 5/11/2023 0354  Gross per 24 hour   Intake 500 ml   Output 2050 ml   Net -1550 ml          General Appearance: Alert; no acute distress. Ears/Nose/Mouth/Throat: moist mucous membranes  Neck: Supple. Chest: Lungs clear to auscultation bilaterally. Cardiovascular: Regular rate and rhythm, S1S2 normal  Abdomen: Soft, non-tender, bowel sounds are active. Extremities: No edema bilaterally. Skin: Warm and dry. PMH/SH reviewed - no change compared to H&P    Telemetry:     EKG:     No valid procedures specified.     []  No new EKG for review    Lab Data Personally
Progress Note      5/12/2023 11:26 AM  NAME: Naga Carney   MRN:  [id number]   Admit Diagnosis: Atrial fibrillation, rapid [facility]) [I48.91]  Atrial fibrillation ([facility] [address] [I48.91]      Problem List:    Patient is a 56-year-old white female with:   1. A-fib with RVR   2. COPD exacerbation   3. Heart failure   4. Hypokalemia   5. Hyponatremia   6. Acute kidney injury   7. Mild aortic regurgitation   8. Moderate tricuspid regurgitation  9. Allergic beta-blocker therapy resulting in angioedema       Assessment/Plan:   There is no accurate ins and outs overnight. Creatinine 1.49, BUN 31. Sodium 134, potassium 3.3. Switch patient to oral Lasix. Continue amiodarone, apixaban. We will switch patient to long-acting Cardizem. Add Entresto. She will need a close monitor kidney function as an outpatient         []       High complexity decision making was performed in this patient at high risk for decompensation with multiple organ involvement. Subjective:     Naga Carney denies chest pain, dyspnea. Discussed with RN events overnight. Review of Systems:   Negative except for as noted above. Objective:      Physical Exam:    Last 24hrs VS reviewed since prior progress note. Most recent are:    BP (!) 141/82   Pulse 100   Temp 97.6 °F (36.4 °C) (Oral)   Resp 18   Ht 1.6 m (5' 2.99\")   Wt 49.7 kg (109 lb 8 oz)   SpO2 94%   BMI 19.40 kg/m²     Intake/Output Summary (Last 24 hours) at 5/12/2023 1126  Last data filed at 5/12/2023 0342  Gross per 24 hour   Intake --   Output 1350 ml   Net -1350 ml          General Appearance: Alert; no acute distress. Ears/Nose/Mouth/Throat: moist mucous membranes  Neck: Supple. Chest: Lungs clear to auscultation bilaterally. Cardiovascular: Regular rate and rhythm, S1S2 normal  Abdomen: Soft, non-tender, bowel sounds are active. Extremities: No edema bilaterally. Skin: Warm and dry.       PMH/SH reviewed - no change compared to H&P    Telemetry:
Progress Note      5/13/2023 10:55 AM  NAME: Abdullahi Salazar   MRN:  [id number]   Admit Diagnosis: Atrial fibrillation, rapid [facility]) [I48.91]  Atrial fibrillation ([facility] [address] [I48.91]      Problem List:    Patient is a 62-year-old white female with:   1. A-fib with RVR   2. COPD exacerbation   3. Heart failure   4. Hypokalemia   5. Hyponatremia   6. Acute kidney injury   7. Mild aortic regurgitation   8. Moderate tricuspid regurgitation  9. Allergic beta-blocker therapy resulting in angioedema       Assessment/Plan:   No acute events overnight. Urine output 2.6 L overnight. Creatinine is 1.49 as of yesterday, BUN was 31. No labs today. Once she is discharged I will see her in 2 weeks or sooner if needed. []       High complexity decision making was performed in this patient at high risk for decompensation with multiple organ involvement. Subjective:     Abdullahi Salazar denies chest pain, dyspnea. Discussed with RN events overnight. Review of Systems:   Negative except for as noted above. Objective:      Physical Exam:    Last 24hrs VS reviewed since prior progress note. Most recent are:    /72   Pulse (!) 112   Temp 97.8 °F (36.6 °C) (Oral)   Resp 18   Ht 1.6 m (5' 2.99\")   Wt 48.5 kg (107 lb)   SpO2 96%   BMI 18.96 kg/m²     Intake/Output Summary (Last 24 hours) at 5/13/2023 1055  Last data filed at 5/13/2023 0400  Gross per 24 hour   Intake 600 ml   Output 2675 ml   Net -2075 ml          General Appearance: Alert; no acute distress. Ears/Nose/Mouth/Throat: moist mucous membranes  Neck: Supple. Chest: Lungs clear to auscultation bilaterally. Cardiovascular: Regular rate and rhythm, S1S2 normal  Abdomen: Soft, non-tender, bowel sounds are active. Extremities: No edema bilaterally. Skin: Warm and dry. PMH/SH reviewed - no change compared to H&P    Telemetry:     EKG:     No valid procedures specified.     []  No new EKG for review    Lab Data Personally
Progress Notes by Bower MD at 05/05/23 1312             Progress Notes by Bower MD at 05/05/23 1312 (continued)                Author: Bower MD  Service: Nephrology  Author Type: Physician       Filed: 05/05/23 2014  Date of Service: 05/05/23 1312  Status: Signed          : Bower MD (Physician)                 Renal Progress Note          Patient: Lani Arguelles  MRN: [id number]   SSN: [id number]          YOB: 1955   Age: 70 y.o. Sex: female         Admit Date: 4/25/2023     LOS: 10 days         Subjective:     Patient seen at bedside. Alert and awake, no acute distress. No new complaints today. Labs showed a creatinine of 1.23.   Labs today are pending           Current Facility-Administered Medications          Medication  Dose  Route  Frequency             [START ON 5/6/2023] therapeutic multivitamin (THERAGRAN) tablet 1 Tablet   1 Tablet  Oral  DAILY       amiodarone (CORDARONE) tablet 200 mg   200 mg  Oral  DAILY       dilTIAZem IR (CARDIZEM) tablet 30 mg   30 mg  Oral  TIDAC       acetaminophen (TYLENOL) tablet 650 mg   650 mg  Oral  Q6H PRN       sodium chloride (NS) flush 5-40 mL   5-40 mL  IntraVENous  Q8H       sodium chloride (NS) flush 5-40 mL   5-40 mL  IntraVENous  PRN       melatonin tablet 5 mg   5 mg  Oral  QHS       benzonatate (TESSALON) capsule 100 mg   100 mg  Oral  TID       albuterol-ipratropium (DUO-NEB) 2.5 MG-0.5 MG/3 ML   3 mL  Nebulization  Q6H PRN       polyethylene glycol (MIRALAX) packet 17 g   17 g  Oral  DAILY PRN             ondansetron (ZOFRAN ODT) tablet 4 mg   4 mg  Oral  Q8H PRN          Or             ondansetron (ZOFRAN) injection 4 mg   4 mg  IntraVENous  Q6H PRN       apixaban (ELIQUIS) tablet 5 mg   5 mg  Oral  BID       [Held by provider] furosemide (LASIX) injection 40 mg   40 mg  IntraVENous  DAILY       budesonide-formoteroL (SYMBICORT) 160-4.5 mcg/actuation HFA inhaler 2 Puff   2 Puff
Progress Notes by Mojica MD at 05/05/23 1312             Progress Notes by Mojica MD at 05/05/23 1312 (continued)                Author: Mojica MD  Service: Nephrology  Author Type: Physician       Filed: 05/05/23 2014  Date of Service: 05/05/23 1312  Status: Signed          : Mojica MD (Physician)                 Renal Progress Note          Patient: Ulysses Krauss  MRN: [id number]   SSN: [id number]          YOB: 1955   Age: 70 y.o. Sex: female         Admit Date: 4/25/2023     LOS: 10 days         Subjective:     Patient seen at bedside. Alert and awake, no acute distress. No new complaints today. Labs showed a creatinine of 1.23.   Labs today are pending           Current Facility-Administered Medications          Medication  Dose  Route  Frequency             [START ON 5/6/2023] therapeutic multivitamin (THERAGRAN) tablet 1 Tablet   1 Tablet  Oral  DAILY       amiodarone (CORDARONE) tablet 200 mg   200 mg  Oral  DAILY       dilTIAZem IR (CARDIZEM) tablet 30 mg   30 mg  Oral  TIDAC       acetaminophen (TYLENOL) tablet 650 mg   650 mg  Oral  Q6H PRN       sodium chloride (NS) flush 5-40 mL   5-40 mL  IntraVENous  Q8H       sodium chloride (NS) flush 5-40 mL   5-40 mL  IntraVENous  PRN       melatonin tablet 5 mg   5 mg  Oral  QHS       benzonatate (TESSALON) capsule 100 mg   100 mg  Oral  TID       albuterol-ipratropium (DUO-NEB) 2.5 MG-0.5 MG/3 ML   3 mL  Nebulization  Q6H PRN       polyethylene glycol (MIRALAX) packet 17 g   17 g  Oral  DAILY PRN             ondansetron (ZOFRAN ODT) tablet 4 mg   4 mg  Oral  Q8H PRN          Or             ondansetron (ZOFRAN) injection 4 mg   4 mg  IntraVENous  Q6H PRN       apixaban (ELIQUIS) tablet 5 mg   5 mg  Oral  BID       [Held by provider] furosemide (LASIX) injection 40 mg   40 mg  IntraVENous  DAILY       budesonide-formoteroL (SYMBICORT) 160-4.5 mcg/actuation HFA inhaler 2 Puff   2 Puff
Progress Notes by Salinas MD at 05/05/23 1312             Progress Notes by Salinas MD at 05/05/23 1312 (continued)                Author: Salinas MD  Service: Nephrology  Author Type: Physician       Filed: 05/05/23 2014  Date of Service: 05/05/23 1312  Status: Signed          : Salinas MD (Physician)                 Renal Progress Note          Patient: Christina Parham  MRN: [id number]   SSN: [id number]          YOB: 1955   Age: 70 y.o. Sex: female         Admit Date: 4/25/2023     LOS: 10 days         Subjective:     Patient seen at bedside. Alert and awake, no acute distress. No new complaints today. Labs showed a creatinine of 1.23.   Labs today are pending           Current Facility-Administered Medications          Medication  Dose  Route  Frequency             [START ON 5/6/2023] therapeutic multivitamin (THERAGRAN) tablet 1 Tablet   1 Tablet  Oral  DAILY       amiodarone (CORDARONE) tablet 200 mg   200 mg  Oral  DAILY       dilTIAZem IR (CARDIZEM) tablet 30 mg   30 mg  Oral  TIDAC       acetaminophen (TYLENOL) tablet 650 mg   650 mg  Oral  Q6H PRN       sodium chloride (NS) flush 5-40 mL   5-40 mL  IntraVENous  Q8H       sodium chloride (NS) flush 5-40 mL   5-40 mL  IntraVENous  PRN       melatonin tablet 5 mg   5 mg  Oral  QHS       benzonatate (TESSALON) capsule 100 mg   100 mg  Oral  TID       albuterol-ipratropium (DUO-NEB) 2.5 MG-0.5 MG/3 ML   3 mL  Nebulization  Q6H PRN       polyethylene glycol (MIRALAX) packet 17 g   17 g  Oral  DAILY PRN             ondansetron (ZOFRAN ODT) tablet 4 mg   4 mg  Oral  Q8H PRN          Or             ondansetron (ZOFRAN) injection 4 mg   4 mg  IntraVENous  Q6H PRN       apixaban (ELIQUIS) tablet 5 mg   5 mg  Oral  BID       [Held by provider] furosemide (LASIX) injection 40 mg   40 mg  IntraVENous  DAILY       budesonide-formoteroL (SYMBICORT) 160-4.5 mcg/actuation HFA inhaler 2 Puff   2 Puff
Pt politely declined therapy this afternoon due to feeling tired and not up to participate. Will continue to check on patient while they are here.
Renal Progress Note    Patient: Shilpi Hilton MRN: [id number]  SSN: [id number]    YOB: 1955  Age: 70 y.o. Sex: female      Admit Date: 4/25/2023    LOS: 18 days     Subjective:   Patient seen at bedside. Alert and awake, no acute distress. not giving any new complaints today.    Lower extremity edema better today      Current Facility-Administered Medications   Medication Dose Route Frequency    sacubitril-valsartan (ENTRESTO) 24-26 MG per tablet 1 tablet  1 tablet Oral BID    spironolactone (ALDACTONE) tablet 25 mg  25 mg Oral Daily    amiodarone (CORDARONE) tablet 200 mg  200 mg Oral BID    HYDROcodone-acetaminophen (NORCO) 5-325 MG per tablet 1 tablet  1 tablet Oral Q6H PRN    sodium chloride flush 0.9 % injection 5-40 mL  5-40 mL IntraVENous 2 times per day    sodium chloride flush 0.9 % injection 5-40 mL  5-40 mL IntraVENous PRN    0.9 % sodium chloride infusion   IntraVENous PRN    acetaminophen (TYLENOL) tablet 650 mg  650 mg Oral Q4H PRN    sorbitol 70 % liquid 30 mL  30 mL Oral Daily PRN    pravastatin (PRAVACHOL) tablet 10 mg  10 mg Oral Nightly    sorbitol 70 % liquid 30 mL  30 mL Oral Once    furosemide (LASIX) tablet 40 mg  40 mg Oral Daily    acetaminophen (TYLENOL) tablet 650 mg  650 mg Oral Q6H PRN    ipratropium-albuterol (DUONEB) nebulizer solution 1 ampule  1 ampule Inhalation Q6H PRN    apixaban (ELIQUIS) tablet 5 mg  5 mg Oral BID    benzonatate (TESSALON) capsule 100 mg  100 mg Oral TID    budesonide-formoterol (SYMBICORT) 160-4.5 MCG/ACT inhaler 2 puff  2 puff Inhalation BID    dilTIAZem (CARDIZEM) tablet 30 mg  30 mg Oral TID AC    folic acid (FOLVITE) tablet 1 mg  1 mg Oral Daily    LORazepam (ATIVAN) tablet 1 mg  1 mg Oral Q4H PRN    melatonin tablet 5 mg  5 mg Oral Nightly    ondansetron (ZOFRAN-ODT) disintegrating tablet 4 mg  4 mg Oral Q8H PRN    Or    ondansetron (ZOFRAN) injection 4 mg  4 mg IntraVENous Q6H PRN    polyethylene glycol (GLYCOLAX) packet 17 g  17 g
Spiritual Care Assessment/Progress Note  [id number]    Name: Sonia Lopez MRN: [id number]    Age: 70 y.o. Sex: female   Language: English     Date: 5/9/2023            Total Time Calculated: 15 min              Spiritual Assessment begun in  [facility]  Service Provided For[de-identified] Patient     Encounter Overview/Reason : Spiritual/Emotional Needs    Spiritual beliefs:      [] Involved in a katherine tradition/spiritual practice:      [] Supported by a katherine community:      [] Claims no spiritual orientation:      [] Seeking spiritual identity:           [] Adheres to an individual form of spirituality:      [x] Not able to assess:                Identified resources for coping and support system:   Support System: Unknown       [] Prayer                  [] Devotional reading               [] Music                  [] Guided Imagery     [] Pet visits                                        [] Other: (COMMENT)     Specific area/focus of visit   Encounter: Type: Initial Screen/Assessment  Crisis:    Spiritual/Emotional needs: Type: Spiritual Support  Ritual, Rites and Sacraments:    Grief, Loss, and Adjustments:    Ethics/Mediation:    Behavioral Health:    Palliative Care: Advance Care Planning:      Plan/Referrals: Other (Comment) (Complied with her wishes to be left alone at this time)    Narrative:   Spiritual care assessment attempted with Ms. Carranza Lilly on [facility].  Provider in the room at 1st attempt, then met with MsMarco Seals in room. She greeted  with by saying she is not happy, aggravated and just wants to be left alone to rest. That she doesn't get enough sleep being here.  affirmed her emotions, and was able to share reason for visit and availability for her and her family everyday. She expressed gratitude for that. Please contact Spiritual Care for any further referrals. Chaplain Shelley Mosquera M.Div.    Please contact Hospital  for
Writer called to the patient's room. Patient complains of pain under her chin. Patient has a hematoma under her chin. She stated that she does not feel short of breath and feels that she can swallow fine. Dr. Vinod Prince was called and a message was left. Dr Tabitha Armenta was called who stated that he was coming to the patients' bedside to assess.
Cardizem 10 mg IV      5/1   Pt alert in bed with complaints of cough and general malaise. Denies chest pain, palpitations, SOB, nausea, diarrhea. HR and BP poorly controlled. K 3.4   Renal function improving    Per CV surgery will proceed with cardiac cath. Did not start metoprolol due to atenolol allergy. 5/2   Pt alert in bed agitated disposition, but no medical complaints. On Cardizem drip for better control of heart rate. Pt had left heart cath yesterday. Per cardiovascular surgery resume Eliquis, pt will receive LEIGH and possible cardioversion. K 3.4       5/3   Pt alert in bed, sitting up eating breakfast with no complaints this morning on amiodarone drip. Denies chest pain. Pt received LEIGH showing EF of 25-30%. Pt was cardioverted 4 times with a post-procedure rhythm of atrial fibrillation. BP elevated this morning 170/96. Lasix currently being held    Per cardiology pt is on amiodarone gtt, currently on apixaban. Consider adding losartan or Entresto if okay with nephrology. 5/4   Pt alert in bed with no medical complaints on amiodarone drip. Pt continues to have HR in the 100-120s on drip. Denies chest pain, palpitations, SOB. BP remains elevated 166/97. Per nursing cardiology stated pt will have ablation completed on Monday. Renal function improving          5/5         Patient resting the bed denies any chest pain or shortness of breath nausea no vomiting seen by the cardiology      Plan for ablation on Monday   Amiodarone drip was discontinued     5/7     Patient resting in bed denies any chest pain or shortness of breath complaining of constipation        5/8     Patient resting in the bed not in distress waiting for ablation today     5/9     Patient is alert and resting in the bed. A-fib ablation yesterday. She reports feeling, \"lousy. \" She endorses a HA, abdominal pain, mild intermittent chest pain  for 1-2 days, and palpitations. She denies SOB, vomiting.
Extremities normal, atraumatic, no cyanosis or edema. Pulses:  2+ and symmetric all extremities. Skin:  Skin color, texture, turgor normal. No rashes or lesions     Lymph nodes:  Cervical, supraclavicular, and axillary nodes normal.        Neurologic:  CNII-XII intact. Normal strength, sensation and reflexes throughout. Lab/Data Review: All lab results for the last 24 hours reviewed. Assessment:        Active Problems:     Atrial fibrillation, rapid ([address] (4/25/2023)        Atrial fibrillation ([address] (4/25/2023)        CHF (congestive heart failure) ([address] (4/25/2023)      Patient is a 67-year-old white female with:   1. A-fib with RVR   2. COPD exacerbation   3. Heart failure   4. Hypokalemia   5. Hyponatremia   6. Acute kidney injury   7. Mild aortic regurgitation   8. Moderate tricuspid regurgitation        Plan:       Afib ablation today.  On eliquis         May 5, 2023
for review    Lab Data Personally Reviewed:    Recent Labs     05/08/23  1449 05/10/23  0723   WBC 6.7 5.5   HGB 13.9 12.0   HCT 40.1 35.6    226     No results for input(s): INR, APTT in the last 72 hours. Invalid input(s): PTP   Recent Labs     05/08/23  1449 05/10/23  0723   * 134*   K 3.3* 3.3*    101   CO2 27 28   BUN 22* 23*     No results for input(s): CPK in the last 72 hours. Invalid input(s): CPKMB, CKNDX, TROIQ  No results found for: CHOL, CHOLX, CHLST, CHOLV, HDL, HDLC, LDL, LDLC, TGLX, TRIGL    Recent Labs     05/10/23  0723   GLOB 3.7     No results for input(s): PH, PCO2, PO2 in the last 72 hours.     Medications Personally Reviewed:    Current Facility-Administered Medications   Medication Dose Route Frequency    amiodarone (CORDARONE) tablet 200 mg  200 mg Oral BID    HYDROcodone-acetaminophen (NORCO) 5-325 MG per tablet 1 tablet  1 tablet Oral Q6H PRN    sodium chloride flush 0.9 % injection 5-40 mL  5-40 mL IntraVENous 2 times per day    sodium chloride flush 0.9 % injection 5-40 mL  5-40 mL IntraVENous PRN    0.9 % sodium chloride infusion   IntraVENous PRN    acetaminophen (TYLENOL) tablet 650 mg  650 mg Oral Q4H PRN    sorbitol 70 % liquid 30 mL  30 mL Oral Daily PRN    pravastatin (PRAVACHOL) tablet 10 mg  10 mg Oral Nightly    sorbitol 70 % liquid 30 mL  30 mL Oral Once    furosemide (LASIX) tablet 40 mg  40 mg Oral Daily    acetaminophen (TYLENOL) tablet 650 mg  650 mg Oral Q6H PRN    ipratropium-albuterol (DUONEB) nebulizer solution 1 ampule  1 ampule Inhalation Q6H PRN    [Held by provider] apixaban (ELIQUIS) tablet 5 mg  5 mg Oral BID    benzonatate (TESSALON) capsule 100 mg  100 mg Oral TID    budesonide-formoterol (SYMBICORT) 160-4.5 MCG/ACT inhaler 2 puff  2 puff Inhalation BID    dilTIAZem (CARDIZEM) tablet 30 mg  30 mg Oral TID AC    folic acid (FOLVITE) tablet 1 mg  1 mg Oral Daily    LORazepam (ATIVAN) tablet 1 mg  1 mg Oral Q4H PRN    melatonin tablet 5 mg
8.3 (L) 8.5 - 10.1 mg/dL    Total Bilirubin 0.5 0.2 - 1.0 mg/dL    AST 20 15 - 37 U/L    ALT 29 12 - 78 U/L    Alk Phosphatase 87 45 - 117 U/L    Total Protein 6.9 6.4 - 8.2 g/dL    Albumin 2.4 (L) 3.5 - 5.0 g/dL    Globulin 4.5 (H) 2.0 - 4.0 g/dL    Albumin/Globulin Ratio 0.5 (L) 1.1 - 2.2     CBC    Collection Time: 05/12/23  9:30 AM   Result Value Ref Range    WBC 6.9 3.6 - 11.0 K/uL    RBC 3.65 (L) 3.80 - 5.20 M/uL    Hemoglobin 13.0 11.5 - 16.0 g/dL    Hematocrit 37.9 35.0 - 47.0 %    .8 (H) 80.0 - 99.0 FL    MCH 35.6 (H) 26.0 - 34.0 PG    MCHC 34.3 30.0 - 36.5 g/dL    RDW 13.2 11.5 - 14.5 %    Platelets 660 524 - 922 K/uL    MPV 10.0 8.9 - 12.9 FL    Nucleated RBCs 0.0 0.0  WBC    nRBC 0.00 0.00 - 0.01 K/uL        Assessment and Plan:      1 acute kidney injury.     -Etiology is likely from hypotension and IV diuresis.     -On admission her creatinine was 1.2 and bumped to 1.9 .     -Her creatinine is improved to 1.5--1.36--1.32--1.26--1. 1.  increased to 1.49 today  -baudilio likley prerenal , probably cardiorenal syndrome/ARB  Continue Lasix  40 mg daily  -Her baseline creatinine was 0.9 on 1/5/2023.     -Continue to monitor renal functions       2. CHF. -She was noticed to have bilateral lower extremity swelling. -2D echocardiogram showed depressed LVEF of 30 to 35%   -On Lasix 40 daily, advised p.o. fluid restriction   Clinically stable,     Cardiology following       3. A-fib with RVR.  s/p abation on 5/8   -She is on amiodarone, diltiazem and eliquis. 4.  Hypokalemia.     -3.3 today . Will give Kcl 40meq today. add spironolactone 25 daily. Monitor potassium levels       5. Hyponatremia.     -Of mild severity.   resolved   -Sodium is 132--137--135 --134 ,  decreased to 131--134 today  -probably related to  high fluid consumption  Continue Lasix 40 mg daily, advised p.o. fluid restriction    Signed By: Bri Schultz MD     May 12, 2023
Collection Time: 05/05/23  7:38 AM         Result  Value  Ref Range            Sodium  133 (L)  136 - 145 mmol/L       Potassium  4.1  3.5 - 5.1 mmol/L       Chloride  104  97 - 108 mmol/L       CO2  25  21 - 32 mmol/L       Anion gap  4 (L)  5 - 15 mmol/L       Glucose  95  65 - 100 mg/dL       BUN  23 (H)  6 - 20 mg/dL       Creatinine  1.23 (H)  0.55 - 1.02 mg/dL       BUN/Creatinine ratio  19  12 - 20         eGFR  48 (L)  >60 ml/min/1.73m2            Calcium  8.6  8.5 - 10.1 mg/dL            Phosphorus  4.5  2.6 - 4.7 mg/dL            Albumin  2.1 (L)  3.5 - 5.0 g/dL              Assessment and Plan:        1 acute kidney injury.     -Etiology is likely from hypotension and IV diuresis.     -On admission her creatinine was 1.2 and bumped to 1.9 .     -Her creatinine is improved to 1.5--1.36--1.32--1.26--1.1. Today's labs are pending   -Her baseline creatinine was 0.9 on 1/5/2023. Continue to monitor renal functions       2. CHF. -She was noticed to have bilateral lower extremity swelling. -2D echocardiogram showed depressed LVEF of 30 to 35%    Clinically stable,     Cardiology following       3. A-fib with RVR.  s/p abation on 5/8   -She is on amiodarone, diltiazem and eliquis. 4.  Hypokalemia.     -3.3 today . Will give Kcl 40meq now. Monitor potassium levels       5. Hyponatremia.     -Of mild severity.   resolved   -Sodium is 132--137--135 --134 ,  probably related to increase in fluid retention and high fluid consumption    Advised patient to maintain fluid restriction,  given 1 dose of IV Lasix 5/6 .monitor sodium         Signed By:  He Saenz MD           May 5, 2023
Patient have recurrence of A-fib after ablation/cardiology recommend to increase amiodarone and redo cardioversion as an outpatient     Neck hematoma stable     CAT scan shows large pleural effusion     5/10     Patient states she \"hurts all over\" and reports neck where the hematoma is. Denies chest pain, SOB, N/V, diarrhea. Neck hematoma stable      Heart rate is well controlled. Continue holding off Eliquis. She underwent thoracentesis yesterday. Remove 850 cc potassium is 3.3, creatinine 1.34. Albumin 1.9. Hemoglobin 12.0. Appreciate EP input. Continue amiodarone. Once discharged, see cardiology in 2 weeks     5/11    Patient is sitting comfortably in bed eating breakfast. She feels like her submandibular hematoma is worsening, rates pain as a 9/10 at worst. Associated difficulty speaking due to hematoma. She continues to have a productive cough but improving overall. She endorses some nausea when receiving her inhaler treatment. Denies chest pain, vomiting, abdominal pain. Objective:     Vitals:    05/11/23 1127   BP: (!) 190/97   Pulse: 87   Resp: 18   Temp: 98.2 °F (36.8 °C)   SpO2: 92%        No results found for this or any previous visit (from the past 24 hour(s)). [unfilled]        Review of Systems    Constitutional: Negative for chills and fever. HENT: Negative. Eyes: Negative. Respiratory: Productive cough. Cardiovascular: Negative. Gastrointestinal: Nausea. Negative abdominal pain, vomiting. Skin: Submandibular hematoma and ecchymosis. Neurological: Negative. Physical Exam:      Constitutional: pt is oriented to person, place, and time. HENT:   Head: Normocephalic and atraumatic. Eyes: Pupils are equal, round, and reactive to light. EOM are normal.   Skin: Large submandibular hematoma. Diffuse ecchymosis throughout the right forearm. Cardiovascular: Irregular rhythm. 2+ pitting edema to the b/l LE. Pulmonary/Chest: Decreased breath sounds.  Crackles
GLOB   --   4.4*  4.0                    No results for input(s): INR, PTP, APTT, INREXT, INREXT in the last 72 hours. No results for input(s): FE, TIBC, PSAT, FERR in the last 72 hours. No results found for: FOL, RBCF    No results for input(s): PH, PCO2, PO2 in the last 72 hours. No results for input(s): CPK, CKNDX, TROIQ in the last 72 hours. No lab exists for component: CPKMB   No results found for: CHOL, CHOLX, CHLST, CHOLV, HDL, HDLP, LDL, LDLC, DLDLP, TGLX, TRIGL, TRIGP, CHHD, CHHDX   No results found for: GLUCPOC   No results found for: COLOR, APPRN, SPGRU, REFSG, BHAVANI, PROTU, GLUCU, KETU, BILU, UROU, EREN, LEUKU, GLUKE, EPSU, BACTU, WBCU, RBCU, CASTS, UCRY          Assessment and Plan:                A-fib with rapid ventricular rate - rate controlled, chronic a fib refractory to cardiovert    Admit to telemetry    Eliquis 5 mg BID    Amiodarone po      Pt received LEIGH showing EF of 25-30%. Pt was cardioverted 4 times with a post-procedure rhythm of atrial fibrillation. CHF/chronic heart failure with ejection fraction with 25-30%   Lasix 40 mg IV daily - HELD    BNP trending down    Alcohol dependency   Thiamine 005 mg daily   Folic acid 1 mg daily    Ativan 1 mg PO PRN    COPD   Duo-neb 3 mL q 6 hours    Symbicort 2 puffs daily    Hyponatremia (135)   HOLA   Per nephrology no IV fluids, avoid ACE and ARBs for now.     Repeat labs    Hypokalemia - resolved   Cough    Benzonatate 100 mg TID       Multivitamin 1 tab daily   Ativan 1 mg PRN q 4 hours    Melatonin 5 mg daily           Ablation today      Patient on amiodarone 200 mg daily   Request 5 mg twice a day   Tessalon 100 mg 3 times a day   Symbicort 2 puffs twice a day   Diltiazem 30 mg 3 times a day    Sorbitol for constipation                                                      Current Facility-Administered Medications:      amiodarone (CORDARONE) tablet 200 mg, 200 mg, Oral, DAILY, Janny Jerez MD, 200 mg at 05/05/23 5480
MD González    polyethylene glycol (GLYCOLAX) packet 17 g, 17 g, Oral, Daily PRN, Yevgeniy Lebron MD, 17 g at 05/06/23 1344    sodium chloride flush 0.9 % injection 5-40 mL, 5-40 mL, IntraVENous, PRN, Percy Claudio MD    sodium chloride flush 0.9 % injection 5-40 mL, 5-40 mL, IntraVENous, Q8H, Sruthi Claudio MD, 10 mL at 05/09/23 0908    multivitamin 1 tablet, 1 tablet, Oral, Daily, Percy Claudio MD, 1 tablet at 05/09/23 7024    thiamine mononitrate tablet 100 mg, 100 mg, Oral, Daily, Percy Claudio MD, 100 mg at 05/09/23 2447
Oral, Nightly, Mu-ism Rubi Del Castillo MD, 10 mg at 05/09/23 2121    sorbitol 70 % liquid 30 mL, 30 mL, Oral, Once, Duglas Ojeda MD    furosemide (LASIX) tablet 40 mg, 40 mg, Oral, Daily, Enid Claudio MD, 40 mg at 05/10/23 9638    acetaminophen (TYLENOL) tablet 650 mg, 650 mg, Oral, Q6H PRN, Duglas Ojeda MD, 650 mg at 05/09/23 1642    ipratropium-albuterol (DUONEB) nebulizer solution 1 ampule, 1 ampule, Inhalation, Q6H PRN, Duglas Ojeda MD    [Held by provider] apixaban (ELIQUIS) tablet 5 mg, 5 mg, Oral, BID, Sruthi Claudio MD, 5 mg at 05/08/23 2133    benzonatate (TESSALON) capsule 100 mg, 100 mg, Oral, TID, Duglas Ojeda MD, 100 mg at 05/10/23 0859    budesonide-formoterol (SYMBICORT) 160-4.5 MCG/ACT inhaler 2 puff, 2 puff, Inhalation, BID, Duglas Ojeda MD, 2 puff at 05/10/23 0901    dilTIAZem (CARDIZEM) tablet 30 mg, 30 mg, Oral, TID AC, Sruthi Claudio MD, 30 mg at 31/74/43 0332    folic acid (FOLVITE) tablet 1 mg, 1 mg, Oral, Daily, Enid Claudio MD, 1 mg at 05/10/23 0858    LORazepam (ATIVAN) tablet 1 mg, 1 mg, Oral, Q4H PRN, Duglas Ojeda MD, 1 mg at 05/07/23 2012    melatonin tablet 5 mg, 5 mg, Oral, Nightly, Sruthi Claudio MD, 5 mg at 05/09/23 2121    ondansetron (ZOFRAN-ODT) disintegrating tablet 4 mg, 4 mg, Oral, Q8H PRN **OR** ondansetron (ZOFRAN) injection 4 mg, 4 mg, IntraVENous, Q6H PRN, Enid Claudio MD    polyethylene glycol (GLYCOLAX) packet 17 g, 17 g, Oral, Daily PRN, Duglas Ojeda MD, 17 g at 05/06/23 1344    sodium chloride flush 0.9 % injection 5-40 mL, 5-40 mL, IntraVENous, PRN, Enid Claudio MD    sodium chloride flush 0.9 % injection 5-40 mL, 5-40 mL, IntraVENous, Q8H, Sruthi Claudio MD, 5 mL at 05/10/23 0858    multivitamin 1 tablet, 1 tablet, Oral, Daily, Enid Claudio MD, 1 tablet at 05/10/23 0824    thiamine mononitrate tablet 100 mg, 100 mg, Oral, Daily, Enid Claudio MD, 100 mg at 05/10/23 7847
Tessalon, Symbicrot, Cardizem, Lasix, Pravastatin. Possible discharge in 24 hrs.            Current Facility-Administered Medications:     sacubitril-valsartan (ENTRESTO) 24-26 MG per tablet 1 tablet, 1 tablet, Oral, BID, Janny Jerez MD    amiodarone (CORDARONE) tablet 200 mg, 200 mg, Oral, BID, Alejandro Grant MD, 200 mg at 05/12/23 0852    HYDROcodone-acetaminophen ([facility] [address]) 5-325 MG per tablet 1 tablet, 1 tablet, Oral, Q6H PRN, Izzy Claudio MD, 1 tablet at 05/11/23 1857    sodium chloride flush 0.9 % injection 5-40 mL, 5-40 mL, IntraVENous, 2 times per day, Alessandro Espinal MD, 5 mL at 05/12/23 0853    sodium chloride flush 0.9 % injection 5-40 mL, 5-40 mL, IntraVENous, PRN, Alejandro Grant MD    0.9 % sodium chloride infusion, , IntraVENous, PRN, Alejandro Stovall MD    acetaminophen (TYLENOL) tablet 650 mg, 650 mg, Oral, Q4H PRN, Alejandro Stovall MD, 650 mg at 05/11/23 1219    sorbitol 70 % liquid 30 mL, 30 mL, Oral, Daily PRN, Izzy Claudio MD, 30 mL at 05/07/23 1128    pravastatin (PRAVACHOL) tablet 10 mg, 10 mg, Oral, Nightly, Janny Jerez MD, 10 mg at 05/11/23 2001    sorbitol 70 % liquid 30 mL, 30 mL, Oral, Once, Efren Valera MD    furosemide (LASIX) tablet 40 mg, 40 mg, Oral, Daily, Sruthi Claudio MD, 40 mg at 05/12/23 6878    acetaminophen (TYLENOL) tablet 650 mg, 650 mg, Oral, Q6H PRN, Izzy Claudio MD, 650 mg at 05/09/23 1642    ipratropium-albuterol (DUONEB) nebulizer solution 1 ampule, 1 ampule, Inhalation, Q6H PRN, Efren Valera MD    apixaban (ELIQUIS) tablet 5 mg, 5 mg, Oral, BID, Sruthi Claudio MD, 5 mg at 05/12/23 7868    benzonatate (TESSALON) capsule 100 mg, 100 mg, Oral, TID, Efren Valera MD, 100 mg at 05/12/23 0852    budesonide-formoterol (SYMBICORT) 160-4.5 MCG/ACT inhaler 2 puff, 2 puff, Inhalation, BID, Efren Valera MD, 2 puff at 05/12/23 0712    dilTIAZem (CARDIZEM) tablet 30 mg, 30 mg, Oral, TID AC, Sruthi Claudio MD, 30 mg at 05/12/23 9626
tablet, Oral, Daily, Miguel Angel Bueno MD, 1 tablet at 05/11/23 1376    thiamine mononitrate tablet 100 mg, 100 mg, Oral, Daily, [facility] Julia Claudio MD, 100 mg at 05/11/23 8232

## 2023-05-15 ENCOUNTER — FOLLOWUP TELEPHONE ENCOUNTER (OUTPATIENT)
Facility: HOSPITAL | Age: 68
End: 2023-05-15

## 2023-05-17 ENCOUNTER — FOLLOWUP TELEPHONE ENCOUNTER (OUTPATIENT)
Facility: HOSPITAL | Age: 68
End: 2023-05-17

## 2023-05-22 NOTE — ANESTHESIA POSTPROCEDURE EVALUATION
Department of Anesthesiology  Postprocedure Note    Patient: Zane Coned  MRN: [id number]  YOB: 1955  Date of evaluation: 5/22/2023      Procedure Summary     Date: 05/08/23 Room / Location: [facility] EP LAB / [facility] ELECTROPHYSIOLOGY    Anesthesia Start: 9535 Anesthesia Stop: 1267    Procedures:       Ablation A-fib w complete ep study      Ep 3d mapping      Ablation following A-fib addl      Ep cardioversion Diagnosis:       Paroxysmal A-fib ([address]      (Same)    Providers: Janeen Brown MD Responsible Provider: Judit Chao MD    Anesthesia Type: General ASA Status: 3          Anesthesia Type: General    Eusebio Phase I: Eusebio Score: 9    Eusebio Phase II:        Anesthesia Post Evaluation    Patient location during evaluation: PACU  Patient participation: complete - patient cannot participate  Level of consciousness: awake  Pain score: 0  Airway patency: patent  Nausea & Vomiting: no nausea and no vomiting  Complications: no  Cardiovascular status: hemodynamically stable  Respiratory status: acceptable  Hydration status: stable  Multimodal analgesia pain management approach

## 2024-01-24 ENCOUNTER — HOSPITAL ENCOUNTER (INPATIENT)
Facility: HOSPITAL | Age: 69
LOS: 20 days | DRG: 308 | End: 2024-02-13
Attending: STUDENT IN AN ORGANIZED HEALTH CARE EDUCATION/TRAINING PROGRAM | Admitting: INTERNAL MEDICINE
Payer: MEDICARE

## 2024-01-24 ENCOUNTER — APPOINTMENT (OUTPATIENT)
Facility: HOSPITAL | Age: 69
DRG: 308 | End: 2024-01-24
Payer: MEDICARE

## 2024-01-24 DIAGNOSIS — I50.811 ACUTE RIGHT-SIDED CONGESTIVE HEART FAILURE (HCC): ICD-10-CM

## 2024-01-24 DIAGNOSIS — J44.1 COPD EXACERBATION (HCC): Primary | ICD-10-CM

## 2024-01-24 DIAGNOSIS — I48.91 ATRIAL FIBRILLATION WITH RVR (HCC): ICD-10-CM

## 2024-01-24 DIAGNOSIS — R79.89 ELEVATED TROPONIN: ICD-10-CM

## 2024-01-24 LAB
ALBUMIN SERPL-MCNC: 1.7 G/DL (ref 3.5–5)
ALBUMIN/GLOB SERPL: 0.6 (ref 1.1–2.2)
ALP SERPL-CCNC: 59 U/L (ref 45–117)
ALT SERPL-CCNC: 11 U/L (ref 12–78)
ANION GAP SERPL CALC-SCNC: 6 MMOL/L (ref 5–15)
AST SERPL W P-5'-P-CCNC: 14 U/L (ref 15–37)
BASOPHILS # BLD: 0.1 K/UL (ref 0–0.1)
BASOPHILS NFR BLD: 1 % (ref 0–1)
BILIRUB SERPL-MCNC: 0.2 MG/DL (ref 0.2–1)
BNP SERPL-MCNC: ABNORMAL PG/ML
BUN SERPL-MCNC: 28 MG/DL (ref 6–20)
BUN/CREAT SERPL: 17 (ref 12–20)
CA-I BLD-MCNC: 5.8 MG/DL (ref 8.5–10.1)
CHLORIDE SERPL-SCNC: 115 MMOL/L (ref 97–108)
CO2 SERPL-SCNC: 20 MMOL/L (ref 21–32)
CREAT SERPL-MCNC: 1.68 MG/DL (ref 0.55–1.02)
DIFFERENTIAL METHOD BLD: ABNORMAL
EKG ATRIAL RATE: 142 BPM
EKG DIAGNOSIS: NORMAL
EKG Q-T INTERVAL: 320 MS
EKG QRS DURATION: 72 MS
EKG QTC CALCULATION (BAZETT): 492 MS
EKG R AXIS: 63 DEGREES
EKG T AXIS: 88 DEGREES
EKG VENTRICULAR RATE: 142 BPM
EOSINOPHIL # BLD: 0.2 K/UL (ref 0–0.4)
EOSINOPHIL NFR BLD: 2 % (ref 0–7)
ERYTHROCYTE [DISTWIDTH] IN BLOOD BY AUTOMATED COUNT: 14.5 % (ref 11.5–14.5)
GLOBULIN SER CALC-MCNC: 2.7 G/DL (ref 2–4)
GLUCOSE SERPL-MCNC: 76 MG/DL (ref 65–100)
HCT VFR BLD AUTO: 43.4 % (ref 35–47)
HGB BLD-MCNC: 15.3 G/DL (ref 11.5–16)
IMM GRANULOCYTES # BLD AUTO: 0.1 K/UL (ref 0–0.04)
IMM GRANULOCYTES NFR BLD AUTO: 1 % (ref 0–0.5)
LYMPHOCYTES # BLD: 0.8 K/UL (ref 0.8–3.5)
LYMPHOCYTES NFR BLD: 9 % (ref 12–49)
MCH RBC QN AUTO: 38.1 PG (ref 26–34)
MCHC RBC AUTO-ENTMCNC: 35.3 G/DL (ref 30–36.5)
MCV RBC AUTO: 108 FL (ref 80–99)
MONOCYTES # BLD: 0.9 K/UL (ref 0–1)
MONOCYTES NFR BLD: 11 % (ref 5–13)
NEUTS SEG # BLD: 6.5 K/UL (ref 1.8–8)
NEUTS SEG NFR BLD: 76 % (ref 32–75)
NRBC # BLD: 0 K/UL (ref 0–0.01)
NRBC BLD-RTO: 0 PER 100 WBC
PLATELET # BLD AUTO: 236 K/UL (ref 150–400)
PMV BLD AUTO: 10.6 FL (ref 8.9–12.9)
POTASSIUM SERPL-SCNC: 2.4 MMOL/L (ref 3.5–5.1)
PROT SERPL-MCNC: 4.4 G/DL (ref 6.4–8.2)
RBC # BLD AUTO: 4.02 M/UL (ref 3.8–5.2)
SODIUM SERPL-SCNC: 141 MMOL/L (ref 136–145)
TROPONIN I SERPL HS-MCNC: 60 NG/L (ref 0–51)
TROPONIN I SERPL HS-MCNC: 73 NG/L (ref 0–51)
WBC # BLD AUTO: 8.5 K/UL (ref 3.6–11)

## 2024-01-24 PROCEDURE — 6370000000 HC RX 637 (ALT 250 FOR IP): Performed by: NURSE PRACTITIONER

## 2024-01-24 PROCEDURE — 2580000003 HC RX 258: Performed by: NURSE PRACTITIONER

## 2024-01-24 PROCEDURE — 85025 COMPLETE CBC W/AUTO DIFF WBC: CPT

## 2024-01-24 PROCEDURE — 2580000003 HC RX 258: Performed by: STUDENT IN AN ORGANIZED HEALTH CARE EDUCATION/TRAINING PROGRAM

## 2024-01-24 PROCEDURE — 71045 X-RAY EXAM CHEST 1 VIEW: CPT

## 2024-01-24 PROCEDURE — 6360000002 HC RX W HCPCS: Performed by: STUDENT IN AN ORGANIZED HEALTH CARE EDUCATION/TRAINING PROGRAM

## 2024-01-24 PROCEDURE — 93005 ELECTROCARDIOGRAM TRACING: CPT | Performed by: STUDENT IN AN ORGANIZED HEALTH CARE EDUCATION/TRAINING PROGRAM

## 2024-01-24 PROCEDURE — 2500000003 HC RX 250 WO HCPCS: Performed by: STUDENT IN AN ORGANIZED HEALTH CARE EDUCATION/TRAINING PROGRAM

## 2024-01-24 PROCEDURE — 6360000002 HC RX W HCPCS: Performed by: NURSE PRACTITIONER

## 2024-01-24 PROCEDURE — 1100000000 HC RM PRIVATE

## 2024-01-24 PROCEDURE — 80053 COMPREHEN METABOLIC PANEL: CPT

## 2024-01-24 PROCEDURE — 84484 ASSAY OF TROPONIN QUANT: CPT

## 2024-01-24 PROCEDURE — 96374 THER/PROPH/DIAG INJ IV PUSH: CPT

## 2024-01-24 PROCEDURE — 94761 N-INVAS EAR/PLS OXIMETRY MLT: CPT

## 2024-01-24 PROCEDURE — 36415 COLL VENOUS BLD VENIPUNCTURE: CPT

## 2024-01-24 PROCEDURE — 6370000000 HC RX 637 (ALT 250 FOR IP): Performed by: STUDENT IN AN ORGANIZED HEALTH CARE EDUCATION/TRAINING PROGRAM

## 2024-01-24 PROCEDURE — 99285 EMERGENCY DEPT VISIT HI MDM: CPT

## 2024-01-24 PROCEDURE — 83880 ASSAY OF NATRIURETIC PEPTIDE: CPT

## 2024-01-24 PROCEDURE — 2500000003 HC RX 250 WO HCPCS: Performed by: NURSE PRACTITIONER

## 2024-01-24 PROCEDURE — 94640 AIRWAY INHALATION TREATMENT: CPT

## 2024-01-24 RX ORDER — SODIUM CHLORIDE 0.9 % (FLUSH) 0.9 %
5-40 SYRINGE (ML) INJECTION EVERY 12 HOURS SCHEDULED
Status: DISCONTINUED | OUTPATIENT
Start: 2024-01-24 | End: 2024-02-13

## 2024-01-24 RX ORDER — SPIRONOLACTONE 25 MG/1
25 TABLET ORAL DAILY
Status: DISCONTINUED | OUTPATIENT
Start: 2024-01-24 | End: 2024-02-12

## 2024-01-24 RX ORDER — SODIUM CHLORIDE 9 MG/ML
INJECTION, SOLUTION INTRAVENOUS CONTINUOUS
Status: DISCONTINUED | OUTPATIENT
Start: 2024-01-24 | End: 2024-01-25

## 2024-01-24 RX ORDER — MAGNESIUM SULFATE IN WATER 40 MG/ML
2000 INJECTION, SOLUTION INTRAVENOUS
Status: COMPLETED | OUTPATIENT
Start: 2024-01-24 | End: 2024-01-24

## 2024-01-24 RX ORDER — MAGNESIUM HYDROXIDE/ALUMINUM HYDROXICE/SIMETHICONE 120; 1200; 1200 MG/30ML; MG/30ML; MG/30ML
30 SUSPENSION ORAL EVERY 6 HOURS PRN
Status: DISCONTINUED | OUTPATIENT
Start: 2024-01-24 | End: 2024-02-13 | Stop reason: HOSPADM

## 2024-01-24 RX ORDER — MAGNESIUM SULFATE IN WATER 40 MG/ML
2000 INJECTION, SOLUTION INTRAVENOUS PRN
Status: DISCONTINUED | OUTPATIENT
Start: 2024-01-24 | End: 2024-02-13

## 2024-01-24 RX ORDER — FUROSEMIDE 10 MG/ML
40 INJECTION INTRAMUSCULAR; INTRAVENOUS 2 TIMES DAILY
Status: DISCONTINUED | OUTPATIENT
Start: 2024-01-24 | End: 2024-01-29

## 2024-01-24 RX ORDER — IPRATROPIUM BROMIDE AND ALBUTEROL SULFATE 2.5; .5 MG/3ML; MG/3ML
1 SOLUTION RESPIRATORY (INHALATION)
Status: COMPLETED | OUTPATIENT
Start: 2024-01-24 | End: 2024-01-24

## 2024-01-24 RX ORDER — ONDANSETRON 2 MG/ML
4 INJECTION INTRAMUSCULAR; INTRAVENOUS EVERY 6 HOURS PRN
Status: DISCONTINUED | OUTPATIENT
Start: 2024-01-24 | End: 2024-02-13

## 2024-01-24 RX ORDER — ACETAMINOPHEN 325 MG/1
650 TABLET ORAL EVERY 6 HOURS PRN
Status: DISCONTINUED | OUTPATIENT
Start: 2024-01-24 | End: 2024-02-13 | Stop reason: HOSPADM

## 2024-01-24 RX ORDER — ACETAMINOPHEN 650 MG/1
650 SUPPOSITORY RECTAL EVERY 6 HOURS PRN
Status: DISCONTINUED | OUTPATIENT
Start: 2024-01-24 | End: 2024-02-13

## 2024-01-24 RX ORDER — SODIUM CHLORIDE 0.9 % (FLUSH) 0.9 %
5-40 SYRINGE (ML) INJECTION PRN
Status: DISCONTINUED | OUTPATIENT
Start: 2024-01-24 | End: 2024-02-13 | Stop reason: HOSPADM

## 2024-01-24 RX ORDER — POTASSIUM CHLORIDE 7.45 MG/ML
10 INJECTION INTRAVENOUS
Status: COMPLETED | OUTPATIENT
Start: 2024-01-24 | End: 2024-01-25

## 2024-01-24 RX ORDER — ONDANSETRON 4 MG/1
4 TABLET, ORALLY DISINTEGRATING ORAL EVERY 8 HOURS PRN
Status: DISCONTINUED | OUTPATIENT
Start: 2024-01-24 | End: 2024-02-13

## 2024-01-24 RX ORDER — SODIUM CHLORIDE 9 MG/ML
INJECTION, SOLUTION INTRAVENOUS PRN
Status: DISCONTINUED | OUTPATIENT
Start: 2024-01-24 | End: 2024-02-13

## 2024-01-24 RX ORDER — POLYETHYLENE GLYCOL 3350 17 G/17G
17 POWDER, FOR SOLUTION ORAL DAILY PRN
Status: DISCONTINUED | OUTPATIENT
Start: 2024-01-24 | End: 2024-02-13

## 2024-01-24 RX ORDER — DILTIAZEM HYDROCHLORIDE 5 MG/ML
10 INJECTION INTRAVENOUS ONCE
Status: DISCONTINUED | OUTPATIENT
Start: 2024-01-24 | End: 2024-01-24

## 2024-01-24 RX ORDER — CALCIUM GLUCONATE 94 MG/ML
1000 INJECTION, SOLUTION INTRAVENOUS ONCE
Status: DISCONTINUED | OUTPATIENT
Start: 2024-01-24 | End: 2024-01-24 | Stop reason: DRUGHIGH

## 2024-01-24 RX ORDER — POTASSIUM CHLORIDE 7.45 MG/ML
10 INJECTION INTRAVENOUS PRN
Status: DISCONTINUED | OUTPATIENT
Start: 2024-01-24 | End: 2024-02-13

## 2024-01-24 RX ORDER — PRAVASTATIN SODIUM 10 MG
10 TABLET ORAL NIGHTLY
Status: DISCONTINUED | OUTPATIENT
Start: 2024-01-24 | End: 2024-02-13

## 2024-01-24 RX ORDER — GAUZE BANDAGE 2" X 2"
100 BANDAGE TOPICAL DAILY
Status: DISCONTINUED | OUTPATIENT
Start: 2024-01-24 | End: 2024-02-13

## 2024-01-24 RX ORDER — HEPARIN SODIUM 5000 [USP'U]/ML
5000 INJECTION, SOLUTION INTRAVENOUS; SUBCUTANEOUS 2 TIMES DAILY
Status: DISCONTINUED | OUTPATIENT
Start: 2024-01-24 | End: 2024-01-25

## 2024-01-24 RX ORDER — CALCIUM GLUCONATE 20 MG/ML
1000 INJECTION, SOLUTION INTRAVENOUS ONCE
Status: COMPLETED | OUTPATIENT
Start: 2024-01-24 | End: 2024-01-24

## 2024-01-24 RX ORDER — 0.9 % SODIUM CHLORIDE 0.9 %
500 INTRAVENOUS SOLUTION INTRAVENOUS ONCE
Status: COMPLETED | OUTPATIENT
Start: 2024-01-24 | End: 2024-01-24

## 2024-01-24 RX ORDER — CHOLECALCIFEROL (VITAMIN D3) 125 MCG
5 CAPSULE ORAL NIGHTLY
Status: DISCONTINUED | OUTPATIENT
Start: 2024-01-24 | End: 2024-02-11

## 2024-01-24 RX ORDER — POTASSIUM CHLORIDE 20 MEQ/1
40 TABLET, EXTENDED RELEASE ORAL PRN
Status: DISCONTINUED | OUTPATIENT
Start: 2024-01-24 | End: 2024-02-13

## 2024-01-24 RX ORDER — FOLIC ACID 1 MG/1
1 TABLET ORAL DAILY
Status: DISCONTINUED | OUTPATIENT
Start: 2024-01-24 | End: 2024-02-13

## 2024-01-24 RX ORDER — HYDROMORPHONE HYDROCHLORIDE 1 MG/ML
0.5 INJECTION, SOLUTION INTRAMUSCULAR; INTRAVENOUS; SUBCUTANEOUS EVERY 4 HOURS PRN
Status: DISCONTINUED | OUTPATIENT
Start: 2024-01-24 | End: 2024-01-26

## 2024-01-24 RX ORDER — CALCIUM GLUCONATE 20 MG/ML
2000 INJECTION, SOLUTION INTRAVENOUS ONCE
Status: DISCONTINUED | OUTPATIENT
Start: 2024-01-24 | End: 2024-01-25

## 2024-01-24 RX ORDER — CYCLOBENZAPRINE HCL 10 MG
10 TABLET ORAL 3 TIMES DAILY PRN
Status: DISCONTINUED | OUTPATIENT
Start: 2024-01-24 | End: 2024-02-13

## 2024-01-24 RX ADMIN — IPRATROPIUM BROMIDE AND ALBUTEROL SULFATE 1 DOSE: 2.5; .5 SOLUTION RESPIRATORY (INHALATION) at 09:29

## 2024-01-24 RX ADMIN — MAGNESIUM SULFATE HEPTAHYDRATE 2000 MG: 40 INJECTION, SOLUTION INTRAVENOUS at 11:45

## 2024-01-24 RX ADMIN — DILTIAZEM HYDROCHLORIDE 5 MG/HR: 5 INJECTION, SOLUTION INTRAVENOUS at 12:46

## 2024-01-24 RX ADMIN — POTASSIUM CHLORIDE 10 MEQ: 7.45 INJECTION INTRAVENOUS at 14:16

## 2024-01-24 RX ADMIN — POTASSIUM CHLORIDE 10 MEQ: 7.45 INJECTION INTRAVENOUS at 16:15

## 2024-01-24 RX ADMIN — POTASSIUM CHLORIDE 10 MEQ: 7.45 INJECTION INTRAVENOUS at 18:40

## 2024-01-24 RX ADMIN — SODIUM CHLORIDE: 9 INJECTION, SOLUTION INTRAVENOUS at 13:20

## 2024-01-24 RX ADMIN — POTASSIUM CHLORIDE 10 MEQ: 7.45 INJECTION INTRAVENOUS at 15:23

## 2024-01-24 RX ADMIN — POTASSIUM CHLORIDE 10 MEQ: 7.45 INJECTION INTRAVENOUS at 17:35

## 2024-01-24 RX ADMIN — SODIUM CHLORIDE 500 ML: 9 INJECTION, SOLUTION INTRAVENOUS at 11:45

## 2024-01-24 RX ADMIN — APIXABAN 5 MG: 5 TABLET, FILM COATED ORAL at 22:41

## 2024-01-24 RX ADMIN — DILTIAZEM HYDROCHLORIDE 12.5 MG/HR: 5 INJECTION, SOLUTION INTRAVENOUS at 23:41

## 2024-01-24 RX ADMIN — POTASSIUM CHLORIDE 10 MEQ: 7.45 INJECTION INTRAVENOUS at 13:20

## 2024-01-24 RX ADMIN — ACETAMINOPHEN 650 MG: 325 TABLET ORAL at 20:27

## 2024-01-24 RX ADMIN — Medication 5 MG: at 20:27

## 2024-01-24 RX ADMIN — SODIUM CHLORIDE, PRESERVATIVE FREE 10 ML: 5 INJECTION INTRAVENOUS at 22:45

## 2024-01-24 RX ADMIN — PRAVASTATIN SODIUM 10 MG: 10 TABLET ORAL at 22:41

## 2024-01-24 RX ADMIN — CALCIUM GLUCONATE 1000 MG: 20 INJECTION, SOLUTION INTRAVENOUS at 12:27

## 2024-01-24 NOTE — ED TRIAGE NOTES
Pt called ems for SOB pt states she has COPD, pt was sat 96% pt was showing afib on monitor per ems, the pt complained of chest pressure for several weeks, ems 12 lead shows Afib, pt not given ASA at this time

## 2024-01-24 NOTE — ED PROVIDER NOTES
[facility] EMERGENCY DEPT  EMERGENCY DEPARTMENT HISTORY AND PHYSICAL EXAM      Date: 1/24/2024  Patient Name: Allison Mauricio  MRN: [id number]  Birthdate 1955  Date of evaluation: 1/24/2024  Provider: Antoine Carmichael MD   Note Started: 9:04 AM EST 1/24/24    HISTORY OF PRESENT ILLNESS     Chief Complaint   Patient presents with    Shortness of Breath    Chest Pain       History Provided By: Patient    HPI: Allison Mauricio is a 68 y.o. female with PMH of HTN, COPD, and A-fib on anticoagulation with Eliquis comes to the ED with chest pressure and shortness of breath has been going on for several days it has been worsening.  Pressure is in the middle of the chest and nonradiating and without additional relieving factors.  No nausea, vomiting or diaphoresis.  She is reporting shortness of breath.  She reports lower extremity swelling is been going on for quite some time.  No fever, chills or sweats.    PAST MEDICAL HISTORY   Past Medical History:  Past Medical History:   Diagnosis Date    HTN (hypertension)     Smoker        Past Surgical History:  Past Surgical History:   Procedure Laterality Date    EP DEVICE PROCEDURE N/A 5/8/2023    Ablation A-fib w complete ep study performed by Alejandro Grant MD at [facility] ELECTROPHYSIOLOGY    EP DEVICE PROCEDURE N/A 5/8/2023    Ep 3d mapping performed by Alejandro Grant MD at [facility] ELECTROPHYSIOLOGY    EP DEVICE PROCEDURE N/A 5/8/2023    Ablation following A-fib addl performed by Alejandro Grant MD at [facility] ELECTROPHYSIOLOGY    EP DEVICE PROCEDURE N/A 5/8/2023    Ep cardioversion performed by Alejandro Grant MD at [facility] ELECTROPHYSIOLOGY    SHOULDER ARTHROPLASTY Right 11/06/2011    Has fracture surgical neck, has ORIF DONE       Family History:  Family History   Problem Relation Age of Onset    No Known Problems Mother     No Known Problems Father        Social History:  Social History     Tobacco Use    Smoking status: Every Day     Current packs/day: 0.50     Types: Cigarettes

## 2024-01-24 NOTE — ED NOTES
Pt was very upset that there was no salt on the tray for dinner, kept asking about it and saying she needs it for her food education was provided for pts with heart issues and the effects of salt, pt seems to understand but was still upset

## 2024-01-24 NOTE — H&P
Absolute 0.2 0.0 - 0.4 K/UL    Basophils Absolute 0.1 0.0 - 0.1 K/UL    Absolute Immature Granulocyte 0.1 (H) 0.00 - 0.04 K/UL    Differential Type AUTOMATED     Troponin    Collection Time: 01/24/24  9:20 AM   Result Value Ref Range    Troponin, High Sensitivity 73 (H) 0 - 51 ng/L   Troponin    Collection Time: 01/24/24 10:28 AM   Result Value Ref Range    Troponin, High Sensitivity 60 (H) 0 - 51 ng/L   Comprehensive Metabolic Panel    Collection Time: 01/24/24 10:28 AM   Result Value Ref Range    Sodium 141 136 - 145 mmol/L    Potassium 2.4 (LL) 3.5 - 5.1 mmol/L    Chloride 115 (H) 97 - 108 mmol/L    CO2 20 (L) 21 - 32 mmol/L    Anion Gap 6 5 - 15 mmol/L    Glucose 76 65 - 100 mg/dL    BUN 28 (H) 6 - 20 mg/dL    Creatinine 1.68 (H) 0.55 - 1.02 mg/dL    Bun/Cre Ratio 17 12 - 20      Est, Glom Filt Rate 33 (L) >60 ml/min/1.73m2    Calcium 5.8 (LL) 8.5 - 10.1 mg/dL    Total Bilirubin 0.2 0.2 - 1.0 mg/dL    AST 14 (L) 15 - 37 U/L    ALT 11 (L) 12 - 78 U/L    Alk Phosphatase 59 45 - 117 U/L    Total Protein 4.4 (L) 6.4 - 8.2 g/dL    Albumin 1.7 (L) 3.5 - 5.0 g/dL    Globulin 2.7 2.0 - 4.0 g/dL    Albumin/Globulin Ratio 0.6 (L) 1.1 - 2.2     Brain Natriuretic Peptide    Collection Time: 01/24/24 10:28 AM   Result Value Ref Range    NT Pro-BNP 30,649 (H) <125 pg/mL         CT Result (most recent):  CT CHEST WO CONTRAST 05/08/2023    Narrative  INDICATION: Chest pain    COMPARISON: Radiographs 4/25/2023    CONTRAST: None.    TECHNIQUE:  5 mm axial images were obtained through the chest. Coronal and  sagittal reformats were generated.  CT dose reduction was achieved through use  of a standardized protocol tailored for this examination and automatic exposure  control for dose modulation.    The absence of intravenous contrast reduces the sensitivity for evaluation of  the mediastinum, kevin, vasculature, and upper abdominal organs.    FINDINGS:    CHEST WALL: No mass or axillary lymphadenopathy.  THYROID: No

## 2024-01-24 NOTE — ED NOTES
Pt laying in bed resting iv areas are not red or sore, iv meds going pt on monitor lights off door closed

## 2024-01-24 NOTE — CARE COORDINATION
01/24/24 1700   Service Assessment   Patient Orientation Alert and Oriented   Cognition Alert   History Provided By Patient   Primary Caregiver Self   Accompanied By/Relationship Pt alone during visit.   Support Systems Spouse/Significant Other   Patient's Healthcare Decision Maker is: Legal Next of Kin   PCP Verified by CM Yes  (Per pt no PCP.)   Last Visit to PCP   (No PCP.)   Prior Functional Level Independent in ADLs/IADLs;Mobility  (cane & assist with ADLs @ times.)   Current Functional Level Independent in ADLs/IADLs;Mobility  (Cane & assist with ADLs @ times.)   Can patient return to prior living arrangement Yes   Ability to make needs known: Good   Family able to assist with home care needs: Yes   Would you like for me to discuss the discharge plan with any other family members/significant others, and if so, who? Yes  ((REBECA) Srinivasa Schwartz)   Financial Resources Medicare   Community Resources None   CM/SW Referral Other (see comment)  (None)   Social/Functional History   Lives With Significant other   Type of Home House   Home Layout Two level   Home Access Stairs to enter without rails   Entrance Stairs - Number of Steps 4 outside steps.   Bathroom Shower/Tub Tub/Shower unit   Bathroom Toilet Standard   Bathroom Accessibility Accessible   Home Equipment Cane  (At times.)   Receives Help From Family   ADL Assistance Independent  (Assist @ times.)   Homemaking Assistance Independent  (Assist @ times.)   Homemaking Responsibilities Yes   Ambulation Assistance Needs assistance  (Cane @ times.)   Transfer Assistance Independent  (Assist @ times.)   Active  No   Occupation Retired   Discharge Planning   Type of Residence House   Living Arrangements Spouse/Significant Other   Current Services Prior To Admission None   Potential Assistance Needed N/A   DME Ordered? No   Potential Assistance Purchasing Medications No   Type of Home Care Services None   Patient expects to be discharged to: House   [facility]

## 2024-01-24 NOTE — ED NOTES
Pt states that she has should pain never got a diagnosis but thinks it bursitis int the right shoulder,

## 2024-01-24 NOTE — NURSE NAVIGATOR
Heart Failure Nurse Navigator: Heart Failure Education    RN performs hand hygiene. RN Introduces herself to the patient and informs the patient of the reasoning for the visit.    Patient Verbalizes Understanding for visit, is accepting of discussion    Persons present for Education: Patient    Time Spent: At least 45minutes    Method of teaching:  Teach-back, demonstration, verbal, visual    Education Packets Given: Heart Failure symptom management calendar/tracker, AHA HF education booklet, HF magnet    -Confirmation of working scales & that the patient can read numbers  -Confirmation of support to assist with daily weights if patient unable to perform independently.     RN-NN provided education on Daily weights to include same time daily, on same scale, and documenting weights on calendar. RN-NN provided education trigger management/ signs and symptoms of Heart Failure. Patient is advised on “Yellow Days” to call MD office and on “Red Days” to come to the ER or call 911.   RN provides Nutritional education that includes how to calculate and restrict sodium and fluid intake. Encouraged fresh vegetable choice over canned/processed goods. Demonstrated how to log meals/intake. RN discusses lifestyle changes including cessation of smoking and increasing activity level.   In addition, RN discusses the importance of keeping/scheduling appointments and adherence to guideline directed medical therapies.      Patient was able to teach back to the RN-NN the above information.  Patient will need reinforcement of education provided.  Patient advised about the HF helper Jax.    **Per patient she has not followed up with cardiology and is not sure who is refilling her medications. RN-NN advises patient to follow-up with cardiology as directed.

## 2024-01-25 ENCOUNTER — APPOINTMENT (OUTPATIENT)
Facility: HOSPITAL | Age: 69
DRG: 308 | End: 2024-01-25
Payer: MEDICARE

## 2024-01-25 LAB
ALBUMIN SERPL-MCNC: 2.3 G/DL (ref 3.5–5)
ANION GAP BLD CALC-SCNC: 9
ANION GAP SERPL CALC-SCNC: 6 MMOL/L (ref 5–15)
BASOPHILS # BLD: 0.1 K/UL (ref 0–0.1)
BASOPHILS NFR BLD: 1 % (ref 0–1)
BUN SERPL-MCNC: 31 MG/DL (ref 6–20)
BUN/CREAT SERPL: 15 (ref 12–20)
CA-I BLD-MCNC: 1.08 MMOL/L (ref 1.12–1.32)
CA-I BLD-MCNC: 7.6 MG/DL (ref 8.5–10.1)
CHLORIDE BLD-SCNC: 105 MMOL/L (ref 98–107)
CHLORIDE SERPL-SCNC: 110 MMOL/L (ref 97–108)
CHOLEST SERPL-MCNC: 180 MG/DL
CO2 BLD-SCNC: 23 MMOL/L
CO2 SERPL-SCNC: 19 MMOL/L (ref 21–32)
CREAT SERPL-MCNC: 2.11 MG/DL (ref 0.55–1.02)
CREAT UR-MCNC: 2.05 MG/DL (ref 0.6–1.3)
DIFFERENTIAL METHOD BLD: ABNORMAL
ECHO AO ASC DIAM: 3.3 CM
ECHO AO ASCENDING AORTA INDEX: 2.23 CM/M2
ECHO AO ROOT DIAM: 3.2 CM
ECHO AO ROOT INDEX: 2.16 CM/M2
ECHO AV AREA PEAK VELOCITY: 2 CM2
ECHO AV AREA VTI: 2 CM2
ECHO AV AREA/BSA PEAK VELOCITY: 1.4 CM2/M2
ECHO AV AREA/BSA VTI: 1.4 CM2/M2
ECHO AV CUSP MM: 1.7 CM
ECHO AV MEAN GRADIENT: 3 MMHG
ECHO AV MEAN VELOCITY: 0.8 M/S
ECHO AV PEAK GRADIENT: 6 MMHG
ECHO AV PEAK VELOCITY: 1.2 M/S
ECHO AV REGURGITANT FRACTION: -2549 %
ECHO AV REGURGITANT VOLUME: -1128.5 ML
ECHO AV VELOCITY RATIO: 0.67
ECHO AV VTI: 22 CM
ECHO BSA: 1.48 M2
ECHO EST RA PRESSURE: 5 MMHG
ECHO LA AREA 4C: 11 CM2
ECHO LA DIAMETER INDEX: 2.5 CM/M2
ECHO LA DIAMETER: 3.7 CM
ECHO LA MAJOR AXIS: 4.4 CM
ECHO LA MINOR AXIS: 5.9 CM
ECHO LA TO AORTIC ROOT RATIO: 1.16
ECHO LA VOL MOD A2C: 89 ML (ref 22–52)
ECHO LA VOL MOD A4C: 22 ML (ref 22–52)
ECHO LA VOLUME INDEX MOD A2C: 60 ML/M2 (ref 16–34)
ECHO LA VOLUME INDEX MOD A4C: 15 ML/M2 (ref 16–34)
ECHO LV E' LATERAL VELOCITY: 9 CM/S
ECHO LV E' SEPTAL VELOCITY: 6 CM/S
ECHO LV EDV A2C: 59 ML
ECHO LV EDV A4C: 67 ML
ECHO LV EDV INDEX A4C: 45 ML/M2
ECHO LV EDV NDEX A2C: 40 ML/M2
ECHO LV EJECTION FRACTION A2C: 42 %
ECHO LV EJECTION FRACTION A4C: 51 %
ECHO LV ESV A2C: 34 ML
ECHO LV ESV A4C: 33 ML
ECHO LV ESV INDEX A2C: 23 ML/M2
ECHO LV ESV INDEX A4C: 22 ML/M2
ECHO LV FRACTIONAL SHORTENING: 32 % (ref 28–44)
ECHO LV INTERNAL DIMENSION DIASTOLE INDEX: 3.18 CM/M2
ECHO LV INTERNAL DIMENSION DIASTOLIC MMODE: 4.9 CM (ref 3.9–5.3)
ECHO LV INTERNAL DIMENSION DIASTOLIC: 4.7 CM (ref 3.9–5.3)
ECHO LV INTERNAL DIMENSION SYSTOLIC INDEX: 2.16 CM/M2
ECHO LV INTERNAL DIMENSION SYSTOLIC MMODE: 3.2 CM
ECHO LV INTERNAL DIMENSION SYSTOLIC: 3.2 CM
ECHO LV IVSD MMODE: 0.5 CM (ref 0.6–0.9)
ECHO LV IVSD: 0.7 CM (ref 0.6–0.9)
ECHO LV MASS 2D: 103.1 G (ref 67–162)
ECHO LV MASS INDEX 2D: 69.6 G/M2 (ref 43–95)
ECHO LV POSTERIOR WALL DIASTOLIC MMODE: 0.9 CM (ref 0.6–0.9)
ECHO LV POSTERIOR WALL DIASTOLIC: 0.7 CM (ref 0.6–0.9)
ECHO LV RELATIVE WALL THICKNESS RATIO: 0.3
ECHO LVOT AREA: 3.1 CM2
ECHO LVOT AV VTI INDEX: 0.64
ECHO LVOT DIAM: 2 CM
ECHO LVOT MEAN GRADIENT: 1 MMHG
ECHO LVOT PEAK GRADIENT: 2 MMHG
ECHO LVOT PEAK VELOCITY: 0.8 M/S
ECHO LVOT STROKE VOLUME INDEX: 29.9 ML/M2
ECHO LVOT SV: 44.3 ML
ECHO LVOT VTI: 14.1 CM
ECHO MV A VELOCITY: 0.41 M/S
ECHO MV ANNULUS DIAMETER: 3 CM
ECHO MV E DECELERATION TIME (DT): 201 MS
ECHO MV E VELOCITY: 0.92 M/S
ECHO MV E/A RATIO: 2.24
ECHO MV E/E' LATERAL: 10.22
ECHO MV E/E' RATIO (AVERAGED): 12.78
ECHO MV REGURGITANT ALIASING (NYQUIST) VELOCITY: 31 CM/S
ECHO MV REGURGITANT FRACTION CONT EQ: 96 %
ECHO MV REGURGITANT PEAK GRADIENT: 117 MMHG
ECHO MV REGURGITANT PEAK VELOCITY: 5.4 M/S
ECHO MV REGURGITANT RADIUS PISA: 1 CM
ECHO MV REGURGITANT VOLUME: 1128.52 CM3
ECHO MV REGURGITANT VTIA: 166 CM
ECHO PULMONARY ARTERY END DIASTOLIC PRESSURE: 6 MMHG
ECHO PV MAX VELOCITY: 0.8 M/S
ECHO PV PEAK GRADIENT: 3 MMHG
ECHO PV REGURGITANT MAX VELOCITY: 1.2 M/S
ECHO RA AREA 4C: 12.3 CM2
ECHO RA END SYSTOLIC VOLUME APICAL 4 CHAMBER INDEX BSA: 19 ML/M2
ECHO RA VOLUME: 28 ML
ECHO RIGHT VENTRICULAR SYSTOLIC PRESSURE (RVSP): 30 MMHG
ECHO RV BASAL DIMENSION: 3.5 CM
ECHO RV LONGITUDINAL DIMENSION: 6.7 CM
ECHO RV MID DIMENSION: 2.5 CM
ECHO RV TAPSE: 2.3 CM (ref 1.7–?)
ECHO RVOT MEAN GRADIENT: 1 MMHG
ECHO RVOT PEAK GRADIENT: 2 MMHG
ECHO RVOT PEAK VELOCITY: 0.8 M/S
ECHO RVOT VTI: 15.4 CM
ECHO TV REGURGITANT MAX VELOCITY: 2.52 M/S
ECHO TV REGURGITANT PEAK GRADIENT: 25 MMHG
EOSINOPHIL # BLD: 0.2 K/UL (ref 0–0.4)
EOSINOPHIL NFR BLD: 4 % (ref 0–7)
ERYTHROCYTE [DISTWIDTH] IN BLOOD BY AUTOMATED COUNT: 14.3 % (ref 11.5–14.5)
GLUCOSE BLD STRIP.AUTO-MCNC: 101 MG/DL (ref 65–100)
GLUCOSE SERPL-MCNC: 98 MG/DL (ref 65–100)
HCT VFR BLD AUTO: 37.8 % (ref 35–47)
HDLC SERPL-MCNC: 96 MG/DL
HDLC SERPL: 1.9 (ref 0–5)
HGB BLD-MCNC: 12.9 G/DL (ref 11.5–16)
IMM GRANULOCYTES # BLD AUTO: 0.1 K/UL (ref 0–0.04)
IMM GRANULOCYTES NFR BLD AUTO: 1 % (ref 0–0.5)
LDLC SERPL CALC-MCNC: 68.2 MG/DL (ref 0–100)
LIPID PANEL: NORMAL
LYMPHOCYTES # BLD: 0.4 K/UL (ref 0.8–3.5)
LYMPHOCYTES NFR BLD: 7 % (ref 12–49)
MAGNESIUM SERPL-MCNC: 2.1 MG/DL (ref 1.6–2.4)
MCH RBC QN AUTO: 37.9 PG (ref 26–34)
MCHC RBC AUTO-ENTMCNC: 34.1 G/DL (ref 30–36.5)
MCV RBC AUTO: 111.2 FL (ref 80–99)
MONOCYTES # BLD: 0.8 K/UL (ref 0–1)
MONOCYTES NFR BLD: 13 % (ref 5–13)
NEUTS SEG # BLD: 4.5 K/UL (ref 1.8–8)
NEUTS SEG NFR BLD: 74 % (ref 32–75)
NRBC # BLD: 0 K/UL (ref 0–0.01)
NRBC BLD-RTO: 0 PER 100 WBC
PHOSPHATE SERPL-MCNC: 3.5 MG/DL (ref 2.6–4.7)
PLATELET # BLD AUTO: 224 K/UL (ref 150–400)
PMV BLD AUTO: 10.6 FL (ref 8.9–12.9)
POTASSIUM BLD-SCNC: 4 MMOL/L (ref 3.5–5.5)
POTASSIUM SERPL-SCNC: 3.3 MMOL/L (ref 3.5–5.1)
RBC # BLD AUTO: 3.4 M/UL (ref 3.8–5.2)
RBC MORPH BLD: ABNORMAL
SODIUM BLD-SCNC: 136 MMOL/L (ref 136–145)
SODIUM SERPL-SCNC: 135 MMOL/L (ref 136–145)
TRIGL SERPL-MCNC: 79 MG/DL
VLDLC SERPL CALC-MCNC: 15.8 MG/DL
WBC # BLD AUTO: 6.1 K/UL (ref 3.6–11)

## 2024-01-25 PROCEDURE — 93306 TTE W/DOPPLER COMPLETE: CPT

## 2024-01-25 PROCEDURE — 6360000002 HC RX W HCPCS: Performed by: NURSE PRACTITIONER

## 2024-01-25 PROCEDURE — 2060000000 HC ICU INTERMEDIATE R&B

## 2024-01-25 PROCEDURE — 80047 BASIC METABLC PNL IONIZED CA: CPT

## 2024-01-25 PROCEDURE — 6370000000 HC RX 637 (ALT 250 FOR IP): Performed by: NURSE PRACTITIONER

## 2024-01-25 PROCEDURE — 80069 RENAL FUNCTION PANEL: CPT

## 2024-01-25 PROCEDURE — 83735 ASSAY OF MAGNESIUM: CPT

## 2024-01-25 PROCEDURE — 93005 ELECTROCARDIOGRAM TRACING: CPT | Performed by: NURSE PRACTITIONER

## 2024-01-25 PROCEDURE — 80061 LIPID PANEL: CPT

## 2024-01-25 PROCEDURE — 2580000003 HC RX 258: Performed by: NURSE PRACTITIONER

## 2024-01-25 PROCEDURE — 2500000003 HC RX 250 WO HCPCS: Performed by: NURSE PRACTITIONER

## 2024-01-25 PROCEDURE — 85025 COMPLETE CBC W/AUTO DIFF WBC: CPT

## 2024-01-25 PROCEDURE — 36415 COLL VENOUS BLD VENIPUNCTURE: CPT

## 2024-01-25 RX ORDER — CALCIUM GLUCONATE 20 MG/ML
1000 INJECTION, SOLUTION INTRAVENOUS ONCE
Status: COMPLETED | OUTPATIENT
Start: 2024-01-25 | End: 2024-01-25

## 2024-01-25 RX ADMIN — FUROSEMIDE 40 MG: 10 INJECTION, SOLUTION INTRAMUSCULAR; INTRAVENOUS at 17:32

## 2024-01-25 RX ADMIN — APIXABAN 5 MG: 5 TABLET, FILM COATED ORAL at 20:17

## 2024-01-25 RX ADMIN — ONDANSETRON 4 MG: 2 INJECTION INTRAMUSCULAR; INTRAVENOUS at 20:20

## 2024-01-25 RX ADMIN — FUROSEMIDE 40 MG: 10 INJECTION, SOLUTION INTRAMUSCULAR; INTRAVENOUS at 08:32

## 2024-01-25 RX ADMIN — PRAVASTATIN SODIUM 10 MG: 10 TABLET ORAL at 20:17

## 2024-01-25 RX ADMIN — SACUBITRIL AND VALSARTAN 1 TABLET: 24; 26 TABLET, FILM COATED ORAL at 22:26

## 2024-01-25 RX ADMIN — DILTIAZEM HYDROCHLORIDE 30 MG: 30 TABLET, FILM COATED ORAL at 16:07

## 2024-01-25 RX ADMIN — SACUBITRIL AND VALSARTAN 1 TABLET: 24; 26 TABLET, FILM COATED ORAL at 08:32

## 2024-01-25 RX ADMIN — CALCIUM GLUCONATE 1000 MG: 20 INJECTION, SOLUTION INTRAVENOUS at 15:58

## 2024-01-25 RX ADMIN — DILTIAZEM HYDROCHLORIDE 30 MG: 30 TABLET, FILM COATED ORAL at 11:15

## 2024-01-25 RX ADMIN — FOLIC ACID 1 MG: 1 TABLET ORAL at 08:32

## 2024-01-25 RX ADMIN — SODIUM CHLORIDE, PRESERVATIVE FREE 10 ML: 5 INJECTION INTRAVENOUS at 08:35

## 2024-01-25 RX ADMIN — SODIUM CHLORIDE, PRESERVATIVE FREE 10 ML: 5 INJECTION INTRAVENOUS at 20:22

## 2024-01-25 RX ADMIN — APIXABAN 5 MG: 5 TABLET, FILM COATED ORAL at 08:51

## 2024-01-25 RX ADMIN — Medication 5 MG: at 20:17

## 2024-01-25 RX ADMIN — THIAMINE HCL TAB 100 MG 100 MG: 100 TAB at 08:32

## 2024-01-25 NOTE — ED NOTES
ED TO INPATIENT SBAR HANDOFF    Patient Name: Allison Mauricio   Preferred Name: Allison  : 1955  68 y.o.   Family/Caregiver Present: no   Code Status Order: Full Code  PO Status: no  Telemetry Order:   C-SSRS: Risk of Suicide: No Risk  Sitter no   Restraints:     Sepsis Risk Score Sepsis Risk Score: 0.4    Situation  Chief Complaint   Patient presents with    Shortness of Breath    Chest Pain     Brief Description of Patient's Condition: stable  Mental Status: oriented and alert  Arrived from:Home  Imaging:   XR CHEST PORTABLE   Final Result   in the left humerus. IMPRESSION:      No acute process. Unchanged lung hyperexpansion, compatible with COPD.           Abnormal labs:   Abnormal Labs Reviewed   CBC WITH AUTO DIFFERENTIAL - Abnormal; Notable for the following components:       Result Value    .0 (*)     MCH 38.1 (*)     Neutrophils % 76 (*)     Lymphocytes % 9 (*)     Immature Granulocytes 1 (*)     Absolute Immature Granulocyte 0.1 (*)     All other components within normal limits   TROPONIN - Abnormal; Notable for the following components:    Troponin, High Sensitivity 73 (*)     All other components within normal limits   TROPONIN - Abnormal; Notable for the following components:    Troponin, High Sensitivity 60 (*)     All other components within normal limits   COMPREHENSIVE METABOLIC PANEL - Abnormal; Notable for the following components:    Potassium 2.4 (*)     Chloride 115 (*)     CO2 20 (*)     BUN 28 (*)     Creatinine 1.68 (*)     Est, Glom Filt Rate 33 (*)     Calcium 5.8 (*)     AST 14 (*)     ALT 11 (*)     Total Protein 4.4 (*)     Albumin 1.7 (*)     Albumin/Globulin Ratio 0.6 (*)     All other components within normal limits   BRAIN NATRIURETIC PEPTIDE - Abnormal; Notable for the following components:    NT Pro-BNP 30,649 (*)     All other components within normal limits   CBC WITH AUTO DIFFERENTIAL - Abnormal; Notable for the following components:    RBC 3.40 (*)     .2

## 2024-01-26 LAB
ALBUMIN SERPL-MCNC: 2.2 G/DL (ref 3.5–5)
ANION GAP SERPL CALC-SCNC: 8 MMOL/L (ref 5–15)
BASOPHILS # BLD: 0 K/UL (ref 0–0.1)
BASOPHILS NFR BLD: 0 % (ref 0–1)
BUN SERPL-MCNC: 27 MG/DL (ref 6–20)
BUN/CREAT SERPL: 12 (ref 12–20)
CA-I BLD-MCNC: 8 MG/DL (ref 8.5–10.1)
CHLORIDE SERPL-SCNC: 105 MMOL/L (ref 97–108)
CO2 SERPL-SCNC: 22 MMOL/L (ref 21–32)
CREAT SERPL-MCNC: 2.25 MG/DL (ref 0.55–1.02)
DIFFERENTIAL METHOD BLD: ABNORMAL
EKG ATRIAL RATE: 141 BPM
EKG ATRIAL RATE: 144 BPM
EKG DIAGNOSIS: NORMAL
EKG DIAGNOSIS: NORMAL
EKG P AXIS: 90 DEGREES
EKG P-R INTERVAL: 184 MS
EKG Q-T INTERVAL: 306 MS
EKG Q-T INTERVAL: 340 MS
EKG QRS DURATION: 64 MS
EKG QRS DURATION: 66 MS
EKG QTC CALCULATION (BAZETT): 425 MS
EKG QTC CALCULATION (BAZETT): 459 MS
EKG R AXIS: 11 DEGREES
EKG R AXIS: 77 DEGREES
EKG T AXIS: 86 DEGREES
EKG T AXIS: 92 DEGREES
EKG VENTRICULAR RATE: 135 BPM
EKG VENTRICULAR RATE: 94 BPM
EOSINOPHIL # BLD: 0.1 K/UL (ref 0–0.4)
EOSINOPHIL NFR BLD: 2 % (ref 0–7)
ERYTHROCYTE [DISTWIDTH] IN BLOOD BY AUTOMATED COUNT: 13.9 % (ref 11.5–14.5)
GLUCOSE SERPL-MCNC: 98 MG/DL (ref 65–100)
HCT VFR BLD AUTO: 37.9 % (ref 35–47)
HGB BLD-MCNC: 13.2 G/DL (ref 11.5–16)
IMM GRANULOCYTES # BLD AUTO: 0 K/UL (ref 0–0.04)
IMM GRANULOCYTES NFR BLD AUTO: 1 % (ref 0–0.5)
LYMPHOCYTES # BLD: 0.3 K/UL (ref 0.8–3.5)
LYMPHOCYTES NFR BLD: 4 % (ref 12–49)
MAGNESIUM SERPL-MCNC: 1.6 MG/DL (ref 1.6–2.4)
MCH RBC QN AUTO: 37.6 PG (ref 26–34)
MCHC RBC AUTO-ENTMCNC: 34.8 G/DL (ref 30–36.5)
MCV RBC AUTO: 108 FL (ref 80–99)
MONOCYTES # BLD: 0.9 K/UL (ref 0–1)
MONOCYTES NFR BLD: 14 % (ref 5–13)
NEUTS SEG # BLD: 5 K/UL (ref 1.8–8)
NEUTS SEG NFR BLD: 79 % (ref 32–75)
NRBC # BLD: 0 K/UL (ref 0–0.01)
NRBC BLD-RTO: 0 PER 100 WBC
PHOSPHATE SERPL-MCNC: 3.6 MG/DL (ref 2.6–4.7)
PLATELET # BLD AUTO: 200 K/UL (ref 150–400)
PMV BLD AUTO: 10 FL (ref 8.9–12.9)
POTASSIUM SERPL-SCNC: 3 MMOL/L (ref 3.5–5.1)
RBC # BLD AUTO: 3.51 M/UL (ref 3.8–5.2)
SODIUM SERPL-SCNC: 135 MMOL/L (ref 136–145)
WBC # BLD AUTO: 6.3 K/UL (ref 3.6–11)

## 2024-01-26 PROCEDURE — 97530 THERAPEUTIC ACTIVITIES: CPT

## 2024-01-26 PROCEDURE — 94761 N-INVAS EAR/PLS OXIMETRY MLT: CPT

## 2024-01-26 PROCEDURE — 6360000002 HC RX W HCPCS: Performed by: NURSE PRACTITIONER

## 2024-01-26 PROCEDURE — 2580000003 HC RX 258: Performed by: NURSE PRACTITIONER

## 2024-01-26 PROCEDURE — 97165 OT EVAL LOW COMPLEX 30 MIN: CPT

## 2024-01-26 PROCEDURE — 2700000000 HC OXYGEN THERAPY PER DAY

## 2024-01-26 PROCEDURE — 93005 ELECTROCARDIOGRAM TRACING: CPT | Performed by: INTERNAL MEDICINE

## 2024-01-26 PROCEDURE — 6370000000 HC RX 637 (ALT 250 FOR IP): Performed by: INTERNAL MEDICINE

## 2024-01-26 PROCEDURE — 85025 COMPLETE CBC W/AUTO DIFF WBC: CPT

## 2024-01-26 PROCEDURE — 36415 COLL VENOUS BLD VENIPUNCTURE: CPT

## 2024-01-26 PROCEDURE — 6370000000 HC RX 637 (ALT 250 FOR IP): Performed by: NURSE PRACTITIONER

## 2024-01-26 PROCEDURE — 94640 AIRWAY INHALATION TREATMENT: CPT

## 2024-01-26 PROCEDURE — 97162 PT EVAL MOD COMPLEX 30 MIN: CPT

## 2024-01-26 PROCEDURE — 2060000000 HC ICU INTERMEDIATE R&B

## 2024-01-26 PROCEDURE — 6360000002 HC RX W HCPCS: Performed by: INTERNAL MEDICINE

## 2024-01-26 PROCEDURE — 80069 RENAL FUNCTION PANEL: CPT

## 2024-01-26 PROCEDURE — 83735 ASSAY OF MAGNESIUM: CPT

## 2024-01-26 RX ORDER — IPRATROPIUM BROMIDE AND ALBUTEROL SULFATE 2.5; .5 MG/3ML; MG/3ML
1 SOLUTION RESPIRATORY (INHALATION)
Status: DISCONTINUED | OUTPATIENT
Start: 2024-01-26 | End: 2024-01-27

## 2024-01-26 RX ORDER — IPRATROPIUM BROMIDE AND ALBUTEROL SULFATE 2.5; .5 MG/3ML; MG/3ML
1 SOLUTION RESPIRATORY (INHALATION) EVERY 6 HOURS PRN
Status: DISCONTINUED | OUTPATIENT
Start: 2024-01-26 | End: 2024-02-13

## 2024-01-26 RX ORDER — BUDESONIDE AND FORMOTEROL FUMARATE DIHYDRATE 160; 4.5 UG/1; UG/1
2 AEROSOL RESPIRATORY (INHALATION)
Status: DISCONTINUED | OUTPATIENT
Start: 2024-01-26 | End: 2024-01-26

## 2024-01-26 RX ORDER — MULTIVITAMIN WITH IRON
1 TABLET ORAL DAILY
Status: DISCONTINUED | OUTPATIENT
Start: 2024-01-26 | End: 2024-02-12

## 2024-01-26 RX ORDER — METHYLPREDNISOLONE SODIUM SUCCINATE 40 MG/ML
40 INJECTION, POWDER, LYOPHILIZED, FOR SOLUTION INTRAMUSCULAR; INTRAVENOUS EVERY 6 HOURS
Status: DISCONTINUED | OUTPATIENT
Start: 2024-01-26 | End: 2024-01-27

## 2024-01-26 RX ORDER — BUDESONIDE AND FORMOTEROL FUMARATE DIHYDRATE 160; 4.5 UG/1; UG/1
2 AEROSOL RESPIRATORY (INHALATION)
Status: DISCONTINUED | OUTPATIENT
Start: 2024-01-26 | End: 2024-01-31

## 2024-01-26 RX ADMIN — METHYLPREDNISOLONE SODIUM SUCCINATE 40 MG: 40 INJECTION INTRAMUSCULAR; INTRAVENOUS at 21:32

## 2024-01-26 RX ADMIN — DILTIAZEM HYDROCHLORIDE 30 MG: 30 TABLET, FILM COATED ORAL at 17:01

## 2024-01-26 RX ADMIN — FUROSEMIDE 40 MG: 10 INJECTION, SOLUTION INTRAMUSCULAR; INTRAVENOUS at 17:02

## 2024-01-26 RX ADMIN — SPIRONOLACTONE 25 MG: 25 TABLET ORAL at 08:59

## 2024-01-26 RX ADMIN — IPRATROPIUM BROMIDE AND ALBUTEROL SULFATE 1 DOSE: 2.5; .5 SOLUTION RESPIRATORY (INHALATION) at 11:34

## 2024-01-26 RX ADMIN — IPRATROPIUM BROMIDE AND ALBUTEROL SULFATE 1 DOSE: 2.5; .5 SOLUTION RESPIRATORY (INHALATION) at 20:49

## 2024-01-26 RX ADMIN — THIAMINE HCL TAB 100 MG 100 MG: 100 TAB at 08:59

## 2024-01-26 RX ADMIN — FOLIC ACID 1 MG: 1 TABLET ORAL at 08:59

## 2024-01-26 RX ADMIN — IPRATROPIUM BROMIDE AND ALBUTEROL SULFATE 1 DOSE: 2.5; .5 SOLUTION RESPIRATORY (INHALATION) at 14:23

## 2024-01-26 RX ADMIN — THERA TABS 1 TABLET: TAB at 17:04

## 2024-01-26 RX ADMIN — ACETAMINOPHEN 650 MG: 325 TABLET ORAL at 17:01

## 2024-01-26 RX ADMIN — Medication 2 PUFF: at 20:49

## 2024-01-26 RX ADMIN — FUROSEMIDE 40 MG: 10 INJECTION, SOLUTION INTRAMUSCULAR; INTRAVENOUS at 08:59

## 2024-01-26 RX ADMIN — DILTIAZEM HYDROCHLORIDE 30 MG: 30 TABLET, FILM COATED ORAL at 06:15

## 2024-01-26 RX ADMIN — POTASSIUM CHLORIDE 40 MEQ: 1500 TABLET, EXTENDED RELEASE ORAL at 08:59

## 2024-01-26 RX ADMIN — DILTIAZEM HYDROCHLORIDE 30 MG: 30 TABLET, FILM COATED ORAL at 12:13

## 2024-01-26 RX ADMIN — SODIUM CHLORIDE, PRESERVATIVE FREE 10 ML: 5 INJECTION INTRAVENOUS at 21:36

## 2024-01-26 RX ADMIN — IPRATROPIUM BROMIDE AND ALBUTEROL SULFATE 1 DOSE: 2.5; .5 SOLUTION RESPIRATORY (INHALATION) at 09:12

## 2024-01-26 RX ADMIN — Medication 5 MG: at 21:29

## 2024-01-26 RX ADMIN — POTASSIUM CHLORIDE 10 MEQ: 7.46 INJECTION, SOLUTION INTRAVENOUS at 06:19

## 2024-01-26 RX ADMIN — METHYLPREDNISOLONE SODIUM SUCCINATE 40 MG: 40 INJECTION INTRAMUSCULAR; INTRAVENOUS at 09:54

## 2024-01-26 RX ADMIN — SACUBITRIL AND VALSARTAN 1 TABLET: 24; 26 TABLET, FILM COATED ORAL at 09:06

## 2024-01-26 RX ADMIN — METHYLPREDNISOLONE SODIUM SUCCINATE 40 MG: 40 INJECTION INTRAMUSCULAR; INTRAVENOUS at 17:01

## 2024-01-26 RX ADMIN — APIXABAN 5 MG: 5 TABLET, FILM COATED ORAL at 21:29

## 2024-01-26 RX ADMIN — APIXABAN 5 MG: 5 TABLET, FILM COATED ORAL at 08:59

## 2024-01-26 RX ADMIN — SODIUM CHLORIDE, PRESERVATIVE FREE 10 ML: 5 INJECTION INTRAVENOUS at 08:59

## 2024-01-26 RX ADMIN — PRAVASTATIN SODIUM 10 MG: 10 TABLET ORAL at 21:29

## 2024-01-26 RX ADMIN — SACUBITRIL AND VALSARTAN 1 TABLET: 24; 26 TABLET, FILM COATED ORAL at 22:15

## 2024-01-26 NOTE — CARE COORDINATION
CM reviewed chart. CM met with patient regarding SNF recs. Patient declined SNF recommendation. CM obtained signed choice letter for home health, sent referral and placed on chart. Patient wants to make finally decision on which HH agency to use. CM will continue to follow.

## 2024-01-27 ENCOUNTER — APPOINTMENT (OUTPATIENT)
Facility: HOSPITAL | Age: 69
DRG: 308 | End: 2024-01-27
Payer: MEDICARE

## 2024-01-27 LAB
ALBUMIN SERPL-MCNC: 2 G/DL (ref 3.5–5)
ALBUMIN SERPL-MCNC: 2 G/DL (ref 3.5–5)
ALBUMIN/GLOB SERPL: 0.5 (ref 1.1–2.2)
ALP SERPL-CCNC: 71 U/L (ref 45–117)
ALT SERPL-CCNC: 14 U/L (ref 12–78)
ANION GAP SERPL CALC-SCNC: 8 MMOL/L (ref 5–15)
AST SERPL W P-5'-P-CCNC: 18 U/L (ref 15–37)
BASOPHILS # BLD: 0 K/UL (ref 0–0.1)
BASOPHILS NFR BLD: 0 % (ref 0–1)
BILIRUB DIRECT SERPL-MCNC: <0.1 MG/DL (ref 0–0.2)
BILIRUB SERPL-MCNC: 0.1 MG/DL (ref 0.2–1)
BNP SERPL-MCNC: ABNORMAL PG/ML
BUN SERPL-MCNC: 40 MG/DL (ref 6–20)
BUN/CREAT SERPL: 15 (ref 12–20)
CA-I BLD-MCNC: 7.7 MG/DL (ref 8.5–10.1)
CHLORIDE SERPL-SCNC: 105 MMOL/L (ref 97–108)
CK SERPL-CCNC: 93 U/L (ref 26–192)
CO2 SERPL-SCNC: 21 MMOL/L (ref 21–32)
CREAT SERPL-MCNC: 2.7 MG/DL (ref 0.55–1.02)
DIFFERENTIAL METHOD BLD: ABNORMAL
EOSINOPHIL # BLD: 0 K/UL (ref 0–0.4)
EOSINOPHIL NFR BLD: 0 % (ref 0–7)
ERYTHROCYTE [DISTWIDTH] IN BLOOD BY AUTOMATED COUNT: 13.8 % (ref 11.5–14.5)
GLOBULIN SER CALC-MCNC: 3.9 G/DL (ref 2–4)
GLUCOSE SERPL-MCNC: 149 MG/DL (ref 65–100)
HCT VFR BLD AUTO: 36.8 % (ref 35–47)
HGB BLD-MCNC: 12.6 G/DL (ref 11.5–16)
IMM GRANULOCYTES # BLD AUTO: 0 K/UL (ref 0–0.04)
IMM GRANULOCYTES NFR BLD AUTO: 0 % (ref 0–0.5)
LACTATE SERPL-SCNC: 1.5 MMOL/L (ref 0.4–2)
LYMPHOCYTES # BLD: 0.2 K/UL (ref 0.8–3.5)
LYMPHOCYTES NFR BLD: 3 % (ref 12–49)
MAGNESIUM SERPL-MCNC: 1.6 MG/DL (ref 1.6–2.4)
MCH RBC QN AUTO: 37.8 PG (ref 26–34)
MCHC RBC AUTO-ENTMCNC: 34.2 G/DL (ref 30–36.5)
MCV RBC AUTO: 110.5 FL (ref 80–99)
MONOCYTES # BLD: 0.2 K/UL (ref 0–1)
MONOCYTES NFR BLD: 2 % (ref 5–13)
NEUTS SEG # BLD: 7.5 K/UL (ref 1.8–8)
NEUTS SEG NFR BLD: 95 % (ref 32–75)
NRBC # BLD: 0 K/UL (ref 0–0.01)
NRBC BLD-RTO: 0 PER 100 WBC
PHOSPHATE SERPL-MCNC: 5.2 MG/DL (ref 2.6–4.7)
PLATELET # BLD AUTO: 204 K/UL (ref 150–400)
PMV BLD AUTO: 10.3 FL (ref 8.9–12.9)
POTASSIUM SERPL-SCNC: 3.5 MMOL/L (ref 3.5–5.1)
PROT SERPL-MCNC: 5.9 G/DL (ref 6.4–8.2)
RBC # BLD AUTO: 3.33 M/UL (ref 3.8–5.2)
RBC MORPH BLD: ABNORMAL
SODIUM SERPL-SCNC: 134 MMOL/L (ref 136–145)
WBC # BLD AUTO: 7.9 K/UL (ref 3.6–11)

## 2024-01-27 PROCEDURE — 93005 ELECTROCARDIOGRAM TRACING: CPT | Performed by: NURSE PRACTITIONER

## 2024-01-27 PROCEDURE — 6370000000 HC RX 637 (ALT 250 FOR IP): Performed by: INTERNAL MEDICINE

## 2024-01-27 PROCEDURE — 94761 N-INVAS EAR/PLS OXIMETRY MLT: CPT

## 2024-01-27 PROCEDURE — 83605 ASSAY OF LACTIC ACID: CPT

## 2024-01-27 PROCEDURE — 94640 AIRWAY INHALATION TREATMENT: CPT

## 2024-01-27 PROCEDURE — 83735 ASSAY OF MAGNESIUM: CPT

## 2024-01-27 PROCEDURE — 6360000002 HC RX W HCPCS: Performed by: INTERNAL MEDICINE

## 2024-01-27 PROCEDURE — 36415 COLL VENOUS BLD VENIPUNCTURE: CPT

## 2024-01-27 PROCEDURE — 2060000000 HC ICU INTERMEDIATE R&B

## 2024-01-27 PROCEDURE — 6360000002 HC RX W HCPCS: Performed by: NURSE PRACTITIONER

## 2024-01-27 PROCEDURE — 85025 COMPLETE CBC W/AUTO DIFF WBC: CPT

## 2024-01-27 PROCEDURE — 80069 RENAL FUNCTION PANEL: CPT

## 2024-01-27 PROCEDURE — 2580000003 HC RX 258: Performed by: NURSE PRACTITIONER

## 2024-01-27 PROCEDURE — 6370000000 HC RX 637 (ALT 250 FOR IP): Performed by: NURSE PRACTITIONER

## 2024-01-27 PROCEDURE — 83880 ASSAY OF NATRIURETIC PEPTIDE: CPT

## 2024-01-27 PROCEDURE — 71250 CT THORAX DX C-: CPT

## 2024-01-27 PROCEDURE — 80076 HEPATIC FUNCTION PANEL: CPT

## 2024-01-27 PROCEDURE — 82550 ASSAY OF CK (CPK): CPT

## 2024-01-27 PROCEDURE — 2700000000 HC OXYGEN THERAPY PER DAY

## 2024-01-27 PROCEDURE — 2500000003 HC RX 250 WO HCPCS: Performed by: NURSE PRACTITIONER

## 2024-01-27 RX ORDER — IPRATROPIUM BROMIDE AND ALBUTEROL SULFATE 2.5; .5 MG/3ML; MG/3ML
1 SOLUTION RESPIRATORY (INHALATION)
Status: DISCONTINUED | OUTPATIENT
Start: 2024-01-27 | End: 2024-01-27

## 2024-01-27 RX ORDER — ACETAMINOPHEN 325 MG/1
650 TABLET ORAL EVERY 8 HOURS PRN
Status: DISCONTINUED | OUTPATIENT
Start: 2024-01-27 | End: 2024-02-12 | Stop reason: SDUPTHER

## 2024-01-27 RX ORDER — METHYLPREDNISOLONE 4 MG/1
4 TABLET ORAL DAILY
Status: DISCONTINUED | OUTPATIENT
Start: 2024-01-27 | End: 2024-01-29

## 2024-01-27 RX ORDER — MAGNESIUM SULFATE HEPTAHYDRATE 40 MG/ML
2000 INJECTION, SOLUTION INTRAVENOUS ONCE
Status: COMPLETED | OUTPATIENT
Start: 2024-01-27 | End: 2024-01-27

## 2024-01-27 RX ORDER — IPRATROPIUM BROMIDE AND ALBUTEROL SULFATE 2.5; .5 MG/3ML; MG/3ML
1 SOLUTION RESPIRATORY (INHALATION)
Status: DISCONTINUED | OUTPATIENT
Start: 2024-01-28 | End: 2024-02-13

## 2024-01-27 RX ADMIN — Medication 5 MG: at 21:24

## 2024-01-27 RX ADMIN — IPRATROPIUM BROMIDE AND ALBUTEROL SULFATE 1 DOSE: 2.5; .5 SOLUTION RESPIRATORY (INHALATION) at 19:52

## 2024-01-27 RX ADMIN — APIXABAN 5 MG: 5 TABLET, FILM COATED ORAL at 21:24

## 2024-01-27 RX ADMIN — THIAMINE HCL TAB 100 MG 100 MG: 100 TAB at 08:57

## 2024-01-27 RX ADMIN — MAGNESIUM SULFATE HEPTAHYDRATE 2000 MG: 40 INJECTION, SOLUTION INTRAVENOUS at 09:08

## 2024-01-27 RX ADMIN — SODIUM CHLORIDE, PRESERVATIVE FREE 10 ML: 5 INJECTION INTRAVENOUS at 09:10

## 2024-01-27 RX ADMIN — METHYLPREDNISOLONE SODIUM SUCCINATE 40 MG: 40 INJECTION INTRAMUSCULAR; INTRAVENOUS at 03:53

## 2024-01-27 RX ADMIN — THERA TABS 1 TABLET: TAB at 08:57

## 2024-01-27 RX ADMIN — SODIUM CHLORIDE, PRESERVATIVE FREE 10 ML: 5 INJECTION INTRAVENOUS at 22:10

## 2024-01-27 RX ADMIN — ACETAMINOPHEN 650 MG: 325 TABLET, FILM COATED ORAL at 15:55

## 2024-01-27 RX ADMIN — FOLIC ACID 1 MG: 1 TABLET ORAL at 08:57

## 2024-01-27 RX ADMIN — DILTIAZEM HYDROCHLORIDE 5 MG/HR: 5 INJECTION, SOLUTION INTRAVENOUS at 11:25

## 2024-01-27 RX ADMIN — SPIRONOLACTONE 25 MG: 25 TABLET ORAL at 08:57

## 2024-01-27 RX ADMIN — APIXABAN 5 MG: 5 TABLET, FILM COATED ORAL at 08:58

## 2024-01-27 RX ADMIN — IPRATROPIUM BROMIDE AND ALBUTEROL SULFATE 1 DOSE: 2.5; .5 SOLUTION RESPIRATORY (INHALATION) at 14:41

## 2024-01-27 RX ADMIN — Medication 2 PUFF: at 10:19

## 2024-01-27 RX ADMIN — METHYLPREDNISOLONE SODIUM SUCCINATE 40 MG: 40 INJECTION INTRAMUSCULAR; INTRAVENOUS at 08:58

## 2024-01-27 RX ADMIN — PRAVASTATIN SODIUM 10 MG: 10 TABLET ORAL at 21:24

## 2024-01-27 RX ADMIN — SACUBITRIL AND VALSARTAN 1 TABLET: 24; 26 TABLET, FILM COATED ORAL at 09:14

## 2024-01-27 RX ADMIN — DILTIAZEM HYDROCHLORIDE 30 MG: 30 TABLET, FILM COATED ORAL at 08:57

## 2024-01-27 RX ADMIN — Medication 2 PUFF: at 19:52

## 2024-01-27 RX ADMIN — FUROSEMIDE 40 MG: 10 INJECTION, SOLUTION INTRAMUSCULAR; INTRAVENOUS at 08:58

## 2024-01-27 RX ADMIN — IPRATROPIUM BROMIDE AND ALBUTEROL SULFATE 1 DOSE: 2.5; .5 SOLUTION RESPIRATORY (INHALATION) at 09:29

## 2024-01-27 NOTE — RT PROTOCOL NOTE
RT Nebulizer Bronchodilator Protocol Note    There is a bronchodilator order in the chart from a provider indicating to follow the RT Bronchodilator Protocol and there is an “Initiate RT Bronchodilator Protocol” order as well (see protocol at bottom of note).    CXR Findings:  No results found.    The findings from the last RT Protocol Assessment were as follows:  Smoking: Chronic pulmonary disease  Respiratory Pattern: Regular pattern and RR 12-20 bpm  Breath Sounds: Clear breath sounds  Cough: Strong, spontaneous, non-productive  Indication for Bronchodilator Therapy:    Bronchodilator Assessment Score: 2    Aerosolized bronchodilator medication orders have been revised according to the RT Nebulizer Bronchodilator Protocol below.    Respiratory Therapist to perform RT Therapy Protocol Assessment initially then follow the protocol.  Repeat RT Therapy Protocol Assessment PRN for score 0-3 or on second treatment, BID, and PRN for scores above 3.    No Indications - adjust the frequency to every 6 hours PRN wheezing or bronchospasm, if no treatments needed after 48 hours then discontinue using Per Protocol order mode.     If indication present, adjust the RT bronchodilator orders based on the Bronchodilator Assessment Score as indicated below.  If a patient is on this medication at home then do not decrease Frequency below that used at home.    0-3 - enter or revise RT bronchodilator order(s) to equivalent RT Bronchodilator order with Frequency of every 4 hours PRN for wheezing or increased work of breathing using Per Protocol order mode.       4-6 - enter or revise RT Bronchodilator order(s) to two equivalent RT bronchodilator orders with one order with BID Frequency and one order with Frequency of every 4 hours PRN wheezing or increased work of breathing using Per Protocol order mode.         7-10 - enter or revise RT Bronchodilator order(s) to two equivalent RT bronchodilator orders with one order with TID

## 2024-01-28 LAB
ALBUMIN SERPL-MCNC: 1.9 G/DL (ref 3.5–5)
ALBUMIN SERPL-MCNC: 2 G/DL (ref 3.5–5)
ALBUMIN/GLOB SERPL: 0.6 (ref 1.1–2.2)
ALP SERPL-CCNC: 67 U/L (ref 45–117)
ALT SERPL-CCNC: 14 U/L (ref 12–78)
ANION GAP SERPL CALC-SCNC: 10 MMOL/L (ref 5–15)
ARTERIAL PATENCY WRIST A: ABNORMAL
AST SERPL W P-5'-P-CCNC: 19 U/L (ref 15–37)
BASE DEFICIT BLDA-SCNC: 6.9 MMOL/L
BASOPHILS # BLD: 0 K/UL (ref 0–0.1)
BASOPHILS NFR BLD: 0 % (ref 0–1)
BDY SITE: ABNORMAL
BILIRUB DIRECT SERPL-MCNC: <0.1 MG/DL (ref 0–0.2)
BILIRUB SERPL-MCNC: 0.1 MG/DL (ref 0.2–1)
BODY TEMPERATURE: 97.6
BUN SERPL-MCNC: 57 MG/DL (ref 6–20)
BUN/CREAT SERPL: 17 (ref 12–20)
CA-I BLD-MCNC: 7.6 MG/DL (ref 8.5–10.1)
CHLORIDE SERPL-SCNC: 101 MMOL/L (ref 97–108)
CO2 SERPL-SCNC: 21 MMOL/L (ref 21–32)
COHGB MFR BLD: 0.8 % (ref 1–2)
CREAT SERPL-MCNC: 3.33 MG/DL (ref 0.55–1.02)
DIFFERENTIAL METHOD BLD: ABNORMAL
EOSINOPHIL # BLD: 0 K/UL (ref 0–0.4)
EOSINOPHIL NFR BLD: 0 % (ref 0–7)
ERYTHROCYTE [DISTWIDTH] IN BLOOD BY AUTOMATED COUNT: 13.9 % (ref 11.5–14.5)
FIO2 ON VENT: 100 %
GAS FLOW.O2 O2 DELIVERY SYS: 15 L/MIN
GLOBULIN SER CALC-MCNC: 3.6 G/DL (ref 2–4)
GLUCOSE SERPL-MCNC: 158 MG/DL (ref 65–100)
HCO3 BLDA-SCNC: 20 MMOL/L (ref 22–26)
HCT VFR BLD AUTO: 37.2 % (ref 35–47)
HGB BLD-MCNC: 12.9 G/DL (ref 11.5–16)
IMM GRANULOCYTES # BLD AUTO: 0 K/UL
IMM GRANULOCYTES NFR BLD AUTO: 0 %
LYMPHOCYTES # BLD: 0.3 K/UL (ref 0.8–3.5)
LYMPHOCYTES NFR BLD: 2 % (ref 12–49)
MAGNESIUM SERPL-MCNC: 2.2 MG/DL (ref 1.6–2.4)
MCH RBC QN AUTO: 38.1 PG (ref 26–34)
MCHC RBC AUTO-ENTMCNC: 34.7 G/DL (ref 30–36.5)
MCV RBC AUTO: 109.7 FL (ref 80–99)
METHGB MFR BLD: 0.3 % (ref 0–1.4)
MONOCYTES # BLD: 0.4 K/UL (ref 0–1)
MONOCYTES NFR BLD: 3 % (ref 5–13)
NEUTS BAND NFR BLD MANUAL: 7 % (ref 0–6)
NEUTS SEG # BLD: 13.1 K/UL (ref 1.8–8)
NEUTS SEG NFR BLD: 88 % (ref 32–75)
NRBC # BLD: 0 K/UL (ref 0–0.01)
NRBC BLD-RTO: 0 PER 100 WBC
OXYHGB MFR BLD: 93 % (ref 95–99)
PCO2 BLDA: 45 MMHG (ref 35–45)
PERFORMED BY:: ABNORMAL
PH BLDA: 7.27 (ref 7.35–7.45)
PHOSPHATE SERPL-MCNC: 5.6 MG/DL (ref 2.6–4.7)
PLATELET # BLD AUTO: 218 K/UL (ref 150–400)
PMV BLD AUTO: 10.6 FL (ref 8.9–12.9)
PO2 BLDA: 72 MMHG (ref 80–100)
POTASSIUM SERPL-SCNC: 3.6 MMOL/L (ref 3.5–5.1)
PROT SERPL-MCNC: 5.6 G/DL (ref 6.4–8.2)
RBC # BLD AUTO: 3.39 M/UL (ref 3.8–5.2)
RBC MORPH BLD: ABNORMAL
SAO2 % BLD: 94 % (ref 95–99)
SAO2% DEVICE SAO2% SENSOR NAME: ABNORMAL
SODIUM SERPL-SCNC: 132 MMOL/L (ref 136–145)
SPECIMEN SITE: ABNORMAL
TROPONIN I SERPL HS-MCNC: 38 NG/L (ref 0–51)
WBC # BLD AUTO: 13.8 K/UL (ref 3.6–11)

## 2024-01-28 PROCEDURE — 36600 WITHDRAWAL OF ARTERIAL BLOOD: CPT

## 2024-01-28 PROCEDURE — P9047 ALBUMIN (HUMAN), 25%, 50ML: HCPCS | Performed by: STUDENT IN AN ORGANIZED HEALTH CARE EDUCATION/TRAINING PROGRAM

## 2024-01-28 PROCEDURE — 2580000003 HC RX 258: Performed by: PHYSICIAN ASSISTANT

## 2024-01-28 PROCEDURE — 82803 BLOOD GASES ANY COMBINATION: CPT

## 2024-01-28 PROCEDURE — 36415 COLL VENOUS BLD VENIPUNCTURE: CPT

## 2024-01-28 PROCEDURE — 2580000003 HC RX 258: Performed by: NURSE PRACTITIONER

## 2024-01-28 PROCEDURE — 6370000000 HC RX 637 (ALT 250 FOR IP): Performed by: NURSE PRACTITIONER

## 2024-01-28 PROCEDURE — 6360000002 HC RX W HCPCS: Performed by: INTERNAL MEDICINE

## 2024-01-28 PROCEDURE — 2500000003 HC RX 250 WO HCPCS: Performed by: PHYSICIAN ASSISTANT

## 2024-01-28 PROCEDURE — 94761 N-INVAS EAR/PLS OXIMETRY MLT: CPT

## 2024-01-28 PROCEDURE — 94640 AIRWAY INHALATION TREATMENT: CPT

## 2024-01-28 PROCEDURE — 85025 COMPLETE CBC W/AUTO DIFF WBC: CPT

## 2024-01-28 PROCEDURE — 2580000003 HC RX 258: Performed by: INTERNAL MEDICINE

## 2024-01-28 PROCEDURE — 6370000000 HC RX 637 (ALT 250 FOR IP): Performed by: INTERNAL MEDICINE

## 2024-01-28 PROCEDURE — 6370000000 HC RX 637 (ALT 250 FOR IP): Performed by: STUDENT IN AN ORGANIZED HEALTH CARE EDUCATION/TRAINING PROGRAM

## 2024-01-28 PROCEDURE — 6360000002 HC RX W HCPCS: Performed by: NURSE PRACTITIONER

## 2024-01-28 PROCEDURE — 80076 HEPATIC FUNCTION PANEL: CPT

## 2024-01-28 PROCEDURE — 2700000000 HC OXYGEN THERAPY PER DAY

## 2024-01-28 PROCEDURE — 84484 ASSAY OF TROPONIN QUANT: CPT

## 2024-01-28 PROCEDURE — 83735 ASSAY OF MAGNESIUM: CPT

## 2024-01-28 PROCEDURE — 80069 RENAL FUNCTION PANEL: CPT

## 2024-01-28 PROCEDURE — 6360000002 HC RX W HCPCS: Performed by: STUDENT IN AN ORGANIZED HEALTH CARE EDUCATION/TRAINING PROGRAM

## 2024-01-28 PROCEDURE — 2060000000 HC ICU INTERMEDIATE R&B

## 2024-01-28 RX ORDER — ALBUMIN (HUMAN) 12.5 G/50ML
50 SOLUTION INTRAVENOUS EVERY 6 HOURS
Status: DISPENSED | OUTPATIENT
Start: 2024-01-28 | End: 2024-01-29

## 2024-01-28 RX ORDER — AMIODARONE HYDROCHLORIDE 200 MG/1
200 TABLET ORAL 2 TIMES DAILY
Status: DISCONTINUED | OUTPATIENT
Start: 2024-01-28 | End: 2024-01-30

## 2024-01-28 RX ORDER — SODIUM CHLORIDE 9 MG/ML
INJECTION, SOLUTION INTRAVENOUS CONTINUOUS
Status: DISPENSED | OUTPATIENT
Start: 2024-01-28 | End: 2024-01-29

## 2024-01-28 RX ORDER — ALBUMIN (HUMAN) 12.5 G/50ML
50 SOLUTION INTRAVENOUS EVERY 6 HOURS
Status: DISCONTINUED | OUTPATIENT
Start: 2024-01-28 | End: 2024-01-28

## 2024-01-28 RX ADMIN — FOLIC ACID 1 MG: 1 TABLET ORAL at 08:45

## 2024-01-28 RX ADMIN — ONDANSETRON 4 MG: 2 INJECTION INTRAMUSCULAR; INTRAVENOUS at 21:16

## 2024-01-28 RX ADMIN — Medication 2 PUFF: at 07:58

## 2024-01-28 RX ADMIN — DILTIAZEM HYDROCHLORIDE 7.5 MG/HR: 5 INJECTION, SOLUTION INTRAVENOUS at 13:14

## 2024-01-28 RX ADMIN — Medication 2 PUFF: at 20:15

## 2024-01-28 RX ADMIN — APIXABAN 5 MG: 5 TABLET, FILM COATED ORAL at 08:45

## 2024-01-28 RX ADMIN — IPRATROPIUM BROMIDE AND ALBUTEROL SULFATE 1 DOSE: 2.5; .5 SOLUTION RESPIRATORY (INHALATION) at 20:01

## 2024-01-28 RX ADMIN — SODIUM CHLORIDE, PRESERVATIVE FREE 10 ML: 5 INJECTION INTRAVENOUS at 21:17

## 2024-01-28 RX ADMIN — METHYLPREDNISOLONE 4 MG: 4 TABLET ORAL at 08:45

## 2024-01-28 RX ADMIN — THERA TABS 1 TABLET: TAB at 08:53

## 2024-01-28 RX ADMIN — SODIUM CHLORIDE, PRESERVATIVE FREE 10 ML: 5 INJECTION INTRAVENOUS at 09:01

## 2024-01-28 RX ADMIN — IPRATROPIUM BROMIDE AND ALBUTEROL SULFATE 1 DOSE: 2.5; .5 SOLUTION RESPIRATORY (INHALATION) at 07:58

## 2024-01-28 RX ADMIN — Medication 5 MG: at 21:16

## 2024-01-28 RX ADMIN — ALBUMIN (HUMAN) 50 G: 12.5 SOLUTION INTRAVENOUS at 21:16

## 2024-01-28 RX ADMIN — ACETAMINOPHEN 650 MG: 325 TABLET, FILM COATED ORAL at 11:21

## 2024-01-28 RX ADMIN — THIAMINE HCL TAB 100 MG 100 MG: 100 TAB at 08:45

## 2024-01-28 RX ADMIN — ONDANSETRON 4 MG: 4 TABLET, ORALLY DISINTEGRATING ORAL at 08:54

## 2024-01-28 RX ADMIN — SODIUM CHLORIDE: 9 INJECTION, SOLUTION INTRAVENOUS at 16:06

## 2024-01-28 RX ADMIN — IPRATROPIUM BROMIDE AND ALBUTEROL SULFATE 1 DOSE: 2.5; .5 SOLUTION RESPIRATORY (INHALATION) at 11:50

## 2024-01-29 ENCOUNTER — APPOINTMENT (OUTPATIENT)
Facility: HOSPITAL | Age: 69
DRG: 308 | End: 2024-01-29
Payer: MEDICARE

## 2024-01-29 ENCOUNTER — APPOINTMENT (OUTPATIENT)
Facility: HOSPITAL | Age: 69
DRG: 308 | End: 2024-01-29
Attending: INTERNAL MEDICINE
Payer: MEDICARE

## 2024-01-29 LAB
ALBUMIN SERPL-MCNC: 3 G/DL (ref 3.5–5)
ALBUMIN SERPL-MCNC: 3 G/DL (ref 3.5–5)
ALBUMIN/GLOB SERPL: 1.1 (ref 1.1–2.2)
ALP SERPL-CCNC: 44 U/L (ref 45–117)
ALT SERPL-CCNC: 17 U/L (ref 12–78)
ANION GAP SERPL CALC-SCNC: 8 MMOL/L (ref 5–15)
ARTERIAL PATENCY WRIST A: ABNORMAL
ARTERIAL PATENCY WRIST A: YES
AST SERPL W P-5'-P-CCNC: 23 U/L (ref 15–37)
BASE DEFICIT BLDA-SCNC: 7.7 MMOL/L
BASE DEFICIT BLDA-SCNC: 8 MMOL/L
BASOPHILS # BLD: 0 K/UL (ref 0–0.1)
BASOPHILS NFR BLD: 0 % (ref 0–1)
BDY SITE: ABNORMAL
BDY SITE: ABNORMAL
BILIRUB DIRECT SERPL-MCNC: <0.1 MG/DL (ref 0–0.2)
BILIRUB SERPL-MCNC: 0.2 MG/DL (ref 0.2–1)
BODY TEMPERATURE: 97.8
BODY TEMPERATURE: 98.7
BUN SERPL-MCNC: 58 MG/DL (ref 6–20)
BUN/CREAT SERPL: 18 (ref 12–20)
CA-I BLD-MCNC: 1.04 MMOL/L (ref 1.13–1.32)
CA-I BLD-MCNC: 7.6 MG/DL (ref 8.5–10.1)
CHLORIDE SERPL-SCNC: 105 MMOL/L (ref 97–108)
CO2 SERPL-SCNC: 20 MMOL/L (ref 21–32)
COHGB MFR BLD: 0.1 % (ref 1–2)
COHGB MFR BLD: 0.3 % (ref 1–2)
CREAT SERPL-MCNC: 3.16 MG/DL (ref 0.55–1.02)
DIFFERENTIAL METHOD BLD: ABNORMAL
EKG ATRIAL RATE: 136 BPM
EKG DIAGNOSIS: NORMAL
EKG P AXIS: 86 DEGREES
EKG P-R INTERVAL: 192 MS
EKG Q-T INTERVAL: 310 MS
EKG QRS DURATION: 66 MS
EKG QTC CALCULATION (BAZETT): 448 MS
EKG R AXIS: 64 DEGREES
EKG T AXIS: 154 DEGREES
EKG VENTRICULAR RATE: 126 BPM
EOSINOPHIL # BLD: 0 K/UL (ref 0–0.4)
EOSINOPHIL NFR BLD: 0 % (ref 0–7)
ERYTHROCYTE [DISTWIDTH] IN BLOOD BY AUTOMATED COUNT: 13.8 % (ref 11.5–14.5)
FIO2 ON VENT: 100 %
FIO2 ON VENT: 100 %
GLOBULIN SER CALC-MCNC: 2.7 G/DL (ref 2–4)
GLUCOSE SERPL-MCNC: 104 MG/DL (ref 65–100)
HCO3 BLDA-SCNC: 19 MMOL/L (ref 22–26)
HCO3 BLDA-SCNC: 20 MMOL/L (ref 22–26)
HCT VFR BLD AUTO: 33.5 % (ref 35–47)
HGB BLD-MCNC: 11.4 G/DL (ref 11.5–16)
IMM GRANULOCYTES # BLD AUTO: 0.1 K/UL (ref 0–0.04)
IMM GRANULOCYTES NFR BLD AUTO: 1 % (ref 0–0.5)
IPAP/PIP: 14
LYMPHOCYTES # BLD: 0.3 K/UL (ref 0.8–3.5)
LYMPHOCYTES NFR BLD: 2 % (ref 12–49)
MAGNESIUM SERPL-MCNC: 2 MG/DL (ref 1.6–2.4)
MCH RBC QN AUTO: 37.3 PG (ref 26–34)
MCHC RBC AUTO-ENTMCNC: 34 G/DL (ref 30–36.5)
MCV RBC AUTO: 109.5 FL (ref 80–99)
METHGB MFR BLD: 0.3 % (ref 0–1.4)
METHGB MFR BLD: 0.4 % (ref 0–1.4)
MONOCYTES # BLD: 1.1 K/UL (ref 0–1)
MONOCYTES NFR BLD: 8 % (ref 5–13)
MRSA DNA SPEC QL NAA+PROBE: NOT DETECTED
NEUTS SEG # BLD: 11.7 K/UL (ref 1.8–8)
NEUTS SEG NFR BLD: 89 % (ref 32–75)
NRBC # BLD: 0 K/UL (ref 0–0.01)
NRBC BLD-RTO: 0 PER 100 WBC
OXYHGB MFR BLD: 86 % (ref 95–99)
OXYHGB MFR BLD: 94.4 % (ref 95–99)
PCO2 BLDA: 44 MMHG (ref 35–45)
PCO2 BLDA: 53 MMHG (ref 35–45)
PEEP RESPIRATORY: 6
PERFORMED BY:: ABNORMAL
PERFORMED BY:: ABNORMAL
PH BLDA: 7.2 (ref 7.35–7.45)
PH BLDA: 7.26 (ref 7.35–7.45)
PHOSPHATE SERPL-MCNC: 5.4 MG/DL (ref 2.6–4.7)
PLATELET # BLD AUTO: 179 K/UL (ref 150–400)
PMV BLD AUTO: 10 FL (ref 8.9–12.9)
PO2 BLDA: 55 MMHG (ref 80–100)
PO2 BLDA: 79 MMHG (ref 80–100)
POTASSIUM SERPL-SCNC: 3.5 MMOL/L (ref 3.5–5.1)
PROT SERPL-MCNC: 5.7 G/DL (ref 6.4–8.2)
RBC # BLD AUTO: 3.06 M/UL (ref 3.8–5.2)
SAO2 % BLD: 86 % (ref 95–99)
SAO2 % BLD: 95 % (ref 95–99)
SAO2% DEVICE SAO2% SENSOR NAME: ABNORMAL
SAO2% DEVICE SAO2% SENSOR NAME: ABNORMAL
SERVICE CMNT-IMP: ABNORMAL
SODIUM SERPL-SCNC: 133 MMOL/L (ref 136–145)
SPECIMEN SITE: ABNORMAL
SPECIMEN SITE: ABNORMAL
TROPONIN I SERPL HS-MCNC: 46 NG/L (ref 0–51)
WBC # BLD AUTO: 13.2 K/UL (ref 3.6–11)

## 2024-01-29 PROCEDURE — 2580000003 HC RX 258: Performed by: PHYSICIAN ASSISTANT

## 2024-01-29 PROCEDURE — 88305 TISSUE EXAM BY PATHOLOGIST: CPT

## 2024-01-29 PROCEDURE — 87186 SC STD MICRODIL/AGAR DIL: CPT

## 2024-01-29 PROCEDURE — 94761 N-INVAS EAR/PLS OXIMETRY MLT: CPT

## 2024-01-29 PROCEDURE — 80069 RENAL FUNCTION PANEL: CPT

## 2024-01-29 PROCEDURE — 2580000003 HC RX 258: Performed by: NURSE PRACTITIONER

## 2024-01-29 PROCEDURE — 87086 URINE CULTURE/COLONY COUNT: CPT

## 2024-01-29 PROCEDURE — 82330 ASSAY OF CALCIUM: CPT

## 2024-01-29 PROCEDURE — 2700000000 HC OXYGEN THERAPY PER DAY

## 2024-01-29 PROCEDURE — 6360000002 HC RX W HCPCS: Performed by: INTERNAL MEDICINE

## 2024-01-29 PROCEDURE — C1729 CATH, DRAINAGE: HCPCS

## 2024-01-29 PROCEDURE — 84157 ASSAY OF PROTEIN OTHER: CPT

## 2024-01-29 PROCEDURE — P9047 ALBUMIN (HUMAN), 25%, 50ML: HCPCS | Performed by: INTERNAL MEDICINE

## 2024-01-29 PROCEDURE — 89050 BODY FLUID CELL COUNT: CPT

## 2024-01-29 PROCEDURE — 85025 COMPLETE CBC W/AUTO DIFF WBC: CPT

## 2024-01-29 PROCEDURE — 94640 AIRWAY INHALATION TREATMENT: CPT

## 2024-01-29 PROCEDURE — 5A09557 ASSISTANCE WITH RESPIRATORY VENTILATION, GREATER THAN 96 CONSECUTIVE HOURS, CONTINUOUS POSITIVE AIRWAY PRESSURE: ICD-10-PCS | Performed by: INTERNAL MEDICINE

## 2024-01-29 PROCEDURE — 6370000000 HC RX 637 (ALT 250 FOR IP): Performed by: STUDENT IN AN ORGANIZED HEALTH CARE EDUCATION/TRAINING PROGRAM

## 2024-01-29 PROCEDURE — 6360000002 HC RX W HCPCS: Performed by: NURSE PRACTITIONER

## 2024-01-29 PROCEDURE — 6360000002 HC RX W HCPCS: Performed by: PHYSICIAN ASSISTANT

## 2024-01-29 PROCEDURE — 88341 IMHCHEM/IMCYTCHM EA ADD ANTB: CPT

## 2024-01-29 PROCEDURE — 2500000003 HC RX 250 WO HCPCS: Performed by: PHYSICIAN ASSISTANT

## 2024-01-29 PROCEDURE — 83735 ASSAY OF MAGNESIUM: CPT

## 2024-01-29 PROCEDURE — 6370000000 HC RX 637 (ALT 250 FOR IP): Performed by: INTERNAL MEDICINE

## 2024-01-29 PROCEDURE — 76770 US EXAM ABDO BACK WALL COMP: CPT

## 2024-01-29 PROCEDURE — 87077 CULTURE AEROBIC IDENTIFY: CPT

## 2024-01-29 PROCEDURE — 87641 MR-STAPH DNA AMP PROBE: CPT

## 2024-01-29 PROCEDURE — 92610 EVALUATE SWALLOWING FUNCTION: CPT

## 2024-01-29 PROCEDURE — 94660 CPAP INITIATION&MGMT: CPT

## 2024-01-29 PROCEDURE — 88342 IMHCHEM/IMCYTCHM 1ST ANTB: CPT

## 2024-01-29 PROCEDURE — 36600 WITHDRAWAL OF ARTERIAL BLOOD: CPT

## 2024-01-29 PROCEDURE — 32554 ASPIRATE PLEURA W/O IMAGING: CPT

## 2024-01-29 PROCEDURE — 82803 BLOOD GASES ANY COMBINATION: CPT

## 2024-01-29 PROCEDURE — 84484 ASSAY OF TROPONIN QUANT: CPT

## 2024-01-29 PROCEDURE — 2000000000 HC ICU R&B

## 2024-01-29 PROCEDURE — 6370000000 HC RX 637 (ALT 250 FOR IP): Performed by: NURSE PRACTITIONER

## 2024-01-29 PROCEDURE — 71045 X-RAY EXAM CHEST 1 VIEW: CPT

## 2024-01-29 PROCEDURE — 80076 HEPATIC FUNCTION PANEL: CPT

## 2024-01-29 PROCEDURE — 88112 CYTOPATH CELL ENHANCE TECH: CPT

## 2024-01-29 RX ORDER — FUROSEMIDE 10 MG/ML
40 INJECTION INTRAMUSCULAR; INTRAVENOUS DAILY
Status: DISCONTINUED | OUTPATIENT
Start: 2024-01-29 | End: 2024-02-04

## 2024-01-29 RX ORDER — METHYLPREDNISOLONE SODIUM SUCCINATE 40 MG/ML
40 INJECTION, POWDER, LYOPHILIZED, FOR SOLUTION INTRAMUSCULAR; INTRAVENOUS EVERY 6 HOURS
Status: DISCONTINUED | OUTPATIENT
Start: 2024-01-29 | End: 2024-01-31

## 2024-01-29 RX ORDER — ALBUMIN (HUMAN) 12.5 G/50ML
25 SOLUTION INTRAVENOUS ONCE
Status: COMPLETED | OUTPATIENT
Start: 2024-01-29 | End: 2024-01-29

## 2024-01-29 RX ADMIN — IPRATROPIUM BROMIDE AND ALBUTEROL SULFATE 1 DOSE: 2.5; .5 SOLUTION RESPIRATORY (INHALATION) at 13:09

## 2024-01-29 RX ADMIN — DILTIAZEM HYDROCHLORIDE 7.5 MG/HR: 5 INJECTION, SOLUTION INTRAVENOUS at 05:51

## 2024-01-29 RX ADMIN — THIAMINE HCL TAB 100 MG 100 MG: 100 TAB at 08:55

## 2024-01-29 RX ADMIN — IPRATROPIUM BROMIDE AND ALBUTEROL SULFATE 1 DOSE: 2.5; .5 SOLUTION RESPIRATORY (INHALATION) at 20:12

## 2024-01-29 RX ADMIN — FOLIC ACID 1 MG: 1 TABLET ORAL at 08:55

## 2024-01-29 RX ADMIN — FUROSEMIDE 40 MG: 10 INJECTION, SOLUTION INTRAMUSCULAR; INTRAVENOUS at 11:31

## 2024-01-29 RX ADMIN — METHYLPREDNISOLONE SODIUM SUCCINATE 40 MG: 40 INJECTION INTRAMUSCULAR; INTRAVENOUS at 13:47

## 2024-01-29 RX ADMIN — Medication 5 MG: at 21:00

## 2024-01-29 RX ADMIN — ALBUMIN (HUMAN) 25 G: 0.25 INJECTION, SOLUTION INTRAVENOUS at 11:31

## 2024-01-29 RX ADMIN — Medication 2 PUFF: at 20:12

## 2024-01-29 RX ADMIN — METHYLPREDNISOLONE SODIUM SUCCINATE 40 MG: 40 INJECTION INTRAMUSCULAR; INTRAVENOUS at 08:54

## 2024-01-29 RX ADMIN — ONDANSETRON 4 MG: 2 INJECTION INTRAMUSCULAR; INTRAVENOUS at 04:07

## 2024-01-29 RX ADMIN — PRAVASTATIN SODIUM 10 MG: 10 TABLET ORAL at 21:00

## 2024-01-29 RX ADMIN — APIXABAN 5 MG: 5 TABLET, FILM COATED ORAL at 08:55

## 2024-01-29 RX ADMIN — SODIUM CHLORIDE, PRESERVATIVE FREE 10 ML: 5 INJECTION INTRAVENOUS at 08:55

## 2024-01-29 RX ADMIN — SODIUM CHLORIDE, PRESERVATIVE FREE 10 ML: 5 INJECTION INTRAVENOUS at 21:00

## 2024-01-29 RX ADMIN — APIXABAN 5 MG: 5 TABLET, FILM COATED ORAL at 21:00

## 2024-01-29 RX ADMIN — IPRATROPIUM BROMIDE AND ALBUTEROL SULFATE 1 DOSE: 2.5; .5 SOLUTION RESPIRATORY (INHALATION) at 07:15

## 2024-01-29 RX ADMIN — METHYLPREDNISOLONE SODIUM SUCCINATE 40 MG: 40 INJECTION INTRAMUSCULAR; INTRAVENOUS at 20:30

## 2024-01-29 RX ADMIN — Medication 2 PUFF: at 07:15

## 2024-01-29 RX ADMIN — THERA TABS 1 TABLET: TAB at 08:55

## 2024-01-29 NOTE — OR NURSING
Left thoracentesis completed. 450mL of clear, pale yellow fluid removed. Band-aid applied to left, middle back. Patient returned to prior position. STAT CXR ordered. Ordered samples sent to lab. Primary nurse notified.

## 2024-01-29 NOTE — CARE COORDINATION
CM reviewed Pt medicals. Met f/f with Pt and her significant other.    Pt stated that when she is D/C she wants to go home.     CM will follow.      [facility] has accepted Pt for HH needs.

## 2024-01-30 LAB
ALBUMIN SERPL-MCNC: 3.1 G/DL (ref 3.5–5)
ALBUMIN SERPL-MCNC: 3.1 G/DL (ref 3.5–5)
ALBUMIN/GLOB SERPL: 1 (ref 1.1–2.2)
ALP SERPL-CCNC: 41 U/L (ref 45–117)
ALT SERPL-CCNC: 15 U/L (ref 12–78)
ANION GAP SERPL CALC-SCNC: 7 MMOL/L (ref 5–15)
APPEARANCE FLD: ABNORMAL
APPEARANCE UR: ABNORMAL
ARTERIAL PATENCY WRIST A: YES
AST SERPL W P-5'-P-CCNC: 15 U/L (ref 15–37)
BACTERIA URNS QL MICRO: NEGATIVE /HPF
BASE DEFICIT BLDA-SCNC: 6.3 MMOL/L
BASOPHILS # BLD: 0 K/UL (ref 0–0.1)
BASOPHILS NFR BLD: 0 % (ref 0–1)
BDY SITE: ABNORMAL
BILIRUB DIRECT SERPL-MCNC: <0.1 MG/DL (ref 0–0.2)
BILIRUB SERPL-MCNC: 0.3 MG/DL (ref 0.2–1)
BILIRUB UR QL: NEGATIVE
BNP SERPL-MCNC: ABNORMAL PG/ML
BODY TEMPERATURE: 98.2
BUN SERPL-MCNC: 60 MG/DL (ref 6–20)
BUN/CREAT SERPL: 18 (ref 12–20)
CA-I BLD-MCNC: 8.3 MG/DL (ref 8.5–10.1)
CHLORIDE SERPL-SCNC: 106 MMOL/L (ref 97–108)
CHLORIDE UR-SCNC: 87 MMOL/L
CK SERPL-CCNC: 39 U/L (ref 26–192)
CO2 SERPL-SCNC: 21 MMOL/L (ref 21–32)
COHGB MFR BLD: 0.3 % (ref 1–2)
COLOR FLD: YELLOW
COLOR UR: ABNORMAL
CREAT SERPL-MCNC: 3.33 MG/DL (ref 0.55–1.02)
CREAT UR-MCNC: 48 MG/DL
DIFFERENTIAL METHOD BLD: ABNORMAL
EKG ATRIAL RATE: 375 BPM
EKG DIAGNOSIS: NORMAL
EKG Q-T INTERVAL: 250 MS
EKG QRS DURATION: 70 MS
EKG QTC CALCULATION (BAZETT): 402 MS
EKG R AXIS: 53 DEGREES
EKG T AXIS: 220 DEGREES
EKG VENTRICULAR RATE: 156 BPM
EOSINOPHIL # BLD: 0 K/UL (ref 0–0.4)
EOSINOPHIL NFR BLD: 0 % (ref 0–7)
EPITH CASTS URNS QL MICRO: NORMAL /LPF
ERYTHROCYTE [DISTWIDTH] IN BLOOD BY AUTOMATED COUNT: 13.6 % (ref 11.5–14.5)
FIO2 ON VENT: 100 %
GAS FLOW.O2 SETTING OXYMISER: 10
GLOBULIN SER CALC-MCNC: 3.1 G/DL (ref 2–4)
GLUCOSE SERPL-MCNC: 144 MG/DL (ref 65–100)
GLUCOSE UR STRIP.AUTO-MCNC: NEGATIVE MG/DL
HCO3 BLDA-SCNC: 20 MMOL/L (ref 22–26)
HCT VFR BLD AUTO: 33.7 % (ref 35–47)
HGB BLD-MCNC: 11.8 G/DL (ref 11.5–16)
HGB UR QL STRIP: NEGATIVE
IMM GRANULOCYTES # BLD AUTO: 0.2 K/UL (ref 0–0.04)
IMM GRANULOCYTES NFR BLD AUTO: 1 % (ref 0–0.5)
KETONES UR QL STRIP.AUTO: NEGATIVE MG/DL
LEUKOCYTE ESTERASE UR QL STRIP.AUTO: ABNORMAL
LYMPHOCYTES # BLD: 0.1 K/UL (ref 0.8–3.5)
LYMPHOCYTES NFR BLD: 1 % (ref 12–49)
LYMPHOCYTES NFR FLD: 21 %
MCH RBC QN AUTO: 37.6 PG (ref 26–34)
MCHC RBC AUTO-ENTMCNC: 35 G/DL (ref 30–36.5)
MCV RBC AUTO: 107.3 FL (ref 80–99)
MESOTHL CELL NFR FLD: 24 %
METHGB MFR BLD: 0.3 % (ref 0–1.4)
MONOCYTES # BLD: 0.3 K/UL (ref 0–1)
MONOCYTES NFR BLD: 2 % (ref 5–13)
MONOCYTES NFR FLD: 10 %
NEUTROPHILS NFR FLD: 45 %
NEUTS SEG # BLD: 11.7 K/UL (ref 1.8–8)
NEUTS SEG NFR BLD: 96 % (ref 32–75)
NITRITE UR QL STRIP.AUTO: NEGATIVE
NRBC # BLD: 0.02 K/UL (ref 0–0.01)
NRBC BLD-RTO: 0.2 PER 100 WBC
NUC CELL # FLD: 182 /CU MM
OXYHGB MFR BLD: 96.7 % (ref 95–99)
PCO2 BLDA: 42 MMHG (ref 35–45)
PEEP RESPIRATORY: 6
PERFORMED BY:: ABNORMAL
PH BLDA: 7.29 (ref 7.35–7.45)
PH UR STRIP: 5 (ref 5–8)
PHOSPHATE SERPL-MCNC: 5.2 MG/DL (ref 2.6–4.7)
PLATELET # BLD AUTO: 178 K/UL (ref 150–400)
PMV BLD AUTO: 10 FL (ref 8.9–12.9)
PO2 BLDA: 96 MMHG (ref 80–100)
POTASSIUM SERPL-SCNC: 3.6 MMOL/L (ref 3.5–5.1)
PROT FLD-MCNC: 1.1 G/DL
PROT SERPL-MCNC: 6.2 G/DL (ref 6.4–8.2)
PROT UR STRIP-MCNC: >300 MG/DL
PROT UR-MCNC: 308 MG/DL (ref 0–11.9)
PROT/CREAT UR-RTO: 6.4
RBC # BLD AUTO: 3.14 M/UL (ref 3.8–5.2)
RBC # FLD: >100 /CU MM
RBC #/AREA URNS HPF: NORMAL /HPF (ref 0–5)
SAO2 % BLD: 97 % (ref 95–99)
SAO2% DEVICE SAO2% SENSOR NAME: ABNORMAL
SODIUM SERPL-SCNC: 134 MMOL/L (ref 136–145)
SODIUM UR-SCNC: 58 MMOL/L
SP GR UR REFRACTOMETRY: 1.01 (ref 1–1.03)
SPECIMEN SITE: ABNORMAL
SPECIMEN SOURCE FLD: ABNORMAL
SPECIMEN SOURCE FLD: NORMAL
UROBILINOGEN UR QL STRIP.AUTO: 0.1 EU/DL (ref 0.1–1)
WBC # BLD AUTO: 12.3 K/UL (ref 3.6–11)
WBC URNS QL MICRO: NORMAL /HPF (ref 0–4)

## 2024-01-30 PROCEDURE — 6370000000 HC RX 637 (ALT 250 FOR IP): Performed by: INTERNAL MEDICINE

## 2024-01-30 PROCEDURE — 36600 WITHDRAWAL OF ARTERIAL BLOOD: CPT

## 2024-01-30 PROCEDURE — 85025 COMPLETE CBC W/AUTO DIFF WBC: CPT

## 2024-01-30 PROCEDURE — 84156 ASSAY OF PROTEIN URINE: CPT

## 2024-01-30 PROCEDURE — 83880 ASSAY OF NATRIURETIC PEPTIDE: CPT

## 2024-01-30 PROCEDURE — 2500000003 HC RX 250 WO HCPCS: Performed by: PHYSICIAN ASSISTANT

## 2024-01-30 PROCEDURE — 81001 URINALYSIS AUTO W/SCOPE: CPT

## 2024-01-30 PROCEDURE — 6360000002 HC RX W HCPCS: Performed by: INTERNAL MEDICINE

## 2024-01-30 PROCEDURE — 82803 BLOOD GASES ANY COMBINATION: CPT

## 2024-01-30 PROCEDURE — 2580000003 HC RX 258: Performed by: NURSE PRACTITIONER

## 2024-01-30 PROCEDURE — 80069 RENAL FUNCTION PANEL: CPT

## 2024-01-30 PROCEDURE — 6360000002 HC RX W HCPCS: Performed by: PHYSICIAN ASSISTANT

## 2024-01-30 PROCEDURE — 2700000000 HC OXYGEN THERAPY PER DAY

## 2024-01-30 PROCEDURE — 82436 ASSAY OF URINE CHLORIDE: CPT

## 2024-01-30 PROCEDURE — 82570 ASSAY OF URINE CREATININE: CPT

## 2024-01-30 PROCEDURE — 84300 ASSAY OF URINE SODIUM: CPT

## 2024-01-30 PROCEDURE — 80076 HEPATIC FUNCTION PANEL: CPT

## 2024-01-30 PROCEDURE — 6370000000 HC RX 637 (ALT 250 FOR IP): Performed by: NURSE PRACTITIONER

## 2024-01-30 PROCEDURE — 2000000000 HC ICU R&B

## 2024-01-30 PROCEDURE — 2580000003 HC RX 258: Performed by: PHYSICIAN ASSISTANT

## 2024-01-30 PROCEDURE — 94640 AIRWAY INHALATION TREATMENT: CPT

## 2024-01-30 PROCEDURE — 2500000003 HC RX 250 WO HCPCS: Performed by: INTERNAL MEDICINE

## 2024-01-30 PROCEDURE — 94761 N-INVAS EAR/PLS OXIMETRY MLT: CPT

## 2024-01-30 PROCEDURE — 6370000000 HC RX 637 (ALT 250 FOR IP): Performed by: STUDENT IN AN ORGANIZED HEALTH CARE EDUCATION/TRAINING PROGRAM

## 2024-01-30 PROCEDURE — 94660 CPAP INITIATION&MGMT: CPT

## 2024-01-30 PROCEDURE — 82550 ASSAY OF CK (CPK): CPT

## 2024-01-30 RX ORDER — DILTIAZEM HYDROCHLORIDE 5 MG/ML
10 INJECTION INTRAVENOUS ONCE
Status: COMPLETED | OUTPATIENT
Start: 2024-01-30 | End: 2024-01-30

## 2024-01-30 RX ORDER — DEXMEDETOMIDINE HYDROCHLORIDE 4 UG/ML
.1-1.5 INJECTION, SOLUTION INTRAVENOUS CONTINUOUS
Status: DISCONTINUED | OUTPATIENT
Start: 2024-01-30 | End: 2024-02-13

## 2024-01-30 RX ADMIN — IPRATROPIUM BROMIDE AND ALBUTEROL SULFATE 1 DOSE: 2.5; .5 SOLUTION RESPIRATORY (INHALATION) at 20:04

## 2024-01-30 RX ADMIN — ACETAMINOPHEN 650 MG: 325 TABLET ORAL at 17:28

## 2024-01-30 RX ADMIN — METHYLPREDNISOLONE SODIUM SUCCINATE 40 MG: 40 INJECTION INTRAMUSCULAR; INTRAVENOUS at 08:52

## 2024-01-30 RX ADMIN — IPRATROPIUM BROMIDE AND ALBUTEROL SULFATE 1 DOSE: 2.5; .5 SOLUTION RESPIRATORY (INHALATION) at 07:29

## 2024-01-30 RX ADMIN — DILTIAZEM HYDROCHLORIDE 30 MG: 30 TABLET, FILM COATED ORAL at 10:08

## 2024-01-30 RX ADMIN — METHYLPREDNISOLONE SODIUM SUCCINATE 40 MG: 40 INJECTION INTRAMUSCULAR; INTRAVENOUS at 13:56

## 2024-01-30 RX ADMIN — IPRATROPIUM BROMIDE AND ALBUTEROL SULFATE 1 DOSE: 2.5; .5 SOLUTION RESPIRATORY (INHALATION) at 13:05

## 2024-01-30 RX ADMIN — METHYLPREDNISOLONE SODIUM SUCCINATE 40 MG: 40 INJECTION INTRAMUSCULAR; INTRAVENOUS at 20:30

## 2024-01-30 RX ADMIN — PRAVASTATIN SODIUM 10 MG: 10 TABLET ORAL at 21:00

## 2024-01-30 RX ADMIN — Medication 2 PUFF: at 20:04

## 2024-01-30 RX ADMIN — DILTIAZEM HYDROCHLORIDE 10 MG: 5 INJECTION, SOLUTION INTRAVENOUS at 23:16

## 2024-01-30 RX ADMIN — FUROSEMIDE 40 MG: 10 INJECTION, SOLUTION INTRAMUSCULAR; INTRAVENOUS at 08:52

## 2024-01-30 RX ADMIN — DEXMEDETOMIDINE HYDROCHLORIDE 0.2 MCG/KG/HR: 400 INJECTION, SOLUTION INTRAVENOUS at 23:15

## 2024-01-30 RX ADMIN — Medication 5 MG: at 21:00

## 2024-01-30 RX ADMIN — DILTIAZEM HYDROCHLORIDE 30 MG: 30 TABLET, FILM COATED ORAL at 15:55

## 2024-01-30 RX ADMIN — APIXABAN 5 MG: 5 TABLET, FILM COATED ORAL at 08:53

## 2024-01-30 RX ADMIN — METHYLPREDNISOLONE SODIUM SUCCINATE 40 MG: 40 INJECTION INTRAMUSCULAR; INTRAVENOUS at 02:30

## 2024-01-30 RX ADMIN — Medication 2 PUFF: at 08:54

## 2024-01-30 RX ADMIN — THIAMINE HCL TAB 100 MG 100 MG: 100 TAB at 08:52

## 2024-01-30 RX ADMIN — THERA TABS 1 TABLET: TAB at 08:52

## 2024-01-30 RX ADMIN — SODIUM CHLORIDE, PRESERVATIVE FREE 10 ML: 5 INJECTION INTRAVENOUS at 21:00

## 2024-01-30 RX ADMIN — SODIUM CHLORIDE, PRESERVATIVE FREE 10 ML: 5 INJECTION INTRAVENOUS at 08:53

## 2024-01-30 RX ADMIN — FOLIC ACID 1 MG: 1 TABLET ORAL at 08:53

## 2024-01-30 RX ADMIN — CEFTRIAXONE SODIUM 1000 MG: 1 INJECTION, POWDER, FOR SOLUTION INTRAMUSCULAR; INTRAVENOUS at 11:16

## 2024-01-30 RX ADMIN — DILTIAZEM HYDROCHLORIDE 7.5 MG/HR: 5 INJECTION, SOLUTION INTRAVENOUS at 01:15

## 2024-01-30 RX ADMIN — APIXABAN 2.5 MG: 2.5 TABLET, FILM COATED ORAL at 21:00

## 2024-01-30 RX ADMIN — ACETAMINOPHEN 650 MG: 325 TABLET ORAL at 10:38

## 2024-01-30 NOTE — CARE COORDINATION
Cm reviewed Pt medicals, Pt is adamant that she wants to go home when D/C.    [facility] has accepted Pt.   [facility] stated: \"Will MD at [facility] be willing to sign? if not can see if a mobile MD would be willing to take patient on either [facility] or [facility]. But patient would have to be okay with info being sent and they will only sign/ follow once care has been established\"    CM will follow.   no concerns

## 2024-01-31 LAB
25(OH)D3 SERPL-MCNC: <9 NG/ML (ref 30–100)
ALBUMIN SERPL-MCNC: 2.6 G/DL (ref 3.5–5)
ALBUMIN SERPL-MCNC: 2.6 G/DL (ref 3.5–5)
ALBUMIN/GLOB SERPL: 0.7 (ref 1.1–2.2)
ALBUMIN/GLOB SERPL: 0.8 (ref 1.1–2.2)
ALP SERPL-CCNC: 42 U/L (ref 45–117)
ALP SERPL-CCNC: 42 U/L (ref 45–117)
ALT SERPL-CCNC: 14 U/L (ref 12–78)
ALT SERPL-CCNC: 14 U/L (ref 12–78)
ANION GAP SERPL CALC-SCNC: 7 MMOL/L (ref 5–15)
ANION GAP SERPL CALC-SCNC: 8 MMOL/L (ref 5–15)
ARTERIAL PATENCY WRIST A: YES
AST SERPL W P-5'-P-CCNC: 13 U/L (ref 15–37)
AST SERPL W P-5'-P-CCNC: 14 U/L (ref 15–37)
BASE DEFICIT BLDA-SCNC: 3.2 MMOL/L
BASOPHILS # BLD: 0 K/UL (ref 0–0.1)
BASOPHILS NFR BLD: 0 % (ref 0–1)
BDY SITE: ABNORMAL
BILIRUB DIRECT SERPL-MCNC: <0.1 MG/DL (ref 0–0.2)
BILIRUB SERPL-MCNC: 0.2 MG/DL (ref 0.2–1)
BILIRUB SERPL-MCNC: 0.2 MG/DL (ref 0.2–1)
BODY TEMPERATURE: 98.8
BUN SERPL-MCNC: 59 MG/DL (ref 6–20)
BUN SERPL-MCNC: 65 MG/DL (ref 6–20)
BUN/CREAT SERPL: 20 (ref 12–20)
BUN/CREAT SERPL: 22 (ref 12–20)
CA-I BLD-MCNC: 8.1 MG/DL (ref 8.5–10.1)
CA-I BLD-MCNC: 8.2 MG/DL (ref 8.5–10.1)
CA-I BLD-MCNC: 8.6 MG/DL (ref 8.5–10.1)
CHLORIDE SERPL-SCNC: 103 MMOL/L (ref 97–108)
CHLORIDE SERPL-SCNC: 105 MMOL/L (ref 97–108)
CO2 SERPL-SCNC: 22 MMOL/L (ref 21–32)
CO2 SERPL-SCNC: 23 MMOL/L (ref 21–32)
COHGB MFR BLD: 0.4 % (ref 1–2)
CREAT SERPL-MCNC: 2.91 MG/DL (ref 0.55–1.02)
CREAT SERPL-MCNC: 2.95 MG/DL (ref 0.55–1.02)
CYTOLOGY-NON GYN: NORMAL
DIFFERENTIAL METHOD BLD: ABNORMAL
EOSINOPHIL # BLD: 0 K/UL (ref 0–0.4)
EOSINOPHIL NFR BLD: 0 % (ref 0–7)
ERYTHROCYTE [DISTWIDTH] IN BLOOD BY AUTOMATED COUNT: 13.3 % (ref 11.5–14.5)
FIO2 ON VENT: 60 %
GAS FLOW.O2 SETTING OXYMISER: 10
GLOBULIN SER CALC-MCNC: 3.3 G/DL (ref 2–4)
GLOBULIN SER CALC-MCNC: 3.5 G/DL (ref 2–4)
GLUCOSE SERPL-MCNC: 137 MG/DL (ref 65–100)
GLUCOSE SERPL-MCNC: 151 MG/DL (ref 65–100)
HCO3 BLDA-SCNC: 21 MMOL/L (ref 22–26)
HCT VFR BLD AUTO: 32.4 % (ref 35–47)
HGB BLD-MCNC: 11.6 G/DL (ref 11.5–16)
IMM GRANULOCYTES # BLD AUTO: 0.1 K/UL (ref 0–0.04)
IMM GRANULOCYTES NFR BLD AUTO: 1 % (ref 0–0.5)
LYMPHOCYTES # BLD: 0.1 K/UL (ref 0.8–3.5)
LYMPHOCYTES NFR BLD: 1 % (ref 12–49)
MCH RBC QN AUTO: 37.5 PG (ref 26–34)
MCHC RBC AUTO-ENTMCNC: 35.8 G/DL (ref 30–36.5)
MCV RBC AUTO: 104.9 FL (ref 80–99)
METHGB MFR BLD: 0.3 % (ref 0–1.4)
MONOCYTES # BLD: 0.7 K/UL (ref 0–1)
MONOCYTES NFR BLD: 4 % (ref 5–13)
NEUTS SEG # BLD: 15.5 K/UL (ref 1.8–8)
NEUTS SEG NFR BLD: 94 % (ref 32–75)
NRBC # BLD: 0 K/UL (ref 0–0.01)
NRBC BLD-RTO: 0 PER 100 WBC
OXYHGB MFR BLD: 92.5 % (ref 95–99)
PCO2 BLDA: 37 MMHG (ref 35–45)
PEEP RESPIRATORY: 6
PERFORMED BY:: ABNORMAL
PH BLDA: 7.38 (ref 7.35–7.45)
PHOSPHATE SERPL-MCNC: 4.9 MG/DL (ref 2.6–4.7)
PLATELET # BLD AUTO: 158 K/UL (ref 150–400)
PMV BLD AUTO: 10.2 FL (ref 8.9–12.9)
PO2 BLDA: 67 MMHG (ref 80–100)
POTASSIUM SERPL-SCNC: 2.7 MMOL/L (ref 3.5–5.1)
POTASSIUM SERPL-SCNC: 4.2 MMOL/L (ref 3.5–5.1)
PROT SERPL-MCNC: 5.9 G/DL (ref 6.4–8.2)
PROT SERPL-MCNC: 6.1 G/DL (ref 6.4–8.2)
PTH-INTACT SERPL-MCNC: 370.9 PG/ML (ref 18.4–88)
RBC # BLD AUTO: 3.09 M/UL (ref 3.8–5.2)
SAO2 % BLD: 93 % (ref 95–99)
SAO2% DEVICE SAO2% SENSOR NAME: ABNORMAL
SODIUM SERPL-SCNC: 133 MMOL/L (ref 136–145)
SODIUM SERPL-SCNC: 135 MMOL/L (ref 136–145)
SPECIMEN SITE: ABNORMAL
VENTILATION MODE VENT: ABNORMAL
WBC # BLD AUTO: 16.4 K/UL (ref 3.6–11)

## 2024-01-31 PROCEDURE — 80053 COMPREHEN METABOLIC PANEL: CPT

## 2024-01-31 PROCEDURE — 94640 AIRWAY INHALATION TREATMENT: CPT

## 2024-01-31 PROCEDURE — 80076 HEPATIC FUNCTION PANEL: CPT

## 2024-01-31 PROCEDURE — 2700000000 HC OXYGEN THERAPY PER DAY

## 2024-01-31 PROCEDURE — 84100 ASSAY OF PHOSPHORUS: CPT

## 2024-01-31 PROCEDURE — 6360000002 HC RX W HCPCS: Performed by: INTERNAL MEDICINE

## 2024-01-31 PROCEDURE — 2500000003 HC RX 250 WO HCPCS: Performed by: INTERNAL MEDICINE

## 2024-01-31 PROCEDURE — 80048 BASIC METABOLIC PNL TOTAL CA: CPT

## 2024-01-31 PROCEDURE — 6370000000 HC RX 637 (ALT 250 FOR IP): Performed by: INTERNAL MEDICINE

## 2024-01-31 PROCEDURE — 2580000003 HC RX 258: Performed by: PHYSICIAN ASSISTANT

## 2024-01-31 PROCEDURE — 83970 ASSAY OF PARATHORMONE: CPT

## 2024-01-31 PROCEDURE — 6360000002 HC RX W HCPCS: Performed by: PHYSICIAN ASSISTANT

## 2024-01-31 PROCEDURE — 6370000000 HC RX 637 (ALT 250 FOR IP): Performed by: NURSE PRACTITIONER

## 2024-01-31 PROCEDURE — 2580000003 HC RX 258: Performed by: NURSE PRACTITIONER

## 2024-01-31 PROCEDURE — 85025 COMPLETE CBC W/AUTO DIFF WBC: CPT

## 2024-01-31 PROCEDURE — 36600 WITHDRAWAL OF ARTERIAL BLOOD: CPT

## 2024-01-31 PROCEDURE — 36415 COLL VENOUS BLD VENIPUNCTURE: CPT

## 2024-01-31 PROCEDURE — 82803 BLOOD GASES ANY COMBINATION: CPT

## 2024-01-31 PROCEDURE — 94660 CPAP INITIATION&MGMT: CPT

## 2024-01-31 PROCEDURE — 6360000002 HC RX W HCPCS: Performed by: NURSE PRACTITIONER

## 2024-01-31 PROCEDURE — 94761 N-INVAS EAR/PLS OXIMETRY MLT: CPT

## 2024-01-31 PROCEDURE — 2500000003 HC RX 250 WO HCPCS: Performed by: PHYSICIAN ASSISTANT

## 2024-01-31 PROCEDURE — 82306 VITAMIN D 25 HYDROXY: CPT

## 2024-01-31 PROCEDURE — 2000000000 HC ICU R&B

## 2024-01-31 PROCEDURE — 6370000000 HC RX 637 (ALT 250 FOR IP): Performed by: STUDENT IN AN ORGANIZED HEALTH CARE EDUCATION/TRAINING PROGRAM

## 2024-01-31 RX ORDER — PANTOPRAZOLE SODIUM 40 MG/1
40 TABLET, DELAYED RELEASE ORAL
Status: DISCONTINUED | OUTPATIENT
Start: 2024-02-01 | End: 2024-02-13

## 2024-01-31 RX ORDER — BUDESONIDE 0.5 MG/2ML
0.5 INHALANT ORAL
Status: DISCONTINUED | OUTPATIENT
Start: 2024-01-31 | End: 2024-02-13

## 2024-01-31 RX ORDER — POTASSIUM CHLORIDE 7.45 MG/ML
10 INJECTION INTRAVENOUS
Status: DISPENSED | OUTPATIENT
Start: 2024-01-31 | End: 2024-01-31

## 2024-01-31 RX ORDER — METHYLPREDNISOLONE SODIUM SUCCINATE 40 MG/ML
40 INJECTION, POWDER, LYOPHILIZED, FOR SOLUTION INTRAMUSCULAR; INTRAVENOUS EVERY 12 HOURS
Status: DISCONTINUED | OUTPATIENT
Start: 2024-01-31 | End: 2024-02-11

## 2024-01-31 RX ADMIN — POTASSIUM CHLORIDE 10 MEQ: 7.46 INJECTION, SOLUTION INTRAVENOUS at 04:46

## 2024-01-31 RX ADMIN — METHYLPREDNISOLONE SODIUM SUCCINATE 40 MG: 40 INJECTION INTRAMUSCULAR; INTRAVENOUS at 02:00

## 2024-01-31 RX ADMIN — POTASSIUM CHLORIDE 10 MEQ: 7.46 INJECTION, SOLUTION INTRAVENOUS at 03:30

## 2024-01-31 RX ADMIN — DEXMEDETOMIDINE HYDROCHLORIDE 0.6 MCG/KG/HR: 400 INJECTION, SOLUTION INTRAVENOUS at 06:07

## 2024-01-31 RX ADMIN — BUDESONIDE INHALATION 500 MCG: 0.5 SUSPENSION RESPIRATORY (INHALATION) at 20:19

## 2024-01-31 RX ADMIN — SODIUM CHLORIDE, PRESERVATIVE FREE 10 ML: 5 INJECTION INTRAVENOUS at 10:23

## 2024-01-31 RX ADMIN — PRAVASTATIN SODIUM 10 MG: 10 TABLET ORAL at 21:40

## 2024-01-31 RX ADMIN — APIXABAN 2.5 MG: 2.5 TABLET, FILM COATED ORAL at 21:30

## 2024-01-31 RX ADMIN — IPRATROPIUM BROMIDE AND ALBUTEROL SULFATE 1 DOSE: 2.5; .5 SOLUTION RESPIRATORY (INHALATION) at 20:19

## 2024-01-31 RX ADMIN — SODIUM CHLORIDE, PRESERVATIVE FREE 10 ML: 5 INJECTION INTRAVENOUS at 21:36

## 2024-01-31 RX ADMIN — POTASSIUM CHLORIDE 10 MEQ: 7.46 INJECTION, SOLUTION INTRAVENOUS at 08:15

## 2024-01-31 RX ADMIN — DEXMEDETOMIDINE HYDROCHLORIDE 0.6 MCG/KG/HR: 400 INJECTION, SOLUTION INTRAVENOUS at 08:16

## 2024-01-31 RX ADMIN — POTASSIUM CHLORIDE 10 MEQ: 7.46 INJECTION, SOLUTION INTRAVENOUS at 05:51

## 2024-01-31 RX ADMIN — IPRATROPIUM BROMIDE AND ALBUTEROL SULFATE 1 DOSE: 2.5; .5 SOLUTION RESPIRATORY (INHALATION) at 07:58

## 2024-01-31 RX ADMIN — IPRATROPIUM BROMIDE AND ALBUTEROL SULFATE 1 DOSE: 2.5; .5 SOLUTION RESPIRATORY (INHALATION) at 13:50

## 2024-01-31 RX ADMIN — CEFTRIAXONE SODIUM 1000 MG: 1 INJECTION, POWDER, FOR SOLUTION INTRAMUSCULAR; INTRAVENOUS at 11:24

## 2024-01-31 RX ADMIN — BUDESONIDE INHALATION 500 MCG: 0.5 SUSPENSION RESPIRATORY (INHALATION) at 07:59

## 2024-01-31 RX ADMIN — METHYLPREDNISOLONE SODIUM SUCCINATE 40 MG: 40 INJECTION INTRAMUSCULAR; INTRAVENOUS at 21:36

## 2024-01-31 RX ADMIN — FUROSEMIDE 40 MG: 10 INJECTION, SOLUTION INTRAMUSCULAR; INTRAVENOUS at 08:15

## 2024-01-31 RX ADMIN — POTASSIUM CHLORIDE 10 MEQ: 7.46 INJECTION, SOLUTION INTRAVENOUS at 10:15

## 2024-01-31 RX ADMIN — DILTIAZEM HYDROCHLORIDE 2.5 MG/HR: 5 INJECTION, SOLUTION INTRAVENOUS at 18:51

## 2024-01-31 NOTE — CARE COORDINATION
CM reviewed Pt medicals, Pt is adamant that she wants to go home when D/C.     [facility] has accepted Pt.   [facility] stated: \"Will MD at [facility] be willing to sign? if not can see if a mobile MD would be willing to take patient on either [facility] or [facility]. But patient would have to be okay with info being sent and they will only sign/ follow once care has been established\"     CM will follow.

## 2024-02-01 ENCOUNTER — APPOINTMENT (OUTPATIENT)
Facility: HOSPITAL | Age: 69
DRG: 308 | End: 2024-02-01
Payer: MEDICARE

## 2024-02-01 LAB
ALBUMIN SERPL-MCNC: 2.4 G/DL (ref 3.5–5)
ALBUMIN/GLOB SERPL: 0.6 (ref 1.1–2.2)
ALP SERPL-CCNC: 48 U/L (ref 45–117)
ALT SERPL-CCNC: 15 U/L (ref 12–78)
ANION GAP SERPL CALC-SCNC: 10 MMOL/L (ref 5–15)
ARTERIAL PATENCY WRIST A: YES
AST SERPL W P-5'-P-CCNC: 14 U/L (ref 15–37)
BASE DEFICIT BLDA-SCNC: 5 MMOL/L
BASOPHILS # BLD: 0 K/UL (ref 0–0.1)
BASOPHILS NFR BLD: 0 % (ref 0–1)
BDY SITE: ABNORMAL
BILIRUB SERPL-MCNC: 0.3 MG/DL (ref 0.2–1)
BODY TEMPERATURE: 98
BUN SERPL-MCNC: 63 MG/DL (ref 6–20)
BUN/CREAT SERPL: 22 (ref 12–20)
CA-I BLD-MCNC: 8.6 MG/DL (ref 8.5–10.1)
CHLORIDE SERPL-SCNC: 104 MMOL/L (ref 97–108)
CO2 SERPL-SCNC: 20 MMOL/L (ref 21–32)
COHGB MFR BLD: 0.3 % (ref 1–2)
CREAT SERPL-MCNC: 2.82 MG/DL (ref 0.55–1.02)
DIFFERENTIAL METHOD BLD: ABNORMAL
EOSINOPHIL # BLD: 0 K/UL (ref 0–0.4)
EOSINOPHIL NFR BLD: 0 % (ref 0–7)
ERYTHROCYTE [DISTWIDTH] IN BLOOD BY AUTOMATED COUNT: 13.2 % (ref 11.5–14.5)
FIO2 ON VENT: 75 %
GLOBULIN SER CALC-MCNC: 3.7 G/DL (ref 2–4)
GLUCOSE SERPL-MCNC: 120 MG/DL (ref 65–100)
HCO3 BLDA-SCNC: 18 MMOL/L (ref 22–26)
HCT VFR BLD AUTO: 32.4 % (ref 35–47)
HGB BLD-MCNC: 11.6 G/DL (ref 11.5–16)
IMM GRANULOCYTES # BLD AUTO: 0.4 K/UL (ref 0–0.04)
IMM GRANULOCYTES NFR BLD AUTO: 2 % (ref 0–0.5)
IPAP/PIP: 1410
LYMPHOCYTES # BLD: 0.1 K/UL (ref 0.8–3.5)
LYMPHOCYTES NFR BLD: 1 % (ref 12–49)
MCH RBC QN AUTO: 37.1 PG (ref 26–34)
MCHC RBC AUTO-ENTMCNC: 35.8 G/DL (ref 30–36.5)
MCV RBC AUTO: 103.5 FL (ref 80–99)
METHGB MFR BLD: 0.2 % (ref 0–1.4)
MONOCYTES # BLD: 0.4 K/UL (ref 0–1)
MONOCYTES NFR BLD: 2 % (ref 5–13)
NEUTS SEG # BLD: 17.1 K/UL (ref 1.8–8)
NEUTS SEG NFR BLD: 95 % (ref 32–75)
NRBC # BLD: 0 K/UL (ref 0–0.01)
NRBC BLD-RTO: 0 PER 100 WBC
OXYHGB MFR BLD: 91.2 % (ref 95–99)
PCO2 BLDA: 28 MMHG (ref 35–45)
PEEP RESPIRATORY: 6
PERFORMED BY:: ABNORMAL
PH BLDA: 7.43 (ref 7.35–7.45)
PHOSPHATE SERPL-MCNC: 5.2 MG/DL (ref 2.6–4.7)
PLATELET # BLD AUTO: 143 K/UL (ref 150–400)
PMV BLD AUTO: 10.6 FL (ref 8.9–12.9)
PO2 BLDA: 60 MMHG (ref 80–100)
POTASSIUM SERPL-SCNC: 3.2 MMOL/L (ref 3.5–5.1)
PROT SERPL-MCNC: 6.1 G/DL (ref 6.4–8.2)
RBC # BLD AUTO: 3.13 M/UL (ref 3.8–5.2)
SAO2 % BLD: 92 % (ref 95–99)
SAO2% DEVICE SAO2% SENSOR NAME: ABNORMAL
SODIUM SERPL-SCNC: 134 MMOL/L (ref 136–145)
SPECIMEN SITE: ABNORMAL
VENTILATION MODE VENT: ABNORMAL
WBC # BLD AUTO: 18 K/UL (ref 3.6–11)

## 2024-02-01 PROCEDURE — 6370000000 HC RX 637 (ALT 250 FOR IP): Performed by: STUDENT IN AN ORGANIZED HEALTH CARE EDUCATION/TRAINING PROGRAM

## 2024-02-01 PROCEDURE — 2500000003 HC RX 250 WO HCPCS: Performed by: PHYSICIAN ASSISTANT

## 2024-02-01 PROCEDURE — 80053 COMPREHEN METABOLIC PANEL: CPT

## 2024-02-01 PROCEDURE — 2000000000 HC ICU R&B

## 2024-02-01 PROCEDURE — 2500000003 HC RX 250 WO HCPCS: Performed by: INTERNAL MEDICINE

## 2024-02-01 PROCEDURE — 2580000003 HC RX 258: Performed by: PHYSICIAN ASSISTANT

## 2024-02-01 PROCEDURE — 93308 TTE F-UP OR LMTD: CPT

## 2024-02-01 PROCEDURE — 6370000000 HC RX 637 (ALT 250 FOR IP): Performed by: INTERNAL MEDICINE

## 2024-02-01 PROCEDURE — 36600 WITHDRAWAL OF ARTERIAL BLOOD: CPT

## 2024-02-01 PROCEDURE — 82803 BLOOD GASES ANY COMBINATION: CPT

## 2024-02-01 PROCEDURE — 6360000002 HC RX W HCPCS: Performed by: INTERNAL MEDICINE

## 2024-02-01 PROCEDURE — 6360000002 HC RX W HCPCS: Performed by: NURSE PRACTITIONER

## 2024-02-01 PROCEDURE — 6370000000 HC RX 637 (ALT 250 FOR IP): Performed by: PHYSICIAN ASSISTANT

## 2024-02-01 PROCEDURE — 6370000000 HC RX 637 (ALT 250 FOR IP): Performed by: NURSE PRACTITIONER

## 2024-02-01 PROCEDURE — 71045 X-RAY EXAM CHEST 1 VIEW: CPT

## 2024-02-01 PROCEDURE — 36415 COLL VENOUS BLD VENIPUNCTURE: CPT

## 2024-02-01 PROCEDURE — 94640 AIRWAY INHALATION TREATMENT: CPT

## 2024-02-01 PROCEDURE — 94761 N-INVAS EAR/PLS OXIMETRY MLT: CPT

## 2024-02-01 PROCEDURE — 2700000000 HC OXYGEN THERAPY PER DAY

## 2024-02-01 PROCEDURE — 85025 COMPLETE CBC W/AUTO DIFF WBC: CPT

## 2024-02-01 PROCEDURE — 6360000002 HC RX W HCPCS: Performed by: PHYSICIAN ASSISTANT

## 2024-02-01 PROCEDURE — 84100 ASSAY OF PHOSPHORUS: CPT

## 2024-02-01 PROCEDURE — 2580000003 HC RX 258: Performed by: NURSE PRACTITIONER

## 2024-02-01 PROCEDURE — 94660 CPAP INITIATION&MGMT: CPT

## 2024-02-01 RX ORDER — CALCITRIOL 0.25 UG/1
0.25 CAPSULE, LIQUID FILLED ORAL DAILY
Status: DISCONTINUED | OUTPATIENT
Start: 2024-02-02 | End: 2024-02-09

## 2024-02-01 RX ORDER — ERGOCALCIFEROL 1.25 MG/1
50000 CAPSULE ORAL WEEKLY
Status: DISCONTINUED | OUTPATIENT
Start: 2024-02-02 | End: 2024-02-09

## 2024-02-01 RX ADMIN — METHYLPREDNISOLONE SODIUM SUCCINATE 40 MG: 40 INJECTION INTRAMUSCULAR; INTRAVENOUS at 09:46

## 2024-02-01 RX ADMIN — THERA TABS 1 TABLET: TAB at 09:47

## 2024-02-01 RX ADMIN — POTASSIUM CHLORIDE 10 MEQ: 7.46 INJECTION, SOLUTION INTRAVENOUS at 09:53

## 2024-02-01 RX ADMIN — IPRATROPIUM BROMIDE AND ALBUTEROL SULFATE 1 DOSE: 2.5; .5 SOLUTION RESPIRATORY (INHALATION) at 13:02

## 2024-02-01 RX ADMIN — THIAMINE HCL TAB 100 MG 100 MG: 100 TAB at 09:47

## 2024-02-01 RX ADMIN — SODIUM CHLORIDE, PRESERVATIVE FREE 10 ML: 5 INJECTION INTRAVENOUS at 21:07

## 2024-02-01 RX ADMIN — IPRATROPIUM BROMIDE AND ALBUTEROL SULFATE 1 DOSE: 2.5; .5 SOLUTION RESPIRATORY (INHALATION) at 20:20

## 2024-02-01 RX ADMIN — APIXABAN 2.5 MG: 2.5 TABLET, FILM COATED ORAL at 09:47

## 2024-02-01 RX ADMIN — SODIUM CHLORIDE, PRESERVATIVE FREE 10 ML: 5 INJECTION INTRAVENOUS at 09:53

## 2024-02-01 RX ADMIN — DEXMEDETOMIDINE HYDROCHLORIDE 0.8 MCG/KG/HR: 400 INJECTION, SOLUTION INTRAVENOUS at 05:56

## 2024-02-01 RX ADMIN — METHYLPREDNISOLONE SODIUM SUCCINATE 40 MG: 40 INJECTION INTRAMUSCULAR; INTRAVENOUS at 21:05

## 2024-02-01 RX ADMIN — ACETAMINOPHEN 650 MG: 325 TABLET ORAL at 09:46

## 2024-02-01 RX ADMIN — POTASSIUM CHLORIDE 10 MEQ: 7.46 INJECTION, SOLUTION INTRAVENOUS at 06:10

## 2024-02-01 RX ADMIN — FOLIC ACID 1 MG: 1 TABLET ORAL at 09:47

## 2024-02-01 RX ADMIN — CEFTRIAXONE SODIUM 1000 MG: 1 INJECTION, POWDER, FOR SOLUTION INTRAMUSCULAR; INTRAVENOUS at 11:47

## 2024-02-01 RX ADMIN — IPRATROPIUM BROMIDE AND ALBUTEROL SULFATE 1 DOSE: 2.5; .5 SOLUTION RESPIRATORY (INHALATION) at 07:36

## 2024-02-01 RX ADMIN — POTASSIUM BICARBONATE 40 MEQ: 782 TABLET, EFFERVESCENT ORAL at 09:47

## 2024-02-01 RX ADMIN — BUDESONIDE INHALATION 500 MCG: 0.5 SUSPENSION RESPIRATORY (INHALATION) at 07:36

## 2024-02-01 RX ADMIN — DILTIAZEM HYDROCHLORIDE 2.5 MG/HR: 5 INJECTION, SOLUTION INTRAVENOUS at 06:01

## 2024-02-01 RX ADMIN — BUDESONIDE INHALATION 500 MCG: 0.5 SUSPENSION RESPIRATORY (INHALATION) at 20:20

## 2024-02-01 RX ADMIN — PANTOPRAZOLE SODIUM 40 MG: 40 TABLET, DELAYED RELEASE ORAL at 09:47

## 2024-02-01 RX ADMIN — DEXMEDETOMIDINE HYDROCHLORIDE 1.5 MCG/KG/HR: 400 INJECTION, SOLUTION INTRAVENOUS at 21:00

## 2024-02-01 RX ADMIN — FUROSEMIDE 40 MG: 10 INJECTION, SOLUTION INTRAMUSCULAR; INTRAVENOUS at 09:46

## 2024-02-01 RX ADMIN — DEXMEDETOMIDINE HYDROCHLORIDE 1 MCG/KG/HR: 400 INJECTION, SOLUTION INTRAVENOUS at 16:35

## 2024-02-01 NOTE — CARE COORDINATION
CM reviewed Pt medicals. CM called Pt adela, Srinivasa @ [phone].    CM asked Pt boyfriend if he has the POA paper work. Pt boyfriend stated that he does not have paper work. CM explained to Pt boyfriend that if possible can he come up here and meet with the Kofi to do an Advance Directive.    Pt boyfrienkizzy stated that he did not know if he could make it up here today.    Pt boyfriend kept saying that he cannot imagine Allison saying ok for him to make decision for her.     Pt umerfrienkizzy stated that Pt does not have any children and no living relatives.    CM asked for a [employer] search.

## 2024-02-02 ENCOUNTER — APPOINTMENT (OUTPATIENT)
Facility: HOSPITAL | Age: 69
DRG: 308 | End: 2024-02-02
Payer: MEDICARE

## 2024-02-02 LAB
ALBUMIN SERPL-MCNC: 2.2 G/DL (ref 3.5–5)
ALBUMIN SERPL-MCNC: 2.2 G/DL (ref 3.5–5)
ALBUMIN/GLOB SERPL: 0.5 (ref 1.1–2.2)
ALP SERPL-CCNC: 50 U/L (ref 45–117)
ALT SERPL-CCNC: 24 U/L (ref 12–78)
ANION GAP SERPL CALC-SCNC: 13 MMOL/L (ref 5–15)
ANION GAP SERPL CALC-SCNC: 14 MMOL/L (ref 5–15)
ARTERIAL PATENCY WRIST A: YES
AST SERPL W P-5'-P-CCNC: 22 U/L (ref 15–37)
BACTERIA SPEC CULT: ABNORMAL
BACTERIA SPEC CULT: ABNORMAL
BASE DEFICIT BLDA-SCNC: 9.3 MMOL/L
BASOPHILS # BLD: 0 K/UL (ref 0–0.1)
BASOPHILS NFR BLD: 0 % (ref 0–1)
BDY SITE: ABNORMAL
BILIRUB SERPL-MCNC: 0.3 MG/DL (ref 0.2–1)
BNP SERPL-MCNC: ABNORMAL PG/ML
BODY TEMPERATURE: 97.9
BUN SERPL-MCNC: 69 MG/DL (ref 6–20)
BUN SERPL-MCNC: 69 MG/DL (ref 6–20)
BUN/CREAT SERPL: 23 (ref 12–20)
BUN/CREAT SERPL: 24 (ref 12–20)
CA-I BLD-MCNC: 9.2 MG/DL (ref 8.5–10.1)
CA-I BLD-MCNC: 9.3 MG/DL (ref 8.5–10.1)
CHLORIDE SERPL-SCNC: 105 MMOL/L (ref 97–108)
CHLORIDE SERPL-SCNC: 105 MMOL/L (ref 97–108)
CO2 SERPL-SCNC: 15 MMOL/L (ref 21–32)
CO2 SERPL-SCNC: 16 MMOL/L (ref 21–32)
COHGB MFR BLD: 0.2 % (ref 1–2)
COLONY COUNT, CNT: ABNORMAL
CREAT SERPL-MCNC: 2.88 MG/DL (ref 0.55–1.02)
CREAT SERPL-MCNC: 2.95 MG/DL (ref 0.55–1.02)
DIFFERENTIAL METHOD BLD: ABNORMAL
ECHO AO ASC DIAM: 3 CM
ECHO AO ASCENDING AORTA INDEX: 2.03 CM/M2
ECHO AO ROOT DIAM: 3.2 CM
ECHO AO ROOT INDEX: 2.16 CM/M2
ECHO AV AREA PEAK VELOCITY: 1.6 CM2
ECHO AV AREA VTI: 2 CM2
ECHO AV AREA/BSA PEAK VELOCITY: 1.1 CM2/M2
ECHO AV AREA/BSA VTI: 1.4 CM2/M2
ECHO AV MEAN GRADIENT: 1 MMHG
ECHO AV MEAN VELOCITY: 0.5 M/S
ECHO AV PEAK GRADIENT: 3 MMHG
ECHO AV PEAK VELOCITY: 0.9 M/S
ECHO AV VELOCITY RATIO: 0.67
ECHO AV VTI: 11.4 CM
ECHO BSA: 1.48 M2
ECHO LA AREA 2C: 11.5 CM2
ECHO LA AREA 4C: 12.5 CM2
ECHO LA DIAMETER INDEX: 1.76 CM/M2
ECHO LA DIAMETER: 2.6 CM
ECHO LA MAJOR AXIS: 4.4 CM
ECHO LA MINOR AXIS: 4.6 CM
ECHO LA TO AORTIC ROOT RATIO: 0.81
ECHO LA VOL BP: 24 ML (ref 22–52)
ECHO LA VOL MOD A2C: 23 ML (ref 22–52)
ECHO LA VOL MOD A4C: 25 ML (ref 22–52)
ECHO LA VOL/BSA BIPLANE: 16 ML/M2 (ref 16–34)
ECHO LA VOLUME INDEX MOD A2C: 16 ML/M2 (ref 16–34)
ECHO LA VOLUME INDEX MOD A4C: 17 ML/M2 (ref 16–34)
ECHO LV E' SEPTAL VELOCITY: 9 CM/S
ECHO LV EDV A2C: 33 ML
ECHO LV EDV NDEX A2C: 22 ML/M2
ECHO LV EJECTION FRACTION A2C: 29 %
ECHO LV ESV A2C: 24 ML
ECHO LV ESV INDEX A2C: 16 ML/M2
ECHO LV FRACTIONAL SHORTENING: 20 % (ref 28–44)
ECHO LV INTERNAL DIMENSION DIASTOLE INDEX: 3.11 CM/M2
ECHO LV INTERNAL DIMENSION DIASTOLIC: 4.6 CM (ref 3.9–5.3)
ECHO LV INTERNAL DIMENSION SYSTOLIC INDEX: 2.5 CM/M2
ECHO LV INTERNAL DIMENSION SYSTOLIC: 3.7 CM
ECHO LV IVSD: 1.1 CM (ref 0.6–0.9)
ECHO LV MASS 2D: 205 G (ref 67–162)
ECHO LV MASS INDEX 2D: 138.5 G/M2 (ref 43–95)
ECHO LV POSTERIOR WALL DIASTOLIC: 1.3 CM (ref 0.6–0.9)
ECHO LV RELATIVE WALL THICKNESS RATIO: 0.57
ECHO LVOT AREA: 2.3 CM2
ECHO LVOT AV VTI INDEX: 0.89
ECHO LVOT DIAM: 1.7 CM
ECHO LVOT MEAN GRADIENT: 1 MMHG
ECHO LVOT PEAK GRADIENT: 1 MMHG
ECHO LVOT PEAK VELOCITY: 0.6 M/S
ECHO LVOT STROKE VOLUME INDEX: 15.5 ML/M2
ECHO LVOT SV: 22.9 ML
ECHO LVOT VTI: 10.1 CM
ECHO MV A VELOCITY: 0.38 M/S
ECHO MV E VELOCITY: 1.04 M/S
ECHO MV E/A RATIO: 2.74
ECHO MV EROA CONT EQ: 0.3 CM2
ECHO MV REGURGITANT ALIASING (NYQUIST) VELOCITY: 85 CM/S
ECHO MV REGURGITANT PEAK GRADIENT: 121 MMHG
ECHO MV REGURGITANT PEAK VELOCITY: 5.5 M/S
ECHO MV REGURGITANT RADIUS PISA: 0.6 CM
ECHO MV REGURGITANT VTIA: 159 CM
ECHO PV MAX VELOCITY: 0.6 M/S
ECHO PV MEAN GRADIENT: 1 MMHG
ECHO PV MEAN VELOCITY: 0.4 M/S
ECHO PV PEAK GRADIENT: 2 MMHG
ECHO PV VTI: 8.4 CM
ECHO RA AREA 4C: 11.7 CM2
ECHO RA END SYSTOLIC VOLUME APICAL 4 CHAMBER INDEX BSA: 20 ML/M2
ECHO RA VOLUME: 29 ML
ECHO RV BASAL DIMENSION: 3.3 CM
ECHO RV FREE WALL PEAK S': 10 CM/S
ECHO RV MID DIMENSION: 1.8 CM
ECHO RV TAPSE: 1.6 CM (ref 1.7–?)
ECHO TV REGURGITANT MAX VELOCITY: 1.8 M/S
ECHO TV REGURGITANT PEAK GRADIENT: 13 MMHG
EOSINOPHIL # BLD: 0 K/UL (ref 0–0.4)
EOSINOPHIL NFR BLD: 0 % (ref 0–7)
ERYTHROCYTE [DISTWIDTH] IN BLOOD BY AUTOMATED COUNT: 13.6 % (ref 11.5–14.5)
FIO2 ON VENT: 70 %
GAS FLOW.O2 SETTING OXYMISER: 10
GLOBULIN SER CALC-MCNC: 4.1 G/DL (ref 2–4)
GLUCOSE SERPL-MCNC: 130 MG/DL (ref 65–100)
GLUCOSE SERPL-MCNC: 133 MG/DL (ref 65–100)
HCO3 BLDA-SCNC: 16 MMOL/L (ref 22–26)
HCT VFR BLD AUTO: 34.4 % (ref 35–47)
HGB BLD-MCNC: 12.1 G/DL (ref 11.5–16)
IMM GRANULOCYTES # BLD AUTO: 0.4 K/UL (ref 0–0.04)
IMM GRANULOCYTES NFR BLD AUTO: 2 % (ref 0–0.5)
IPAP/PIP: 14
LYMPHOCYTES # BLD: 0 K/UL (ref 0.8–3.5)
LYMPHOCYTES NFR BLD: 0 % (ref 12–49)
Lab: ABNORMAL
MCH RBC QN AUTO: 38.1 PG (ref 26–34)
MCHC RBC AUTO-ENTMCNC: 35.2 G/DL (ref 30–36.5)
MCV RBC AUTO: 108.2 FL (ref 80–99)
METHGB MFR BLD: 0.4 % (ref 0–1.4)
MONOCYTES # BLD: 0.4 K/UL (ref 0–1)
MONOCYTES NFR BLD: 2 % (ref 5–13)
NEUTS SEG # BLD: 17.8 K/UL (ref 1.8–8)
NEUTS SEG NFR BLD: 96 % (ref 32–75)
NRBC # BLD: 0.02 K/UL (ref 0–0.01)
NRBC BLD-RTO: 0.1 PER 100 WBC
OXYHGB MFR BLD: 90.5 % (ref 95–99)
PCO2 BLDA: 31 MMHG (ref 35–45)
PEEP RESPIRATORY: 8
PERFORMED BY:: ABNORMAL
PH BLDA: 7.32 (ref 7.35–7.45)
PHOSPHATE SERPL-MCNC: 6.6 MG/DL (ref 2.6–4.7)
PLATELET # BLD AUTO: 113 K/UL (ref 150–400)
PMV BLD AUTO: 11.2 FL (ref 8.9–12.9)
PO2 BLDA: 61 MMHG (ref 80–100)
POTASSIUM SERPL-SCNC: 3.6 MMOL/L (ref 3.5–5.1)
POTASSIUM SERPL-SCNC: 3.6 MMOL/L (ref 3.5–5.1)
PROT SERPL-MCNC: 6.3 G/DL (ref 6.4–8.2)
RBC # BLD AUTO: 3.18 M/UL (ref 3.8–5.2)
RBC MORPH BLD: ABNORMAL
SAO2 % BLD: 91 % (ref 95–99)
SAO2% DEVICE SAO2% SENSOR NAME: ABNORMAL
SODIUM SERPL-SCNC: 134 MMOL/L (ref 136–145)
SODIUM SERPL-SCNC: 134 MMOL/L (ref 136–145)
SPECIMEN SITE: ABNORMAL
VENTILATION MODE VENT: ABNORMAL
WBC # BLD AUTO: 18.6 K/UL (ref 3.6–11)

## 2024-02-02 PROCEDURE — 83880 ASSAY OF NATRIURETIC PEPTIDE: CPT

## 2024-02-02 PROCEDURE — 80053 COMPREHEN METABOLIC PANEL: CPT

## 2024-02-02 PROCEDURE — 6370000000 HC RX 637 (ALT 250 FOR IP): Performed by: INTERNAL MEDICINE

## 2024-02-02 PROCEDURE — 71045 X-RAY EXAM CHEST 1 VIEW: CPT

## 2024-02-02 PROCEDURE — 6360000002 HC RX W HCPCS: Performed by: INTERNAL MEDICINE

## 2024-02-02 PROCEDURE — 85025 COMPLETE CBC W/AUTO DIFF WBC: CPT

## 2024-02-02 PROCEDURE — 94761 N-INVAS EAR/PLS OXIMETRY MLT: CPT

## 2024-02-02 PROCEDURE — 2580000003 HC RX 258: Performed by: NURSE PRACTITIONER

## 2024-02-02 PROCEDURE — 2580000003 HC RX 258: Performed by: PHYSICIAN ASSISTANT

## 2024-02-02 PROCEDURE — 2000000000 HC ICU R&B

## 2024-02-02 PROCEDURE — 2700000000 HC OXYGEN THERAPY PER DAY

## 2024-02-02 PROCEDURE — 80069 RENAL FUNCTION PANEL: CPT

## 2024-02-02 PROCEDURE — 6370000000 HC RX 637 (ALT 250 FOR IP): Performed by: PHYSICIAN ASSISTANT

## 2024-02-02 PROCEDURE — 6370000000 HC RX 637 (ALT 250 FOR IP): Performed by: NURSE PRACTITIONER

## 2024-02-02 PROCEDURE — 2500000003 HC RX 250 WO HCPCS: Performed by: INTERNAL MEDICINE

## 2024-02-02 PROCEDURE — 94660 CPAP INITIATION&MGMT: CPT

## 2024-02-02 PROCEDURE — 36600 WITHDRAWAL OF ARTERIAL BLOOD: CPT

## 2024-02-02 PROCEDURE — 2500000003 HC RX 250 WO HCPCS

## 2024-02-02 PROCEDURE — 94640 AIRWAY INHALATION TREATMENT: CPT

## 2024-02-02 PROCEDURE — 6360000002 HC RX W HCPCS: Performed by: PHYSICIAN ASSISTANT

## 2024-02-02 PROCEDURE — 2500000003 HC RX 250 WO HCPCS: Performed by: PHYSICIAN ASSISTANT

## 2024-02-02 PROCEDURE — 6370000000 HC RX 637 (ALT 250 FOR IP): Performed by: STUDENT IN AN ORGANIZED HEALTH CARE EDUCATION/TRAINING PROGRAM

## 2024-02-02 PROCEDURE — 36415 COLL VENOUS BLD VENIPUNCTURE: CPT

## 2024-02-02 PROCEDURE — 93005 ELECTROCARDIOGRAM TRACING: CPT

## 2024-02-02 PROCEDURE — 82803 BLOOD GASES ANY COMBINATION: CPT

## 2024-02-02 RX ORDER — DOBUTAMINE HYDROCHLORIDE 200 MG/100ML
2.5-1 INJECTION INTRAVENOUS CONTINUOUS
Status: DISCONTINUED | OUTPATIENT
Start: 2024-02-02 | End: 2024-02-04

## 2024-02-02 RX ADMIN — PANTOPRAZOLE SODIUM 40 MG: 40 TABLET, DELAYED RELEASE ORAL at 08:53

## 2024-02-02 RX ADMIN — CALCITRIOL CAPSULES 0.25 MCG 0.25 MCG: 0.25 CAPSULE ORAL at 08:53

## 2024-02-02 RX ADMIN — DEXMEDETOMIDINE HYDROCHLORIDE 1.5 MCG/KG/HR: 400 INJECTION, SOLUTION INTRAVENOUS at 11:49

## 2024-02-02 RX ADMIN — DOBUTAMINE HYDROCHLORIDE 5 MCG/KG/MIN: 200 INJECTION INTRAVENOUS at 18:06

## 2024-02-02 RX ADMIN — DEXMEDETOMIDINE HYDROCHLORIDE 1.5 MCG/KG/HR: 400 INJECTION, SOLUTION INTRAVENOUS at 22:17

## 2024-02-02 RX ADMIN — DEXMEDETOMIDINE HYDROCHLORIDE 1.5 MCG/KG/HR: 400 INJECTION, SOLUTION INTRAVENOUS at 07:20

## 2024-02-02 RX ADMIN — METHYLPREDNISOLONE SODIUM SUCCINATE 40 MG: 40 INJECTION INTRAMUSCULAR; INTRAVENOUS at 20:47

## 2024-02-02 RX ADMIN — SODIUM CHLORIDE, PRESERVATIVE FREE 10 ML: 5 INJECTION INTRAVENOUS at 08:53

## 2024-02-02 RX ADMIN — DILTIAZEM HYDROCHLORIDE 5 MG/HR: 5 INJECTION, SOLUTION INTRAVENOUS at 13:59

## 2024-02-02 RX ADMIN — SODIUM CHLORIDE, PRESERVATIVE FREE 10 ML: 5 INJECTION INTRAVENOUS at 20:50

## 2024-02-02 RX ADMIN — THERA TABS 1 TABLET: TAB at 08:53

## 2024-02-02 RX ADMIN — DEXMEDETOMIDINE HYDROCHLORIDE 1.5 MCG/KG/HR: 400 INJECTION, SOLUTION INTRAVENOUS at 18:06

## 2024-02-02 RX ADMIN — DEXMEDETOMIDINE HYDROCHLORIDE 1.5 MCG/KG/HR: 400 INJECTION, SOLUTION INTRAVENOUS at 03:16

## 2024-02-02 RX ADMIN — CEFTRIAXONE SODIUM 1000 MG: 1 INJECTION, POWDER, FOR SOLUTION INTRAMUSCULAR; INTRAVENOUS at 11:10

## 2024-02-02 RX ADMIN — AMIODARONE HYDROCHLORIDE 150 MG: 1.5 INJECTION, SOLUTION INTRAVENOUS at 19:10

## 2024-02-02 RX ADMIN — POTASSIUM BICARBONATE 40 MEQ: 782 TABLET, EFFERVESCENT ORAL at 08:52

## 2024-02-02 RX ADMIN — BUDESONIDE INHALATION 500 MCG: 0.5 SUSPENSION RESPIRATORY (INHALATION) at 19:39

## 2024-02-02 RX ADMIN — ERGOCALCIFEROL 50000 UNITS: 1.25 CAPSULE ORAL at 08:52

## 2024-02-02 RX ADMIN — APIXABAN 2.5 MG: 2.5 TABLET, FILM COATED ORAL at 20:48

## 2024-02-02 RX ADMIN — Medication 5 MG: at 20:48

## 2024-02-02 RX ADMIN — APIXABAN 2.5 MG: 2.5 TABLET, FILM COATED ORAL at 08:53

## 2024-02-02 RX ADMIN — FUROSEMIDE 40 MG: 10 INJECTION, SOLUTION INTRAMUSCULAR; INTRAVENOUS at 08:53

## 2024-02-02 RX ADMIN — IPRATROPIUM BROMIDE AND ALBUTEROL SULFATE 1 DOSE: 2.5; .5 SOLUTION RESPIRATORY (INHALATION) at 13:26

## 2024-02-02 RX ADMIN — SPIRONOLACTONE 25 MG: 25 TABLET ORAL at 08:53

## 2024-02-02 RX ADMIN — THIAMINE HCL TAB 100 MG 100 MG: 100 TAB at 08:52

## 2024-02-02 RX ADMIN — AMIODARONE HYDROCHLORIDE 1 MG/MIN: 1.8 INJECTION, SOLUTION INTRAVENOUS at 20:53

## 2024-02-02 RX ADMIN — IPRATROPIUM BROMIDE AND ALBUTEROL SULFATE 1 DOSE: 2.5; .5 SOLUTION RESPIRATORY (INHALATION) at 19:38

## 2024-02-02 RX ADMIN — PRAVASTATIN SODIUM 10 MG: 10 TABLET ORAL at 20:48

## 2024-02-02 RX ADMIN — BUDESONIDE INHALATION 500 MCG: 0.5 SUSPENSION RESPIRATORY (INHALATION) at 08:00

## 2024-02-02 RX ADMIN — METHYLPREDNISOLONE SODIUM SUCCINATE 40 MG: 40 INJECTION INTRAMUSCULAR; INTRAVENOUS at 08:53

## 2024-02-02 RX ADMIN — FOLIC ACID 1 MG: 1 TABLET ORAL at 08:53

## 2024-02-02 RX ADMIN — IPRATROPIUM BROMIDE AND ALBUTEROL SULFATE 1 DOSE: 2.5; .5 SOLUTION RESPIRATORY (INHALATION) at 08:00

## 2024-02-02 RX ADMIN — SODIUM BICARBONATE 100 MEQ: 84 INJECTION, SOLUTION INTRAVENOUS at 11:49

## 2024-02-02 NOTE — CARE COORDINATION
CM reviewed Pt medicals, Pt is adamant that she wants to go home when D/C.     [facility] accepted Pt.   [facility] stated: \"Will MD at [facility] be willing to sign? if not can see if a mobile MD would be willing to take patient on either [facility] or [facility]. But patient would have to be okay with info being sent and they will only sign/ follow once care has been established\"     CM will follow.    Per doctors note: Pt is weak and tired on BiPAP machine, ABG shows pCO2 31 pO2 61 on 70% FiO2 will change to high flow or mid flow during the daytime if she she does not desaturate after taking of the BiPAP    CM talked with boyfriend yesterday and asked that he come in to get the paper work done to make him POA.    Spoke with Kofijanna Gonzalez, she stated that as long as the Pt is A&O she can do the Advance Directive and chart what Pt wishes are. April stated that she does not have to have the boyfriend here.     THOMAS asked for a [employer] search just in case Pt becomes unresponsive and they have not been able to complete an Advance Directive.

## 2024-02-03 ENCOUNTER — APPOINTMENT (OUTPATIENT)
Facility: HOSPITAL | Age: 69
DRG: 308 | End: 2024-02-03
Payer: MEDICARE

## 2024-02-03 LAB
ALBUMIN SERPL-MCNC: 2.1 G/DL (ref 3.5–5)
ALBUMIN/GLOB SERPL: 0.5 (ref 1.1–2.2)
ALP SERPL-CCNC: 47 U/L (ref 45–117)
ALT SERPL-CCNC: 23 U/L (ref 12–78)
ANION GAP SERPL CALC-SCNC: 9 MMOL/L (ref 5–15)
ARTERIAL PATENCY WRIST A: ABNORMAL
AST SERPL W P-5'-P-CCNC: 23 U/L (ref 15–37)
BASE DEFICIT BLDA-SCNC: 0.1 MMOL/L
BASOPHILS # BLD: 0 K/UL (ref 0–0.1)
BASOPHILS NFR BLD: 0 % (ref 0–1)
BDY SITE: ABNORMAL
BILIRUB SERPL-MCNC: 0.2 MG/DL (ref 0.2–1)
BODY TEMPERATURE: 97.8
BUN SERPL-MCNC: 67 MG/DL (ref 6–20)
BUN/CREAT SERPL: 22 (ref 12–20)
CA-I BLD-MCNC: 8.1 MG/DL (ref 8.5–10.1)
CHLORIDE SERPL-SCNC: 99 MMOL/L (ref 97–108)
CO2 SERPL-SCNC: 25 MMOL/L (ref 21–32)
COHGB MFR BLD: 0.3 % (ref 1–2)
CREAT SERPL-MCNC: 3.05 MG/DL (ref 0.55–1.02)
DIFFERENTIAL METHOD BLD: ABNORMAL
EOSINOPHIL # BLD: 0 K/UL (ref 0–0.4)
EOSINOPHIL NFR BLD: 0 % (ref 0–7)
ERYTHROCYTE [DISTWIDTH] IN BLOOD BY AUTOMATED COUNT: 13.1 % (ref 11.5–14.5)
FIO2 ON VENT: 60 %
GAS FLOW.O2 SETTING OXYMISER: 10
GLOBULIN SER CALC-MCNC: 4 G/DL (ref 2–4)
GLUCOSE SERPL-MCNC: 175 MG/DL (ref 65–100)
HCO3 BLDA-SCNC: 24 MMOL/L (ref 22–26)
HCT VFR BLD AUTO: 31.3 % (ref 35–47)
HGB BLD-MCNC: 11.2 G/DL (ref 11.5–16)
IMM GRANULOCYTES # BLD AUTO: 0.4 K/UL (ref 0–0.04)
IMM GRANULOCYTES NFR BLD AUTO: 2 % (ref 0–0.5)
LYMPHOCYTES # BLD: 0 K/UL (ref 0.8–3.5)
LYMPHOCYTES NFR BLD: 0 % (ref 12–49)
MCH RBC QN AUTO: 37.3 PG (ref 26–34)
MCHC RBC AUTO-ENTMCNC: 35.8 G/DL (ref 30–36.5)
MCV RBC AUTO: 104.3 FL (ref 80–99)
METHGB MFR BLD: 0.2 % (ref 0–1.4)
MONOCYTES # BLD: 0.5 K/UL (ref 0–1)
MONOCYTES NFR BLD: 3 % (ref 5–13)
NEUTS SEG # BLD: 17.4 K/UL (ref 1.8–8)
NEUTS SEG NFR BLD: 95 % (ref 32–75)
NRBC # BLD: 0 K/UL (ref 0–0.01)
NRBC BLD-RTO: 0 PER 100 WBC
OXYHGB MFR BLD: 91.4 % (ref 95–99)
PCO2 BLDA: 34 MMHG (ref 35–45)
PEEP RESPIRATORY: 8
PERFORMED BY:: ABNORMAL
PH BLDA: 7.45 (ref 7.35–7.45)
PLATELET # BLD AUTO: 144 K/UL (ref 150–400)
PMV BLD AUTO: 10.9 FL (ref 8.9–12.9)
PO2 BLDA: 61 MMHG (ref 80–100)
POTASSIUM SERPL-SCNC: 3.6 MMOL/L (ref 3.5–5.1)
PROT SERPL-MCNC: 6.1 G/DL (ref 6.4–8.2)
RBC # BLD AUTO: 3 M/UL (ref 3.8–5.2)
RBC MORPH BLD: ABNORMAL
SAO2 % BLD: 92 % (ref 95–99)
SAO2% DEVICE SAO2% SENSOR NAME: ABNORMAL
SODIUM SERPL-SCNC: 133 MMOL/L (ref 136–145)
SPECIMEN SITE: ABNORMAL
WBC # BLD AUTO: 18.3 K/UL (ref 3.6–11)

## 2024-02-03 PROCEDURE — 6360000002 HC RX W HCPCS: Performed by: INTERNAL MEDICINE

## 2024-02-03 PROCEDURE — 2500000003 HC RX 250 WO HCPCS: Performed by: INTERNAL MEDICINE

## 2024-02-03 PROCEDURE — 36600 WITHDRAWAL OF ARTERIAL BLOOD: CPT

## 2024-02-03 PROCEDURE — 82803 BLOOD GASES ANY COMBINATION: CPT

## 2024-02-03 PROCEDURE — 6370000000 HC RX 637 (ALT 250 FOR IP): Performed by: INTERNAL MEDICINE

## 2024-02-03 PROCEDURE — 2000000000 HC ICU R&B

## 2024-02-03 PROCEDURE — 36415 COLL VENOUS BLD VENIPUNCTURE: CPT

## 2024-02-03 PROCEDURE — 94640 AIRWAY INHALATION TREATMENT: CPT

## 2024-02-03 PROCEDURE — 6360000002 HC RX W HCPCS: Performed by: PHYSICIAN ASSISTANT

## 2024-02-03 PROCEDURE — 71045 X-RAY EXAM CHEST 1 VIEW: CPT

## 2024-02-03 PROCEDURE — 94660 CPAP INITIATION&MGMT: CPT

## 2024-02-03 PROCEDURE — 80053 COMPREHEN METABOLIC PANEL: CPT

## 2024-02-03 PROCEDURE — 2580000003 HC RX 258: Performed by: PHYSICIAN ASSISTANT

## 2024-02-03 PROCEDURE — 6370000000 HC RX 637 (ALT 250 FOR IP): Performed by: NURSE PRACTITIONER

## 2024-02-03 PROCEDURE — 2580000003 HC RX 258: Performed by: NURSE PRACTITIONER

## 2024-02-03 PROCEDURE — 6370000000 HC RX 637 (ALT 250 FOR IP): Performed by: PHYSICIAN ASSISTANT

## 2024-02-03 PROCEDURE — 2700000000 HC OXYGEN THERAPY PER DAY

## 2024-02-03 PROCEDURE — 85025 COMPLETE CBC W/AUTO DIFF WBC: CPT

## 2024-02-03 PROCEDURE — 6370000000 HC RX 637 (ALT 250 FOR IP): Performed by: STUDENT IN AN ORGANIZED HEALTH CARE EDUCATION/TRAINING PROGRAM

## 2024-02-03 PROCEDURE — 94761 N-INVAS EAR/PLS OXIMETRY MLT: CPT

## 2024-02-03 RX ORDER — DIGOXIN 250 MCG
125 TABLET ORAL ONCE
Status: COMPLETED | OUTPATIENT
Start: 2024-02-03 | End: 2024-02-03

## 2024-02-03 RX ORDER — FUROSEMIDE 10 MG/ML
60 INJECTION INTRAMUSCULAR; INTRAVENOUS ONCE
Status: COMPLETED | OUTPATIENT
Start: 2024-02-03 | End: 2024-02-03

## 2024-02-03 RX ORDER — GUAIFENESIN 600 MG/1
1200 TABLET, EXTENDED RELEASE ORAL 2 TIMES DAILY
Status: DISCONTINUED | OUTPATIENT
Start: 2024-02-03 | End: 2024-02-09

## 2024-02-03 RX ORDER — POTASSIUM CHLORIDE 20 MEQ/1
20 TABLET, EXTENDED RELEASE ORAL DAILY
Status: DISCONTINUED | OUTPATIENT
Start: 2024-02-03 | End: 2024-02-12

## 2024-02-03 RX ADMIN — DIGOXIN 125 MCG: 0.25 TABLET ORAL at 12:16

## 2024-02-03 RX ADMIN — DEXMEDETOMIDINE HYDROCHLORIDE 1.5 MCG/KG/HR: 400 INJECTION, SOLUTION INTRAVENOUS at 08:32

## 2024-02-03 RX ADMIN — IPRATROPIUM BROMIDE AND ALBUTEROL SULFATE 1 DOSE: 2.5; .5 SOLUTION RESPIRATORY (INHALATION) at 21:02

## 2024-02-03 RX ADMIN — FOLIC ACID 1 MG: 1 TABLET ORAL at 08:11

## 2024-02-03 RX ADMIN — BUDESONIDE INHALATION 500 MCG: 0.5 SUSPENSION RESPIRATORY (INHALATION) at 21:02

## 2024-02-03 RX ADMIN — APIXABAN 2.5 MG: 2.5 TABLET, FILM COATED ORAL at 21:00

## 2024-02-03 RX ADMIN — SODIUM CHLORIDE, PRESERVATIVE FREE 10 ML: 5 INJECTION INTRAVENOUS at 21:00

## 2024-02-03 RX ADMIN — APIXABAN 2.5 MG: 2.5 TABLET, FILM COATED ORAL at 08:11

## 2024-02-03 RX ADMIN — ACETAMINOPHEN 650 MG: 325 TABLET ORAL at 08:14

## 2024-02-03 RX ADMIN — DEXMEDETOMIDINE HYDROCHLORIDE 1.5 MCG/KG/HR: 400 INJECTION, SOLUTION INTRAVENOUS at 15:00

## 2024-02-03 RX ADMIN — GUAIFENESIN 1200 MG: 600 TABLET, EXTENDED RELEASE ORAL at 21:00

## 2024-02-03 RX ADMIN — FUROSEMIDE 40 MG: 10 INJECTION, SOLUTION INTRAMUSCULAR; INTRAVENOUS at 08:22

## 2024-02-03 RX ADMIN — THERA TABS 1 TABLET: TAB at 08:11

## 2024-02-03 RX ADMIN — Medication 5 MG: at 21:00

## 2024-02-03 RX ADMIN — SPIRONOLACTONE 25 MG: 25 TABLET ORAL at 08:11

## 2024-02-03 RX ADMIN — METHYLPREDNISOLONE SODIUM SUCCINATE 40 MG: 40 INJECTION INTRAMUSCULAR; INTRAVENOUS at 08:22

## 2024-02-03 RX ADMIN — PANTOPRAZOLE SODIUM 40 MG: 40 TABLET, DELAYED RELEASE ORAL at 08:11

## 2024-02-03 RX ADMIN — THIAMINE HCL TAB 100 MG 100 MG: 100 TAB at 08:11

## 2024-02-03 RX ADMIN — PRAVASTATIN SODIUM 10 MG: 10 TABLET ORAL at 21:00

## 2024-02-03 RX ADMIN — FUROSEMIDE 60 MG: 10 INJECTION, SOLUTION INTRAMUSCULAR; INTRAVENOUS at 18:12

## 2024-02-03 RX ADMIN — DEXMEDETOMIDINE HYDROCHLORIDE 1.4 MCG/KG/HR: 400 INJECTION, SOLUTION INTRAVENOUS at 19:33

## 2024-02-03 RX ADMIN — IPRATROPIUM BROMIDE AND ALBUTEROL SULFATE 1 DOSE: 2.5; .5 SOLUTION RESPIRATORY (INHALATION) at 14:03

## 2024-02-03 RX ADMIN — AMIODARONE HYDROCHLORIDE 0.5 MG/MIN: 1.8 INJECTION, SOLUTION INTRAVENOUS at 23:06

## 2024-02-03 RX ADMIN — METHYLPREDNISOLONE SODIUM SUCCINATE 40 MG: 40 INJECTION INTRAMUSCULAR; INTRAVENOUS at 21:00

## 2024-02-03 RX ADMIN — IPRATROPIUM BROMIDE AND ALBUTEROL SULFATE 1 DOSE: 2.5; .5 SOLUTION RESPIRATORY (INHALATION) at 06:31

## 2024-02-03 RX ADMIN — DEXMEDETOMIDINE HYDROCHLORIDE 1.5 MCG/KG/HR: 400 INJECTION, SOLUTION INTRAVENOUS at 01:59

## 2024-02-03 RX ADMIN — AMIODARONE HYDROCHLORIDE 0.5 MG/MIN: 1.8 INJECTION, SOLUTION INTRAVENOUS at 12:02

## 2024-02-03 RX ADMIN — POTASSIUM CHLORIDE 20 MEQ: 1500 TABLET, EXTENDED RELEASE ORAL at 12:16

## 2024-02-03 RX ADMIN — VANCOMYCIN HYDROCHLORIDE 750 MG: 750 INJECTION, POWDER, LYOPHILIZED, FOR SOLUTION INTRAVENOUS at 17:02

## 2024-02-03 RX ADMIN — AMIODARONE HYDROCHLORIDE 1 MG/MIN: 1.8 INJECTION, SOLUTION INTRAVENOUS at 01:58

## 2024-02-03 RX ADMIN — CALCITRIOL CAPSULES 0.25 MCG 0.25 MCG: 0.25 CAPSULE ORAL at 08:11

## 2024-02-03 RX ADMIN — CEFTRIAXONE SODIUM 1000 MG: 1 INJECTION, POWDER, FOR SOLUTION INTRAMUSCULAR; INTRAVENOUS at 12:04

## 2024-02-03 RX ADMIN — BUDESONIDE INHALATION 500 MCG: 0.5 SUSPENSION RESPIRATORY (INHALATION) at 06:32

## 2024-02-03 RX ADMIN — SODIUM CHLORIDE, PRESERVATIVE FREE 10 ML: 5 INJECTION INTRAVENOUS at 08:21

## 2024-02-04 ENCOUNTER — APPOINTMENT (OUTPATIENT)
Facility: HOSPITAL | Age: 69
DRG: 308 | End: 2024-02-04
Payer: MEDICARE

## 2024-02-04 LAB
ALBUMIN SERPL-MCNC: 2 G/DL (ref 3.5–5)
ALBUMIN/GLOB SERPL: 0.5 (ref 1.1–2.2)
ALP SERPL-CCNC: 51 U/L (ref 45–117)
ALT SERPL-CCNC: 37 U/L (ref 12–78)
ANION GAP SERPL CALC-SCNC: 12 MMOL/L (ref 5–15)
ARTERIAL PATENCY WRIST A: YES
AST SERPL W P-5'-P-CCNC: 39 U/L (ref 15–37)
BASE DEFICIT BLDA-SCNC: 1.6 MMOL/L
BASOPHILS # BLD: 0 K/UL (ref 0–0.1)
BASOPHILS NFR BLD: 0 % (ref 0–1)
BDY SITE: ABNORMAL
BILIRUB SERPL-MCNC: 0.2 MG/DL (ref 0.2–1)
BODY TEMPERATURE: 97.6
BUN SERPL-MCNC: 67 MG/DL (ref 6–20)
BUN/CREAT SERPL: 22 (ref 12–20)
CA-I BLD-MCNC: 1.1 MMOL/L (ref 1.13–1.32)
CA-I BLD-MCNC: 8.2 MG/DL (ref 8.5–10.1)
CHLORIDE SERPL-SCNC: 98 MMOL/L (ref 97–108)
CO2 SERPL-SCNC: 23 MMOL/L (ref 21–32)
COHGB MFR BLD: 0.4 % (ref 1–2)
CREAT SERPL-MCNC: 3 MG/DL (ref 0.55–1.02)
DIFFERENTIAL METHOD BLD: ABNORMAL
EOSINOPHIL # BLD: 0.2 K/UL (ref 0–0.4)
EOSINOPHIL NFR BLD: 1 % (ref 0–7)
ERYTHROCYTE [DISTWIDTH] IN BLOOD BY AUTOMATED COUNT: 13.1 % (ref 11.5–14.5)
FIO2 ON VENT: 60 %
GLOBULIN SER CALC-MCNC: 3.8 G/DL (ref 2–4)
GLUCOSE SERPL-MCNC: 126 MG/DL (ref 65–100)
HCO3 BLDA-SCNC: 23 MMOL/L (ref 22–26)
HCT VFR BLD AUTO: 32.3 % (ref 35–47)
HGB BLD-MCNC: 11.5 G/DL (ref 11.5–16)
IMM GRANULOCYTES # BLD AUTO: 0 K/UL
IMM GRANULOCYTES NFR BLD AUTO: 0 %
IPAP/PIP: 16
LYMPHOCYTES # BLD: 0 K/UL (ref 0.8–3.5)
LYMPHOCYTES NFR BLD: 0 % (ref 12–49)
MAGNESIUM SERPL-MCNC: 1.5 MG/DL (ref 1.6–2.4)
MCH RBC QN AUTO: 37.1 PG (ref 26–34)
MCHC RBC AUTO-ENTMCNC: 35.6 G/DL (ref 30–36.5)
MCV RBC AUTO: 104.2 FL (ref 80–99)
METHGB MFR BLD: 0.3 % (ref 0–1.4)
MONOCYTES # BLD: 0.6 K/UL (ref 0–1)
MONOCYTES NFR BLD: 3 % (ref 5–13)
NEUTS SEG # BLD: 18.4 K/UL (ref 1.8–8)
NEUTS SEG NFR BLD: 95 % (ref 32–75)
NRBC # BLD: 0.02 K/UL (ref 0–0.01)
NRBC BLD-RTO: 0.1 PER 100 WBC
OXYHGB MFR BLD: 88.4 % (ref 95–99)
PCO2 BLDA: 38 MMHG (ref 35–45)
PEEP RESPIRATORY: 8
PERFORMED BY:: ABNORMAL
PH BLDA: 7.4 (ref 7.35–7.45)
PLATELET # BLD AUTO: 99 K/UL (ref 150–400)
PMV BLD AUTO: 11.3 FL (ref 8.9–12.9)
PO2 BLDA: 55 MMHG (ref 80–100)
POTASSIUM SERPL-SCNC: 3.4 MMOL/L (ref 3.5–5.1)
PROMYELOCYTES NFR BLD MANUAL: 1 %
PROT SERPL-MCNC: 5.8 G/DL (ref 6.4–8.2)
RBC # BLD AUTO: 3.1 M/UL (ref 3.8–5.2)
RBC MORPH BLD: ABNORMAL
SAO2 % BLD: 89 % (ref 95–99)
SAO2% DEVICE SAO2% SENSOR NAME: ABNORMAL
SODIUM SERPL-SCNC: 133 MMOL/L (ref 136–145)
SPECIMEN SITE: ABNORMAL
VANCOMYCIN SERPL-MCNC: 10.5 UG/ML
WBC # BLD AUTO: 19.4 K/UL (ref 3.6–11)

## 2024-02-04 PROCEDURE — 87040 BLOOD CULTURE FOR BACTERIA: CPT

## 2024-02-04 PROCEDURE — 94660 CPAP INITIATION&MGMT: CPT

## 2024-02-04 PROCEDURE — 82330 ASSAY OF CALCIUM: CPT

## 2024-02-04 PROCEDURE — 2580000003 HC RX 258: Performed by: PHYSICIAN ASSISTANT

## 2024-02-04 PROCEDURE — 2700000000 HC OXYGEN THERAPY PER DAY

## 2024-02-04 PROCEDURE — 6370000000 HC RX 637 (ALT 250 FOR IP): Performed by: NURSE PRACTITIONER

## 2024-02-04 PROCEDURE — 2500000003 HC RX 250 WO HCPCS: Performed by: INTERNAL MEDICINE

## 2024-02-04 PROCEDURE — 6370000000 HC RX 637 (ALT 250 FOR IP): Performed by: INTERNAL MEDICINE

## 2024-02-04 PROCEDURE — 82803 BLOOD GASES ANY COMBINATION: CPT

## 2024-02-04 PROCEDURE — 71045 X-RAY EXAM CHEST 1 VIEW: CPT

## 2024-02-04 PROCEDURE — 94640 AIRWAY INHALATION TREATMENT: CPT

## 2024-02-04 PROCEDURE — 6360000002 HC RX W HCPCS: Performed by: INTERNAL MEDICINE

## 2024-02-04 PROCEDURE — 80202 ASSAY OF VANCOMYCIN: CPT

## 2024-02-04 PROCEDURE — 93005 ELECTROCARDIOGRAM TRACING: CPT | Performed by: PHYSICIAN ASSISTANT

## 2024-02-04 PROCEDURE — 6370000000 HC RX 637 (ALT 250 FOR IP): Performed by: STUDENT IN AN ORGANIZED HEALTH CARE EDUCATION/TRAINING PROGRAM

## 2024-02-04 PROCEDURE — 80053 COMPREHEN METABOLIC PANEL: CPT

## 2024-02-04 PROCEDURE — 85025 COMPLETE CBC W/AUTO DIFF WBC: CPT

## 2024-02-04 PROCEDURE — 6360000002 HC RX W HCPCS: Performed by: NURSE PRACTITIONER

## 2024-02-04 PROCEDURE — 6360000002 HC RX W HCPCS: Performed by: PHYSICIAN ASSISTANT

## 2024-02-04 PROCEDURE — 2580000003 HC RX 258: Performed by: INTERNAL MEDICINE

## 2024-02-04 PROCEDURE — 36415 COLL VENOUS BLD VENIPUNCTURE: CPT

## 2024-02-04 PROCEDURE — 2000000000 HC ICU R&B

## 2024-02-04 PROCEDURE — 36600 WITHDRAWAL OF ARTERIAL BLOOD: CPT

## 2024-02-04 PROCEDURE — 83735 ASSAY OF MAGNESIUM: CPT

## 2024-02-04 PROCEDURE — 2580000003 HC RX 258: Performed by: NURSE PRACTITIONER

## 2024-02-04 PROCEDURE — 6370000000 HC RX 637 (ALT 250 FOR IP): Performed by: HOSPITALIST

## 2024-02-04 RX ORDER — LANOLIN ALCOHOL/MO/W.PET/CERES
400 CREAM (GRAM) TOPICAL ONCE
Status: DISCONTINUED | OUTPATIENT
Start: 2024-02-04 | End: 2024-02-13 | Stop reason: HOSPADM

## 2024-02-04 RX ORDER — LOSARTAN POTASSIUM 25 MG/1
25 TABLET ORAL DAILY
Status: DISCONTINUED | OUTPATIENT
Start: 2024-02-04 | End: 2024-02-04

## 2024-02-04 RX ORDER — FUROSEMIDE 10 MG/ML
80 INJECTION INTRAMUSCULAR; INTRAVENOUS 2 TIMES DAILY
Status: DISCONTINUED | OUTPATIENT
Start: 2024-02-04 | End: 2024-02-05

## 2024-02-04 RX ORDER — AMIODARONE HYDROCHLORIDE 200 MG/1
200 TABLET ORAL DAILY
Status: DISCONTINUED | OUTPATIENT
Start: 2024-02-04 | End: 2024-02-13

## 2024-02-04 RX ORDER — MAGNESIUM SULFATE HEPTAHYDRATE 40 MG/ML
2000 INJECTION, SOLUTION INTRAVENOUS ONCE
Status: COMPLETED | OUTPATIENT
Start: 2024-02-04 | End: 2024-02-04

## 2024-02-04 RX ORDER — DIGOXIN 250 MCG
125 TABLET ORAL DAILY
Status: DISCONTINUED | OUTPATIENT
Start: 2024-02-04 | End: 2024-02-04

## 2024-02-04 RX ORDER — HYDROXYZINE PAMOATE 25 MG/1
25 CAPSULE ORAL 3 TIMES DAILY
Status: DISCONTINUED | OUTPATIENT
Start: 2024-02-04 | End: 2024-02-13

## 2024-02-04 RX ORDER — POTASSIUM CHLORIDE 20 MEQ/1
40 TABLET, EXTENDED RELEASE ORAL ONCE
Status: DISCONTINUED | OUTPATIENT
Start: 2024-02-04 | End: 2024-02-04

## 2024-02-04 RX ORDER — LOSARTAN POTASSIUM 25 MG/1
12.5 TABLET ORAL 2 TIMES DAILY
Status: DISCONTINUED | OUTPATIENT
Start: 2024-02-04 | End: 2024-02-09

## 2024-02-04 RX ADMIN — IPRATROPIUM BROMIDE AND ALBUTEROL SULFATE 1 DOSE: 2.5; .5 SOLUTION RESPIRATORY (INHALATION) at 13:39

## 2024-02-04 RX ADMIN — FOLIC ACID 1 MG: 1 TABLET ORAL at 12:00

## 2024-02-04 RX ADMIN — LOSARTAN POTASSIUM 12.5 MG: 25 TABLET, FILM COATED ORAL at 21:00

## 2024-02-04 RX ADMIN — DEXMEDETOMIDINE HYDROCHLORIDE 1.4 MCG/KG/HR: 400 INJECTION, SOLUTION INTRAVENOUS at 22:59

## 2024-02-04 RX ADMIN — HYDROXYZINE PAMOATE 25 MG: 25 CAPSULE ORAL at 12:45

## 2024-02-04 RX ADMIN — POTASSIUM CHLORIDE 10 MEQ: 7.46 INJECTION, SOLUTION INTRAVENOUS at 06:07

## 2024-02-04 RX ADMIN — MAGNESIUM SULFATE HEPTAHYDRATE 2000 MG: 40 INJECTION, SOLUTION INTRAVENOUS at 08:33

## 2024-02-04 RX ADMIN — CYCLOBENZAPRINE 10 MG: 10 TABLET, FILM COATED ORAL at 12:45

## 2024-02-04 RX ADMIN — GUAIFENESIN 1200 MG: 600 TABLET, EXTENDED RELEASE ORAL at 21:33

## 2024-02-04 RX ADMIN — BUDESONIDE INHALATION 500 MCG: 0.5 SUSPENSION RESPIRATORY (INHALATION) at 07:46

## 2024-02-04 RX ADMIN — DEXMEDETOMIDINE HYDROCHLORIDE 1.5 MCG/KG/HR: 400 INJECTION, SOLUTION INTRAVENOUS at 01:16

## 2024-02-04 RX ADMIN — PRAVASTATIN SODIUM 10 MG: 10 TABLET ORAL at 21:00

## 2024-02-04 RX ADMIN — BUDESONIDE INHALATION 500 MCG: 0.5 SUSPENSION RESPIRATORY (INHALATION) at 19:54

## 2024-02-04 RX ADMIN — APIXABAN 2.5 MG: 2.5 TABLET, FILM COATED ORAL at 12:45

## 2024-02-04 RX ADMIN — SODIUM CHLORIDE, PRESERVATIVE FREE 10 ML: 5 INJECTION INTRAVENOUS at 09:00

## 2024-02-04 RX ADMIN — HYDROXYZINE PAMOATE 25 MG: 25 CAPSULE ORAL at 21:00

## 2024-02-04 RX ADMIN — ACETAMINOPHEN 650 MG: 325 TABLET ORAL at 06:48

## 2024-02-04 RX ADMIN — MEROPENEM 1000 MG: 1 INJECTION, POWDER, FOR SOLUTION INTRAVENOUS at 11:21

## 2024-02-04 RX ADMIN — METHYLPREDNISOLONE SODIUM SUCCINATE 40 MG: 40 INJECTION INTRAMUSCULAR; INTRAVENOUS at 08:25

## 2024-02-04 RX ADMIN — DEXMEDETOMIDINE HYDROCHLORIDE 1.5 MCG/KG/HR: 400 INJECTION, SOLUTION INTRAVENOUS at 06:43

## 2024-02-04 RX ADMIN — IPRATROPIUM BROMIDE AND ALBUTEROL SULFATE 1 DOSE: 2.5; .5 SOLUTION RESPIRATORY (INHALATION) at 19:54

## 2024-02-04 RX ADMIN — THIAMINE HCL TAB 100 MG 100 MG: 100 TAB at 08:25

## 2024-02-04 RX ADMIN — FUROSEMIDE 80 MG: 10 INJECTION, SOLUTION INTRAMUSCULAR; INTRAVENOUS at 18:15

## 2024-02-04 RX ADMIN — DEXMEDETOMIDINE HYDROCHLORIDE 1.5 MCG/KG/HR: 400 INJECTION, SOLUTION INTRAVENOUS at 12:22

## 2024-02-04 RX ADMIN — IPRATROPIUM BROMIDE AND ALBUTEROL SULFATE 1 DOSE: 2.5; .5 SOLUTION RESPIRATORY (INHALATION) at 07:46

## 2024-02-04 RX ADMIN — MEROPENEM 1000 MG: 1 INJECTION, POWDER, FOR SOLUTION INTRAVENOUS at 21:00

## 2024-02-04 RX ADMIN — DEXMEDETOMIDINE HYDROCHLORIDE 1.3 MCG/KG/HR: 400 INJECTION, SOLUTION INTRAVENOUS at 18:15

## 2024-02-04 RX ADMIN — POTASSIUM BICARBONATE 20 MEQ: 782 TABLET, EFFERVESCENT ORAL at 21:00

## 2024-02-04 RX ADMIN — AMIODARONE HYDROCHLORIDE 200 MG: 200 TABLET ORAL at 18:59

## 2024-02-04 RX ADMIN — POTASSIUM CHLORIDE 10 MEQ: 7.46 INJECTION, SOLUTION INTRAVENOUS at 04:45

## 2024-02-04 RX ADMIN — VANCOMYCIN HYDROCHLORIDE 500 MG: 500 INJECTION, POWDER, LYOPHILIZED, FOR SOLUTION INTRAVENOUS at 18:16

## 2024-02-04 RX ADMIN — APIXABAN 2.5 MG: 2.5 TABLET, FILM COATED ORAL at 21:00

## 2024-02-04 RX ADMIN — AMIODARONE HYDROCHLORIDE 0.5 MG/MIN: 1.8 INJECTION, SOLUTION INTRAVENOUS at 12:25

## 2024-02-04 RX ADMIN — AMIODARONE HYDROCHLORIDE 0.5 MG/MIN: 1.8 INJECTION, SOLUTION INTRAVENOUS at 10:40

## 2024-02-04 RX ADMIN — SODIUM CHLORIDE, PRESERVATIVE FREE 10 ML: 5 INJECTION INTRAVENOUS at 21:00

## 2024-02-04 RX ADMIN — Medication 5 MG: at 21:00

## 2024-02-04 RX ADMIN — FUROSEMIDE 80 MG: 10 INJECTION, SOLUTION INTRAMUSCULAR; INTRAVENOUS at 08:33

## 2024-02-04 RX ADMIN — LOSARTAN POTASSIUM 12.5 MG: 25 TABLET, FILM COATED ORAL at 11:44

## 2024-02-04 RX ADMIN — METHYLPREDNISOLONE SODIUM SUCCINATE 40 MG: 40 INJECTION INTRAMUSCULAR; INTRAVENOUS at 21:00

## 2024-02-05 ENCOUNTER — APPOINTMENT (OUTPATIENT)
Facility: HOSPITAL | Age: 69
DRG: 308 | End: 2024-02-05
Payer: MEDICARE

## 2024-02-05 PROBLEM — E43 SEVERE PROTEIN-CALORIE MALNUTRITION (HCC): Status: ACTIVE | Noted: 2024-02-05

## 2024-02-05 LAB
ALBUMIN SERPL-MCNC: 1.7 G/DL (ref 3.5–5)
ALBUMIN SERPL-MCNC: 1.7 G/DL (ref 3.5–5)
ALBUMIN/GLOB SERPL: 0.4 (ref 1.1–2.2)
ALP SERPL-CCNC: 48 U/L (ref 45–117)
ALT SERPL-CCNC: 33 U/L (ref 12–78)
ANION GAP SERPL CALC-SCNC: 12 MMOL/L (ref 5–15)
ANION GAP SERPL CALC-SCNC: 12 MMOL/L (ref 5–15)
ARTERIAL PATENCY WRIST A: YES
AST SERPL W P-5'-P-CCNC: 34 U/L (ref 15–37)
BASE DEFICIT BLDA-SCNC: 2.7 MMOL/L
BASOPHILS # BLD: 0 K/UL (ref 0–0.1)
BASOPHILS NFR BLD: 0 % (ref 0–1)
BDY SITE: ABNORMAL
BILIRUB SERPL-MCNC: 0.3 MG/DL (ref 0.2–1)
BODY TEMPERATURE: 98
BUN SERPL-MCNC: 66 MG/DL (ref 6–20)
BUN SERPL-MCNC: 67 MG/DL (ref 6–20)
BUN/CREAT SERPL: 23 (ref 12–20)
BUN/CREAT SERPL: 23 (ref 12–20)
CA-I BLD-MCNC: 1.16 MMOL/L (ref 1.13–1.32)
CA-I BLD-MCNC: 8.3 MG/DL (ref 8.5–10.1)
CA-I BLD-MCNC: 8.4 MG/DL (ref 8.5–10.1)
CHLORIDE SERPL-SCNC: 97 MMOL/L (ref 97–108)
CHLORIDE SERPL-SCNC: 98 MMOL/L (ref 97–108)
CO2 SERPL-SCNC: 23 MMOL/L (ref 21–32)
CO2 SERPL-SCNC: 23 MMOL/L (ref 21–32)
COHGB MFR BLD: 0.3 % (ref 1–2)
CREAT SERPL-MCNC: 2.93 MG/DL (ref 0.55–1.02)
CREAT SERPL-MCNC: 2.96 MG/DL (ref 0.55–1.02)
DIFFERENTIAL METHOD BLD: ABNORMAL
DIGOXIN SERPL-MCNC: 1.1 NG/ML (ref 0.9–2)
EKG ATRIAL RATE: 136 BPM
EKG DIAGNOSIS: NORMAL
EKG Q-T INTERVAL: 280 MS
EKG QRS DURATION: 80 MS
EKG QTC CALCULATION (BAZETT): 387 MS
EKG R AXIS: 71 DEGREES
EKG T AXIS: 81 DEGREES
EKG VENTRICULAR RATE: 115 BPM
EOSINOPHIL # BLD: 0 K/UL (ref 0–0.4)
EOSINOPHIL NFR BLD: 0 % (ref 0–7)
ERYTHROCYTE [DISTWIDTH] IN BLOOD BY AUTOMATED COUNT: 13 % (ref 11.5–14.5)
FIO2 ON VENT: 70 %
GLOBULIN SER CALC-MCNC: 3.8 G/DL (ref 2–4)
GLUCOSE BLD STRIP.AUTO-MCNC: 99 MG/DL (ref 65–100)
GLUCOSE SERPL-MCNC: 126 MG/DL (ref 65–100)
GLUCOSE SERPL-MCNC: 128 MG/DL (ref 65–100)
HCO3 BLDA-SCNC: 23 MMOL/L (ref 22–26)
HCT VFR BLD AUTO: 30.9 % (ref 35–47)
HGB BLD-MCNC: 11.2 G/DL (ref 11.5–16)
IMM GRANULOCYTES # BLD AUTO: 0 K/UL
IMM GRANULOCYTES NFR BLD AUTO: 0 %
IPAP/PIP: 18
LYMPHOCYTES # BLD: 0.5 K/UL (ref 0.8–3.5)
LYMPHOCYTES NFR BLD: 3 % (ref 12–49)
MAGNESIUM SERPL-MCNC: 2.2 MG/DL (ref 1.6–2.4)
MCH RBC QN AUTO: 37.3 PG (ref 26–34)
MCHC RBC AUTO-ENTMCNC: 36.2 G/DL (ref 30–36.5)
MCV RBC AUTO: 103 FL (ref 80–99)
METAMYELOCYTES NFR BLD MANUAL: 1 %
METHGB MFR BLD: 0.4 % (ref 0–1.4)
MONOCYTES # BLD: 0.7 K/UL (ref 0–1)
MONOCYTES NFR BLD: 4 % (ref 5–13)
NEUTS SEG # BLD: 16.5 K/UL (ref 1.8–8)
NEUTS SEG NFR BLD: 92 % (ref 32–75)
NRBC # BLD: 0 K/UL (ref 0–0.01)
NRBC BLD-RTO: 0 PER 100 WBC
OXYHGB MFR BLD: 94.3 % (ref 95–99)
PCO2 BLDA: 41 MMHG (ref 35–45)
PEEP RESPIRATORY: 10
PERFORMED BY:: ABNORMAL
PERFORMED BY:: NORMAL
PH BLDA: 7.36 (ref 7.35–7.45)
PHOSPHATE SERPL-MCNC: 6.6 MG/DL (ref 2.6–4.7)
PLATELET # BLD AUTO: 84 K/UL (ref 150–400)
PMV BLD AUTO: 12.4 FL (ref 8.9–12.9)
PO2 BLDA: 79 MMHG (ref 80–100)
POTASSIUM SERPL-SCNC: 2.9 MMOL/L (ref 3.5–5.1)
POTASSIUM SERPL-SCNC: 2.9 MMOL/L (ref 3.5–5.1)
POTASSIUM SERPL-SCNC: 5.4 MMOL/L (ref 3.5–5.1)
PROT SERPL-MCNC: 5.5 G/DL (ref 6.4–8.2)
RBC # BLD AUTO: 3 M/UL (ref 3.8–5.2)
RBC MORPH BLD: ABNORMAL
SAO2 % BLD: 95 % (ref 95–99)
SAO2% DEVICE SAO2% SENSOR NAME: ABNORMAL
SODIUM SERPL-SCNC: 132 MMOL/L (ref 136–145)
SODIUM SERPL-SCNC: 133 MMOL/L (ref 136–145)
SPECIMEN SITE: ABNORMAL
VANCOMYCIN SERPL-MCNC: 13.8 UG/ML
WBC # BLD AUTO: 17.9 K/UL (ref 3.6–11)

## 2024-02-05 PROCEDURE — 2700000000 HC OXYGEN THERAPY PER DAY

## 2024-02-05 PROCEDURE — 94640 AIRWAY INHALATION TREATMENT: CPT

## 2024-02-05 PROCEDURE — 71045 X-RAY EXAM CHEST 1 VIEW: CPT

## 2024-02-05 PROCEDURE — 94761 N-INVAS EAR/PLS OXIMETRY MLT: CPT

## 2024-02-05 PROCEDURE — 2580000003 HC RX 258: Performed by: NURSE PRACTITIONER

## 2024-02-05 PROCEDURE — 6370000000 HC RX 637 (ALT 250 FOR IP): Performed by: STUDENT IN AN ORGANIZED HEALTH CARE EDUCATION/TRAINING PROGRAM

## 2024-02-05 PROCEDURE — 80202 ASSAY OF VANCOMYCIN: CPT

## 2024-02-05 PROCEDURE — 2500000003 HC RX 250 WO HCPCS: Performed by: INTERNAL MEDICINE

## 2024-02-05 PROCEDURE — 83735 ASSAY OF MAGNESIUM: CPT

## 2024-02-05 PROCEDURE — 6360000002 HC RX W HCPCS: Performed by: HOSPITALIST

## 2024-02-05 PROCEDURE — 80053 COMPREHEN METABOLIC PANEL: CPT

## 2024-02-05 PROCEDURE — 82803 BLOOD GASES ANY COMBINATION: CPT

## 2024-02-05 PROCEDURE — 80069 RENAL FUNCTION PANEL: CPT

## 2024-02-05 PROCEDURE — 36600 WITHDRAWAL OF ARTERIAL BLOOD: CPT

## 2024-02-05 PROCEDURE — 36415 COLL VENOUS BLD VENIPUNCTURE: CPT

## 2024-02-05 PROCEDURE — 82330 ASSAY OF CALCIUM: CPT

## 2024-02-05 PROCEDURE — 80162 ASSAY OF DIGOXIN TOTAL: CPT

## 2024-02-05 PROCEDURE — 6360000002 HC RX W HCPCS: Performed by: INTERNAL MEDICINE

## 2024-02-05 PROCEDURE — 6370000000 HC RX 637 (ALT 250 FOR IP): Performed by: INTERNAL MEDICINE

## 2024-02-05 PROCEDURE — 2580000003 HC RX 258: Performed by: STUDENT IN AN ORGANIZED HEALTH CARE EDUCATION/TRAINING PROGRAM

## 2024-02-05 PROCEDURE — 84132 ASSAY OF SERUM POTASSIUM: CPT

## 2024-02-05 PROCEDURE — 6360000002 HC RX W HCPCS: Performed by: NURSE PRACTITIONER

## 2024-02-05 PROCEDURE — 82962 GLUCOSE BLOOD TEST: CPT

## 2024-02-05 PROCEDURE — 6370000000 HC RX 637 (ALT 250 FOR IP): Performed by: NURSE PRACTITIONER

## 2024-02-05 PROCEDURE — 2000000000 HC ICU R&B

## 2024-02-05 PROCEDURE — 85025 COMPLETE CBC W/AUTO DIFF WBC: CPT

## 2024-02-05 PROCEDURE — 94660 CPAP INITIATION&MGMT: CPT

## 2024-02-05 PROCEDURE — 6360000002 HC RX W HCPCS: Performed by: STUDENT IN AN ORGANIZED HEALTH CARE EDUCATION/TRAINING PROGRAM

## 2024-02-05 RX ORDER — LORAZEPAM 2 MG/ML
1 INJECTION INTRAMUSCULAR EVERY 4 HOURS PRN
Status: DISCONTINUED | OUTPATIENT
Start: 2024-02-05 | End: 2024-02-13

## 2024-02-05 RX ORDER — FUROSEMIDE 10 MG/ML
40 INJECTION INTRAMUSCULAR; INTRAVENOUS 2 TIMES DAILY
Status: DISCONTINUED | OUTPATIENT
Start: 2024-02-05 | End: 2024-02-09

## 2024-02-05 RX ORDER — FUROSEMIDE 10 MG/ML
40 INJECTION INTRAMUSCULAR; INTRAVENOUS DAILY
Status: DISCONTINUED | OUTPATIENT
Start: 2024-02-06 | End: 2024-02-05

## 2024-02-05 RX ADMIN — POTASSIUM CHLORIDE 10 MEQ: 7.46 INJECTION, SOLUTION INTRAVENOUS at 12:32

## 2024-02-05 RX ADMIN — LORAZEPAM 1 MG: 2 INJECTION INTRAMUSCULAR; INTRAVENOUS at 02:00

## 2024-02-05 RX ADMIN — POTASSIUM CHLORIDE 10 MEQ: 7.46 INJECTION, SOLUTION INTRAVENOUS at 13:30

## 2024-02-05 RX ADMIN — AMIODARONE HYDROCHLORIDE 200 MG: 200 TABLET ORAL at 10:00

## 2024-02-05 RX ADMIN — DEXMEDETOMIDINE HYDROCHLORIDE 0.4 MCG/KG/HR: 400 INJECTION, SOLUTION INTRAVENOUS at 15:56

## 2024-02-05 RX ADMIN — LORAZEPAM 1 MG: 2 INJECTION INTRAMUSCULAR; INTRAVENOUS at 12:49

## 2024-02-05 RX ADMIN — POTASSIUM CHLORIDE 10 MEQ: 7.46 INJECTION, SOLUTION INTRAVENOUS at 06:31

## 2024-02-05 RX ADMIN — POTASSIUM CHLORIDE 10 MEQ: 7.46 INJECTION, SOLUTION INTRAVENOUS at 04:53

## 2024-02-05 RX ADMIN — APIXABAN 2.5 MG: 2.5 TABLET, FILM COATED ORAL at 10:10

## 2024-02-05 RX ADMIN — BUDESONIDE INHALATION 500 MCG: 0.5 SUSPENSION RESPIRATORY (INHALATION) at 07:47

## 2024-02-05 RX ADMIN — POTASSIUM CHLORIDE 10 MEQ: 7.46 INJECTION, SOLUTION INTRAVENOUS at 11:21

## 2024-02-05 RX ADMIN — IPRATROPIUM BROMIDE AND ALBUTEROL SULFATE 1 DOSE: 2.5; .5 SOLUTION RESPIRATORY (INHALATION) at 13:19

## 2024-02-05 RX ADMIN — GUAIFENESIN 1200 MG: 600 TABLET, EXTENDED RELEASE ORAL at 10:10

## 2024-02-05 RX ADMIN — THIAMINE HCL TAB 100 MG 100 MG: 100 TAB at 10:08

## 2024-02-05 RX ADMIN — SODIUM CHLORIDE, PRESERVATIVE FREE 10 ML: 5 INJECTION INTRAVENOUS at 10:22

## 2024-02-05 RX ADMIN — POTASSIUM BICARBONATE 20 MEQ: 782 TABLET, EFFERVESCENT ORAL at 10:22

## 2024-02-05 RX ADMIN — METHYLPREDNISOLONE SODIUM SUCCINATE 40 MG: 40 INJECTION INTRAMUSCULAR; INTRAVENOUS at 10:22

## 2024-02-05 RX ADMIN — SODIUM CHLORIDE 500 MG: 900 INJECTION INTRAVENOUS at 18:11

## 2024-02-05 RX ADMIN — DEXMEDETOMIDINE HYDROCHLORIDE 1.3 MCG/KG/HR: 400 INJECTION, SOLUTION INTRAVENOUS at 04:55

## 2024-02-05 RX ADMIN — CALCITRIOL CAPSULES 0.25 MCG 0.25 MCG: 0.25 CAPSULE ORAL at 10:08

## 2024-02-05 RX ADMIN — SPIRONOLACTONE 25 MG: 25 TABLET ORAL at 10:08

## 2024-02-05 RX ADMIN — IPRATROPIUM BROMIDE AND ALBUTEROL SULFATE 1 DOSE: 2.5; .5 SOLUTION RESPIRATORY (INHALATION) at 07:47

## 2024-02-05 RX ADMIN — IPRATROPIUM BROMIDE AND ALBUTEROL SULFATE 1 DOSE: 2.5; .5 SOLUTION RESPIRATORY (INHALATION) at 19:20

## 2024-02-05 RX ADMIN — THERA TABS 1 TABLET: TAB at 10:08

## 2024-02-05 RX ADMIN — BUDESONIDE INHALATION 500 MCG: 0.5 SUSPENSION RESPIRATORY (INHALATION) at 19:20

## 2024-02-05 RX ADMIN — POTASSIUM CHLORIDE 20 MEQ: 1500 TABLET, EXTENDED RELEASE ORAL at 10:10

## 2024-02-05 RX ADMIN — HYDROXYZINE PAMOATE 25 MG: 25 CAPSULE ORAL at 10:08

## 2024-02-05 RX ADMIN — FOLIC ACID 1 MG: 1 TABLET ORAL at 10:08

## 2024-02-05 RX ADMIN — LOSARTAN POTASSIUM 12.5 MG: 25 TABLET, FILM COATED ORAL at 10:08

## 2024-02-05 NOTE — CARE COORDINATION
CM reviewed Pt medicals, Pt is a DNI, per RN note Pt:   0000- Patient desaturated between 70's-80's on 65% FiO2, increased to 80% and further increased to 100% FiO2 by RT without significant improvement  0200- Desaturated again to 70's-80's and became argumentative and agitated.    CM will follow.     THOMAS PS Kofi April to asked if she could complete an AMD with Pt.     April stated that Pt did not have the mental capacity to make decision on Friday. April stated they would need a Psy note before they could do the AMD.

## 2024-02-06 ENCOUNTER — APPOINTMENT (OUTPATIENT)
Facility: HOSPITAL | Age: 69
DRG: 308 | End: 2024-02-06
Payer: MEDICARE

## 2024-02-06 LAB
ANION GAP SERPL CALC-SCNC: 11 MMOL/L (ref 5–15)
ARTERIAL PATENCY WRIST A: YES
BASE DEFICIT BLDA-SCNC: 4 MMOL/L
BDY SITE: ABNORMAL
BODY TEMPERATURE: 97.6
BUN SERPL-MCNC: 76 MG/DL (ref 6–20)
BUN/CREAT SERPL: 24 (ref 12–20)
CA-I BLD-MCNC: 8.4 MG/DL (ref 8.5–10.1)
CHLORIDE SERPL-SCNC: 102 MMOL/L (ref 97–108)
CO2 SERPL-SCNC: 21 MMOL/L (ref 21–32)
COHGB MFR BLD: 0.4 % (ref 1–2)
CREAT SERPL-MCNC: 3.2 MG/DL (ref 0.55–1.02)
FIO2 ON VENT: 70 %
GAS FLOW.O2 SETTING OXYMISER: 10
GLUCOSE BLD STRIP.AUTO-MCNC: 120 MG/DL (ref 65–100)
GLUCOSE SERPL-MCNC: 116 MG/DL (ref 65–100)
HCO3 BLDA-SCNC: 21 MMOL/L (ref 22–26)
IPAP/PIP: 18
METHGB MFR BLD: 0.5 % (ref 0–1.4)
OXYHGB MFR BLD: 94.8 % (ref 95–99)
PCO2 BLDA: 37 MMHG (ref 35–45)
PEEP RESPIRATORY: 10
PERFORMED BY:: ABNORMAL
PERFORMED BY:: ABNORMAL
PH BLDA: 7.37 (ref 7.35–7.45)
PO2 BLDA: 80 MMHG (ref 80–100)
POTASSIUM SERPL-SCNC: 4.5 MMOL/L (ref 3.5–5.1)
SAO2 % BLD: 96 % (ref 95–99)
SAO2% DEVICE SAO2% SENSOR NAME: ABNORMAL
SPECIMEN SITE: ABNORMAL
VENTILATION MODE VENT: ABNORMAL

## 2024-02-06 PROCEDURE — 6370000000 HC RX 637 (ALT 250 FOR IP): Performed by: INTERNAL MEDICINE

## 2024-02-06 PROCEDURE — 82803 BLOOD GASES ANY COMBINATION: CPT

## 2024-02-06 PROCEDURE — 36415 COLL VENOUS BLD VENIPUNCTURE: CPT

## 2024-02-06 PROCEDURE — 71045 X-RAY EXAM CHEST 1 VIEW: CPT

## 2024-02-06 PROCEDURE — 6370000000 HC RX 637 (ALT 250 FOR IP): Performed by: NURSE PRACTITIONER

## 2024-02-06 PROCEDURE — 94640 AIRWAY INHALATION TREATMENT: CPT

## 2024-02-06 PROCEDURE — 6360000002 HC RX W HCPCS: Performed by: INTERNAL MEDICINE

## 2024-02-06 PROCEDURE — 6370000000 HC RX 637 (ALT 250 FOR IP): Performed by: PHYSICIAN ASSISTANT

## 2024-02-06 PROCEDURE — 2000000000 HC ICU R&B

## 2024-02-06 PROCEDURE — 2500000003 HC RX 250 WO HCPCS: Performed by: INTERNAL MEDICINE

## 2024-02-06 PROCEDURE — 94761 N-INVAS EAR/PLS OXIMETRY MLT: CPT

## 2024-02-06 PROCEDURE — 6370000000 HC RX 637 (ALT 250 FOR IP): Performed by: STUDENT IN AN ORGANIZED HEALTH CARE EDUCATION/TRAINING PROGRAM

## 2024-02-06 PROCEDURE — 94660 CPAP INITIATION&MGMT: CPT

## 2024-02-06 PROCEDURE — 2580000003 HC RX 258: Performed by: NURSE PRACTITIONER

## 2024-02-06 PROCEDURE — 82962 GLUCOSE BLOOD TEST: CPT

## 2024-02-06 PROCEDURE — 6360000002 HC RX W HCPCS: Performed by: HOSPITALIST

## 2024-02-06 PROCEDURE — 80048 BASIC METABOLIC PNL TOTAL CA: CPT

## 2024-02-06 PROCEDURE — 36600 WITHDRAWAL OF ARTERIAL BLOOD: CPT

## 2024-02-06 PROCEDURE — 2580000003 HC RX 258: Performed by: INTERNAL MEDICINE

## 2024-02-06 PROCEDURE — 2700000000 HC OXYGEN THERAPY PER DAY

## 2024-02-06 RX ADMIN — DEXMEDETOMIDINE HYDROCHLORIDE 0.6 MCG/KG/HR: 400 INJECTION, SOLUTION INTRAVENOUS at 10:41

## 2024-02-06 RX ADMIN — Medication 5 MG: at 20:54

## 2024-02-06 RX ADMIN — SODIUM CHLORIDE, PRESERVATIVE FREE 10 ML: 5 INJECTION INTRAVENOUS at 01:12

## 2024-02-06 RX ADMIN — HYDROXYZINE PAMOATE 25 MG: 25 CAPSULE ORAL at 14:50

## 2024-02-06 RX ADMIN — IPRATROPIUM BROMIDE AND ALBUTEROL SULFATE 1 DOSE: 2.5; .5 SOLUTION RESPIRATORY (INHALATION) at 13:43

## 2024-02-06 RX ADMIN — PANTOPRAZOLE SODIUM 40 MG: 40 TABLET, DELAYED RELEASE ORAL at 14:49

## 2024-02-06 RX ADMIN — GUAIFENESIN 1200 MG: 600 TABLET, EXTENDED RELEASE ORAL at 20:54

## 2024-02-06 RX ADMIN — THIAMINE HCL TAB 100 MG 100 MG: 100 TAB at 09:50

## 2024-02-06 RX ADMIN — LORAZEPAM 1 MG: 2 INJECTION INTRAMUSCULAR; INTRAVENOUS at 15:12

## 2024-02-06 RX ADMIN — PRAVASTATIN SODIUM 10 MG: 10 TABLET ORAL at 20:54

## 2024-02-06 RX ADMIN — LORAZEPAM 1 MG: 2 INJECTION INTRAMUSCULAR; INTRAVENOUS at 01:11

## 2024-02-06 RX ADMIN — METHYLPREDNISOLONE SODIUM SUCCINATE 40 MG: 40 INJECTION INTRAMUSCULAR; INTRAVENOUS at 09:54

## 2024-02-06 RX ADMIN — DILTIAZEM HYDROCHLORIDE 5 MG/HR: 5 INJECTION, SOLUTION INTRAVENOUS at 00:26

## 2024-02-06 RX ADMIN — THERA TABS 1 TABLET: TAB at 09:50

## 2024-02-06 RX ADMIN — HYDROXYZINE PAMOATE 25 MG: 25 CAPSULE ORAL at 20:54

## 2024-02-06 RX ADMIN — SODIUM CHLORIDE, PRESERVATIVE FREE 10 ML: 5 INJECTION INTRAVENOUS at 09:51

## 2024-02-06 RX ADMIN — BUDESONIDE INHALATION 500 MCG: 0.5 SUSPENSION RESPIRATORY (INHALATION) at 19:29

## 2024-02-06 RX ADMIN — METHYLPREDNISOLONE SODIUM SUCCINATE 40 MG: 40 INJECTION INTRAMUSCULAR; INTRAVENOUS at 20:54

## 2024-02-06 RX ADMIN — GUAIFENESIN 1200 MG: 600 TABLET, EXTENDED RELEASE ORAL at 09:50

## 2024-02-06 RX ADMIN — FOLIC ACID 1 MG: 1 TABLET ORAL at 09:50

## 2024-02-06 RX ADMIN — DEXMEDETOMIDINE HYDROCHLORIDE 0.6 MCG/KG/HR: 400 INJECTION, SOLUTION INTRAVENOUS at 00:18

## 2024-02-06 RX ADMIN — POTASSIUM BICARBONATE 20 MEQ: 782 TABLET, EFFERVESCENT ORAL at 20:55

## 2024-02-06 RX ADMIN — IPRATROPIUM BROMIDE AND ALBUTEROL SULFATE 1 DOSE: 2.5; .5 SOLUTION RESPIRATORY (INHALATION) at 08:05

## 2024-02-06 RX ADMIN — LORAZEPAM 1 MG: 2 INJECTION INTRAMUSCULAR; INTRAVENOUS at 20:54

## 2024-02-06 RX ADMIN — FUROSEMIDE 40 MG: 10 INJECTION, SOLUTION INTRAMUSCULAR; INTRAVENOUS at 09:54

## 2024-02-06 RX ADMIN — POTASSIUM BICARBONATE 20 MEQ: 782 TABLET, EFFERVESCENT ORAL at 09:55

## 2024-02-06 RX ADMIN — CALCITRIOL CAPSULES 0.25 MCG 0.25 MCG: 0.25 CAPSULE ORAL at 09:50

## 2024-02-06 RX ADMIN — LORAZEPAM 1 MG: 2 INJECTION INTRAMUSCULAR; INTRAVENOUS at 07:21

## 2024-02-06 RX ADMIN — METHYLPREDNISOLONE SODIUM SUCCINATE 40 MG: 40 INJECTION INTRAMUSCULAR; INTRAVENOUS at 01:11

## 2024-02-06 RX ADMIN — APIXABAN 2.5 MG: 2.5 TABLET, FILM COATED ORAL at 09:50

## 2024-02-06 RX ADMIN — HYDROXYZINE PAMOATE 25 MG: 25 CAPSULE ORAL at 09:50

## 2024-02-06 RX ADMIN — SPIRONOLACTONE 25 MG: 25 TABLET ORAL at 09:50

## 2024-02-06 RX ADMIN — AMIODARONE HYDROCHLORIDE 200 MG: 200 TABLET ORAL at 09:50

## 2024-02-06 RX ADMIN — IPRATROPIUM BROMIDE AND ALBUTEROL SULFATE 1 DOSE: 2.5; .5 SOLUTION RESPIRATORY (INHALATION) at 19:29

## 2024-02-06 RX ADMIN — SODIUM CHLORIDE, PRESERVATIVE FREE 10 ML: 5 INJECTION INTRAVENOUS at 20:55

## 2024-02-06 RX ADMIN — APIXABAN 2.5 MG: 2.5 TABLET, FILM COATED ORAL at 20:54

## 2024-02-06 RX ADMIN — BUDESONIDE INHALATION 500 MCG: 0.5 SUSPENSION RESPIRATORY (INHALATION) at 08:05

## 2024-02-06 RX ADMIN — DILTIAZEM HYDROCHLORIDE 5 MG/HR: 5 INJECTION, SOLUTION INTRAVENOUS at 20:54

## 2024-02-06 NOTE — CARE COORDINATION
CM reviewed Pt medicals, per doctors note: Patient is currently on BiPAP.  She is lethargic.  She is on 70% FiO2.  Currently in A-fib with RVR.  Occasionally heart rate up to 170s.  Blood pressure soft.  She appears to be euvolemic on examination    CM will follow for D/C needs.     3:30  Kraig CM came to CM and stated that he has done the [employer] search and Pt has no one.

## 2024-02-07 LAB
ALBUMIN SERPL-MCNC: 1.6 G/DL (ref 3.5–5)
ANION GAP SERPL CALC-SCNC: 9 MMOL/L (ref 5–15)
BUN SERPL-MCNC: 79 MG/DL (ref 6–20)
BUN/CREAT SERPL: 23 (ref 12–20)
CA-I BLD-MCNC: 8 MG/DL (ref 8.5–10.1)
CHLORIDE SERPL-SCNC: 101 MMOL/L (ref 97–108)
CO2 SERPL-SCNC: 21 MMOL/L (ref 21–32)
CREAT SERPL-MCNC: 3.42 MG/DL (ref 0.55–1.02)
ERYTHROCYTE [DISTWIDTH] IN BLOOD BY AUTOMATED COUNT: 13.4 % (ref 11.5–14.5)
GLUCOSE SERPL-MCNC: 192 MG/DL (ref 65–100)
HCT VFR BLD AUTO: 31.2 % (ref 35–47)
HGB BLD-MCNC: 11 G/DL (ref 11.5–16)
MAGNESIUM SERPL-MCNC: 1.8 MG/DL (ref 1.6–2.4)
MCH RBC QN AUTO: 36.9 PG (ref 26–34)
MCHC RBC AUTO-ENTMCNC: 35.3 G/DL (ref 30–36.5)
MCV RBC AUTO: 104.7 FL (ref 80–99)
NRBC # BLD: 0 K/UL (ref 0–0.01)
NRBC BLD-RTO: 0 PER 100 WBC
PHOSPHATE SERPL-MCNC: 5.5 MG/DL (ref 2.6–4.7)
PLATELET # BLD AUTO: 102 K/UL (ref 150–400)
PMV BLD AUTO: 12.2 FL (ref 8.9–12.9)
POTASSIUM SERPL-SCNC: 5.3 MMOL/L (ref 3.5–5.1)
RBC # BLD AUTO: 2.98 M/UL (ref 3.8–5.2)
SODIUM SERPL-SCNC: 131 MMOL/L (ref 136–145)
WBC # BLD AUTO: 13.5 K/UL (ref 3.6–11)

## 2024-02-07 PROCEDURE — 2000000000 HC ICU R&B

## 2024-02-07 PROCEDURE — 6360000002 HC RX W HCPCS: Performed by: INTERNAL MEDICINE

## 2024-02-07 PROCEDURE — 6370000000 HC RX 637 (ALT 250 FOR IP): Performed by: PHYSICIAN ASSISTANT

## 2024-02-07 PROCEDURE — 51798 US URINE CAPACITY MEASURE: CPT

## 2024-02-07 PROCEDURE — 94761 N-INVAS EAR/PLS OXIMETRY MLT: CPT

## 2024-02-07 PROCEDURE — 6370000000 HC RX 637 (ALT 250 FOR IP): Performed by: INTERNAL MEDICINE

## 2024-02-07 PROCEDURE — 6370000000 HC RX 637 (ALT 250 FOR IP): Performed by: NURSE PRACTITIONER

## 2024-02-07 PROCEDURE — 36415 COLL VENOUS BLD VENIPUNCTURE: CPT

## 2024-02-07 PROCEDURE — 6370000000 HC RX 637 (ALT 250 FOR IP): Performed by: HOSPITALIST

## 2024-02-07 PROCEDURE — 83735 ASSAY OF MAGNESIUM: CPT

## 2024-02-07 PROCEDURE — 94640 AIRWAY INHALATION TREATMENT: CPT

## 2024-02-07 PROCEDURE — 2700000000 HC OXYGEN THERAPY PER DAY

## 2024-02-07 PROCEDURE — 94660 CPAP INITIATION&MGMT: CPT

## 2024-02-07 PROCEDURE — 2500000003 HC RX 250 WO HCPCS: Performed by: INTERNAL MEDICINE

## 2024-02-07 PROCEDURE — 2580000003 HC RX 258: Performed by: INTERNAL MEDICINE

## 2024-02-07 PROCEDURE — 2580000003 HC RX 258: Performed by: NURSE PRACTITIONER

## 2024-02-07 PROCEDURE — 80069 RENAL FUNCTION PANEL: CPT

## 2024-02-07 PROCEDURE — 85027 COMPLETE CBC AUTOMATED: CPT

## 2024-02-07 PROCEDURE — 6370000000 HC RX 637 (ALT 250 FOR IP): Performed by: STUDENT IN AN ORGANIZED HEALTH CARE EDUCATION/TRAINING PROGRAM

## 2024-02-07 RX ADMIN — FOLIC ACID 1 MG: 1 TABLET ORAL at 08:34

## 2024-02-07 RX ADMIN — BUDESONIDE INHALATION 500 MCG: 0.5 SUSPENSION RESPIRATORY (INHALATION) at 07:46

## 2024-02-07 RX ADMIN — DEXMEDETOMIDINE HYDROCHLORIDE 0.4 MCG/KG/HR: 400 INJECTION, SOLUTION INTRAVENOUS at 02:12

## 2024-02-07 RX ADMIN — SODIUM CHLORIDE, PRESERVATIVE FREE 10 ML: 5 INJECTION INTRAVENOUS at 20:52

## 2024-02-07 RX ADMIN — APIXABAN 2.5 MG: 2.5 TABLET, FILM COATED ORAL at 08:34

## 2024-02-07 RX ADMIN — DILTIAZEM HYDROCHLORIDE 15 MG/HR: 5 INJECTION, SOLUTION INTRAVENOUS at 13:02

## 2024-02-07 RX ADMIN — BUDESONIDE INHALATION 500 MCG: 0.5 SUSPENSION RESPIRATORY (INHALATION) at 19:52

## 2024-02-07 RX ADMIN — DEXMEDETOMIDINE HYDROCHLORIDE 0.5 MCG/KG/HR: 400 INJECTION, SOLUTION INTRAVENOUS at 18:18

## 2024-02-07 RX ADMIN — APIXABAN 2.5 MG: 2.5 TABLET, FILM COATED ORAL at 20:52

## 2024-02-07 RX ADMIN — IPRATROPIUM BROMIDE AND ALBUTEROL SULFATE 1 DOSE: 2.5; .5 SOLUTION RESPIRATORY (INHALATION) at 19:51

## 2024-02-07 RX ADMIN — GUAIFENESIN 1200 MG: 600 TABLET, EXTENDED RELEASE ORAL at 08:34

## 2024-02-07 RX ADMIN — THIAMINE HCL TAB 100 MG 100 MG: 100 TAB at 08:34

## 2024-02-07 RX ADMIN — AMIODARONE HYDROCHLORIDE 200 MG: 200 TABLET ORAL at 08:34

## 2024-02-07 RX ADMIN — CYCLOBENZAPRINE 10 MG: 10 TABLET, FILM COATED ORAL at 08:34

## 2024-02-07 RX ADMIN — PANTOPRAZOLE SODIUM 40 MG: 40 TABLET, DELAYED RELEASE ORAL at 05:24

## 2024-02-07 RX ADMIN — PRAVASTATIN SODIUM 10 MG: 10 TABLET ORAL at 20:55

## 2024-02-07 RX ADMIN — SODIUM ZIRCONIUM CYCLOSILICATE 10 G: 10 POWDER, FOR SUSPENSION ORAL at 16:18

## 2024-02-07 RX ADMIN — HYDROXYZINE PAMOATE 25 MG: 25 CAPSULE ORAL at 20:54

## 2024-02-07 RX ADMIN — HYDROXYZINE PAMOATE 25 MG: 25 CAPSULE ORAL at 16:19

## 2024-02-07 RX ADMIN — Medication 5 MG: at 20:53

## 2024-02-07 RX ADMIN — CALCITRIOL CAPSULES 0.25 MCG 0.25 MCG: 0.25 CAPSULE ORAL at 08:34

## 2024-02-07 RX ADMIN — THERA TABS 1 TABLET: TAB at 08:34

## 2024-02-07 RX ADMIN — IPRATROPIUM BROMIDE AND ALBUTEROL SULFATE 1 DOSE: 2.5; .5 SOLUTION RESPIRATORY (INHALATION) at 07:46

## 2024-02-07 RX ADMIN — METHYLPREDNISOLONE SODIUM SUCCINATE 40 MG: 40 INJECTION INTRAMUSCULAR; INTRAVENOUS at 12:40

## 2024-02-07 RX ADMIN — DILTIAZEM HYDROCHLORIDE 15 MG/HR: 5 INJECTION, SOLUTION INTRAVENOUS at 22:53

## 2024-02-07 RX ADMIN — METHYLPREDNISOLONE SODIUM SUCCINATE 40 MG: 40 INJECTION INTRAMUSCULAR; INTRAVENOUS at 20:49

## 2024-02-07 RX ADMIN — HYDROXYZINE PAMOATE 25 MG: 25 CAPSULE ORAL at 11:25

## 2024-02-07 RX ADMIN — SODIUM CHLORIDE, PRESERVATIVE FREE 10 ML: 5 INJECTION INTRAVENOUS at 08:35

## 2024-02-07 NOTE — CARE COORDINATION
CM reviewed Pt medicals, a [employer] search was done and Pt has no family.    Apparently the only person Pt has is her boyfriend of 20 years.    Per doctors note:  Remains on BiPAP, desats quickly when attempted hiflow NC yesterday  She is lethargic, mumbling incoherently, not following commands    CM will follow.

## 2024-02-08 ENCOUNTER — APPOINTMENT (OUTPATIENT)
Facility: HOSPITAL | Age: 69
DRG: 308 | End: 2024-02-08
Payer: MEDICARE

## 2024-02-08 LAB
ANION GAP SERPL CALC-SCNC: 11 MMOL/L (ref 5–15)
ARTERIAL PATENCY WRIST A: YES
BASE DEFICIT BLDA-SCNC: 1.3 MMOL/L
BDY SITE: ABNORMAL
BUN SERPL-MCNC: 84 MG/DL (ref 6–20)
BUN/CREAT SERPL: 23 (ref 12–20)
CA-I BLD-MCNC: 8 MG/DL (ref 8.5–10.1)
CHLORIDE SERPL-SCNC: 100 MMOL/L (ref 97–108)
CO2 SERPL-SCNC: 22 MMOL/L (ref 21–32)
COHGB MFR BLD: 0.2 % (ref 1–2)
CREAT SERPL-MCNC: 3.58 MG/DL (ref 0.55–1.02)
ERYTHROCYTE [DISTWIDTH] IN BLOOD BY AUTOMATED COUNT: 13.1 % (ref 11.5–14.5)
GLUCOSE SERPL-MCNC: 222 MG/DL (ref 65–100)
HCO3 BLDA-SCNC: 22 MMOL/L (ref 22–26)
HCT VFR BLD AUTO: 30.5 % (ref 35–47)
HGB BLD-MCNC: 10.7 G/DL (ref 11.5–16)
MCH RBC QN AUTO: 36.8 PG (ref 26–34)
MCHC RBC AUTO-ENTMCNC: 35.1 G/DL (ref 30–36.5)
MCV RBC AUTO: 104.8 FL (ref 80–99)
METHGB MFR BLD: 0.4 % (ref 0–1.4)
NRBC # BLD: 0 K/UL (ref 0–0.01)
NRBC BLD-RTO: 0 PER 100 WBC
OXYHGB MFR BLD: 83.5 % (ref 95–99)
PCO2 BLDA: 33 MMHG (ref 35–45)
PERFORMED BY:: ABNORMAL
PH BLDA: 7.45 (ref 7.35–7.45)
PLATELET # BLD AUTO: 123 K/UL (ref 150–400)
PMV BLD AUTO: 12 FL (ref 8.9–12.9)
PO2 BLDA: 44 MMHG (ref 80–100)
POTASSIUM SERPL-SCNC: 4.3 MMOL/L (ref 3.5–5.1)
RBC # BLD AUTO: 2.91 M/UL (ref 3.8–5.2)
SAO2 % BLD: 84 % (ref 95–99)
SAO2% DEVICE SAO2% SENSOR NAME: ABNORMAL
SERVICE CMNT-IMP: ABNORMAL
SODIUM SERPL-SCNC: 133 MMOL/L (ref 136–145)
SPECIMEN SITE: ABNORMAL
WBC # BLD AUTO: 16 K/UL (ref 3.6–11)

## 2024-02-08 PROCEDURE — 2580000003 HC RX 258: Performed by: NURSE PRACTITIONER

## 2024-02-08 PROCEDURE — 6370000000 HC RX 637 (ALT 250 FOR IP): Performed by: STUDENT IN AN ORGANIZED HEALTH CARE EDUCATION/TRAINING PROGRAM

## 2024-02-08 PROCEDURE — 36600 WITHDRAWAL OF ARTERIAL BLOOD: CPT

## 2024-02-08 PROCEDURE — 6370000000 HC RX 637 (ALT 250 FOR IP): Performed by: NURSE PRACTITIONER

## 2024-02-08 PROCEDURE — 36415 COLL VENOUS BLD VENIPUNCTURE: CPT

## 2024-02-08 PROCEDURE — 6370000000 HC RX 637 (ALT 250 FOR IP): Performed by: INTERNAL MEDICINE

## 2024-02-08 PROCEDURE — 6370000000 HC RX 637 (ALT 250 FOR IP): Performed by: PHYSICIAN ASSISTANT

## 2024-02-08 PROCEDURE — 2500000003 HC RX 250 WO HCPCS: Performed by: INTERNAL MEDICINE

## 2024-02-08 PROCEDURE — 82803 BLOOD GASES ANY COMBINATION: CPT

## 2024-02-08 PROCEDURE — 94761 N-INVAS EAR/PLS OXIMETRY MLT: CPT

## 2024-02-08 PROCEDURE — 2580000003 HC RX 258: Performed by: INTERNAL MEDICINE

## 2024-02-08 PROCEDURE — 71045 X-RAY EXAM CHEST 1 VIEW: CPT

## 2024-02-08 PROCEDURE — 94640 AIRWAY INHALATION TREATMENT: CPT

## 2024-02-08 PROCEDURE — 2000000000 HC ICU R&B

## 2024-02-08 PROCEDURE — 6360000002 HC RX W HCPCS: Performed by: INTERNAL MEDICINE

## 2024-02-08 PROCEDURE — 80048 BASIC METABOLIC PNL TOTAL CA: CPT

## 2024-02-08 PROCEDURE — 94660 CPAP INITIATION&MGMT: CPT

## 2024-02-08 PROCEDURE — 6360000002 HC RX W HCPCS: Performed by: HOSPITALIST

## 2024-02-08 PROCEDURE — 85027 COMPLETE CBC AUTOMATED: CPT

## 2024-02-08 PROCEDURE — 2700000000 HC OXYGEN THERAPY PER DAY

## 2024-02-08 RX ADMIN — DILTIAZEM HYDROCHLORIDE 5 MG/HR: 5 INJECTION, SOLUTION INTRAVENOUS at 20:34

## 2024-02-08 RX ADMIN — PANTOPRAZOLE SODIUM 40 MG: 40 TABLET, DELAYED RELEASE ORAL at 08:09

## 2024-02-08 RX ADMIN — DILTIAZEM HYDROCHLORIDE 5 MG/HR: 5 INJECTION, SOLUTION INTRAVENOUS at 06:24

## 2024-02-08 RX ADMIN — PRAVASTATIN SODIUM 10 MG: 10 TABLET ORAL at 20:33

## 2024-02-08 RX ADMIN — APIXABAN 2.5 MG: 2.5 TABLET, FILM COATED ORAL at 20:33

## 2024-02-08 RX ADMIN — GUAIFENESIN 1200 MG: 600 TABLET, EXTENDED RELEASE ORAL at 08:09

## 2024-02-08 RX ADMIN — IPRATROPIUM BROMIDE AND ALBUTEROL SULFATE 1 DOSE: 2.5; .5 SOLUTION RESPIRATORY (INHALATION) at 19:23

## 2024-02-08 RX ADMIN — SODIUM CHLORIDE, PRESERVATIVE FREE 10 ML: 5 INJECTION INTRAVENOUS at 20:33

## 2024-02-08 RX ADMIN — SODIUM CHLORIDE, PRESERVATIVE FREE 10 ML: 5 INJECTION INTRAVENOUS at 08:10

## 2024-02-08 RX ADMIN — HYDROXYZINE PAMOATE 25 MG: 25 CAPSULE ORAL at 08:09

## 2024-02-08 RX ADMIN — APIXABAN 2.5 MG: 2.5 TABLET, FILM COATED ORAL at 08:09

## 2024-02-08 RX ADMIN — AMIODARONE HYDROCHLORIDE 200 MG: 200 TABLET ORAL at 08:09

## 2024-02-08 RX ADMIN — IPRATROPIUM BROMIDE AND ALBUTEROL SULFATE 1 DOSE: 2.5; .5 SOLUTION RESPIRATORY (INHALATION) at 14:23

## 2024-02-08 RX ADMIN — DEXMEDETOMIDINE HYDROCHLORIDE 0.5 MCG/KG/HR: 400 INJECTION, SOLUTION INTRAVENOUS at 20:34

## 2024-02-08 RX ADMIN — METHYLPREDNISOLONE SODIUM SUCCINATE 40 MG: 40 INJECTION INTRAMUSCULAR; INTRAVENOUS at 20:33

## 2024-02-08 RX ADMIN — HYDROXYZINE PAMOATE 25 MG: 25 CAPSULE ORAL at 13:52

## 2024-02-08 RX ADMIN — BUDESONIDE INHALATION 500 MCG: 0.5 SUSPENSION RESPIRATORY (INHALATION) at 07:24

## 2024-02-08 RX ADMIN — THERA TABS 1 TABLET: TAB at 08:09

## 2024-02-08 RX ADMIN — CALCITRIOL CAPSULES 0.25 MCG 0.25 MCG: 0.25 CAPSULE ORAL at 08:09

## 2024-02-08 RX ADMIN — IPRATROPIUM BROMIDE AND ALBUTEROL SULFATE 1 DOSE: 2.5; .5 SOLUTION RESPIRATORY (INHALATION) at 07:24

## 2024-02-08 RX ADMIN — Medication 5 MG: at 20:33

## 2024-02-08 RX ADMIN — DEXMEDETOMIDINE HYDROCHLORIDE 0.5 MCG/KG/HR: 400 INJECTION, SOLUTION INTRAVENOUS at 06:26

## 2024-02-08 RX ADMIN — METHYLPREDNISOLONE SODIUM SUCCINATE 40 MG: 40 INJECTION INTRAMUSCULAR; INTRAVENOUS at 08:09

## 2024-02-08 RX ADMIN — BUDESONIDE INHALATION 500 MCG: 0.5 SUSPENSION RESPIRATORY (INHALATION) at 19:23

## 2024-02-08 RX ADMIN — HYDROXYZINE PAMOATE 25 MG: 25 CAPSULE ORAL at 20:33

## 2024-02-08 RX ADMIN — THIAMINE HCL TAB 100 MG 100 MG: 100 TAB at 08:09

## 2024-02-08 RX ADMIN — LORAZEPAM 1 MG: 2 INJECTION INTRAMUSCULAR; INTRAVENOUS at 11:23

## 2024-02-08 RX ADMIN — FOLIC ACID 1 MG: 1 TABLET ORAL at 08:09

## 2024-02-08 NOTE — CARE COORDINATION
CM reviewed Pt medicals, a [employer] search was done and Pt has no family.    Apparently the only person Pt has is her boyfriend of 20 years.    CM will follow for D/C needs. Dr. Jimenez stated that he will have a goals of care with Pt.

## 2024-02-09 ENCOUNTER — APPOINTMENT (OUTPATIENT)
Facility: HOSPITAL | Age: 69
DRG: 308 | End: 2024-02-09
Payer: MEDICARE

## 2024-02-09 LAB
ANION GAP SERPL CALC-SCNC: 12 MMOL/L (ref 5–15)
ARTERIAL PATENCY WRIST A: YES
BASE DEFICIT BLDA-SCNC: 0.8 MMOL/L
BDY SITE: ABNORMAL
BODY TEMPERATURE: 97.8
BUN SERPL-MCNC: 90 MG/DL (ref 6–20)
BUN/CREAT SERPL: 24 (ref 12–20)
CA-I BLD-MCNC: 8 MG/DL (ref 8.5–10.1)
CHLORIDE SERPL-SCNC: 99 MMOL/L (ref 97–108)
CO2 SERPL-SCNC: 21 MMOL/L (ref 21–32)
COHGB MFR BLD: 0.1 % (ref 1–2)
CREAT SERPL-MCNC: 3.69 MG/DL (ref 0.55–1.02)
ERYTHROCYTE [DISTWIDTH] IN BLOOD BY AUTOMATED COUNT: 13 % (ref 11.5–14.5)
FIO2 ON VENT: 100 %
GAS FLOW.O2 SETTING OXYMISER: 10
GLUCOSE SERPL-MCNC: 175 MG/DL (ref 65–100)
HCO3 BLDA-SCNC: 23 MMOL/L (ref 22–26)
HCT VFR BLD AUTO: 31.3 % (ref 35–47)
HGB BLD-MCNC: 11.1 G/DL (ref 11.5–16)
IPAP/PIP: 18
MAGNESIUM SERPL-MCNC: 1.9 MG/DL (ref 1.6–2.4)
MCH RBC QN AUTO: 36.6 PG (ref 26–34)
MCHC RBC AUTO-ENTMCNC: 35.5 G/DL (ref 30–36.5)
MCV RBC AUTO: 103.3 FL (ref 80–99)
METHGB MFR BLD: 0.5 % (ref 0–1.4)
NRBC # BLD: 0 K/UL (ref 0–0.01)
NRBC BLD-RTO: 0 PER 100 WBC
OXYHGB MFR BLD: 85.3 % (ref 95–99)
PCO2 BLDA: 34 MMHG (ref 35–45)
PEEP RESPIRATORY: 10
PERFORMED BY:: ABNORMAL
PH BLDA: 7.44 (ref 7.35–7.45)
PLATELET # BLD AUTO: 145 K/UL (ref 150–400)
PMV BLD AUTO: 12.1 FL (ref 8.9–12.9)
PO2 BLDA: 45 MMHG (ref 80–100)
POTASSIUM SERPL-SCNC: 4 MMOL/L (ref 3.5–5.1)
RBC # BLD AUTO: 3.03 M/UL (ref 3.8–5.2)
SAO2 % BLD: 86 % (ref 95–99)
SAO2% DEVICE SAO2% SENSOR NAME: ABNORMAL
SERVICE CMNT-IMP: ABNORMAL
SODIUM SERPL-SCNC: 132 MMOL/L (ref 136–145)
SPECIMEN SITE: ABNORMAL
VENTILATION MODE VENT: ABNORMAL
WBC # BLD AUTO: 21.4 K/UL (ref 3.6–11)

## 2024-02-09 PROCEDURE — 6370000000 HC RX 637 (ALT 250 FOR IP): Performed by: INTERNAL MEDICINE

## 2024-02-09 PROCEDURE — 85027 COMPLETE CBC AUTOMATED: CPT

## 2024-02-09 PROCEDURE — 36600 WITHDRAWAL OF ARTERIAL BLOOD: CPT

## 2024-02-09 PROCEDURE — 6370000000 HC RX 637 (ALT 250 FOR IP): Performed by: NURSE PRACTITIONER

## 2024-02-09 PROCEDURE — 2580000003 HC RX 258: Performed by: INTERNAL MEDICINE

## 2024-02-09 PROCEDURE — 2580000003 HC RX 258: Performed by: NURSE PRACTITIONER

## 2024-02-09 PROCEDURE — 2500000003 HC RX 250 WO HCPCS: Performed by: INTERNAL MEDICINE

## 2024-02-09 PROCEDURE — 36415 COLL VENOUS BLD VENIPUNCTURE: CPT

## 2024-02-09 PROCEDURE — 6360000002 HC RX W HCPCS: Performed by: INTERNAL MEDICINE

## 2024-02-09 PROCEDURE — 51701 INSERT BLADDER CATHETER: CPT

## 2024-02-09 PROCEDURE — 80048 BASIC METABOLIC PNL TOTAL CA: CPT

## 2024-02-09 PROCEDURE — 51798 US URINE CAPACITY MEASURE: CPT

## 2024-02-09 PROCEDURE — 2700000000 HC OXYGEN THERAPY PER DAY

## 2024-02-09 PROCEDURE — 6370000000 HC RX 637 (ALT 250 FOR IP): Performed by: STUDENT IN AN ORGANIZED HEALTH CARE EDUCATION/TRAINING PROGRAM

## 2024-02-09 PROCEDURE — 71045 X-RAY EXAM CHEST 1 VIEW: CPT

## 2024-02-09 PROCEDURE — 83735 ASSAY OF MAGNESIUM: CPT

## 2024-02-09 PROCEDURE — 6370000000 HC RX 637 (ALT 250 FOR IP): Performed by: PHYSICIAN ASSISTANT

## 2024-02-09 PROCEDURE — 94640 AIRWAY INHALATION TREATMENT: CPT

## 2024-02-09 PROCEDURE — 82803 BLOOD GASES ANY COMBINATION: CPT

## 2024-02-09 PROCEDURE — 2000000000 HC ICU R&B

## 2024-02-09 PROCEDURE — 94660 CPAP INITIATION&MGMT: CPT

## 2024-02-09 RX ORDER — FUROSEMIDE 10 MG/ML
40 INJECTION INTRAMUSCULAR; INTRAVENOUS DAILY
Status: DISCONTINUED | OUTPATIENT
Start: 2024-02-10 | End: 2024-02-11

## 2024-02-09 RX ORDER — GUAIFENESIN 200 MG/10ML
400 LIQUID ORAL
Status: DISCONTINUED | OUTPATIENT
Start: 2024-02-09 | End: 2024-02-13

## 2024-02-09 RX ORDER — CALCITRIOL 1 UG/ML
0.25 SOLUTION ORAL DAILY
Status: DISCONTINUED | OUTPATIENT
Start: 2024-02-09 | End: 2024-02-13

## 2024-02-09 RX ORDER — ERGOCALCIFEROL (VITAMIN D2) 200 MCG/ML
50000 DROPS ORAL WEEKLY
Status: DISCONTINUED | OUTPATIENT
Start: 2024-02-09 | End: 2024-02-13

## 2024-02-09 RX ORDER — HYDROMORPHONE HYDROCHLORIDE 1 MG/ML
0.5 INJECTION, SOLUTION INTRAMUSCULAR; INTRAVENOUS; SUBCUTANEOUS EVERY 4 HOURS PRN
Status: DISCONTINUED | OUTPATIENT
Start: 2024-02-09 | End: 2024-02-10

## 2024-02-09 RX ADMIN — CALCITRIOL 0.25 MCG: 1 SOLUTION ORAL at 11:08

## 2024-02-09 RX ADMIN — APIXABAN 2.5 MG: 2.5 TABLET, FILM COATED ORAL at 08:39

## 2024-02-09 RX ADMIN — GUAIFENESIN 400 MG: 200 SOLUTION ORAL at 19:20

## 2024-02-09 RX ADMIN — DEXMEDETOMIDINE HYDROCHLORIDE 0.3 MCG/KG/HR: 400 INJECTION, SOLUTION INTRAVENOUS at 11:12

## 2024-02-09 RX ADMIN — BUDESONIDE INHALATION 500 MCG: 0.5 SUSPENSION RESPIRATORY (INHALATION) at 20:02

## 2024-02-09 RX ADMIN — THIAMINE HCL TAB 100 MG 100 MG: 100 TAB at 08:39

## 2024-02-09 RX ADMIN — HYDROXYZINE PAMOATE 25 MG: 25 CAPSULE ORAL at 19:20

## 2024-02-09 RX ADMIN — SODIUM CHLORIDE, PRESERVATIVE FREE 10 ML: 5 INJECTION INTRAVENOUS at 08:40

## 2024-02-09 RX ADMIN — APIXABAN 2.5 MG: 2.5 TABLET, FILM COATED ORAL at 19:21

## 2024-02-09 RX ADMIN — GUAIFENESIN 1200 MG: 600 TABLET, EXTENDED RELEASE ORAL at 08:38

## 2024-02-09 RX ADMIN — IPRATROPIUM BROMIDE AND ALBUTEROL SULFATE 1 DOSE: 2.5; .5 SOLUTION RESPIRATORY (INHALATION) at 20:02

## 2024-02-09 RX ADMIN — HYDROMORPHONE HYDROCHLORIDE 0.5 MG: 1 INJECTION, SOLUTION INTRAMUSCULAR; INTRAVENOUS; SUBCUTANEOUS at 08:39

## 2024-02-09 RX ADMIN — Medication 50000 UNITS: at 11:08

## 2024-02-09 RX ADMIN — PRAVASTATIN SODIUM 10 MG: 10 TABLET ORAL at 19:21

## 2024-02-09 RX ADMIN — METHYLPREDNISOLONE SODIUM SUCCINATE 40 MG: 40 INJECTION INTRAMUSCULAR; INTRAVENOUS at 19:21

## 2024-02-09 RX ADMIN — PANTOPRAZOLE SODIUM 40 MG: 40 TABLET, DELAYED RELEASE ORAL at 08:39

## 2024-02-09 RX ADMIN — AMIODARONE HYDROCHLORIDE 200 MG: 200 TABLET ORAL at 08:38

## 2024-02-09 RX ADMIN — SODIUM CHLORIDE, PRESERVATIVE FREE 10 ML: 5 INJECTION INTRAVENOUS at 19:21

## 2024-02-09 RX ADMIN — METHYLPREDNISOLONE SODIUM SUCCINATE 40 MG: 40 INJECTION INTRAMUSCULAR; INTRAVENOUS at 08:38

## 2024-02-09 RX ADMIN — HYDROMORPHONE HYDROCHLORIDE 0.5 MG: 1 INJECTION, SOLUTION INTRAMUSCULAR; INTRAVENOUS; SUBCUTANEOUS at 19:30

## 2024-02-09 RX ADMIN — HYDROXYZINE PAMOATE 25 MG: 25 CAPSULE ORAL at 14:40

## 2024-02-09 RX ADMIN — IPRATROPIUM BROMIDE AND ALBUTEROL SULFATE 1 DOSE: 2.5; .5 SOLUTION RESPIRATORY (INHALATION) at 07:10

## 2024-02-09 RX ADMIN — BUDESONIDE INHALATION 500 MCG: 0.5 SUSPENSION RESPIRATORY (INHALATION) at 07:10

## 2024-02-09 RX ADMIN — DILTIAZEM HYDROCHLORIDE 5 MG/HR: 5 INJECTION, SOLUTION INTRAVENOUS at 11:09

## 2024-02-09 RX ADMIN — THERA TABS 1 TABLET: TAB at 08:38

## 2024-02-09 RX ADMIN — IPRATROPIUM BROMIDE AND ALBUTEROL SULFATE 1 DOSE: 2.5; .5 SOLUTION RESPIRATORY (INHALATION) at 14:13

## 2024-02-09 RX ADMIN — FOLIC ACID 1 MG: 1 TABLET ORAL at 08:38

## 2024-02-09 RX ADMIN — Medication 5 MG: at 19:21

## 2024-02-09 RX ADMIN — GUAIFENESIN 400 MG: 200 SOLUTION ORAL at 14:40

## 2024-02-09 RX ADMIN — HYDROXYZINE PAMOATE 25 MG: 25 CAPSULE ORAL at 08:38

## 2024-02-09 RX ADMIN — GUAIFENESIN 400 MG: 200 SOLUTION ORAL at 23:21

## 2024-02-09 NOTE — CARE COORDINATION
CM reviewed Pt medicals, per Dr. Jerez Pt Poor overall prognosis.  Blood pressure is low.  She was +2.8 L overnight.    CM will follow.

## 2024-02-10 ENCOUNTER — APPOINTMENT (OUTPATIENT)
Facility: HOSPITAL | Age: 69
DRG: 308 | End: 2024-02-10
Payer: MEDICARE

## 2024-02-10 LAB
ANION GAP SERPL CALC-SCNC: 11 MMOL/L (ref 5–15)
ARTERIAL PATENCY WRIST A: YES
BACTERIA SPEC CULT: NORMAL
BACTERIA SPEC CULT: NORMAL
BASE DEFICIT BLDA-SCNC: 1.1 MMOL/L
BASOPHILS # BLD: 0 K/UL (ref 0–0.1)
BASOPHILS NFR BLD: 0 % (ref 0–1)
BDY SITE: ABNORMAL
BODY TEMPERATURE: 98.6
BUN SERPL-MCNC: 106 MG/DL (ref 6–20)
BUN/CREAT SERPL: 28 (ref 12–20)
CA-I BLD-MCNC: 7.8 MG/DL (ref 8.5–10.1)
CHLORIDE SERPL-SCNC: 97 MMOL/L (ref 97–108)
CO2 SERPL-SCNC: 22 MMOL/L (ref 21–32)
COHGB MFR BLD: 0.4 % (ref 1–2)
CREAT SERPL-MCNC: 3.74 MG/DL (ref 0.55–1.02)
DIFFERENTIAL METHOD BLD: ABNORMAL
EOSINOPHIL # BLD: 0 K/UL (ref 0–0.4)
EOSINOPHIL NFR BLD: 0 % (ref 0–7)
ERYTHROCYTE [DISTWIDTH] IN BLOOD BY AUTOMATED COUNT: 13 % (ref 11.5–14.5)
FIO2 ON VENT: 80 %
GAS FLOW.O2 SETTING OXYMISER: 12
GLUCOSE SERPL-MCNC: 202 MG/DL (ref 65–100)
HCO3 BLDA-SCNC: 23 MMOL/L (ref 22–26)
HCT VFR BLD AUTO: 29.7 % (ref 35–47)
HGB BLD-MCNC: 10.7 G/DL (ref 11.5–16)
IMM GRANULOCYTES # BLD AUTO: 0 K/UL (ref 0–0.04)
IMM GRANULOCYTES NFR BLD AUTO: 0 % (ref 0–0.5)
IPAP/PIP: 18
LYMPHOCYTES # BLD: 0.6 K/UL (ref 0.8–3.5)
LYMPHOCYTES NFR BLD: 2 % (ref 12–49)
Lab: NORMAL
Lab: NORMAL
MCH RBC QN AUTO: 37.2 PG (ref 26–34)
MCHC RBC AUTO-ENTMCNC: 36 G/DL (ref 30–36.5)
MCV RBC AUTO: 103.1 FL (ref 80–99)
METHGB MFR BLD: 0.3 % (ref 0–1.4)
MONOCYTES # BLD: 0.8 K/UL (ref 0–1)
MONOCYTES NFR BLD: 3 % (ref 5–13)
NEUTS SEG # BLD: 26.4 K/UL (ref 1.8–8)
NEUTS SEG NFR BLD: 95 % (ref 32–75)
NRBC # BLD: 0 K/UL (ref 0–0.01)
NRBC BLD-RTO: 0 PER 100 WBC
OXYHGB MFR BLD: 87.7 % (ref 95–99)
PCO2 BLDA: 39 MMHG (ref 35–45)
PEEP RESPIRATORY: 10
PERFORMED BY:: ABNORMAL
PH BLDA: 7.4 (ref 7.35–7.45)
PLATELET # BLD AUTO: 153 K/UL (ref 150–400)
PMV BLD AUTO: 11.9 FL (ref 8.9–12.9)
PO2 BLDA: 52 MMHG (ref 80–100)
POTASSIUM SERPL-SCNC: 4.3 MMOL/L (ref 3.5–5.1)
RBC # BLD AUTO: 2.88 M/UL (ref 3.8–5.2)
RBC MORPH BLD: ABNORMAL
SAO2 % BLD: 88 % (ref 95–99)
SAO2% DEVICE SAO2% SENSOR NAME: ABNORMAL
SERVICE CMNT-IMP: ABNORMAL
SODIUM SERPL-SCNC: 130 MMOL/L (ref 136–145)
SPECIMEN SITE: ABNORMAL
VENTILATION MODE VENT: ABNORMAL
WBC # BLD AUTO: 27.8 K/UL (ref 3.6–11)

## 2024-02-10 PROCEDURE — 6360000002 HC RX W HCPCS: Performed by: INTERNAL MEDICINE

## 2024-02-10 PROCEDURE — 2500000003 HC RX 250 WO HCPCS: Performed by: INTERNAL MEDICINE

## 2024-02-10 PROCEDURE — 94640 AIRWAY INHALATION TREATMENT: CPT

## 2024-02-10 PROCEDURE — 71045 X-RAY EXAM CHEST 1 VIEW: CPT

## 2024-02-10 PROCEDURE — 80048 BASIC METABOLIC PNL TOTAL CA: CPT

## 2024-02-10 PROCEDURE — 2700000000 HC OXYGEN THERAPY PER DAY

## 2024-02-10 PROCEDURE — 94660 CPAP INITIATION&MGMT: CPT

## 2024-02-10 PROCEDURE — 6370000000 HC RX 637 (ALT 250 FOR IP): Performed by: INTERNAL MEDICINE

## 2024-02-10 PROCEDURE — 2000000000 HC ICU R&B

## 2024-02-10 PROCEDURE — 85025 COMPLETE CBC W/AUTO DIFF WBC: CPT

## 2024-02-10 PROCEDURE — 6370000000 HC RX 637 (ALT 250 FOR IP): Performed by: NURSE PRACTITIONER

## 2024-02-10 PROCEDURE — 36415 COLL VENOUS BLD VENIPUNCTURE: CPT

## 2024-02-10 PROCEDURE — 6370000000 HC RX 637 (ALT 250 FOR IP): Performed by: PHYSICIAN ASSISTANT

## 2024-02-10 PROCEDURE — 36600 WITHDRAWAL OF ARTERIAL BLOOD: CPT

## 2024-02-10 PROCEDURE — 6370000000 HC RX 637 (ALT 250 FOR IP): Performed by: STUDENT IN AN ORGANIZED HEALTH CARE EDUCATION/TRAINING PROGRAM

## 2024-02-10 PROCEDURE — 82803 BLOOD GASES ANY COMBINATION: CPT

## 2024-02-10 PROCEDURE — 94761 N-INVAS EAR/PLS OXIMETRY MLT: CPT

## 2024-02-10 PROCEDURE — 6360000002 HC RX W HCPCS: Performed by: HOSPITALIST

## 2024-02-10 PROCEDURE — 2580000003 HC RX 258: Performed by: NURSE PRACTITIONER

## 2024-02-10 PROCEDURE — 2580000003 HC RX 258: Performed by: INTERNAL MEDICINE

## 2024-02-10 RX ORDER — HYDROMORPHONE HYDROCHLORIDE 1 MG/ML
1 INJECTION, SOLUTION INTRAMUSCULAR; INTRAVENOUS; SUBCUTANEOUS EVERY 4 HOURS PRN
Status: DISPENSED | OUTPATIENT
Start: 2024-02-10 | End: 2024-02-12

## 2024-02-10 RX ADMIN — GUAIFENESIN 400 MG: 200 SOLUTION ORAL at 15:44

## 2024-02-10 RX ADMIN — THIAMINE HCL TAB 100 MG 100 MG: 100 TAB at 09:00

## 2024-02-10 RX ADMIN — GUAIFENESIN 400 MG: 200 SOLUTION ORAL at 21:18

## 2024-02-10 RX ADMIN — METHYLPREDNISOLONE SODIUM SUCCINATE 40 MG: 40 INJECTION INTRAMUSCULAR; INTRAVENOUS at 08:59

## 2024-02-10 RX ADMIN — SODIUM CHLORIDE, PRESERVATIVE FREE 10 ML: 5 INJECTION INTRAVENOUS at 09:01

## 2024-02-10 RX ADMIN — PRAVASTATIN SODIUM 10 MG: 10 TABLET ORAL at 21:17

## 2024-02-10 RX ADMIN — THERA TABS 1 TABLET: TAB at 09:00

## 2024-02-10 RX ADMIN — HYDROMORPHONE HYDROCHLORIDE 0.5 MG: 1 INJECTION, SOLUTION INTRAMUSCULAR; INTRAVENOUS; SUBCUTANEOUS at 05:29

## 2024-02-10 RX ADMIN — CALCITRIOL 0.25 MCG: 1 SOLUTION ORAL at 09:14

## 2024-02-10 RX ADMIN — AMIODARONE HYDROCHLORIDE 200 MG: 200 TABLET ORAL at 09:00

## 2024-02-10 RX ADMIN — BUDESONIDE INHALATION 500 MCG: 0.5 SUSPENSION RESPIRATORY (INHALATION) at 08:25

## 2024-02-10 RX ADMIN — SODIUM CHLORIDE, PRESERVATIVE FREE 10 ML: 5 INJECTION INTRAVENOUS at 21:18

## 2024-02-10 RX ADMIN — IPRATROPIUM BROMIDE AND ALBUTEROL SULFATE 1 DOSE: 2.5; .5 SOLUTION RESPIRATORY (INHALATION) at 14:06

## 2024-02-10 RX ADMIN — DILTIAZEM HYDROCHLORIDE 5 MG/HR: 5 INJECTION, SOLUTION INTRAVENOUS at 09:00

## 2024-02-10 RX ADMIN — LORAZEPAM 1 MG: 2 INJECTION INTRAMUSCULAR; INTRAVENOUS at 18:26

## 2024-02-10 RX ADMIN — GUAIFENESIN 400 MG: 200 SOLUTION ORAL at 09:00

## 2024-02-10 RX ADMIN — APIXABAN 2.5 MG: 2.5 TABLET, FILM COATED ORAL at 09:00

## 2024-02-10 RX ADMIN — Medication 5 MG: at 21:17

## 2024-02-10 RX ADMIN — METHYLPREDNISOLONE SODIUM SUCCINATE 40 MG: 40 INJECTION INTRAMUSCULAR; INTRAVENOUS at 21:18

## 2024-02-10 RX ADMIN — FUROSEMIDE 40 MG: 10 INJECTION, SOLUTION INTRAMUSCULAR; INTRAVENOUS at 08:59

## 2024-02-10 RX ADMIN — DEXMEDETOMIDINE HYDROCHLORIDE 0.3 MCG/KG/HR: 400 INJECTION, SOLUTION INTRAVENOUS at 09:00

## 2024-02-10 RX ADMIN — PANTOPRAZOLE SODIUM 40 MG: 40 TABLET, DELAYED RELEASE ORAL at 09:00

## 2024-02-10 RX ADMIN — APIXABAN 2.5 MG: 2.5 TABLET, FILM COATED ORAL at 21:17

## 2024-02-10 RX ADMIN — BUDESONIDE INHALATION 500 MCG: 0.5 SUSPENSION RESPIRATORY (INHALATION) at 19:54

## 2024-02-10 RX ADMIN — HYDROXYZINE PAMOATE 25 MG: 25 CAPSULE ORAL at 15:44

## 2024-02-10 RX ADMIN — GUAIFENESIN 400 MG: 200 SOLUTION ORAL at 03:00

## 2024-02-10 RX ADMIN — HYDROXYZINE PAMOATE 25 MG: 25 CAPSULE ORAL at 21:17

## 2024-02-10 RX ADMIN — FOLIC ACID 1 MG: 1 TABLET ORAL at 09:00

## 2024-02-10 RX ADMIN — IPRATROPIUM BROMIDE AND ALBUTEROL SULFATE 1 DOSE: 2.5; .5 SOLUTION RESPIRATORY (INHALATION) at 08:25

## 2024-02-10 RX ADMIN — HYDROXYZINE PAMOATE 25 MG: 25 CAPSULE ORAL at 09:00

## 2024-02-10 RX ADMIN — IPRATROPIUM BROMIDE AND ALBUTEROL SULFATE 1 DOSE: 2.5; .5 SOLUTION RESPIRATORY (INHALATION) at 19:54

## 2024-02-11 ENCOUNTER — APPOINTMENT (OUTPATIENT)
Facility: HOSPITAL | Age: 69
DRG: 308 | End: 2024-02-11
Payer: MEDICARE

## 2024-02-11 LAB
ALBUMIN SERPL-MCNC: 1.6 G/DL (ref 3.5–5)
ALBUMIN SERPL-MCNC: 1.8 G/DL (ref 3.5–5)
ALBUMIN/GLOB SERPL: 0.4 (ref 1.1–2.2)
ALP SERPL-CCNC: 131 U/L (ref 45–117)
ALT SERPL-CCNC: 43 U/L (ref 12–78)
ANION GAP SERPL CALC-SCNC: 13 MMOL/L (ref 5–15)
APPEARANCE UR: ABNORMAL
ARTERIAL PATENCY WRIST A: YES
BACTERIA URNS QL MICRO: NEGATIVE /HPF
BASE DEFICIT BLDA-SCNC: 2.2 MMOL/L
BDY SITE: ABNORMAL
BILIRUB DIRECT SERPL-MCNC: <0.1 MG/DL (ref 0–0.2)
BILIRUB SERPL-MCNC: 0.7 MG/DL (ref 0.2–1)
BILIRUB UR QL: NEGATIVE
BODY TEMPERATURE: 97.7
BUN SERPL-MCNC: 101 MG/DL (ref 6–20)
BUN/CREAT SERPL: 29 (ref 12–20)
CA-I BLD-MCNC: 1.07 MMOL/L (ref 1.13–1.32)
CA-I BLD-MCNC: 8 MG/DL (ref 8.5–10.1)
CHLORIDE SERPL-SCNC: 95 MMOL/L (ref 97–108)
CO2 SERPL-SCNC: 21 MMOL/L (ref 21–32)
COHGB MFR BLD: 0.4 % (ref 1–2)
COLOR UR: ABNORMAL
CREAT SERPL-MCNC: 3.54 MG/DL (ref 0.55–1.02)
CRP SERPL-MCNC: 11.6 MG/DL (ref 0–0.3)
EPITH CASTS URNS QL MICRO: ABNORMAL /LPF
ERYTHROCYTE [DISTWIDTH] IN BLOOD BY AUTOMATED COUNT: 13 % (ref 11.5–14.5)
FIO2 ON VENT: 100 %
FLUAV RNA SPEC QL NAA+PROBE: NOT DETECTED
FLUBV RNA SPEC QL NAA+PROBE: NOT DETECTED
GAS FLOW.O2 SETTING OXYMISER: 12
GLOBULIN SER CALC-MCNC: 3.7 G/DL (ref 2–4)
GLUCOSE SERPL-MCNC: 178 MG/DL (ref 65–100)
GLUCOSE UR STRIP.AUTO-MCNC: NEGATIVE MG/DL
HCO3 BLDA-SCNC: 21 MMOL/L (ref 22–26)
HCT VFR BLD AUTO: 28.5 % (ref 35–47)
HGB BLD-MCNC: 10.3 G/DL (ref 11.5–16)
HGB UR QL STRIP: NEGATIVE
IPAP/PIP: 18
KETONES UR QL STRIP.AUTO: NEGATIVE MG/DL
LEUKOCYTE ESTERASE UR QL STRIP.AUTO: ABNORMAL
MCH RBC QN AUTO: 37.5 PG (ref 26–34)
MCHC RBC AUTO-ENTMCNC: 36.1 G/DL (ref 30–36.5)
MCV RBC AUTO: 103.6 FL (ref 80–99)
METHGB MFR BLD: 0.2 % (ref 0–1.4)
NITRITE UR QL STRIP.AUTO: NEGATIVE
NRBC # BLD: 0 K/UL (ref 0–0.01)
NRBC BLD-RTO: 0 PER 100 WBC
OXYHGB MFR BLD: 90.7 % (ref 95–99)
PCO2 BLDA: 33 MMHG (ref 35–45)
PEEP RESPIRATORY: 10
PERFORMED BY:: ABNORMAL
PH BLDA: 7.43 (ref 7.35–7.45)
PH UR STRIP: 5 (ref 5–8)
PHOSPHATE SERPL-MCNC: 4.2 MG/DL (ref 2.6–4.7)
PLATELET # BLD AUTO: 162 K/UL (ref 150–400)
PMV BLD AUTO: 12.5 FL (ref 8.9–12.9)
PO2 BLDA: 58 MMHG (ref 80–100)
POTASSIUM SERPL-SCNC: 4.4 MMOL/L (ref 3.5–5.1)
PROT SERPL-MCNC: 5.3 G/DL (ref 6.4–8.2)
PROT UR STRIP-MCNC: 100 MG/DL
RBC # BLD AUTO: 2.75 M/UL (ref 3.8–5.2)
RBC #/AREA URNS HPF: ABNORMAL /HPF (ref 0–5)
SAO2 % BLD: 91 % (ref 95–99)
SAO2% DEVICE SAO2% SENSOR NAME: ABNORMAL
SARS-COV-2 RNA RESP QL NAA+PROBE: DETECTED
SODIUM SERPL-SCNC: 129 MMOL/L (ref 136–145)
SP GR UR REFRACTOMETRY: 1.01 (ref 1–1.03)
SPECIMEN SITE: ABNORMAL
URINE CULTURE IF INDICATED: ABNORMAL
UROBILINOGEN UR QL STRIP.AUTO: 0.1 EU/DL (ref 0.1–1)
WBC # BLD AUTO: 42.8 K/UL (ref 3.6–11)
WBC CASTS URNS QL MICRO: PRESENT
WBC URNS QL MICRO: ABNORMAL /HPF (ref 0–4)
YEAST URNS QL MICRO: PRESENT

## 2024-02-11 PROCEDURE — 87324 CLOSTRIDIUM AG IA: CPT

## 2024-02-11 PROCEDURE — 6360000002 HC RX W HCPCS: Performed by: INTERNAL MEDICINE

## 2024-02-11 PROCEDURE — 85027 COMPLETE CBC AUTOMATED: CPT

## 2024-02-11 PROCEDURE — 87449 NOS EACH ORGANISM AG IA: CPT

## 2024-02-11 PROCEDURE — 94761 N-INVAS EAR/PLS OXIMETRY MLT: CPT

## 2024-02-11 PROCEDURE — 87040 BLOOD CULTURE FOR BACTERIA: CPT

## 2024-02-11 PROCEDURE — 94660 CPAP INITIATION&MGMT: CPT

## 2024-02-11 PROCEDURE — 87086 URINE CULTURE/COLONY COUNT: CPT

## 2024-02-11 PROCEDURE — 87077 CULTURE AEROBIC IDENTIFY: CPT

## 2024-02-11 PROCEDURE — 87493 C DIFF AMPLIFIED PROBE: CPT

## 2024-02-11 PROCEDURE — 6370000000 HC RX 637 (ALT 250 FOR IP): Performed by: INTERNAL MEDICINE

## 2024-02-11 PROCEDURE — 86140 C-REACTIVE PROTEIN: CPT

## 2024-02-11 PROCEDURE — 94640 AIRWAY INHALATION TREATMENT: CPT

## 2024-02-11 PROCEDURE — 2580000003 HC RX 258: Performed by: NURSE PRACTITIONER

## 2024-02-11 PROCEDURE — 82330 ASSAY OF CALCIUM: CPT

## 2024-02-11 PROCEDURE — 36415 COLL VENOUS BLD VENIPUNCTURE: CPT

## 2024-02-11 PROCEDURE — 36600 WITHDRAWAL OF ARTERIAL BLOOD: CPT

## 2024-02-11 PROCEDURE — 82803 BLOOD GASES ANY COMBINATION: CPT

## 2024-02-11 PROCEDURE — 81001 URINALYSIS AUTO W/SCOPE: CPT

## 2024-02-11 PROCEDURE — 80076 HEPATIC FUNCTION PANEL: CPT

## 2024-02-11 PROCEDURE — 6370000000 HC RX 637 (ALT 250 FOR IP): Performed by: NURSE PRACTITIONER

## 2024-02-11 PROCEDURE — 2000000000 HC ICU R&B

## 2024-02-11 PROCEDURE — 6370000000 HC RX 637 (ALT 250 FOR IP): Performed by: PHYSICIAN ASSISTANT

## 2024-02-11 PROCEDURE — 6370000000 HC RX 637 (ALT 250 FOR IP): Performed by: STUDENT IN AN ORGANIZED HEALTH CARE EDUCATION/TRAINING PROGRAM

## 2024-02-11 PROCEDURE — 71045 X-RAY EXAM CHEST 1 VIEW: CPT

## 2024-02-11 PROCEDURE — 2500000003 HC RX 250 WO HCPCS: Performed by: INTERNAL MEDICINE

## 2024-02-11 PROCEDURE — 2580000003 HC RX 258: Performed by: INTERNAL MEDICINE

## 2024-02-11 PROCEDURE — 6360000002 HC RX W HCPCS: Performed by: HOSPITALIST

## 2024-02-11 PROCEDURE — 87636 SARSCOV2 & INF A&B AMP PRB: CPT

## 2024-02-11 PROCEDURE — 80069 RENAL FUNCTION PANEL: CPT

## 2024-02-11 PROCEDURE — 71250 CT THORAX DX C-: CPT

## 2024-02-11 RX ORDER — FUROSEMIDE 10 MG/ML
40 INJECTION INTRAMUSCULAR; INTRAVENOUS 2 TIMES DAILY
Status: DISCONTINUED | OUTPATIENT
Start: 2024-02-11 | End: 2024-02-12

## 2024-02-11 RX ORDER — METHYLPREDNISOLONE SODIUM SUCCINATE 40 MG/ML
40 INJECTION, POWDER, LYOPHILIZED, FOR SOLUTION INTRAMUSCULAR; INTRAVENOUS DAILY
Status: DISCONTINUED | OUTPATIENT
Start: 2024-02-11 | End: 2024-02-13

## 2024-02-11 RX ADMIN — IPRATROPIUM BROMIDE AND ALBUTEROL SULFATE 1 DOSE: 2.5; .5 SOLUTION RESPIRATORY (INHALATION) at 19:21

## 2024-02-11 RX ADMIN — CALCITRIOL 0.25 MCG: 1 SOLUTION ORAL at 10:00

## 2024-02-11 RX ADMIN — HYDROXYZINE PAMOATE 25 MG: 25 CAPSULE ORAL at 20:01

## 2024-02-11 RX ADMIN — HYDROMORPHONE HYDROCHLORIDE 1 MG: 1 INJECTION, SOLUTION INTRAMUSCULAR; INTRAVENOUS; SUBCUTANEOUS at 13:05

## 2024-02-11 RX ADMIN — IPRATROPIUM BROMIDE AND ALBUTEROL SULFATE 1 DOSE: 2.5; .5 SOLUTION RESPIRATORY (INHALATION) at 09:01

## 2024-02-11 RX ADMIN — GUAIFENESIN 400 MG: 200 SOLUTION ORAL at 07:31

## 2024-02-11 RX ADMIN — BUDESONIDE INHALATION 500 MCG: 0.5 SUSPENSION RESPIRATORY (INHALATION) at 19:22

## 2024-02-11 RX ADMIN — HYDROXYZINE PAMOATE 25 MG: 25 CAPSULE ORAL at 14:09

## 2024-02-11 RX ADMIN — GUAIFENESIN 400 MG: 200 SOLUTION ORAL at 23:23

## 2024-02-11 RX ADMIN — FUROSEMIDE 40 MG: 10 INJECTION, SOLUTION INTRAMUSCULAR; INTRAVENOUS at 07:45

## 2024-02-11 RX ADMIN — GUAIFENESIN 400 MG: 200 SOLUTION ORAL at 00:23

## 2024-02-11 RX ADMIN — LORAZEPAM 1 MG: 2 INJECTION INTRAMUSCULAR; INTRAVENOUS at 07:57

## 2024-02-11 RX ADMIN — THERA TABS 1 TABLET: TAB at 07:45

## 2024-02-11 RX ADMIN — TOCILIZUMAB 400 MG: 20 INJECTION, SOLUTION, CONCENTRATE INTRAVENOUS at 16:44

## 2024-02-11 RX ADMIN — BUDESONIDE INHALATION 500 MCG: 0.5 SUSPENSION RESPIRATORY (INHALATION) at 09:01

## 2024-02-11 RX ADMIN — APIXABAN 2.5 MG: 2.5 TABLET, FILM COATED ORAL at 20:01

## 2024-02-11 RX ADMIN — HYDROXYZINE PAMOATE 25 MG: 25 CAPSULE ORAL at 07:45

## 2024-02-11 RX ADMIN — DILTIAZEM HYDROCHLORIDE 7.5 MG/HR: 5 INJECTION, SOLUTION INTRAVENOUS at 11:23

## 2024-02-11 RX ADMIN — DEXMEDETOMIDINE HYDROCHLORIDE 0.5 MCG/KG/HR: 400 INJECTION, SOLUTION INTRAVENOUS at 23:53

## 2024-02-11 RX ADMIN — IPRATROPIUM BROMIDE AND ALBUTEROL SULFATE 1 DOSE: 2.5; .5 SOLUTION RESPIRATORY (INHALATION) at 13:41

## 2024-02-11 RX ADMIN — APIXABAN 2.5 MG: 2.5 TABLET, FILM COATED ORAL at 07:45

## 2024-02-11 RX ADMIN — THIAMINE HCL TAB 100 MG 100 MG: 100 TAB at 07:45

## 2024-02-11 RX ADMIN — GUAIFENESIN 400 MG: 200 SOLUTION ORAL at 04:09

## 2024-02-11 RX ADMIN — DEXMEDETOMIDINE HYDROCHLORIDE 0.5 MCG/KG/HR: 400 INJECTION, SOLUTION INTRAVENOUS at 07:24

## 2024-02-11 RX ADMIN — METHYLPREDNISOLONE SODIUM SUCCINATE 40 MG: 40 INJECTION INTRAMUSCULAR; INTRAVENOUS at 09:16

## 2024-02-11 RX ADMIN — GUAIFENESIN 400 MG: 200 SOLUTION ORAL at 15:46

## 2024-02-11 RX ADMIN — PRAVASTATIN SODIUM 10 MG: 10 TABLET ORAL at 20:01

## 2024-02-11 RX ADMIN — FUROSEMIDE 40 MG: 10 INJECTION, SOLUTION INTRAMUSCULAR; INTRAVENOUS at 16:44

## 2024-02-11 RX ADMIN — SODIUM CHLORIDE, PRESERVATIVE FREE 10 ML: 5 INJECTION INTRAVENOUS at 20:01

## 2024-02-11 RX ADMIN — AMIODARONE HYDROCHLORIDE 200 MG: 200 TABLET ORAL at 07:45

## 2024-02-11 RX ADMIN — SODIUM CHLORIDE, PRESERVATIVE FREE 10 ML: 5 INJECTION INTRAVENOUS at 07:46

## 2024-02-11 RX ADMIN — GUAIFENESIN 400 MG: 200 SOLUTION ORAL at 11:23

## 2024-02-11 RX ADMIN — PANTOPRAZOLE SODIUM 40 MG: 40 TABLET, DELAYED RELEASE ORAL at 07:24

## 2024-02-11 RX ADMIN — FOLIC ACID 1 MG: 1 TABLET ORAL at 07:45

## 2024-02-11 RX ADMIN — GUAIFENESIN 400 MG: 200 SOLUTION ORAL at 20:01

## 2024-02-12 ENCOUNTER — APPOINTMENT (OUTPATIENT)
Facility: HOSPITAL | Age: 69
DRG: 308 | End: 2024-02-12
Payer: MEDICARE

## 2024-02-12 ENCOUNTER — APPOINTMENT (OUTPATIENT)
Facility: HOSPITAL | Age: 69
DRG: 308 | End: 2024-02-12
Attending: INTERNAL MEDICINE
Payer: MEDICARE

## 2024-02-12 ENCOUNTER — APPOINTMENT (OUTPATIENT)
Facility: HOSPITAL | Age: 69
DRG: 308 | End: 2024-02-12
Attending: COLON & RECTAL SURGERY
Payer: MEDICARE

## 2024-02-12 LAB
ALBUMIN SERPL-MCNC: 1.7 G/DL (ref 3.5–5)
ANION GAP SERPL CALC-SCNC: 12 MMOL/L (ref 5–15)
ARTERIAL PATENCY WRIST A: ABNORMAL
BASE DEFICIT BLDA-SCNC: 4.4 MMOL/L
BDY SITE: ABNORMAL
BODY TEMPERATURE: 98.1
BUN SERPL-MCNC: 107 MG/DL (ref 6–20)
BUN/CREAT SERPL: 30 (ref 12–20)
C DIFF GDH STL QL: POSITIVE
C DIFF TOX A+B STL QL IA: NEGATIVE
C DIFF TOX GENS STL QL NAA+PROBE: NEGATIVE
C DIFF TOXIN INTERPRETATION: ABNORMAL
CA-I BLD-MCNC: 7.7 MG/DL (ref 8.5–10.1)
CHLORIDE SERPL-SCNC: 94 MMOL/L (ref 97–108)
CO2 SERPL-SCNC: 22 MMOL/L (ref 21–32)
COHGB MFR BLD: 0.3 % (ref 1–2)
CREAT SERPL-MCNC: 3.61 MG/DL (ref 0.55–1.02)
ERYTHROCYTE [DISTWIDTH] IN BLOOD BY AUTOMATED COUNT: 13 % (ref 11.5–14.5)
FIO2 ON VENT: 100 %
GAS FLOW.O2 SETTING OXYMISER: 12
GLUCOSE BLD STRIP.AUTO-MCNC: 189 MG/DL (ref 65–100)
GLUCOSE SERPL-MCNC: 213 MG/DL (ref 65–100)
HCO3 BLDA-SCNC: 20 MMOL/L (ref 22–26)
HCT VFR BLD AUTO: 26.7 % (ref 35–47)
HGB BLD-MCNC: 9.5 G/DL (ref 11.5–16)
MCH RBC QN AUTO: 37.1 PG (ref 26–34)
MCHC RBC AUTO-ENTMCNC: 35.6 G/DL (ref 30–36.5)
MCV RBC AUTO: 104.3 FL (ref 80–99)
METHGB MFR BLD: 0.3 % (ref 0–1.4)
NRBC # BLD: 0 K/UL (ref 0–0.01)
NRBC BLD-RTO: 0 PER 100 WBC
OXYHGB MFR BLD: 91.5 % (ref 95–99)
PCO2 BLDA: 36 MMHG (ref 35–45)
PEEP MAX SETTING VENT: 18
PEEP RESPIRATORY: 12
PERFORMED BY:: ABNORMAL
PERFORMED BY:: ABNORMAL
PH BLDA: 7.37 (ref 7.35–7.45)
PHOSPHATE SERPL-MCNC: 4.8 MG/DL (ref 2.6–4.7)
PLATELET # BLD AUTO: 165 K/UL (ref 150–400)
PMV BLD AUTO: 11.8 FL (ref 8.9–12.9)
PO2 BLDA: 65 MMHG (ref 80–100)
POTASSIUM SERPL-SCNC: 4.1 MMOL/L (ref 3.5–5.1)
PROCALCITONIN SERPL-MCNC: 0.33 NG/ML
RBC # BLD AUTO: 2.56 M/UL (ref 3.8–5.2)
REFLEX: ABNORMAL
SAO2 % BLD: 92 % (ref 95–99)
SAO2% DEVICE SAO2% SENSOR NAME: ABNORMAL
SODIUM SERPL-SCNC: 128 MMOL/L (ref 136–145)
SPECIMEN SITE: ABNORMAL
WBC # BLD AUTO: 46.2 K/UL (ref 3.6–11)

## 2024-02-12 PROCEDURE — 82962 GLUCOSE BLOOD TEST: CPT

## 2024-02-12 PROCEDURE — 80069 RENAL FUNCTION PANEL: CPT

## 2024-02-12 PROCEDURE — 94660 CPAP INITIATION&MGMT: CPT

## 2024-02-12 PROCEDURE — 99223 1ST HOSP IP/OBS HIGH 75: CPT | Performed by: INTERNAL MEDICINE

## 2024-02-12 PROCEDURE — 2500000003 HC RX 250 WO HCPCS: Performed by: INTERNAL MEDICINE

## 2024-02-12 PROCEDURE — 36600 WITHDRAWAL OF ARTERIAL BLOOD: CPT

## 2024-02-12 PROCEDURE — 02HV33Z INSERTION OF INFUSION DEVICE INTO SUPERIOR VENA CAVA, PERCUTANEOUS APPROACH: ICD-10-PCS | Performed by: STUDENT IN AN ORGANIZED HEALTH CARE EDUCATION/TRAINING PROGRAM

## 2024-02-12 PROCEDURE — 2580000003 HC RX 258: Performed by: INTERNAL MEDICINE

## 2024-02-12 PROCEDURE — 6370000000 HC RX 637 (ALT 250 FOR IP): Performed by: INTERNAL MEDICINE

## 2024-02-12 PROCEDURE — 85027 COMPLETE CBC AUTOMATED: CPT

## 2024-02-12 PROCEDURE — 82803 BLOOD GASES ANY COMBINATION: CPT

## 2024-02-12 PROCEDURE — 6360000002 HC RX W HCPCS: Performed by: INTERNAL MEDICINE

## 2024-02-12 PROCEDURE — 84145 PROCALCITONIN (PCT): CPT

## 2024-02-12 PROCEDURE — 90935 HEMODIALYSIS ONE EVALUATION: CPT

## 2024-02-12 PROCEDURE — 93005 ELECTROCARDIOGRAM TRACING: CPT | Performed by: INTERNAL MEDICINE

## 2024-02-12 PROCEDURE — 6370000000 HC RX 637 (ALT 250 FOR IP): Performed by: STUDENT IN AN ORGANIZED HEALTH CARE EDUCATION/TRAINING PROGRAM

## 2024-02-12 PROCEDURE — 5A1D80Z PERFORMANCE OF URINARY FILTRATION, PROLONGED INTERMITTENT, 6-18 HOURS PER DAY: ICD-10-PCS | Performed by: INTERNAL MEDICINE

## 2024-02-12 PROCEDURE — 71045 X-RAY EXAM CHEST 1 VIEW: CPT

## 2024-02-12 PROCEDURE — C1752 CATH,HEMODIALYSIS,SHORT-TERM: HCPCS

## 2024-02-12 PROCEDURE — C1894 INTRO/SHEATH, NON-LASER: HCPCS

## 2024-02-12 PROCEDURE — 2709999900 HC NON-CHARGEABLE SUPPLY

## 2024-02-12 PROCEDURE — 6360000002 HC RX W HCPCS: Performed by: HOSPITALIST

## 2024-02-12 PROCEDURE — 94640 AIRWAY INHALATION TREATMENT: CPT

## 2024-02-12 PROCEDURE — 2580000003 HC RX 258: Performed by: NURSE PRACTITIONER

## 2024-02-12 PROCEDURE — 94761 N-INVAS EAR/PLS OXIMETRY MLT: CPT

## 2024-02-12 PROCEDURE — 36415 COLL VENOUS BLD VENIPUNCTURE: CPT

## 2024-02-12 PROCEDURE — C1729 CATH, DRAINAGE: HCPCS

## 2024-02-12 PROCEDURE — 2700000000 HC OXYGEN THERAPY PER DAY

## 2024-02-12 PROCEDURE — 0W9930Z DRAINAGE OF RIGHT PLEURAL CAVITY WITH DRAINAGE DEVICE, PERCUTANEOUS APPROACH: ICD-10-PCS | Performed by: STUDENT IN AN ORGANIZED HEALTH CARE EDUCATION/TRAINING PROGRAM

## 2024-02-12 PROCEDURE — 6370000000 HC RX 637 (ALT 250 FOR IP): Performed by: NURSE PRACTITIONER

## 2024-02-12 PROCEDURE — 2000000000 HC ICU R&B

## 2024-02-12 PROCEDURE — 76937 US GUIDE VASCULAR ACCESS: CPT

## 2024-02-12 RX ORDER — LACTOBACILLUS RHAMNOSUS GG 10B CELL
1 CAPSULE ORAL
Status: DISCONTINUED | OUTPATIENT
Start: 2024-02-12 | End: 2024-02-13

## 2024-02-12 RX ORDER — 0.9 % SODIUM CHLORIDE 0.9 %
30 INTRAVENOUS SOLUTION INTRAVENOUS PRN
Status: DISCONTINUED | OUTPATIENT
Start: 2024-02-12 | End: 2024-02-13

## 2024-02-12 RX ORDER — VANCOMYCIN HYDROCHLORIDE 50 MG/ML
500 KIT ORAL EVERY 6 HOURS SCHEDULED
Status: DISCONTINUED | OUTPATIENT
Start: 2024-02-12 | End: 2024-02-13

## 2024-02-12 RX ORDER — CALCIUM CHLORIDE, MAGNESIUM CHLORIDE, DEXTROSE MONOHYDRATE, LACTIC ACID, SODIUM CHLORIDE, SODIUM BICARBONATE AND POTASSIUM CHLORIDE 3.68; 3.05; 22; 5.4; 6.46; 3.09; .314 G/L; G/L; G/L; G/L; G/L; G/L; G/L
INJECTION INTRAVENOUS CONTINUOUS
Status: DISCONTINUED | OUTPATIENT
Start: 2024-02-13 | End: 2024-02-13

## 2024-02-12 RX ORDER — ZINC GLUCONATE 50 MG
50 TABLET ORAL DAILY
Status: DISCONTINUED | OUTPATIENT
Start: 2024-02-12 | End: 2024-02-13

## 2024-02-12 RX ORDER — NOREPINEPHRINE BITARTRATE 0.06 MG/ML
1-120 INJECTION, SOLUTION INTRAVENOUS CONTINUOUS
Status: DISCONTINUED | OUTPATIENT
Start: 2024-02-13 | End: 2024-02-13

## 2024-02-12 RX ORDER — METRONIDAZOLE 500 MG/100ML
500 INJECTION, SOLUTION INTRAVENOUS EVERY 8 HOURS
Status: DISCONTINUED | OUTPATIENT
Start: 2024-02-12 | End: 2024-02-13

## 2024-02-12 RX ORDER — FLUCONAZOLE 2 MG/ML
400 INJECTION, SOLUTION INTRAVENOUS ONCE
Status: COMPLETED | OUTPATIENT
Start: 2024-02-12 | End: 2024-02-12

## 2024-02-12 RX ORDER — FLUCONAZOLE 2 MG/ML
200 INJECTION, SOLUTION INTRAVENOUS
Status: DISCONTINUED | OUTPATIENT
Start: 2024-02-14 | End: 2024-02-13

## 2024-02-12 RX ORDER — HYDROMORPHONE HYDROCHLORIDE 1 MG/ML
1 INJECTION, SOLUTION INTRAMUSCULAR; INTRAVENOUS; SUBCUTANEOUS EVERY 4 HOURS PRN
Status: DISCONTINUED | OUTPATIENT
Start: 2024-02-12 | End: 2024-02-13

## 2024-02-12 RX ORDER — HEPARIN SODIUM 1000 [USP'U]/ML
2500 INJECTION, SOLUTION INTRAVENOUS; SUBCUTANEOUS PRN
Status: DISCONTINUED | OUTPATIENT
Start: 2024-02-12 | End: 2024-02-13

## 2024-02-12 RX ADMIN — FLUCONAZOLE 400 MG: 2 INJECTION, SOLUTION INTRAVENOUS at 20:45

## 2024-02-12 RX ADMIN — SODIUM CHLORIDE, PRESERVATIVE FREE 10 ML: 5 INJECTION INTRAVENOUS at 21:13

## 2024-02-12 RX ADMIN — FUROSEMIDE 40 MG: 10 INJECTION, SOLUTION INTRAMUSCULAR; INTRAVENOUS at 08:38

## 2024-02-12 RX ADMIN — FOLIC ACID 1 MG: 1 TABLET ORAL at 08:38

## 2024-02-12 RX ADMIN — HYDROXYZINE PAMOATE 25 MG: 25 CAPSULE ORAL at 08:38

## 2024-02-12 RX ADMIN — SODIUM CHLORIDE, PRESERVATIVE FREE 10 ML: 5 INJECTION INTRAVENOUS at 08:39

## 2024-02-12 RX ADMIN — METHYLPREDNISOLONE SODIUM SUCCINATE 40 MG: 40 INJECTION INTRAMUSCULAR; INTRAVENOUS at 08:38

## 2024-02-12 RX ADMIN — IPRATROPIUM BROMIDE AND ALBUTEROL SULFATE 1 DOSE: 2.5; .5 SOLUTION RESPIRATORY (INHALATION) at 19:50

## 2024-02-12 RX ADMIN — VANCOMYCIN HYDROCHLORIDE 1250 MG: 1.25 INJECTION, POWDER, LYOPHILIZED, FOR SOLUTION INTRAVENOUS at 15:07

## 2024-02-12 RX ADMIN — Medication 1 CAPSULE: at 20:40

## 2024-02-12 RX ADMIN — GUAIFENESIN 400 MG: 200 SOLUTION ORAL at 15:13

## 2024-02-12 RX ADMIN — LORAZEPAM 1 MG: 2 INJECTION INTRAMUSCULAR; INTRAVENOUS at 03:03

## 2024-02-12 RX ADMIN — CALCITRIOL 0.25 MCG: 1 SOLUTION ORAL at 08:39

## 2024-02-12 RX ADMIN — Medication 50 MG: at 20:55

## 2024-02-12 RX ADMIN — IPRATROPIUM BROMIDE AND ALBUTEROL SULFATE 1 DOSE: 2.5; .5 SOLUTION RESPIRATORY (INHALATION) at 13:30

## 2024-02-12 RX ADMIN — IPRATROPIUM BROMIDE AND ALBUTEROL SULFATE 1 DOSE: 2.5; .5 SOLUTION RESPIRATORY (INHALATION) at 06:07

## 2024-02-12 RX ADMIN — DEXMEDETOMIDINE HYDROCHLORIDE 0.4 MCG/KG/HR: 400 INJECTION, SOLUTION INTRAVENOUS at 15:14

## 2024-02-12 RX ADMIN — BUDESONIDE INHALATION 500 MCG: 0.5 SUSPENSION RESPIRATORY (INHALATION) at 19:50

## 2024-02-12 RX ADMIN — AMIODARONE HYDROCHLORIDE 200 MG: 200 TABLET ORAL at 08:38

## 2024-02-12 RX ADMIN — APIXABAN 2.5 MG: 2.5 TABLET, FILM COATED ORAL at 08:39

## 2024-02-12 RX ADMIN — HYDROMORPHONE HYDROCHLORIDE 1 MG: 1 INJECTION, SOLUTION INTRAMUSCULAR; INTRAVENOUS; SUBCUTANEOUS at 02:09

## 2024-02-12 RX ADMIN — LORAZEPAM 1 MG: 2 INJECTION INTRAMUSCULAR; INTRAVENOUS at 21:02

## 2024-02-12 RX ADMIN — DILTIAZEM HYDROCHLORIDE 7.5 MG/HR: 5 INJECTION, SOLUTION INTRAVENOUS at 03:54

## 2024-02-12 RX ADMIN — METRONIDAZOLE 500 MG: 500 INJECTION, SOLUTION INTRAVENOUS at 14:00

## 2024-02-12 RX ADMIN — DILTIAZEM HYDROCHLORIDE 7.5 MG/HR: 5 INJECTION, SOLUTION INTRAVENOUS at 15:14

## 2024-02-12 RX ADMIN — HYDROXYZINE PAMOATE 25 MG: 25 CAPSULE ORAL at 14:00

## 2024-02-12 RX ADMIN — CEFEPIME 1000 MG: 1 INJECTION, POWDER, FOR SOLUTION INTRAMUSCULAR; INTRAVENOUS at 15:13

## 2024-02-12 RX ADMIN — GUAIFENESIN 400 MG: 200 SOLUTION ORAL at 08:38

## 2024-02-12 RX ADMIN — THIAMINE HCL TAB 100 MG 100 MG: 100 TAB at 08:38

## 2024-02-12 RX ADMIN — GUAIFENESIN 400 MG: 200 SOLUTION ORAL at 12:13

## 2024-02-12 RX ADMIN — APIXABAN 2.5 MG: 2.5 TABLET, FILM COATED ORAL at 20:40

## 2024-02-12 RX ADMIN — BUDESONIDE INHALATION 500 MCG: 0.5 SUSPENSION RESPIRATORY (INHALATION) at 06:08

## 2024-02-12 RX ADMIN — GUAIFENESIN 400 MG: 200 SOLUTION ORAL at 20:40

## 2024-02-12 RX ADMIN — REMDESIVIR 200 MG: 100 INJECTION, POWDER, LYOPHILIZED, FOR SOLUTION INTRAVENOUS at 16:11

## 2024-02-12 RX ADMIN — PRAVASTATIN SODIUM 10 MG: 10 TABLET ORAL at 20:40

## 2024-02-12 RX ADMIN — GUAIFENESIN 400 MG: 200 SOLUTION ORAL at 04:00

## 2024-02-12 RX ADMIN — LORAZEPAM 1 MG: 2 INJECTION INTRAMUSCULAR; INTRAVENOUS at 10:33

## 2024-02-12 RX ADMIN — HYDROXYZINE PAMOATE 25 MG: 25 CAPSULE ORAL at 20:40

## 2024-02-12 RX ADMIN — VANCOMYCIN HYDROCHLORIDE 500 MG: 250 POWDER, FOR SOLUTION ORAL at 18:19

## 2024-02-12 RX ADMIN — THERA TABS 1 TABLET: TAB at 08:38

## 2024-02-12 RX ADMIN — METRONIDAZOLE 500 MG: 500 INJECTION, SOLUTION INTRAVENOUS at 22:07

## 2024-02-12 NOTE — PERIOP NOTE
IR to bedside for Right chest tube placement  Connected to [place], Pulmonologist to manage    Temporary HD catheter placement (right)    Central Line access (right)    Patient tolerated procedures, vitals stable throughout  Post chest XR obtained  Report given to Primary RNPhuong    Specimens sent to lab (no orders)

## 2024-02-12 NOTE — CARE COORDINATION
Team Meeting will be held tomorrow to discuss this patients case. CM will hold off on applying patient for guardianship at this time.

## 2024-02-12 NOTE — DIALYSIS
Patient tolerated treatment poorly. After 15 minutes of treatment patient become hypotensive. MD notified. Treatment was stopped per MD. Report given to primary nurse Phuong.

## 2024-02-13 ENCOUNTER — APPOINTMENT (OUTPATIENT)
Facility: HOSPITAL | Age: 69
DRG: 308 | End: 2024-02-13
Payer: MEDICARE

## 2024-02-13 VITALS
RESPIRATION RATE: 24 BRPM | HEART RATE: 73 BPM | TEMPERATURE: 89.4 F | HEIGHT: 62 IN | SYSTOLIC BLOOD PRESSURE: 92 MMHG | WEIGHT: 107.14 LBS | OXYGEN SATURATION: 96 % | DIASTOLIC BLOOD PRESSURE: 49 MMHG | BODY MASS INDEX: 19.72 KG/M2

## 2024-02-13 LAB
ALBUMIN SERPL-MCNC: 1.6 G/DL (ref 3.5–5)
ANION GAP SERPL CALC-SCNC: 9 MMOL/L (ref 5–15)
ARTERIAL PATENCY WRIST A: YES
BASE DEFICIT BLDA-SCNC: 6.8 MMOL/L
BDY SITE: ABNORMAL
BODY TEMPERATURE: 91.8
BUN SERPL-MCNC: 86 MG/DL (ref 6–20)
BUN/CREAT SERPL: 33 (ref 12–20)
CA-I BLD-MCNC: 7.2 MG/DL (ref 8.5–10.1)
CHLORIDE SERPL-SCNC: 104 MMOL/L (ref 97–108)
CO2 SERPL-SCNC: 22 MMOL/L (ref 21–32)
COHGB MFR BLD: 0.3 % (ref 1–2)
CREAT SERPL-MCNC: 2.61 MG/DL (ref 0.55–1.02)
CRP SERPL-MCNC: 5.92 MG/DL (ref 0–0.3)
EKG ATRIAL RATE: 125 BPM
EKG DIAGNOSIS: NORMAL
EKG Q-T INTERVAL: 294 MS
EKG QRS DURATION: 76 MS
EKG QTC CALCULATION (BAZETT): 385 MS
EKG R AXIS: 48 DEGREES
EKG T AXIS: 101 DEGREES
EKG VENTRICULAR RATE: 103 BPM
ERYTHROCYTE [DISTWIDTH] IN BLOOD BY AUTOMATED COUNT: 13.2 % (ref 11.5–14.5)
FERRITIN SERPL-MCNC: 2781 NG/ML (ref 26–388)
FIO2 ON VENT: 100 %
GLUCOSE BLD STRIP.AUTO-MCNC: 250 MG/DL (ref 65–100)
GLUCOSE SERPL-MCNC: 241 MG/DL (ref 65–100)
HCO3 BLDA-SCNC: 21 MMOL/L (ref 22–26)
HCT VFR BLD AUTO: 21.4 % (ref 35–47)
HGB BLD-MCNC: 7.4 G/DL (ref 11.5–16)
IPAP/PIP: 18
LDH SERPL L TO P-CCNC: 547 U/L (ref 81–246)
MAGNESIUM SERPL-MCNC: 2 MG/DL (ref 1.6–2.4)
MCH RBC QN AUTO: 36.5 PG (ref 26–34)
MCHC RBC AUTO-ENTMCNC: 34.6 G/DL (ref 30–36.5)
MCV RBC AUTO: 105.4 FL (ref 80–99)
METHGB MFR BLD: 0.4 % (ref 0–1.4)
NRBC # BLD: 0 K/UL (ref 0–0.01)
NRBC BLD-RTO: 0 PER 100 WBC
OXYHGB MFR BLD: 78.1 % (ref 95–99)
PCO2 BLDA: 56 MMHG (ref 35–45)
PEEP RESPIRATORY: 12
PERFORMED BY:: ABNORMAL
PERFORMED BY:: ABNORMAL
PH BLDA: 7.2 (ref 7.35–7.45)
PHOSPHATE SERPL-MCNC: 4.6 MG/DL (ref 2.6–4.7)
PLATELET # BLD AUTO: 127 K/UL (ref 150–400)
PMV BLD AUTO: 11.9 FL (ref 8.9–12.9)
PO2 BLDA: 48 MMHG (ref 80–100)
POTASSIUM SERPL-SCNC: 4 MMOL/L (ref 3.5–5.1)
PROCALCITONIN SERPL-MCNC: 0.17 NG/ML
RBC # BLD AUTO: 2.03 M/UL (ref 3.8–5.2)
SAO2 % BLD: 79 % (ref 95–99)
SAO2% DEVICE SAO2% SENSOR NAME: ABNORMAL
SERVICE CMNT-IMP: ABNORMAL
SODIUM SERPL-SCNC: 135 MMOL/L (ref 136–145)
SPECIMEN SITE: ABNORMAL
VENTILATION MODE VENT: ABNORMAL
WBC # BLD AUTO: 25.3 K/UL (ref 3.6–11)

## 2024-02-13 PROCEDURE — 6370000000 HC RX 637 (ALT 250 FOR IP): Performed by: INTERNAL MEDICINE

## 2024-02-13 PROCEDURE — 36600 WITHDRAWAL OF ARTERIAL BLOOD: CPT

## 2024-02-13 PROCEDURE — 85027 COMPLETE CBC AUTOMATED: CPT

## 2024-02-13 PROCEDURE — 2500000003 HC RX 250 WO HCPCS: Performed by: INTERNAL MEDICINE

## 2024-02-13 PROCEDURE — 83735 ASSAY OF MAGNESIUM: CPT

## 2024-02-13 PROCEDURE — 71045 X-RAY EXAM CHEST 1 VIEW: CPT

## 2024-02-13 PROCEDURE — 82962 GLUCOSE BLOOD TEST: CPT

## 2024-02-13 PROCEDURE — 36415 COLL VENOUS BLD VENIPUNCTURE: CPT

## 2024-02-13 PROCEDURE — 2700000000 HC OXYGEN THERAPY PER DAY

## 2024-02-13 PROCEDURE — 2580000003 HC RX 258: Performed by: INTERNAL MEDICINE

## 2024-02-13 PROCEDURE — 6360000002 HC RX W HCPCS: Performed by: INTERNAL MEDICINE

## 2024-02-13 PROCEDURE — 6370000000 HC RX 637 (ALT 250 FOR IP): Performed by: STUDENT IN AN ORGANIZED HEALTH CARE EDUCATION/TRAINING PROGRAM

## 2024-02-13 PROCEDURE — 94660 CPAP INITIATION&MGMT: CPT

## 2024-02-13 PROCEDURE — 90945 DIALYSIS ONE EVALUATION: CPT

## 2024-02-13 PROCEDURE — 82728 ASSAY OF FERRITIN: CPT

## 2024-02-13 PROCEDURE — 82803 BLOOD GASES ANY COMBINATION: CPT

## 2024-02-13 PROCEDURE — 94761 N-INVAS EAR/PLS OXIMETRY MLT: CPT

## 2024-02-13 PROCEDURE — 6370000000 HC RX 637 (ALT 250 FOR IP): Performed by: NURSE PRACTITIONER

## 2024-02-13 PROCEDURE — 6370000000 HC RX 637 (ALT 250 FOR IP): Performed by: PHYSICIAN ASSISTANT

## 2024-02-13 PROCEDURE — 83615 LACTATE (LD) (LDH) ENZYME: CPT

## 2024-02-13 PROCEDURE — 86140 C-REACTIVE PROTEIN: CPT

## 2024-02-13 PROCEDURE — 2580000003 HC RX 258: Performed by: NURSE PRACTITIONER

## 2024-02-13 PROCEDURE — 94640 AIRWAY INHALATION TREATMENT: CPT

## 2024-02-13 PROCEDURE — 84145 PROCALCITONIN (PCT): CPT

## 2024-02-13 PROCEDURE — 80069 RENAL FUNCTION PANEL: CPT

## 2024-02-13 RX ORDER — FLUCONAZOLE 2 MG/ML
400 INJECTION, SOLUTION INTRAVENOUS EVERY 24 HOURS
Status: DISCONTINUED | OUTPATIENT
Start: 2024-02-13 | End: 2024-02-13

## 2024-02-13 RX ORDER — SCOLOPAMINE TRANSDERMAL SYSTEM 1 MG/1
1 PATCH, EXTENDED RELEASE TRANSDERMAL
Status: DISCONTINUED | OUTPATIENT
Start: 2024-02-13 | End: 2024-02-13 | Stop reason: HOSPADM

## 2024-02-13 RX ORDER — ACETAMINOPHEN 650 MG/1
650 SUPPOSITORY RECTAL EVERY 4 HOURS PRN
Status: DISCONTINUED | OUTPATIENT
Start: 2024-02-13 | End: 2024-02-13 | Stop reason: HOSPADM

## 2024-02-13 RX ORDER — MORPHINE SULFATE 4 MG/ML
4 INJECTION, SOLUTION INTRAMUSCULAR; INTRAVENOUS
Status: DISCONTINUED | OUTPATIENT
Start: 2024-02-13 | End: 2024-02-13 | Stop reason: HOSPADM

## 2024-02-13 RX ORDER — LORAZEPAM 2 MG/ML
1 INJECTION INTRAMUSCULAR
Status: DISCONTINUED | OUTPATIENT
Start: 2024-02-13 | End: 2024-02-13 | Stop reason: HOSPADM

## 2024-02-13 RX ORDER — MORPHINE SULFATE 2 MG/ML
2 INJECTION, SOLUTION INTRAMUSCULAR; INTRAVENOUS
Status: DISCONTINUED | OUTPATIENT
Start: 2024-02-13 | End: 2024-02-13 | Stop reason: HOSPADM

## 2024-02-13 RX ADMIN — CEFEPIME 1000 MG: 1 INJECTION, POWDER, FOR SOLUTION INTRAMUSCULAR; INTRAVENOUS at 02:48

## 2024-02-13 RX ADMIN — LORAZEPAM 1 MG: 2 INJECTION INTRAMUSCULAR; INTRAVENOUS at 13:57

## 2024-02-13 RX ADMIN — THIAMINE HCL TAB 100 MG 100 MG: 100 TAB at 08:48

## 2024-02-13 RX ADMIN — CALCIUM CHLORIDE, MAGNESIUM CHLORIDE, DEXTROSE MONOHYDRATE, LACTIC ACID, SODIUM CHLORIDE, SODIUM BICARBONATE AND POTASSIUM CHLORIDE: 3.68; 3.05; 22; 5.4; 6.46; 3.09; .314 INJECTION INTRAVENOUS at 03:00

## 2024-02-13 RX ADMIN — DILTIAZEM HYDROCHLORIDE 7.5 MG/HR: 5 INJECTION, SOLUTION INTRAVENOUS at 05:06

## 2024-02-13 RX ADMIN — Medication 50 MG: at 08:47

## 2024-02-13 RX ADMIN — MORPHINE SULFATE 4 MG: 4 INJECTION, SOLUTION INTRAMUSCULAR; INTRAVENOUS at 13:57

## 2024-02-13 RX ADMIN — GUAIFENESIN 400 MG: 200 SOLUTION ORAL at 05:46

## 2024-02-13 RX ADMIN — IPRATROPIUM BROMIDE AND ALBUTEROL SULFATE 1 DOSE: 2.5; .5 SOLUTION RESPIRATORY (INHALATION) at 09:27

## 2024-02-13 RX ADMIN — DEXMEDETOMIDINE HYDROCHLORIDE 0.4 MCG/KG/HR: 400 INJECTION, SOLUTION INTRAVENOUS at 05:10

## 2024-02-13 RX ADMIN — HYDROMORPHONE HYDROCHLORIDE 1 MG: 1 INJECTION, SOLUTION INTRAMUSCULAR; INTRAVENOUS; SUBCUTANEOUS at 02:47

## 2024-02-13 RX ADMIN — Medication 1 CAPSULE: at 11:07

## 2024-02-13 RX ADMIN — HYDROXYZINE PAMOATE 25 MG: 25 CAPSULE ORAL at 08:48

## 2024-02-13 RX ADMIN — AMIODARONE HYDROCHLORIDE 200 MG: 200 TABLET ORAL at 08:48

## 2024-02-13 RX ADMIN — FOLIC ACID 1 MG: 1 TABLET ORAL at 08:48

## 2024-02-13 RX ADMIN — GUAIFENESIN 400 MG: 200 SOLUTION ORAL at 08:47

## 2024-02-13 RX ADMIN — GUAIFENESIN 400 MG: 200 SOLUTION ORAL at 00:05

## 2024-02-13 RX ADMIN — BUDESONIDE INHALATION 500 MCG: 0.5 SUSPENSION RESPIRATORY (INHALATION) at 09:27

## 2024-02-13 RX ADMIN — GUAIFENESIN 400 MG: 200 SOLUTION ORAL at 11:06

## 2024-02-13 RX ADMIN — VANCOMYCIN HYDROCHLORIDE 500 MG: 250 POWDER, FOR SOLUTION ORAL at 06:26

## 2024-02-13 RX ADMIN — Medication 1 CAPSULE: at 08:48

## 2024-02-13 RX ADMIN — APIXABAN 2.5 MG: 2.5 TABLET, FILM COATED ORAL at 08:48

## 2024-02-13 RX ADMIN — METHYLPREDNISOLONE SODIUM SUCCINATE 40 MG: 40 INJECTION INTRAMUSCULAR; INTRAVENOUS at 08:47

## 2024-02-13 RX ADMIN — METRONIDAZOLE 500 MG: 500 INJECTION, SOLUTION INTRAVENOUS at 05:29

## 2024-02-13 RX ADMIN — PANTOPRAZOLE SODIUM 40 MG: 40 TABLET, DELAYED RELEASE ORAL at 05:56

## 2024-02-13 RX ADMIN — VANCOMYCIN HYDROCHLORIDE 500 MG: 250 POWDER, FOR SOLUTION ORAL at 11:06

## 2024-02-13 RX ADMIN — CALCIUM CHLORIDE, MAGNESIUM CHLORIDE, DEXTROSE MONOHYDRATE, LACTIC ACID, SODIUM CHLORIDE, SODIUM BICARBONATE AND POTASSIUM CHLORIDE: 3.68; 3.05; 22; 5.4; 6.46; 3.09; .314 INJECTION INTRAVENOUS at 08:54

## 2024-02-13 RX ADMIN — VANCOMYCIN HYDROCHLORIDE 500 MG: 250 POWDER, FOR SOLUTION ORAL at 00:05

## 2024-02-13 RX ADMIN — CALCITRIOL 0.25 MCG: 1 SOLUTION ORAL at 08:47

## 2024-02-13 RX ADMIN — SODIUM CHLORIDE, PRESERVATIVE FREE 10 ML: 5 INJECTION INTRAVENOUS at 08:48

## 2024-02-13 NOTE — CONSULTS
Cardiology Consult    NAME: Allison Mauricio   :  1955   MRN:  [id number]     Date/Time:  2024 2:38 PM    Patient PCP: None, None  ________________________________________________________________________     Assessment:    Patient is a 68-year-old white female with:   1.  A-fib with RVR, status post ablation May 23   2.  COPD exacerbation   3.  Heart failure   4.  Hypokalemia   5.  Hyponatremia   6.  Acute kidney injury   7.  Mild aortic regurgitation   8.  Moderate tricuspid regurgitation  9.  Allergic beta-blocker therapy resulting in angioedema  10.  Nicotine dependence  11.  Status post left humerus fracture  12.  Nonobstructive CAD  13.  History of alcohol abuse      Plan:   Patient has paroxysmal atrial fibrillation with a rapid ventricular rate.  While I was talking to her she will break back into sinus rhythm but then she will have short run of PSVT.  Potassium is low.  Replete potassium.  She is currently wheezing.  Probably symptoms are multifactorial.      Sodium is 141, potassium is 2.4, creatinine 1.68, bicarbonate is 20.  BNP is elevated.  High-sensitivity troponin with a flat trend.  Denied having any chest pain.  She is actively wheezing.  Hemoglobin 15.3.  Chest x-ray was nonacute.  Log was hyper expanded  EKG showed sinus rhythm with PAC.  Echocardiogram last year showed EF of 30 to 35%.  At that time she underwent cardioversion.  Cardiac catheterization in May 23 showed normal left main, LAD and left circumflex artery.  Heavily calcified RCA in the crux area.  Ejection fraction was felt to be 50 to 55% with anterior wall hypokinesis.    Currently on apixaban, IV Cardizem, IV Lasix, potassium replacement, Entresto and spironolactone    Further recommendation depending on clinical progression  Recheck echocardiogram  She is counseled regarding the importance of alcohol abstinence and smoking cessation      []        High complexity decision making was 
Clinical Ethics Consultation Note    History and Context:  Ms. Allison Mauricio is a 69-year-old, female who presented to the ED with chest pressure and shortness of breath that had persisted for a month and gotten worse the week before coming to the ED. The pt has a history of  hypertension, HFrEF, atrial fibrillation with RVR (s/p ablation 05/08/23), EtOH abuse, and nicotine use. Since being admitted, she has decompensated and needed BiPAP. She requested a code status change on 2/1 to be DNI.      The patient is lethargic due to sedation needed for her to tolerate BiPAP and, per medical opinion in conversation, does not have capacity to make decisions. Pt does have a boyfriend of 20 years who is not her medical decision maker, but who knows her well. Per CM, the pt has no LNOK and no documented mPOA.    Valid Advanced Medical Directive: No    Process:  Today 2/13, we convened an interdisciplinary medical review (IDMR) committee to discuss pt care moving forward. The team consisted of CMO, CM, Attending, ICU Director, ethics, and other members of the care team.     Ethical Question(s) and Concern(s):  Are we morally required to provide care that is futile and may go against the pt's wishes?    Analysis:  Per medical opinion, there are no more care options we can offer Ms. Mauricio. Per medical opinion, condition is worsening, and her boyfriend is telling the care team that she probably would not have wanted escalated care. Using insights from Ms. Mauricio's long-time boyfriend, insights gathered from her wish to be DNI, and concerns that any escalated care would offer no medical benefit, the team agreed that it would be in the pt's best interest and most honoring to her known wishes to pursue comfort care.    The IDMR used the best interest standard for the pt which is consistent with AMA code of ethics as well as the ERDs.    Best Interest Standard:??   If there is not adequate evidence or information of the 
Clinical Ethics Consultation Note    History and Context:  Ms. Allison Mauricio is a 69-year-old, female who presented to the ED with chest pressure and shortness of breath that had persisted for a month and gotten worse the week before coming to the ED. The pt has a history of  hypertension, HFrEF, atrial fibrillation with RVR (s/p ablation 05/08/23), EtOH abuse, and nicotine use. Since being admitted, she has decompensated and needed BiPAP. She requested a code status change on 2/1 to be DNI.     The patient is lethargic due to sedation needed for her to tolerate BiPAP and, per medical opinion in conversation, does not have capacity to make decisions. Pt does have a boyfriend of 20 years who is not her medical decision maker, but who knows her well. Per CM, the pt has no LNOK and no documented mPOA.    Valid Advanced Medical Directive: No    Ethical Question(s) and Concern(s):  Given that Ms. Mauricio does not have a surrogate decision-maker and is unable to make medical decisions, what is the ethically appropriate decision-making standard??   ?   Analysis:  Respect for Autonomy:??   Ms. Mauricio is not alert or oriented and lacks decisional capacity per medical judgement. Respect for autonomy is, “not applicable in the context of making decisions regarding life-sustaining treatments for unrepresented patients…respecting a patient’s autonomy requires the patient to have expressed an autonomous treatment preference applicable to the clinical situation at hand.” (ATS 2020 Unrepresented Patients Statement) Therefore, Ms. Mauricio falls under the best interest standard.?   ?   Best Interest Standard:??   If there is not adequate evidence or information of the patient’s preferences and values or no surrogate decision-maker exists, medical decisions should be based upon the best interest of the patient. “Best interest decisions should be based on… the pain and suffering associated with the intervention, the degree of 
Nutrition Education    Educated on heart healthy diet  Learners: Patient  Readiness: Acceptance  Method: Explanation and Handout  Response: Demonstrated Understanding  Contact name and number provided.    Vee Mansfield RD  Contact Number: [phone]    
PULMONARY CONSULT  [facility]    Name: Allison Mauricio MRN: [id number]   : 1955 Hospital: [facility]   Date: 2024  Admission date: 2024 Hospital Day: 4       HPI:     Patient Active Problem List   Diagnosis    Respiratory failure with hypoxia (HCC)    Hypokalemia    Acute respiratory failure (HCC)    COPD exacerbation (HCC)    Fracture of humeral shaft, left, closed    Atrial fibrillation, rapid (HCC)    Atrial fibrillation (HCC)    CHF (congestive heart failure) (HCC)    Atrial fibrillation with rapid ventricular response (HCC)             [x] High complexity decision making was performed  [x] See my orders for details      Subjective/Initial History:     I was asked by Ronda Ellis MD to see Allison Mauricio  a 68 y.o.   female in consultation     Excerpts from admission 2024 or consult notes as follows:   68-year-old lady came in because of shortness of breath palpitation past medical history of atrial fibrillation congestive heart failure history of tobacco and EtOH abuse she is not feeling well for the past couple of days before coming to the hospital she was admitted and found to be in A-fib with RVR cardiology seen the patient started on amiodarone she was on Eliquis today she was having more shortness of breath and dyspnea she is a current everyday smoker not on oxygen at home.      Allergies   Allergen Reactions    Atenolol Angioedema    Sulfa Antibiotics Swelling    Prednisone Other (See Comments)     Patient states \"it makes me bounce off of the walls\"; declines taking     Penicillins Rash        MAR reviewed and pertinent medications noted or modified as needed     Current Facility-Administered Medications   Medication Dose Route Frequency Provider Last Rate Last Admin    ipratropium 0.5 mg-albuterol 2.5 mg (DUONEB) nebulizer solution 1 Dose  1 Dose Inhalation Q6H RT Jorge Alberto Jimenez MD   1 Dose at 24 1441    dilTIAZem 125 mg in sodium 
Patient with loculated hydropneumothorax in the right chest.  There is a vertical surgical lower chest.  Recommend IR for percutaneous drainage catheter.  
  Result Value Ref Range    Lactic Acid, Plasma 1.5 0.4 - 2.0 mmol/L     -Chest x-ray images from 1 x 24 reviewed, has hyperexpanded lungs with some mild pulmonary vascular congestion.    -CT chest without contrast has been ordered and pending    Assessment & Plan:     Luis on CKD3b -her baseline Cr is around 1.4 with EGFR of less than 40, it has gradually trended up to 2.7.  -Likely from Cardio-renal & intravascular volume depletion from diuretics.  Lasix and Aldactone are currently on hold.  Agree with holding diuretics & Entresto  -Intake output chart reviewed, in negative balance in the last 48 hours ,  -Still has ankle edema but patient reports it has improved a lot since admission.  -Still complaining of orthopnea and PND, CT chest is pending.  If she has any significant right-sided effusion may benefit from thoracentesis.  -Medication list has been reviewed, currently not on any nephrotoxic medications.  No exposure to IV contrast noted in the last 48 hours .  Chart reviewed, has no documented episodes of severe hypotension  -Will also request for a bladder scan to rule out any chronic retention and postrenal causes     CHF exacerbation -echo shows EF of 35 to 40% with severe left atrial dilation, elevated right-sided pressures.  will hold diuretics for now because of prerenal LUIS.  -P.o. fluid and salt restriction.  -   Afib with RVR  -was started on IV Cardizem.    COPD exacerbation -on nebs and steroids.    Thank you for the consultation, will follow the patient closely    Signed By: Dolores Vivas MD     January 27, 2024      This note was prepared using voice recognition system and is likely to have sound alike errors that may have been overlooked even during proofreading.  Please contact the author for any clarifications.    
conducted with a chaperone present (Nurses).   Constitutional:       General: She is in acute distress.      Appearance: She is ill-appearing.   HENT:      Head: Normocephalic and atraumatic.      Left Ear: External ear normal.      Nose:      Comments: BIPAP mask     Mouth/Throat:      Comments: Unable to examine  Eyes:      Comments: closed   Cardiovascular:      Rate and Rhythm: Regular rhythm. Tachycardia present.      Heart sounds: No murmur heard.  Pulmonary:      Effort: Respiratory distress present.      Breath sounds: Wheezing and rhonchi present.   Chest:          Comments: Right sided chest tube with serosanguinous effluent  Abdominal:      General: Bowel sounds are normal. There is no distension.      Palpations: Abdomen is soft.      Tenderness: There is no abdominal tenderness.   Genitourinary:     Comments: Kent catheter  Flexiseal with dark watery stool  Musculoskeletal:      Cervical back: Neck supple.      Right lower leg: No edema.      Left lower leg: No edema.      Comments: Both calves with gauze dressing   Skin:     Findings: Bruising present. No rash.      Comments: Extensive ecchymoses involving all extremities   Neurological:      General: No focal deficit present.      Mental Status: She is disoriented.   Psychiatric:      Comments: Unable to assess         Labs    CBC:  Recent Labs     02/10/24  0310 02/11/24  0445 02/12/24  0215   WBC 27.8* 42.8* 46.2*   RBC 2.88* 2.75* 2.56*   HGB 10.7* 10.3* 9.5*   HCT 29.7* 28.5* 26.7*   .1* 103.6* 104.3*   RDW 13.0 13.0 13.0    162 165     CHEMISTRIES:  Recent Labs     02/10/24  0310 02/11/24  0445 02/12/24  0215   * 129* 128*   K 4.3 4.4 4.1   CL 97 95* 94*   CO2 22 21 22   * 101* 107*   CREATININE 3.74* 3.54* 3.61*   GLUCOSE 202* 178* 213*   PHOS  --  4.2 4.8*         Procal 0.33  CRP 11.60    Clostridium difficile GDH antigen positive, C. Diff EIA negative, C. Diff PCR pending    Blood cultures (2/11) No growth 17 
Disease   Body mass index is 20.12 kg/m².  A-fib with RVR  CHF HFrEF EF is about 25 to 30%  History of EtOH and tobacco abuse  Pt is requiring Drug therapy requiring intensive monitoring for toxicity  Pt is unstable, unpredictable needing inpatient monitoring; is acutely ill and at high risk of sudden decline and decompensation with severe consequenses and continued end organ dysfunction and failure  Prognosis guarded       RECOMMENDATIONS/PLAN:     68-year lady came in because of shortness of breath palpitation found to be in A-fib with RVR she is now on 2 L nasal Cannula oxygen as salvage oxygen delivery device to provide high concentration of oxygen to overcome refractory hypoxia;  Start patient on IV Solu-Medrol nebulizer treatment  Chest x-ray no acute infiltrate emphysematous lung  She is not on any inhalers at home will start patient on Symbicort inhaler nebulizer treatment  Continue with the Lasix 40 every 12 hours  Continue with the cardiac medication  Supplemental O2 to keep sats > 93%  Aspiration precautions  Labs to follow electrolytes, renal function and and blood counts  Glucose monitoring and SSI  Bronchial hygiene with respiratory therapy techniques, bronchodilators  DVT, SUP prophylaxis  Smoking cessation counseling done  Pt needs IV fluids with additives and Drug therapy requiring intensive monitoring for toxicity  Prescription drug management with home med reconciliation reviewed       This care involved high complexity medical decision making: I personally:  Reviewed the flowsheet and previous days notes  Reviewed and summarized records or history from previous days note or discussions with staff, family  High Risk Drug therapy requiring intensive monitoring for toxicity: eg steroids, pressors, antibiotics  Reviewed and/or ordered Clinical lab tests  Reviewed images and/or ordered Radiology tests  Reviewed the patients ECG / Telemetry  Reviewed and/or adjusted NiPPV settings  Called and arranged 
compared with May 2023 I am not sure it is any different.  She will need repeat CT in 3 to 6 months.  Creatinine has worsened will give albumin today to try and improve renal perfusion and function      Benson Landon MD

## 2024-02-13 NOTE — DISCHARGE SUMMARY
Physician Death Summary     Patient ID:    Allison Mauricio  [id number]  69 y.o.  1955    Admit date: 1/24/2024    Death date : 2/13/2024      Final Diagnoses:   Elevated troponin [R79.89]  COPD exacerbation (HCC) [J44.1]  Atrial fibrillation with rapid ventricular response (HCC) [I48.91]  Atrial fibrillation with RVR (HCC) [I48.91]  Acute respiratory failure with hypoxia     Acute on chronic systolic heart failure, EF 20%     Right hydropneumothorax     Paroxysmal atrial fibrillation with RVR     COVID-19 with pneumonitis     Metabolic and uremic encephalopathy     Alcohol abuse with alcohol withdrawal syndrome     Acute kidney injury on chronic kidney disease stage III   -Acute component likely prerenal due to diuretics, hypotension and cardiorenal syndrome     Clostridium difficile infection     Possible Candida UTI     COPD with exacerbation     Sepsis with leukocytosis, elevated procalcitonin and CRP      Reason for Hospitalization:    68-year-old woman with HFrEF with EF 25%, atrial fibrillation s/p ablation, EtOH abuse, nicotine dependence who initially presented to the hospital with chest pressure.  Patient found to be in A-fib with RVR initially treated with Cardizem then addition of amiodarone.  Chest x-ray was clear.  Patient admitted to medication noncompliance.     Patient was admitted to cardiac telemetry.  Later transferred to ICU.  Started back on previous medications.  Patient was seen by cardiology.  Placed on CIWA protocol due to alcohol abuse.  Patient developed HOLA and was seen by nephrology.     Patient developed increased shortness of breath and hypoxia.  Chest CT showed bilateral pleural effusions.  CT also showed masslike density in the right upper lobe measuring 2.5 cm, chronic atelectasis versus neoplasm.     Patient underwent IR thoracentesis on 1/29.  Cell count on the fluid showed only 182 PMNs.  She required noninvasive ventilation.  She ended up requiring Precedex.     Repeat

## 2024-02-13 NOTE — PLAN OF CARE
Problem: SLP Adult - Impaired Swallowing  Goal: By Discharge: Advance to least restrictive diet without signs or symptoms of aspiration for planned discharge setting.  See evaluation for individualized goals.  Description: Speech Therapy Swallow Goals    Initiated 1/29/2024    -Patient stated goal: Pt wants to eat and not have a modified diet    -Patient will tolerate Soft and bite sized and mildly thick liquids diet without clinical indicators of aspiration given min cues within 7 day(s).        -Patient will tolerate PO trials without clinical indicators of aspiration given min cues within 7 day(s).      -Patient will demonstrate understanding of swallow safety precautions and aspiration precautions, diet recs with min cues within 7 day(s).           1/29/2024 1351 by Irena Kwong, SLP  Outcome: Progressing     
  Problem: Skin/Tissue Integrity  Goal: Absence of new skin breakdown  Description: 1.  Monitor for areas of redness and/or skin breakdown  2.  Assess vascular access sites hourly  3.  Every 4-6 hours minimum:  Change oxygen saturation probe site  4.  Every 4-6 hours:  If on nasal continuous positive airway pressure, respiratory therapy assess nares and determine need for appliance change or resting period.  1/25/2024 2255 by Leoncio Rodriguez, RN  Outcome: Progressing  1/25/2024 1705 by Rosmery Vargas, RN  Outcome: Progressing     Problem: Discharge Planning  Goal: Discharge to home or other facility with appropriate resources  1/25/2024 1705 by Rosmery Vargas, RN  Outcome: Progressing  Flowsheets (Taken 1/25/2024 1645)  Discharge to home or other facility with appropriate resources:   Identify barriers to discharge with patient and caregiver   Arrange for needed discharge resources and transportation as appropriate   Refer to discharge planning if patient needs post-hospital services based on physician order or complex needs related to functional status, cognitive ability or social support system     Problem: Safety - Adult  Goal: Free from fall injury  1/25/2024 2255 by Leoncio Rodriguez, RN  Outcome: Progressing  1/25/2024 1705 by Rosmery Vargas, RN  Outcome: Progressing     
  Problem: Skin/Tissue Integrity  Goal: Absence of new skin breakdown  Description: 1.  Monitor for areas of redness and/or skin breakdown  2.  Assess vascular access sites hourly  3.  Every 4-6 hours minimum:  Change oxygen saturation probe site  4.  Every 4-6 hours:  If on nasal continuous positive airway pressure, respiratory therapy assess nares and determine need for appliance change or resting period.  Outcome: Progressing     Problem: Discharge Planning  Goal: Discharge to home or other facility with appropriate resources  Outcome: Progressing     Problem: Safety - Adult  Goal: Free from fall injury  Outcome: Progressing     Problem: Chronic Conditions and Co-morbidities  Goal: Patient's chronic conditions and co-morbidity symptoms are monitored and maintained or improved  Outcome: Progressing     Problem: Neurosensory - Adult  Goal: Achieves stable or improved neurological status  Outcome: Progressing  Goal: Absence of seizures  Outcome: Progressing  Goal: Remains free of injury related to seizures activity  Outcome: Progressing  Goal: Achieves maximal functionality and self care  Outcome: Progressing     Problem: Respiratory - Adult  Goal: Achieves optimal ventilation and oxygenation  Outcome: Progressing     Problem: Cardiovascular - Adult  Goal: Maintains optimal cardiac output and hemodynamic stability  Outcome: Progressing  Goal: Absence of cardiac dysrhythmias or at baseline  Outcome: Progressing     Problem: Skin/Tissue Integrity - Adult  Goal: Skin integrity remains intact  Outcome: Progressing  Goal: Incisions, wounds, or drain sites healing without S/S of infection  Outcome: Progressing  Goal: Oral mucous membranes remain intact  Outcome: Progressing     Problem: Musculoskeletal - Adult  Goal: Return mobility to safest level of function  Outcome: Progressing  Goal: Maintain proper alignment of affected body part  Outcome: Progressing  Goal: Return ADL status to a safe level of function  Outcome: 
Speech LAnguage Pathology Dysphagia EVALUATION    Patient: Allison Mauricio (68 y.o. female)  Date: 1/29/2024  Primary Diagnosis: Elevated troponin [R79.89]  COPD exacerbation (HCC) [J44.1]  Atrial fibrillation with rapid ventricular response (HCC) [I48.91]  Atrial fibrillation with RVR (HCC) [I48.91]       Precautions: aspiration Fall Risk    DIET RECOMMENDATIONS: Soft and bite sized and mildly thick liquids    SWALLOW SAFETY PRECAUTIONS: 1:1 assistance with ALL PO intake, STRICT aspiration and GERD precautions, monitor pt closely for s/s aspiration, meds crushed if able in applesauce or pudding, FEED ONLY IF PT ABLE TO TOLERATE BEING OFF OF BIPAP      ASSESSMENT :  Based on the objective data described below, the patient presents with oropharyngeal sw function WFL with concerns for underlying esophageal dysphagia. Diet tolerance negatively impacted by respiratory status.     Pt seen for bedside sw evaluation, see below for details. Pt admitted with SOB, chest pain, COPD exacerbation, a-fib with RVR, HFrEF, elevated troponin, HTN, HOLA, hypokalemia, hypocalcemia, hypomagnesemia. Hx includes ETOH, HTN, smoker.     Pt on BiPAP and placed on 5L NC 02 by nsg for assessment.   Oral phase functional with increased WOB noted, pharyngeal phase WFL with mild delay. Delayed significant coughing is present following PO trials.  See below for previous MBS results, concerning for possible esophageal dysphagia.  Pt becomes easily irritable.     Patient will benefit from skilled intervention to address the above impairments.    GOALS:  Problem: SLP Adult - Impaired Swallowing  Goal: By Discharge: Advance to least restrictive diet without signs or symptoms of aspiration for planned discharge setting.  See evaluation for individualized goals.  Description: Speech Therapy Swallow Goals    Initiated 1/29/2024    -Patient stated goal: Pt wants to eat and not have a modified diet    -Patient will tolerate Soft and bite sized and 
facilitate oxygenation and minimize respiratory effort     Problem: Cardiovascular - Adult  Goal: Maintains optimal cardiac output and hemodynamic stability  Recent Flowsheet Documentation  Taken 2/3/2024 2000 by Nilton Cheatham RN  Maintains optimal cardiac output and hemodynamic stability:   Monitor blood pressure and heart rate   Monitor urine output and notify Licensed Independent Practitioner for values outside of normal range   Assess for signs of decreased cardiac output  Goal: Absence of cardiac dysrhythmias or at baseline  Recent Flowsheet Documentation  Taken 2/3/2024 2000 by Nilton Cheatham RN  Absence of cardiac dysrhythmias or at baseline:   Monitor cardiac rate and rhythm   Assess for signs of decreased cardiac output   Administer antiarrhythmia medication and electrolyte replacement as ordered     Problem: Skin/Tissue Integrity - Adult  Goal: Skin integrity remains intact  Recent Flowsheet Documentation  Taken 2/3/2024 2000 by Nilton Cheatham RN  Skin Integrity Remains Intact:   Monitor for areas of redness and/or skin breakdown   Assess vascular access sites hourly  Taken 2/3/2024 0800 by Kim Rogers RN  Skin Integrity Remains Intact:   Monitor for areas of redness and/or skin breakdown   Assess vascular access sites hourly   Every 4-6 hours minimum: Change oxygen saturation probe site  Goal: Incisions, wounds, or drain sites healing without S/S of infection  Recent Flowsheet Documentation  Taken 2/3/2024 2000 by Nilton Cheatham RN  Incisions, Wounds, or Drain Sites Healing Without Sign and Symptoms of Infection:   Implement wound care per orders   Initiate isolation precautions as appropriate   Initiate pressure ulcer prevention bundle as indicated  Taken 2/3/2024 0800 by Kim Rogers RN  Incisions, Wounds, or Drain Sites Healing Without Sign and Symptoms of Infection:   ADMISSION and DAILY: Assess and document risk factors for pressure ulcer development   TWICE DAILY: Assess and 
RN  Minimal or absence of nausea and vomiting:   Provide nonpharmacologic comfort measures as appropriate   Advance diet as tolerated, if ordered   Nutrition consult to assist patient with adequate nutrition and appropriate food choices  Goal: Maintains or returns to baseline bowel function  Recent Flowsheet Documentation  Taken 2/8/2024 2000 by Yanci Boyer RN  Maintains or returns to baseline bowel function:   Assess bowel function   Encourage mobilization and activity   Administer ordered medications as needed   Nutrition consult to assist patient with appropriate food choices  Goal: Maintains adequate nutritional intake  Recent Flowsheet Documentation  Taken 2/8/2024 2000 by Yanci Boyer RN  Maintains adequate nutritional intake:   Monitor intake and output, weight and lab values   Obtain nutritional consult as needed   Monitor percentage of each meal consumed     Problem: Genitourinary - Adult  Goal: Absence of urinary retention  Recent Flowsheet Documentation  Taken 2/8/2024 2000 by Yanci Boyer RN  Absence of urinary retention:   Assess patient’s ability to void and empty bladder   Monitor intake/output and perform bladder scan as needed     Problem: Spiritual Care  Goal: Pt/Family able to move forward in process of forgiving self, others, and/or higher power  Description: INTERVENTIONS:  1. Assist patient/family to overcome blocks to healing by use of spiritual practices (prayer, meditation, guided imagery, reiki, breath work, etc).  2. De-myth guilt and help patient/family identify possible irrational spiritual/cultural beliefs and values.  3. Explore possibilities of making amends & reconciliation with self, others, and/or a greater power.  4. Guide patient/family in identifying painful feelings of guilt.  5. Help patient/famiy explore and identify spiritual beliefs, cultural understandings or values that may help or hinder letting go of issue.  6. Help patient/family explore feelings of anger, 
higher power  Description: INTERVENTIONS:  1. Assist patient/family to overcome blocks to healing by use of spiritual practices (prayer, meditation, guided imagery, reiki, breath work, etc).  2. De-myth guilt and help patient/family identify possible irrational spiritual/cultural beliefs and values.  3. Explore possibilities of making amends & reconciliation with self, others, and/or a greater power.  4. Guide patient/family in identifying painful feelings of guilt.  5. Help patient/famiy explore and identify spiritual beliefs, cultural understandings or values that may help or hinder letting go of issue.  6. Help patient/family explore feelings of anger, bitterness, resentment.  7. Help patient/family identify and examine the situation in which these feelings are experienced.  8. Help patient/family identify destructive displacement of feelings onto other individuals.  9. Invite use of sacraments/rituals/ceremonies as appropriate (e.g. - confession, anointing, smudging).  10. Refer patient/family to formal counseling and/or to [facility] for further support work.  Recent Flowsheet Documentation  Taken 1/30/2024 2000 by Nilton Cheatham RN  Patient/family able to move forward in process of forgiving self, others, and/or higher power:   Assist patient to overcome blocks to healing by use of spiritual practices (prayer, meditation, guided imagery, reiki, breath work, etc)   De-myth guilt and help patient/family identify possible irrational spiritual/cultural beliefs and values   Explore possibilities of making amends and reconciliation with self, others, and/or a greater power     Problem: Pain  Goal: Verbalizes/displays adequate comfort level or baseline comfort level  Recent Flowsheet Documentation  Taken 1/30/2024 2000 by Nilton Cheatham RN  Verbalizes/displays adequate comfort level or baseline comfort level:   Encourage patient to monitor pain and request assistance   Assess pain using appropriate pain 
excess or deficit   Monitor intake, output and patient weight   Monitor urine specific gravity, serum osmolarity and serum sodium as indicated or ordered   Monitor response to interventions for patient's volume status, including labs, urine output, blood pressure (other measures as available)   Encourage oral intake as appropriate   Instruct patient on fluid and nutrition restrictions as appropriate  Goal: Glucose maintained within prescribed range  Outcome: Progressing  Flowsheets  Taken 2/9/2024 2000 by Yanci Boyer RN  Glucose maintained within prescribed range:   Monitor blood glucose as ordered   Assess barriers to adequate nutritional intake and initiate nutrition consult as needed   Instruct patient on self management of diabetes and initiate consult as needed  Taken 2/9/2024 0700 by Ronda Roca RN  Glucose maintained within prescribed range:   Monitor blood glucose as ordered   Assess for signs and symptoms of hyperglycemia and hypoglycemia   Administer ordered medications to maintain glucose within target range   Assess barriers to adequate nutritional intake and initiate nutrition consult as needed   Instruct patient on self management of diabetes and initiate consult as needed     Problem: Anxiety  Goal: Will report anxiety at manageable levels  Description: INTERVENTIONS:  1. Administer medication as ordered  2. Teach and rehearse alternative coping skills  3. Provide emotional support with 1:1 interaction with staff  Outcome: Progressing  Flowsheets  Taken 2/9/2024 2000 by Yanci Boyer RN  Will report anxiety at manageable levels:   Administer medication as ordered   Provide emotional support with 1:1 interaction with staff  Taken 2/9/2024 0700 by Ronda Roca RN  Will report anxiety at manageable levels:   Administer medication as ordered   Provide emotional support with 1:1 interaction with staff     Problem: Gastrointestinal - Adult  Goal: Minimal or absence of nausea and 
alternative solutions  4. Provide information as requested by patient/family  5. Respect patient/family right to receive or not to receive information  6. Serve as a liaison between patient and family and health care team  7. Initiate Consults from Ethics, Palliative Care or initiate Family Care Conference as is appropriate  Recent Flowsheet Documentation  Taken 2/7/2024 2000 by Nigel Rubin RN  Patient/family able to effectively weigh alternatives and participate in decision making related to treatment and care:   Determine when there are differences between patient's view, family's view, and healthcare provider's view of condition   Facilitate patient and family articulation of goals for care   Help patient and family identify pros/cons of alternative solutions     Problem: Confusion  Goal: Confusion, delirium, dementia, or psychosis is improved or at baseline  Description: INTERVENTIONS:  1. Assess for possible contributors to thought disturbance, including medications, impaired vision or hearing, underlying metabolic abnormalities, dehydration, psychiatric diagnoses, and notify attending LIP  2. [place] high risk fall precautions, as indicated  3. Provide frequent short contacts to provide reality reorientation, refocusing and direction  4. Decrease environmental stimuli, including noise as appropriate  5. Monitor and intervene to maintain adequate nutrition, hydration, elimination, sleep and activity  6. If unable to ensure safety without constant attention obtain sitter and review sitter guidelines with assigned personnel  7. Initiate Psychosocial CNS and Spiritual Care consult, as indicated  Recent Flowsheet Documentation  Taken 2/7/2024 2000 by Nigel Rubin RN  Effect of thought disturbance (confusion, delirium, dementia, or psychosis) are managed with adequate functional status:   Assess for contributors to thought disturbance, including medications, impaired vision or hearing, underlying 
disturbance (confusion, delirium, dementia, or psychosis) are managed with adequate functional status:   Assess for contributors to thought disturbance, including medications, impaired vision or hearing, underlying metabolic abnormalities, dehydration, psychiatric diagnoses, notify LIP   [place] high risk fall precautions, as indicated   Provide frequent short contacts to provide reality reorientation, refocusing and direction     Problem: Infection - Adult  Goal: Absence of infection at discharge  Recent Flowsheet Documentation  Taken 2/11/2024 2000 by Yanci Boyer RN  Absence of infection at discharge:   Assess and monitor for signs and symptoms of infection   Monitor lab/diagnostic results   Monitor all insertion sites i.e., indwelling lines, tubes and drains   Administer medications as ordered   Instruct and encourage patient and family to use good hand hygiene technique   Identify and instruct in appropriate isolation precautions for identified infection/condition   [place] appropriate cooling/warming therapies per order  Goal: Absence of infection during hospitalization  Recent Flowsheet Documentation  Taken 2/11/2024 2000 by Yanci Boyer RN  Absence of infection during hospitalization:   Monitor lab/diagnostic results   Assess and monitor for signs and symptoms of infection   Monitor all insertion sites i.e., indwelling lines, tubes and drains   [place] appropriate cooling/warming therapies per order   Administer medications as ordered   Instruct and encourage patient and family to use good hand hygiene technique   Identify and instruct in appropriate isolation precautions for identified infection/condition  Goal: Absence of fever/infection during anticipated neutropenic period  Recent Flowsheet Documentation  Taken 2/11/2024 2000 by Yanci Boyer RN  Absence of fever/infection during anticipated neutropenic period: Monitor white blood cell count     
care:   Determine when there are differences between patient's view, family's view, and healthcare provider's view of condition   Facilitate patient and family articulation of goals for care   Help patient and family identify pros/cons of alternative solutions     Problem: Confusion  Goal: Confusion, delirium, dementia, or psychosis is improved or at baseline  Description: INTERVENTIONS:  1. Assess for possible contributors to thought disturbance, including medications, impaired vision or hearing, underlying metabolic abnormalities, dehydration, psychiatric diagnoses, and notify attending LIP  2. [place] high risk fall precautions, as indicated  3. Provide frequent short contacts to provide reality reorientation, refocusing and direction  4. Decrease environmental stimuli, including noise as appropriate  5. Monitor and intervene to maintain adequate nutrition, hydration, elimination, sleep and activity  6. If unable to ensure safety without constant attention obtain sitter and review sitter guidelines with assigned personnel  7. Initiate Psychosocial CNS and Spiritual Care consult, as indicated  Recent Flowsheet Documentation  Taken 2/12/2024 2000 by Aniyah Morin RN  Effect of thought disturbance (confusion, delirium, dementia, or psychosis) are managed with adequate functional status:   Assess for contributors to thought disturbance, including medications, impaired vision or hearing, underlying metabolic abnormalities, dehydration, psychiatric diagnoses, notify LIP   [place] high risk fall precautions, as indicated   Provide frequent short contacts to provide reality reorientation, refocusing and direction   Decrease environmental stimuli, including noise as appropriate   Monitor and intervene to maintain adequate nutrition, hydration, elimination, sleep and activity     Problem: Infection - Adult  Goal: Absence of infection at discharge  Recent Flowsheet Documentation  Taken 2/12/2024 2200 by Patience 
psychiatric diagnoses, notify LIP   Provide frequent short contacts to provide reality reorientation, refocusing and direction   [place] high risk fall precautions, as indicated

## 2024-02-13 NOTE — CARE COORDINATION
CM reviewed Pt medicals, per Dr. Jerez Pt Poor overall prognosis.  Blood pressure is low.    There is an Ethics meeting today, Kraig Lopes RN/Rajat will attend.       12:30  Multidisciplinary meeting, it was decided to make Pt comfort care.

## 2024-02-13 NOTE — PATIENT CARE CONFERENCE
2/13/2024    I participated in a multi disciplinary review committee for this patient.  This consisted of our ethics representative, DENNIS Escamilla, ICU nurse director, case management as well as patient's ICU nurse.    We are all of the same opinion that continued aggressive care including dialysis as well as any further escalation of care would all be futile measures and that death appears imminent at this time.    I did notify Dr. Henson and he was in agreement with this as well as pulmonology    I spoke with her significant other Vee who had no objections and indicates patient probably would not have wanted to everything we have been doing    He indicates he does not think he could handle being here following removal of BiPAP    We will therefore proceed with transition over to comfort care at this time                    Denny Henderson MD

## 2024-02-14 LAB
BACTERIA SPEC CULT: ABNORMAL
COLONY COUNT, CNT: ABNORMAL
Lab: ABNORMAL

## 2024-02-17 LAB
BACTERIA SPEC CULT: NORMAL
BACTERIA SPEC CULT: NORMAL
Lab: NORMAL
Lab: NORMAL

## 2024-12-17 NOTE — PROGRESS NOTES
Progress Note      2/10/2024 8:27 AM  NAME: Allison Mauricio   MRN:  [id number]   Admit Diagnosis: Elevated troponin [R79.89]  COPD exacerbation (HCC) [J44.1]  Atrial fibrillation with rapid ventricular response (HCC) [I48.91]  Atrial fibrillation with RVR (HCC) [I48.91]    Cardiology sign on cross cover progress note-[facility]  (Janny Jerez MD)    Problem List:   1.  Respiratory failure with hypoxia requiring BiPAP  2.  Acute exacerbation of chronic obstructive pulmonary disease (COPD)  3.  Respiratory failure with hypoxia requiring BiPAP  4.  Atrial fibrillation with rapid ventricular response      ([id number] score =4)  5.  Nonobstructive coronary artery disease (CAD)  6.  Chronic heart failure with reduced ejection fraction (LVEF = 20-25%)  7.  Valvular heart disease:       7a.  Mild aortic regurgitation       7b.  Moderate tricuspid valve regurgitation  8.  Hypertension  9.  Nicotine addiction  10.  Beta-blocker allergy (angioedema)    11.  Cachexia (BMI = 19.31 kg/m²)  12.  Acute kidney injury on chronic kidney disease stage III  13.  Metabolic acidosis  14.  Severe protein calorie malnutrition  15.  History of ethanol abuse  16.  Close fracture of left humerus shaft  17.  Leukocytosis  18.  Anemia  19.  Electrolyte abnormalities (hyponatremia, and hypocalcemia)    Subjective:     Allison Mauricio, is seen and examined in intensive care unit (ICU) room 261.  There were no acute cardiovascular events reported overnight.  Currently, she denies any cardiovascular complaints.  The patient is a poor historian.  She is examined receiving oxygen supplementation via BiPAP.    Medications Personally Reviewed:    Current Facility-Administered Medications   Medication Dose Route Frequency    HYDROmorphone HCl PF (DILAUDID) injection 1 mg  1 mg IntraVENous Q4H PRN    calcitRIOL (ROCALTROL) solution 0.25 mcg  0.25 mcg Per NG tube Daily    Ergocalciferol (Drisdol) 200 MCG/ML drops 50,000 Units  
        Progress Note      1/25/2024 3:05 PM  NAME: Allison Mauricio   MRN:  [id number]   Admit Diagnosis: Atrial fibrillation with rapid ventricular response (HCC) [I48.91]      Problem List:     Patient is a 68-year-old white female with:   1.  A-fib with RVR, status post ablation May 23   2.  COPD exacerbation   3.  Heart failure   4.  Hypokalemia   5.  Hyponatremia   6.  Acute kidney injury   7.  Mild aortic regurgitation   8.  Moderate tricuspid regurgitation  9.  Allergic beta-blocker therapy resulting in angioedema  10.  Nicotine dependence  11.  Status post left humerus fracture  12.  Nonobstructive CAD  13.  History of alcohol abuse       Assessment/Plan:     She is counseled on the importance of alcohol abstinence and smoking cessation.  I agree to discontinue Cardizem gtt. and transition to oral Cardizem.  Potassium 3.3.  Replete potassium.  BNP is elevated.  Currently on apixaban, IV Lasix, Entresto and pravastatin.  Will resume Aldactone         []       High complexity decision making was performed in this patient at high risk for decompensation with multiple organ involvement.    Subjective:     Allison Mauricio denies chest pain, dyspnea.  Discussed with RN events overnight.     Review of Systems:   Negative except for as noted above.    Objective:      Physical Exam:    Last 24hrs VS reviewed since prior progress note. Most recent are:    /83   Pulse 90   Temp 98.1 °F (36.7 °C) (Axillary)   Resp 16   Ht 1.575 m (5' 2\")   Wt 49.9 kg (110 lb)   SpO2 99%   BMI 20.12 kg/m²   No intake or output data in the 24 hours ending 01/25/24 1505     General Appearance: Alert; no acute distress.  Ears/Nose/Mouth/Throat: moist mucous membranes  Neck: Supple.  Chest: Lungs clear to auscultation bilaterally.  Cardiovascular: Regular rate and rhythm, S1S2 normal  Abdomen: Soft, non-tender, bowel sounds are active.  Extremities: No edema bilaterally.  Skin: Warm and dry.      PMH/SH reviewed - no change 
        Progress Note      1/26/2024 12:23 PM  NAME: Allison Mauricio   MRN:  [id number]   Admit Diagnosis: Elevated troponin [R79.89]  COPD exacerbation (HCC) [J44.1]  Atrial fibrillation with rapid ventricular response (HCC) [I48.91]  Atrial fibrillation with RVR (HCC) [I48.91]      Problem List:     Patient is a 68-year-old white female with:   1.  A-fib with RVR, status post ablation May 23   2.  COPD exacerbation   3.  Heart failure   4.  Hypokalemia   5.  Hyponatremia   6.  Acute kidney injury   7.  Mild aortic regurgitation   8.  Moderate tricuspid regurgitation  9.  Allergic beta-blocker therapy resulting in angioedema  10.  Nicotine dependence  11.  Status post left humerus fracture  12.  Nonobstructive CAD  13.  History of alcohol abuse       Assessment/Plan:     Patient is wheezing quite extensively.  Will involve pulmonary.  This is why her heart is rate is fast.         []       High complexity decision making was performed in this patient at high risk for decompensation with multiple organ involvement.    Subjective:     Allison Mauricio denies chest pain, dyspnea.  Discussed with RN events overnight.     Review of Systems:   Negative except for as noted above.    Objective:      Physical Exam:    Last 24hrs VS reviewed since prior progress note. Most recent are:    /71   Pulse (!) 117   Temp 99.3 °F (37.4 °C) (Oral)   Resp 20   Ht 1.575 m (5' 2\")   Wt 49.9 kg (110 lb)   SpO2 94%   BMI 20.12 kg/m²     Intake/Output Summary (Last 24 hours) at 1/26/2024 1223  Last data filed at 1/25/2024 2022  Gross per 24 hour   Intake 10 ml   Output 750 ml   Net -740 ml        General Appearance: Alert; no acute distress.  Ears/Nose/Mouth/Throat: moist mucous membranes  Neck: Supple.  Chest: End expiratory wheezes bilaterally.  Cardiovascular: Regular rate and rhythm, S1S2 normal  Abdomen: Soft, non-tender, bowel sounds are active.  Extremities: No edema bilaterally.  Skin: Warm and dry.      PMH/SH 
        Progress Note      1/28/2024 2:58 PM  NAME: Allison Mauricio   MRN:  [id number]   Admit Diagnosis: Elevated troponin [R79.89]  COPD exacerbation (HCC) [J44.1]  Atrial fibrillation with rapid ventricular response (HCC) [I48.91]  Atrial fibrillation with RVR (HCC) [I48.91]      Problem List:     Patient is a 68-year-old white female with:   1.  A-fib with RVR, status post ablation May 23   2.  COPD exacerbation   3.  Heart failure   4.  Hypokalemia   5.  Hyponatremia   6.  Acute kidney injury   7.  Mild aortic regurgitation   8.  Moderate tricuspid regurgitation  9.  Allergic beta-blocker therapy resulting in angioedema  10.  Nicotine dependence  11.  Status post left humerus fracture  12.  Nonobstructive CAD  13.  History of alcohol abuse       Assessment/Plan:     Blood pressure is normal.  Creatinine is at 3.3, BUN is 57, sodium is 132.  BNP is 18,000 yesterday.  Hemoglobin 12.9, platelet 218.  Patient complains of shortness of breath.  Appreciate nephrology input.  Will continue to hold diuretic for now and Entresto.  Plans noted for gentle hydration.         []       High complexity decision making was performed in this patient at high risk for decompensation with multiple organ involvement.    Subjective:     Allison Mauricio compressions were.  Discussed with RN events overnight.     Review of Systems:   Negative except for as noted above.    Objective:      Physical Exam:    Last 24hrs VS reviewed since prior progress note. Most recent are:    /70   Pulse 94   Temp 98 °F (36.7 °C) (Oral)   Resp 20   Ht 1.575 m (5' 2\")   Wt 49.9 kg (110 lb)   SpO2 96%   BMI 20.12 kg/m²   No intake or output data in the 24 hours ending 01/28/24 1458       General Appearance: Alert; no acute distress.  Ears/Nose/Mouth/Throat: moist mucous membranes  Neck: Supple.  Chest: End expiratory wheezes bilaterally.  Cardiovascular: Regular rate and rhythm, S1S2 normal  Abdomen: Soft, non-tender, bowel sounds are 
        Progress Note      1/29/2024 4:36 PM  NAME: Allison Mauricio   MRN:  [id number]   Admit Diagnosis: Elevated troponin [R79.89]  COPD exacerbation (HCC) [J44.1]  Atrial fibrillation with rapid ventricular response (HCC) [I48.91]  Atrial fibrillation with RVR (HCC) [I48.91]      Problem List:     Patient is a 68-year-old white female with:   1.  A-fib with RVR, status post ablation May 23   2.  COPD exacerbation   3.  Heart failure   4.  Hypokalemia   5.  Hyponatremia   6.  Acute kidney injury   7.  Mild aortic regurgitation   8.  Moderate tricuspid regurgitation  9.  Allergic beta-blocker therapy resulting in angioedema  10.  Nicotine dependence  11.  Status post left humerus fracture  12.  Nonobstructive CAD  13.  History of alcohol abuse       Assessment/Plan:     Patient was transferred to the ICU.  Plans noted for PET scan as an outpatient for right midlung lung mass.Patient underwent thoracentesis with 450 cc of clear fluid removed from left thoraxCreatinine is trending down, BUN is 58.  Sodium 133.         []       High complexity decision making was performed in this patient at high risk for decompensation with multiple organ involvement.    Subjective:     Allison Mauricio complains of shortness of breath.  Discussed with RN events overnight.     Review of Systems:   Negative except for as noted above.    Objective:      Physical Exam:    Last 24hrs VS reviewed since prior progress note. Most recent are:    /65   Pulse 88   Temp 98 °F (36.7 °C) (Axillary)   Resp 16   Ht 1.575 m (5' 2\")   Wt 49.9 kg (110 lb)   SpO2 94%   BMI 20.12 kg/m²     Intake/Output Summary (Last 24 hours) at 1/29/2024 1636  Last data filed at 1/29/2024 1200  Gross per 24 hour   Intake 265.83 ml   Output 700 ml   Net -434.17 ml          General Appearance: Alert; no acute distress.  Ears/Nose/Mouth/Throat: moist mucous membranes  Neck: Supple.  Chest: End expiratory wheezes bilaterally.  Cardiovascular: Regular rate 
        Progress Note      1/30/2024 8:57 AM  NAME: Allison Mauricio   MRN:  [id number]   Admit Diagnosis: Elevated troponin [R79.89]  COPD exacerbation (HCC) [J44.1]  Atrial fibrillation with rapid ventricular response (HCC) [I48.91]  Atrial fibrillation with RVR (HCC) [I48.91]      Problem List:     Patient is a 68-year-old white female with:   1.  A-fib with RVR, status post ablation May 23   2.  COPD exacerbation   3.  Heart failure   4.  Hypokalemia   5.  Hyponatremia   6.  Acute kidney injury   7.  Mild aortic regurgitation   8.  Moderate tricuspid regurgitation  9.  Allergic beta-blocker therapy resulting in angioedema  10.  Nicotine dependence  11.  Status post left humerus fracture  12.  Nonobstructive CAD  13.  History of alcohol abuse       Assessment/Plan:     No acute events overnight.  Currently on apixaban 5 mg twice a day.  She is currently in sinus rhythm with PAC.  We will discontinue IV Cardizem.  We will resume oral Cardizem.  She is currently on a BiPAP.  Continue holding of Entresto spironolactone.  Sodium 134, creatinine 3.3, BUN is 60.  Albumin 3.1.  Alkaline phosphatase 41.  Hemoglobin 11.8, white count is 12.3.  Platelet is 178.  Lower extremity swelling resolved.  Continue supportive care.  We will adjust Eliquis dose to 2.5 mg twice a day         []       High complexity decision making was performed in this patient at high risk for decompensation with multiple organ involvement.    Subjective:     Allison Mauricio complains of shortness of breath.  Discussed with RN events overnight.     Review of Systems:   Negative except for as noted above.    Objective:      Physical Exam:    Last 24hrs VS reviewed since prior progress note. Most recent are:    BP (!) 143/85   Pulse 88   Temp 97.8 °F (36.6 °C) (Axillary)   Resp 19   Ht 1.575 m (5' 2\")   Wt 49.9 kg (110 lb)   SpO2 94%   BMI 20.12 kg/m²     Intake/Output Summary (Last 24 hours) at 1/30/2024 0857  Last data filed at 1/30/2024 
        Progress Note      1/31/2024 7:54 AM  NAME: Allison Mauricio   MRN:  [id number]   Admit Diagnosis: Elevated troponin [R79.89]  COPD exacerbation (HCC) [J44.1]  Atrial fibrillation with rapid ventricular response (HCC) [I48.91]  Atrial fibrillation with RVR (HCC) [I48.91]      Problem List:     Patient is a 68-year-old white female with:   1.  A-fib with RVR, status post ablation May 23   2.  COPD exacerbation   3.  Heart failure   4.  Hypokalemia   5.  Hyponatremia   6.  Acute kidney injury   7.  Mild aortic regurgitation   8.  Moderate tricuspid regurgitation  9.  Allergic beta-blocker therapy resulting in angioedema  10.  Nicotine dependence  11.  Status post left humerus fracture  12.  Nonobstructive CAD  13.  History of alcohol abuse       Assessment/Plan:     Patient on Precedex.  Currently in sinus rhythm with frequent PAC.  Off Cardizem drip.  She is currently on a BiPAP.  She was -1.4 L overnight.  Sodium is 133, potassium 2.7.  Replete potassium.  Creatinine is improving at 2.91.  White count is 16.4, hemoglobin 11.6  Platelet 194.         []       High complexity decision making was performed in this patient at high risk for decompensation with multiple organ involvement.    Subjective:     Allison Mauricio complains of shortness of breath.  Discussed with RN events overnight.     Review of Systems:   Negative except for as noted above.    Objective:      Physical Exam:    Last 24hrs VS reviewed since prior progress note. Most recent are:    /88   Pulse (!) 101   Temp 98.4 °F (36.9 °C) (Oral)   Resp 18   Ht 1.575 m (5' 2\")   Wt 49.9 kg (110 lb)   SpO2 99%   BMI 20.12 kg/m²     Intake/Output Summary (Last 24 hours) at 1/31/2024 0754  Last data filed at 1/31/2024 0607  Gross per 24 hour   Intake 416.21 ml   Output 1870 ml   Net -1453.79 ml          General Appearance: Alert; no acute distress.  Ears/Nose/Mouth/Throat: moist mucous membranes  Neck: Supple.  Chest: End expiratory 
        Progress Note      2/12/2024 8:52 AM  NAME: Allison Mauricio   MRN:  [id number]   Admit Diagnosis: Elevated troponin [R79.89]  COPD exacerbation (HCC) [J44.1]  Atrial fibrillation with rapid ventricular response (HCC) [I48.91]  Atrial fibrillation with RVR (HCC) [I48.91]      Problem List:     1.  Respiratory failure with hypoxia requiring BiPAP  2.  Acute exacerbation of chronic obstructive pulmonary disease (COPD)  3.  Respiratory failure with hypoxia requiring BiPAP  4.  Atrial fibrillation with rapid ventricular response      ([id number] score =4)  5.  Nonobstructive coronary artery disease (CAD)  6.  Chronic heart failure with reduced ejection fraction (LVEF = 20-25%)  7.  Valvular heart disease:       7a.  Mild aortic regurgitation       7b.  Moderate tricuspid valve regurgitation  8.  Hypertension  9.  Nicotine addiction  10.  Beta-blocker allergy (angioedema)     11.  Cachexia (BMI = 19.31 kg/m²)  12.  Acute kidney injury on chronic kidney disease stage III  13.  Metabolic acidosis  14.  Severe protein calorie malnutrition  15.  History of ethanol abuse  16.  Close fracture of left humerus shaft  17.  Leukocytosis  18.  Anemia  19.  Electrolyte abnormalities (hyponatremia, and hypocalcemia)       Assessment/Plan:     Patient is being treated for COVID-pneumonia.  Currently in atrial fibrillation with controlled ventricular rate.  Remains on Cardizem gtt.    She is lethargic.  She has pneumothorax.  Discussed the case with pulmonologist.  Plans for chest tube         []       High complexity decision making was performed in this patient at high risk for decompensation with multiple organ involvement.    Subjective:     Allison Mauricio denies chest pain, dyspnea.  Discussed with RN events overnight.     Review of Systems:   Negative except for as noted above.    Objective:      Physical Exam:    Last 24hrs VS reviewed since prior progress note. Most recent are:    BP (!) 104/56   Pulse 87   
        Progress Note      2/13/2024 3:29 PM  NAME: Allison Mauricio   MRN:  [id number]   Admit Diagnosis: Elevated troponin [R79.89]  COPD exacerbation (HCC) [J44.1]  Atrial fibrillation with rapid ventricular response (HCC) [I48.91]  Atrial fibrillation with RVR (HCC) [I48.91]      Problem List:     1.  Respiratory failure with hypoxia requiring BiPAP  2.  Acute exacerbation of chronic obstructive pulmonary disease (COPD)  3.  Respiratory failure with hypoxia requiring BiPAP  4.  Atrial fibrillation with rapid ventricular response      ([id number] score =4)  5.  Nonobstructive coronary artery disease (CAD)  6.  Chronic heart failure with reduced ejection fraction (LVEF = 20-25%)  7.  Valvular heart disease:       7a.  Mild aortic regurgitation       7b.  Moderate tricuspid valve regurgitation  8.  Hypertension  9.  Nicotine addiction  10.  Beta-blocker allergy (angioedema)     11.  Cachexia (BMI = 19.31 kg/m²)  12.  Acute kidney injury on chronic kidney disease stage III  13.  Metabolic acidosis  14.  Severe protein calorie malnutrition  15.  History of ethanol abuse  16.  Close fracture of left humerus shaft  17.  Leukocytosis  18.  Anemia  19.  Electrolyte abnormalities (hyponatremia, and hypocalcemia)       Assessment/Plan:     Prognosis grim   Patient converted to sinus rhythm.  She is a more unresponsive.  Currently on ultrafiltration for volume overload.  Patient is currently on remdesivir.  She tested positive for COVID.         []       High complexity decision making was performed in this patient at high risk for decompensation with multiple organ involvement.    Subjective:     Allison Mauricio denies chest pain, dyspnea.  Discussed with RN events overnight.     Review of Systems:   Negative except for as noted above.    Objective:      Physical Exam:    Last 24hrs VS reviewed since prior progress note. Most recent are:    BP (!) 92/49   Pulse 73   Temp (!) 89.4 °F (31.9 °C)   Resp 24   Ht 1.575 
        Progress Note      2/4/2024 11:15 AM  NAME: Allison Mauricio   MRN:  [id number]   Admit Diagnosis: Elevated troponin [R79.89]  COPD exacerbation (HCC) [J44.1]  Atrial fibrillation with rapid ventricular response (HCC) [I48.91]  Atrial fibrillation with RVR (HCC) [I48.91]      Problem List:   - A-fib with RVR, status post ablation May 23  -COPD exacerbation  -Heart failure  -Hypokalemia  -Hyponatremia  -Acute kidney injury  -Mild aortic regurgitation  -Moderate tricuspid regurgitation  -Allergic beta-blocker therapy resulting in angioedema  -Nicotine dependence  -Status post left humerus fracture  -Nonobstructive CAD  -History of alcohol abuse         Assessment/Plan:   Note dictated February 4, 2024  -Patient is lying comfortably in the bed with BiPAP requiring to maintain oxygen saturation.  -Monitor shows atrial fibrillation with heart rate between 90 to 110 bpm.  -Will check digoxin level along with giving her another dose of digoxin of 0.125 mg today as patient is tolerating it well with no significant rhythm abnormalities.  -Will switch amiodarone to p.o. 200 mg twice daily.  -Once patient is relatively stable per her pulmonary status we can offer her LEIGH cardioversion.  -For now we will continue medical management with goal to control the heart rate of less than 90 and supportive care..  -No further cardiovascular testing at this time as it would not change my cardiac management and continue to optimize guideline directed medical management for cardiomyopathy.  -=============================================================  Note dictated February 3, 2024  -Follow-up visit from cardiology, patient continues to be in atrial fibrillation with RVR despite of being on amiodarone infusion.  Heart rate in 90 to 105 bpm on the monitor.  -Patient is on BiPAP machine but able to communicate.  -I will take the liberty to give her digoxin  -The goal is to control heart rate to minimize the risk of tachycardia 
        Progress Note      2/5/2024 7:34 PM  NAME: Allison Mauricio   MRN:  [id number]   Admit Diagnosis: Elevated troponin [R79.89]  COPD exacerbation (HCC) [J44.1]  Atrial fibrillation with rapid ventricular response (HCC) [I48.91]  Atrial fibrillation with RVR (HCC) [I48.91]      Problem List:     Patient is a 68-year-old white female with:   1.  A-fib with RVR, status post ablation May 23   2.  COPD exacerbation   3.  Heart failure   4.  Hypokalemia   5.  Hyponatremia   6.  Acute kidney injury   7.  Mild aortic regurgitation   8.  Moderate tricuspid regurgitation  9.  Allergic beta-blocker therapy resulting in angioedema  10.  Nicotine dependence  11.  Status post left humerus fracture  12.  Nonobstructive CAD  13.  History of alcohol abuse       Assessment/Plan:     Patient is currently on BiPAP.  She is lethargic.  She is on 70% FiO2.  Currently in A-fib with RVR.  Occasionally heart rate up to 170s.  Blood pressure soft.  She appears to be euvolemic on examination.  Dose of Lasix has been reduced.When dobutamine was attempted to support blood pressure heart rate increased.  Replete potassium.  Patient cardiac catheterization last year in May that was done for cardiomyopathy showed moderate coronary disease of mid RCA.  Currently on apixaban spironolactone and oral amiodarone         []       High complexity decision making was performed in this patient at high risk for decompensation with multiple organ involvement.    Subjective:     Allison Mauricio lethargic.  Discussed with RN events overnight.     Review of Systems:   Negative except for as noted above.    Objective:      Physical Exam:    Last 24hrs VS reviewed since prior progress note. Most recent are:    /72   Pulse (!) 150   Temp 97.9 °F (36.6 °C) (Axillary)   Resp (!) 31   Ht 1.575 m (5' 2.01\")   Wt 49.9 kg (110 lb)   SpO2 98%   BMI 20.11 kg/m²     Intake/Output Summary (Last 24 hours) at 2/5/2024 1934  Last data filed at 2/5/2024 
        Progress Note      2/6/2024 2:03 PM  NAME: Allison Mauricio   MRN:  [id number]   Admit Diagnosis: Elevated troponin [R79.89]  COPD exacerbation (HCC) [J44.1]  Atrial fibrillation with rapid ventricular response (HCC) [I48.91]  Atrial fibrillation with RVR (HCC) [I48.91]      Problem List:     Patient is a 68-year-old white female with:   1.  A-fib with RVR, status post ablation May 23   2.  COPD exacerbation   3.  Heart failure   4.  Hypokalemia   5.  Hyponatremia   6.  Acute kidney injury   7.  Mild aortic regurgitation   8.  Moderate tricuspid regurgitation  9.  Allergic beta-blocker therapy resulting in angioedema  10.  Nicotine dependence  11.  Status post left humerus fracture  12.  Nonobstructive CAD  13.  History of alcohol abuse       Assessment/Plan:     She continues to be lethargic.  She was in A-fib with RVR yesterday and Cardizem drip was started.  Heart rate has improved and he blood pressure is soft.  She remains on a BiPAP.  She is on Precedex.  Patient with history of alcohol abuse.  Currently on oral amiodarone, apixaban and IV Lasix.  Creatinine is 3.2, BUN is 76.  Sodium 134, potassium 4.5.           []       High complexity decision making was performed in this patient at high risk for decompensation with multiple organ involvement.    Subjective:     Allison Mauricio lethargic.  Discussed with RN events overnight.     Review of Systems:   Negative except for as noted above.    Objective:      Physical Exam:    Last 24hrs VS reviewed since prior progress note. Most recent are:    BP 99/66   Pulse (!) 102   Temp 97.2 °F (36.2 °C) (Axillary)   Resp 19   Ht 1.575 m (5' 2.01\")   Wt 49.9 kg (110 lb)   SpO2 98%   BMI 20.11 kg/m²     Intake/Output Summary (Last 24 hours) at 2/6/2024 1403  Last data filed at 2/6/2024 0149  Gross per 24 hour   Intake --   Output 400 ml   Net -400 ml          General Appearance: Weak and tired on BiPAP.  Ears/Nose/Mouth/Throat: moist mucous 
        Progress Note      2/7/2024 3:44 PM  NAME: Allison Mauricio   MRN:  [id number]   Admit Diagnosis: Elevated troponin [R79.89]  COPD exacerbation (HCC) [J44.1]  Atrial fibrillation with rapid ventricular response (HCC) [I48.91]  Atrial fibrillation with RVR (HCC) [I48.91]      Problem List:     Patient is a 68-year-old white female with:   1.  A-fib with RVR, status post ablation May 23   2.  COPD exacerbation   3.  Heart failure   4.  Hypokalemia   5.  Hyponatremia   6.  Acute kidney injury   7.  Mild aortic regurgitation   8.  Moderate tricuspid regurgitation  9.  Allergic beta-blocker therapy resulting in angioedema  10.  Nicotine dependence  11.  Status post left humerus fracture  12.  Nonobstructive CAD  13.  History of alcohol abuse       Assessment/Plan:     She is very agitated on the BiPAP.  This is resulting in atrial fibrillation with RVR.  Sedatives as per pulmonary and primary team.  She is not volume overload.  Potassium is 5.3, sodium 131.  Creatinine is 3.42, BUN is 79.    Prognosis poor         []       High complexity decision making was performed in this patient at high risk for decompensation with multiple organ involvement.    Subjective:     Allison Mauricio lethargic.  Discussed with RN events overnight.     Review of Systems:   Negative except for as noted above.    Objective:      Physical Exam:    Last 24hrs VS reviewed since prior progress note. Most recent are:    BP (!) 125/91   Pulse (!) 143   Temp 98.3 °F (36.8 °C) (Axillary)   Resp (!) 31   Ht 1.575 m (5' 2.01\")   Wt 47.9 kg (105 lb 9.6 oz)   SpO2 97%   BMI 19.31 kg/m²     Intake/Output Summary (Last 24 hours) at 2/7/2024 1544  Last data filed at 2/7/2024 0600  Gross per 24 hour   Intake 1693.51 ml   Output 350 ml   Net 1343.51 ml          General Appearance: Weak and tired on BiPAP.  Ears/Nose/Mouth/Throat: moist mucous membranes  Neck: Supple.  Chest: End expiratory wheezes bilaterally.  Cardiovascular: Tachycardic 
        Progress Note      2/8/2024 7:50 AM  NAME: Allison Mauricio   MRN:  [id number]   Admit Diagnosis: Elevated troponin [R79.89]  COPD exacerbation (HCC) [J44.1]  Atrial fibrillation with rapid ventricular response (HCC) [I48.91]  Atrial fibrillation with RVR (HCC) [I48.91]      Problem List:     Patient is a 68-year-old white female with:   1.  A-fib with RVR, status post ablation May 23   2.  COPD exacerbation   3.  Heart failure   4.  Hypokalemia   5.  Hyponatremia   6.  Acute kidney injury   7.  Mild aortic regurgitation   8.  Moderate tricuspid regurgitation  9.  Allergic beta-blocker therapy resulting in angioedema  10.  Nicotine dependence  11.  Status post left humerus fracture  12.  Nonobstructive CAD  13.  History of alcohol abuse       Assessment/Plan:     Poor overall prognosis.  Blood pressure is low.  She was +2.8 L overnight.  No further cardiac testing planned at this time.  Ethics committee consulted.  BUN and creatinine is worse.  She is not acidotic.  White count is worse.         []       High complexity decision making was performed in this patient at high risk for decompensation with multiple organ involvement.    Subjective:     Allison Mauricio lethargic.  Discussed with RN events overnight.     Review of Systems:   Negative except for as noted above.    Objective:      Physical Exam:    Last 24hrs VS reviewed since prior progress note. Most recent are:    BP (!) 89/63   Pulse 74   Temp 97.6 °F (36.4 °C) (Axillary)   Resp 18   Ht 1.575 m (5' 2.01\")   Wt 47.9 kg (105 lb 9.6 oz)   SpO2 94%   BMI 19.31 kg/m²     Intake/Output Summary (Last 24 hours) at 2/8/2024 0750  Last data filed at 2/8/2024 0700  Gross per 24 hour   Intake 2867.83 ml   Output 0 ml   Net 2867.83 ml          General Appearance: Weak and tired on BiPAP.  Ears/Nose/Mouth/Throat: moist mucous membranes  Neck: Supple.  Chest: End expiratory wheezes bilaterally.  Cardiovascular: Tachycardic   Abdomen: Soft, 
    Hospitalist Progress Note    NAME:   Allison Mauricio   : 1955   MRN: [id number]     Date/Time: 2024 9:37 AM  Patient PCP: None, None    Estimated discharge date:> 48 hours  Barriers: clinical improvement, cardiology clearance, nephrology clearance      HOSPITAL COURSE:    Allison Mauricio is a 68 y.o.  female with hypertension, HFrEF, atrial fibrillation with RVR (s/p ablation 23), EtOH abuse, and nicotine use was admitted to the hospital for further evaluation of shortness of breath and chest pressure. Patient states her symptoms have been ongoing for the past 1 month and have progressively worsened this week. Complaining of chest pressure and shortness of breath with limited movement. Upon presentation to the ER patient was hypertensive and tachycardiac.  Patient was found to be in A-fib with RVR. Started on a Cardizem gtt, patient converted to NSR. Cardizem gtt discontinued and patient was transitioned to her home dose of Cardizem. BNP > 30,000. Cr 1.68, resumed home lasix.  Echo: EF 35-40%, low normal systolic function, normal diastolic function; mild/mod TR with RVSP 30 mmgHg, moderately dilated LA, medium sized left pleural effusion. Cardiology evaluated the patient and recommendations were provided. Patient with persistent HR elevations at rest. EKG showed A-fib with RVR, cardizem gtt restarted 7.5 mg/hr. Patient continues to have an elevated HR at rest and with exertion. Pulmonary consulted to rule out underlying pulmonary causes. Started IV Solu-medrol and DuoNebs.    Patient with worsening renal function. Nephrology consulted for HOLA on CKD.   Will hold lasix, Entresto, and spironolactone per nephrology recommendations.   CT chest showed moderate bilateral pleural effusions. Mass like density in the RUL measuring 2.5 cm chronic atelectasis vs neoplasm. CT results discussed and reviewed with pulmonary.IR performed therapeutic thoracentesis. Recommending outpatient PET scan for further 
    Hospitalist Progress Note    NAME:   Allison Mauricio   : 1955   MRN: [id number]     Date/Time: 2024 10:27 AM  Patient PCP: None, None    Estimated discharge date:> 48 hours  Barriers: clinical improvement, cardiology clearance, nephrology clearance      HOSPITAL COURSE:    Allison Mauricio is a 68 y.o.  female with hypertension, HFrEF, atrial fibrillation with RVR (s/p ablation 23), EtOH abuse, and nicotine use was admitted to the hospital for further evaluation of shortness of breath and chest pressure. Patient states her symptoms have been ongoing for the past 1 month and have progressively worsened this week. Complaining of chest pressure and shortness of breath with limited movement. Upon presentation to the ER patient was hypertensive and tachycardiac.  Patient was found to be in A-fib with RVR. Started on a Cardizem gtt, patient converted to NSR. Cardizem gtt discontinued and patient was transitioned to her home dose of Cardizem. BNP > 30,000. Cr 1.68, resumed home lasix.  Echo: EF 35-40%, low normal systolic function, normal diastolic function; mild/mod TR with RVSP 30 mmgHg, moderately dilated LA, medium sized left pleural effusion. Cardiology evaluated the patient and recommendations were provided. Patient with persistent HR elevations at rest. EKG showed A-fib with RVR, cardizem gtt restarted 7.5 mg/hr. Patient continues to have an elevated HR at rest and with exertion. Pulmonary consulted to rule out underlying pulmonary causes. Started IV Solu-medrol and DuoNebs.    Patient with worsening renal function. Nephrology consulted for [facility]A on CKD.   Will hold lasix, Entresto, and spironolactone per nephrology recommendations.   CT chest showed moderate bilateral pleural effusions. Mass like density in the RUL measuring 2.5 cm chronic atelectasis vs neoplasm. CT results discussed and reviewed with pulmonary.IR performed therapeutic thoracentesis. Recommending outpatient PET scan for further 
    Hospitalist Progress Note    NAME:   Allison Mauricio   : 1955   MRN: [id number]     Date/Time: 2024 4:38 PM  Patient PCP: None, None    Estimated discharge date:> 48 hours  Barriers: clinical improvement, IV steroids      HOSPITAL COURSE:    Allison Mauricio is a 68 y.o.  female with hypertension, HFrEF, atrial fibrillation with RVR (s/p ablation 23), EtOH abuse, and nicotine use was admitted to the hospital for further evaluation of shortness of breath and chest pressure. Patient states her symptoms have been ongoing for the past 1 month and have progressively worsened this week. Complaining of chest pressure and shortness of breath with limited movement. Patient is a poor historian. She states the only medications she takes at home is lasix and multi-vitamins. She states she was taking other medications but the prescriptions ran out and did no have them refilled. Patient is a daily drinker and smoker consuming beer daily, her last drink was yesterday. Upon presentation to the ER patient is hypertensive and tachycardiac. EKG:ST: , no ischemia noted. Started on a Cardizem gtt. Chemistry showed hypokalemia K 2.4 and hypercalcemia Ca 5.4. Receiving IV replacement. BNP 30,649, will order echocardiogram. Last known EF  was 25-30%. Continue IV lasix and gentle hydration. Cardiology evaluated the patient.      Assessment / Plan:    Atrial fibrillation with RVR:   - S/p ablation 23  - EKG: ST:  with premature SVT complexes; no acute ischemia  - home medications: amiodarone 200 mg BID, Eliquis 5 mg BID, Cardizem 30 mg TID, Entresto 24-26 nightly, and spironolactone 25 mg daily.   - patient reports non-compliance to all medications except for lasix and she states she started taking Eliquis last week.   - CHADS-VASc score is 4, will resume Eliquis   - Discontinue Cardizem gtt,  transition to oral Cardizem   - Continue telemetry monitoring. Keep serum potassium between 4-5 and serum 
    Hospitalist Progress Note    NAME:   Allison Mauricio   : 1955   MRN: [id number]     Date/Time: 2024 11:26 AM  Patient PCP: None, None    Estimated discharge date:> 48 hours  Barriers: clinical improvement, cardiology clearance, nephrology clearance      HOSPITAL COURSE:    Allison Mauricio is a 68 y.o.  female with hypertension, HFrEF, atrial fibrillation with RVR (s/p ablation 23), EtOH abuse, and nicotine use was admitted to the hospital for further evaluation of shortness of breath and chest pressure. Patient states her symptoms have been ongoing for the past 1 month and have progressively worsened this week. Complaining of chest pressure and shortness of breath with limited movement. Upon presentation to the ER patient was hypertensive and tachycardiac.  Patient was found to be in A-fib with RVR. Started on a Cardizem gtt, patient converted to NSR. Cardizem gtt discontinued and patient was transitioned to her home dose of Cardizem. BNP > 30,000. Cr 1.68, resumed home lasix.  Echo: EF 35-40%, low normal systolic function, normal diastolic function; mild/mod TR with RVSP 30 mmgHg, moderately dilated LA, medium sized left pleural effusion. Cardiology evaluated the patient and recommendations were provided. Patient with persistent HR elevations at rest. EKG showed A-fib with RVR, cardizem gtt restarted 7.5 mg/hr. Patient continues to have an elevated HR at rest and with exertion. Pulmonary consulted to rule out underlying pulmonary causes. Started IV Solu-medrol and DuoNebs.    Patient with worsening renal function. Nephrology consulted for H[facility]A on CKD.   Will hold lasix, Entresto, and spironolactone per nephrology recommendations.   CT chest showed moderate bilateral pleural effusions. Mass like density in the RUL measuring 2.5 cm chronic atelectasis vs neoplasm. CT results discussed and reviewed with pulmonary.IR performed therapeutic thoracentesis. Recommending outpatient PET scan for further 
    Hospitalist Progress Note    NAME:   Allison Mauricio   : 1955   MRN: [id number]     Date/Time: 2024 10:45 AM  Patient PCP: None, None    Estimated discharge date:> 48 hours  Barriers: clinical improvement, IV steroids      HOSPITAL COURSE:    Allison Mauricio is a 68 y.o.  female with hypertension, HFrEF, atrial fibrillation with RVR (s/p ablation 23), EtOH abuse, and nicotine use was admitted to the hospital for further evaluation of shortness of breath and chest pressure. Patient states her symptoms have been ongoing for the past 1 month and have progressively worsened this week. Complaining of chest pressure and shortness of breath with limited movement. Patient is a poor historian. She states the only medications she takes at home is lasix and multi-vitamins. She states she was taking other medications but the prescriptions ran out and did no have them refilled. Patient is a daily drinker and smoker consuming beer daily, her last drink was yesterday. Upon presentation to the ER patient is hypertensive and tachycardiac. EKG:ST: , no ischemia noted. Started on a Cardizem gtt. Chemistry showed hypokalemia K 2.4 and hypercalcemia Ca 5.4. Receiving IV replacement. BNP 30,649, will order echocardiogram. Last known EF  was 25-30%. Continue IV lasix and gentle hydration. Cardiology evaluated the patient.  Renal function worsening, nephrology to evaluate the patient.     Assessment / Plan:    Atrial fibrillation with RVR:   - S/p ablation 23  - EKG: ST:  with premature SVT complexes; no acute ischemia  - home medications: amiodarone 200 mg BID, Eliquis 5 mg BID, Cardizem 30 mg TID, Entresto 24-26 nightly, and spironolactone 25 mg daily.   - patient reports non-compliance to all medications except for lasix and she states she started taking Eliquis last week.   - CHADS-VASc score is 4, will resume Eliquis   - Discontinue Cardizem gtt,  transition to oral Cardizem   - Continue 
    Hospitalist Progress Note    NAME:   Allison Mauricio   : 1955   MRN: [id number]     Date/Time: 2024 5:06 PM  Patient PCP: None, None    Estimated discharge date:> 48 hours  Barriers: clinical improvement, cardiology clearance, nephrology clearance      HOSPITAL COURSE:    Allison Mauricio is a 68 y.o.  female with hypertension, HFrEF, atrial fibrillation with RVR (s/p ablation 23), EtOH abuse, and nicotine use was admitted to the hospital for further evaluation of shortness of breath and chest pressure. Patient states her symptoms have been ongoing for the past 1 month and have progressively worsened this week. Complaining of chest pressure and shortness of breath with limited movement. Upon presentation to the ER patient was hypertensive and tachycardiac.  Patient was found to be in A-fib with RVR. Started on a Cardizem gtt, patient converted to NSR. Cardizem gtt discontinued and patient was transitioned to her home dose of Cardizem. BNP > 30,000. Cr 1.68, resumed home lasix.  Echo: EF 35-40%, low normal systolic function, normal diastolic function; mild/mod TR with RVSP 30 mmgHg, moderately dilated LA, medium sized left pleural effusion. Cardiology evaluated the patient and recommendations were provided. Patient with persistent HR elevations at rest. EKG showed A-fib with RVR, cardizem gtt restarted 7.5 mg/hr. Patient continues to have an elevated HR at rest and with exertion. Pulmonary consulted to rule out underlying pulmonary causes. Started IV Solu-medrol and DuoNebs.    Patient with worsening renal function. Nephrology consulted for [facility]A on CKD.   Will hold lasix, Entresto, and spironolactone per nephrology recommendations.   CT chest showed moderate bilateral pleural effusions. Mass like density in the RUL measuring 2.5 cm chronic atelectasis vs neoplasm. CT results discussed and reviewed with pulmonary.IR performed therapeutic thoracentesis. Recommending outpatient PET scan for further 
    Hospitalist Progress Note    NAME:   Allison Mauricio   : 1955   MRN: [id number]     Date/Time: 2/3/2024 1:55 PM  Patient PCP: None, None    Estimated discharge date:> 48 hours  Barriers: clinical improvement, cardiology clearance, nephrology clearance      HOSPITAL COURSE:    Allison Mauricio is a 68 y.o.  female with hypertension, HFrEF, atrial fibrillation with RVR (s/p ablation 23), EtOH abuse, and nicotine use was admitted to the hospital for further evaluation of shortness of breath and chest pressure. Patient states her symptoms have been ongoing for the past 1 month and have progressively worsened this week. Complaining of chest pressure and shortness of breath with limited movement. Upon presentation to the ER patient was hypertensive and tachycardiac.  Patient was found to be in A-fib with RVR. Started on a Cardizem gtt, patient converted to NSR. Cardizem gtt discontinued and patient was transitioned to her home dose of Cardizem. BNP > 30,000. Cr 1.68, resumed home lasix.  Echo: EF 35-40%, low normal systolic function, normal diastolic function; mild/mod TR with RVSP 30 mmgHg, moderately dilated LA, medium sized left pleural effusion. Cardiology evaluated the patient and recommendations were provided. Patient with persistent HR elevations at rest. EKG showed A-fib with RVR, cardizem gtt restarted 7.5 mg/hr. Patient continues to have an elevated HR at rest and with exertion. Pulmonary consulted to rule out underlying pulmonary causes. Started IV Solu-medrol and DuoNebs.    Patient with worsening renal function. Nephrology consulted for [facility] on CKD.   Will hold lasix, Entresto, and spironolactone per nephrology recommendations.   CT chest showed moderate bilateral pleural effusions. Mass like density in the RUL measuring 2.5 cm chronic atelectasis vs neoplasm. CT results discussed and reviewed with pulmonary.IR performed therapeutic thoracentesis. Recommending outpatient PET scan for further 
    Hospitalist Progress Note    NAME:   Allison Mauricio   : 1955   MRN: [id number]     Date/Time: 2024 10:14 AM  Patient PCP: None, None    Estimated discharge date:> 48 hours  Barriers: clinical improvement, cardiology clearance, nephrology clearance      HOSPITAL COURSE:    Allison Mauricio is a 68 y.o.  female with hypertension, HFrEF, atrial fibrillation with RVR (s/p ablation 23), EtOH abuse, and nicotine use was admitted to the hospital for further evaluation of shortness of breath and chest pressure. Patient states her symptoms have been ongoing for the past 1 month and have progressively worsened this week. Complaining of chest pressure and shortness of breath with limited movement. Upon presentation to the ER patient was hypertensive and tachycardiac.  Patient was found to be in A-fib with RVR. Started on a Cardizem gtt, patient converted to NSR. Cardizem gtt discontinued and patient was transitioned to her home dose of Cardizem. BNP > 30,000. Cr 1.68, resumed home lasix.  Echo: EF 35-40%, low normal systolic function, normal diastolic function; mild/mod TR with RVSP 30 mmgHg, moderately dilated LA, medium sized left pleural effusion. Patient with persistent HR elevations at rest. EKG showed A-fib with RVR, cardizem gtt restarted 7.5 mg/hr. Pulmonary consulted to rule out underlying pulmonary causes. Started IV Solu-medrol and DuoNebs.    Patient with worsening renal function. Nephrology consulted for H[facility]A on CKD.   Will hold lasix, Entresto, and spironolactone per nephrology recommendations.   CT chest showed moderate bilateral pleural effusions. Mass like density in the RUL measuring 2.5 cm chronic atelectasis vs neoplasm. CT results discussed and reviewed with pulmonary.                                                                       IR performed therapeutic thoracentesis. Recommending outpatient PET scan for further investigation.  Patient remains dependent on BiPAP.  Less likely 
   Nephrology Progress Note         Patient: Allison Mauricio MRN: [id number]  SSN: [id number]    YOB: 1955  Age: 68 y.o.  Sex: female          Subjective:     Patient seen at bedside today, complains of SOB & cough and is upset about having severe persistent symptoms    Objective:     Vitals:    01/27/24 2357 01/28/24 0409 01/28/24 0759 01/28/24 0806   BP: 108/64 102/79  (!) 100/53   Pulse: 88 55  92   Resp: 22 18  20   Temp: 98.2 °F (36.8 °C) 98.1 °F (36.7 °C)  97.9 °F (36.6 °C)   TempSrc: Oral Oral  Oral   SpO2: 96% 98% 93% 95%   Weight:       Height:            Intake and Output:  Yesterday's Intake and Output:  No intake/output data recorded.     Physical Exam:   GENERAL: alert, cooperative, no distress, appears stated age  EYE: negative , clear cornea and conjunctiva   HEENT - Normal nose, moist mucous membranes , no oral ulcers/thrush  LUNG: diminished breath sounds bibasilar  HEART: regular rate and rhythm, S1, S2 normal  ABDOMEN: soft, non-tender. Bowel sounds normal. No masses,  no organomegaly  EXTREMITIES:  extremities normal, atraumatic, no cyanosis or edema, no ulcers,  Skin -  no rash or dry, intact , no abnormalities  NEUROLOGIC: negative, aao*3 , normal speech      Data Review    Meds    Current Facility-Administered Medications   Medication Dose Route Frequency    dilTIAZem 125 mg in sodium chloride 0.9 % 125 mL infusion ([id number])  7.5 mg/hr IntraVENous Continuous    acetaminophen (TYLENOL) tablet 650 mg  650 mg Oral Q8H PRN    methylPREDNISolone (MEDROL) tablet 4 mg  4 mg Oral Daily    ipratropium 0.5 mg-albuterol 2.5 mg (DUONEB) nebulizer solution 1 Dose  1 Dose Inhalation Q6H WA RT    budesonide-formoterol (SYMBICORT) 160-4.5 MCG/ACT inhaler 2 puff  2 puff Inhalation BID RT    ipratropium 0.5 mg-albuterol 2.5 mg (DUONEB) nebulizer solution 1 Dose  1 Dose Inhalation Q6H PRN    multivitamin 1 tablet  1 tablet Oral Daily    sodium chloride flush 0.9 % injection 5-40 mL  5-40 mL 
  Hospital Medicine Progress Note      Date of Admission: 1/24/2024  Hospital Day: 13    Chief Admission Complaint: Shortness of breath     Subjective: Patient seen and examined this morning.  Remains on BiPAP.  Critically ill in the ICU.        Assessment/Plan:    68-year-old female with past medical history of HFrEF, atrial fibrillation s/p ablation, EtOH abuse, nicotine dependence who initially presented to the hospital with chest pressure.  Patient found to be in A-fib with RVR initially treated with Cardizem.  Echocardiogram revealing EF of 35 to 40%.  Imaging showed pleural effusion.  Patient has been on and off BiPAP now consistently on BiPAP.  Repeat echocardiogram with LVEF of 20-25%    #Acute hypoxic respiratory failure  Multifactorial in the setting of COPD, CHF, pleural effusion  And continue noninvasive ventilation  Wean as tolerated  Pulmonology on board    #COPD  Continue Solu-Medrol 40 mg IV twice daily  DuoNebs scheduled and as needed  Pulmicort    #HFrEF  Echocardiogram with LVEF of 20 to 25%.  Continue diuresis with Lasix 80 mg twice daily  Continue monitor urine output  Low-salt diet  Atrial fibrillation with RVR  Cardiology on board    # HOLA on CKD  # Metabolic acidosis  Renal function has been stabilizing.  Currently on Lasix  Closely monitor renal function  Nephrology on board    #Atrial fibrillation with RVR  Rate controlled  Continue Eliquis  Amiodarone 200 mg daily    # Hypokalemia-replete as needed    # Mild hyponatremia      # Enterococcus faecalis-pansensitive  -Leukocytosis in the setting of IV steroids  Discontinue meropenem  Penicillin allergy  Continue vancomycin for 2 more days    #Hypertension-continue antihypertensives    # Lung mass    #Anxiety/agitation-requiring Precedex, Vistaril    # History of alcohol abuse    #Nicotine dependence      Physical Exam Performed:      General appearance: Lethargic appearing, currently on BiPAP  Respiratory: By lateral rhonchi with diminished 
  Hospital Medicine Progress Note      Date of Admission: 1/24/2024  Hospital Day: 15    Chief Admission Complaint: Shortness of breath     Subjective: Patient seen for followup of acute hypoxic resp failure, COPD, CHF   Remains on BiPAP, desats quickly when attempted hiflow NC yesterday  She is lethargic, mumbling incoherently, not following commands  Tolerating TF at slow rate  Overall HR improved with episodes of tachycardia noted on telemetry, into 130s    Assessment/Plan:    68-year-old woman with HFrEF, atrial fibrillation s/p ablation, EtOH abuse, nicotine dependence who initially presented to the hospital with chest pressure.  Patient found to be in A-fib with RVR initially treated with Cardizem then addition of amiodarone.  Echocardiogram revealing EF of 35 to 40%.  She developed worsening hypoxia. She was aggressively diuresed for volume overload with pleural effusions on CT chest as well as course of abx/steroids and bronchodilators for COPD exacerbation.   Patient has been on and off BiPAP now consistently on BiPAP last few days.  Repeat echocardiogram with LVEF of 20-25%    #Acute hypoxic respiratory failure  Multifactorial in the setting of COPD, CHF, pleural effusions  continues noninvasive ventilation - rapid desat off bipap  Wean if tolerated  Pulmonology following    #COPD - no bronchospasm currently  Continue Solu-Medrol 40 mg IV twice daily  DuoNebs scheduled and as needed  Continue Pulmicort  Completed course abx    #HFrEF  Echocardiogram with LVEF of 20 to 25%.  Has been aggressively diuresed now wit worsening renal function, but no improvement in resp status  Exacerbated by Atrial fibrillation with RVR  Holding lasix this am pending Nephrology evaluation given worsening renal function    # HOLA on CKD with Metabolic acidosis  Creatinine trending back up with mild hyperkalemia  Holding diuretics this am  Also holding daily potassium supplements  Monitor lytes    #Atrial fibrillation with RVR  HR 
  Hospital Medicine Progress Note      Date of Admission: 1/24/2024  Hospital Day: 16    Chief Admission Complaint: Shortness of breath     Subjective: Patient seen for followup of acute hypoxic resp failure, COPD, CHF   Remains on BiPAP, desats quickly when attempted hiflow NC   She is lethargic, mumbling incoherently, not following commands  Tolerating TF at slow rate  Intermittent tachycardia, BP on low side, renal function worsening, no improvement in resp status    Assessment/Plan:    68-year-old woman with HFrEF, atrial fibrillation s/p ablation, EtOH abuse, nicotine dependence who initially presented to the hospital with chest pressure.  Patient found to be in A-fib with RVR initially treated with Cardizem then addition of amiodarone.  Echocardiogram revealing EF of 35 to 40%.  She developed worsening hypoxia. She was aggressively diuresed for volume overload with pleural effusions on CT chest as well as course of abx/steroids and bronchodilators for COPD exacerbation.   Patient has been on and off BiPAP now consistently on BiPAP last few days.  Repeat echocardiogram with LVEF of 20-25%    #Acute hypoxic respiratory failure  Multifactorial in the setting of COPD, CHF, pleural effusions  continues noninvasive ventilation - rapid desat off bipap  Wean if tolerated but oveall she seems to be doing worse in last few days  Pulmonology following    #COPD - no bronchospasm currently  Continue Solu-Medrol 40 mg IV twice daily  DuoNebs scheduled and as needed  Continue Pulmicort  Completed course abx    #HFrEF  Echocardiogram with LVEF of 20 to 25%.  Has been aggressively diuresed now with worsening renal function, but no improvement in resp status  Exacerbated by Atrial fibrillation with RVR  Continue lasix - she would not be a candidate for RRT given her status/prognosis    # HOLA on CKD with Metabolic acidosis  Creatinine trending back up with mild hyperkalemia  Potassium supplements just as needed  Continue 
  Hospital Medicine Progress Note      Date of Admission: 1/24/2024  Hospital Day: 17    Chief Admission Complaint: Shortness of breath     Subjective: Patient seen for followup of acute hypoxic resp failure, COPD, CHF   Remains on BiPAP, desats quickly when attempted hiflow NC   She is lethargic, mumbling incoherently, not following commands  Tolerating TF at slow rate  Intermittent tachycardia, BP on low side, renal function worsening, no improvement in resp status  Possible small right apical pneumothorax    I talked with Belle Vásquez from the ethics committee regarding Mrs. Medardo Hernadez situation and goals of care.  Per patient's previously expressed wishes she is DNI.  However she does not have a power of  nor even any next of kin for decision-making at this time.  Her clinical status is poor with slow decline and increasing dependence on BiPAP, worsening renal function.  Her mental status is altered and she is unable to participate in any meaningful discussion regarding her goals of care.  Plan is to convene a meeting next week with available consultants to discuss options.  In particular after discussing with Dr. Montilla and I am in full agreement that dialysis should not be initiated given her overall poor status and prognosis.  She will remain partial code allowing for resuscitation in the event of arrest but we will do so judiciously in the best interest of Mrs. Mauricio    Assessment/Plan:    68-year-old woman with HFrEF, atrial fibrillation s/p ablation, EtOH abuse, nicotine dependence who initially presented to the hospital with chest pressure.  Patient found to be in A-fib with RVR initially treated with Cardizem then addition of amiodarone.  Echocardiogram revealing EF of 35 to 40%.  She developed worsening hypoxia. She was aggressively diuresed for volume overload with pleural effusions on CT chest as well as course of abx/steroids and bronchodilators for COPD exacerbation.   Patient has been on 
  Physician Progress Note      PATIENT:               LESLIE VALLE  CSN #:                  [id number]  :                       1955  ADMIT DATE:       2024 9:01 AM  DISCH DATE:  RESPONDING  PROVIDER #:        Mirna Evans NP          QUERY TEXT:    Pt admitted with SOB and has systolic CHF documented. If possible, please   document in progress notes and discharge summary further specificity regarding   the type and acuity of CHF:    The medical record reflects the following:  Risk Factors: Systolic CHF, HOLA, COPD, pAF, CAD.    Clinical Indicators:  PN: \"HFrEF: - complaining of NGUYEN and at rest for the   past 1 month. Bilateral LE edema, Pro-BNP 30,649, continue IV lasix 40 mg   BID.\"    Treatment: Cardiology, Lasix IV, CXR, Lab monitoring.  .  Thank you,  KYM Smith, RN, CRCR, CDI Specialist  [email or web address]  Can also be reached on Perfect Serve.  .  Options provided:  -- Acute on Chronic Systolic CHF/HFrEF  -- Acute Systolic CHF/HFrEF  -- Chronic Systolic CHF/HFrEF  -- Other - I will add my own diagnosis  -- Disagree - Not applicable / Not valid  -- Disagree - Clinically unable to determine / Unknown  -- Refer to Clinical Documentation Reviewer    PROVIDER RESPONSE TEXT:    This patient is in acute on chronic systolic CHF/HFrEF.    Query created by: Cheri Jeffery on 2024 4:02 PM      Electronically signed by:  Mirna Evans NP 2024 4:06 PM          
  Physician Progress Note      PATIENT:               LESLIE VALLE  CSN #:                  [id number]  :                       1955  ADMIT DATE:       2024 9:01 AM  DISCH DATE:  RESPONDING  PROVIDER #:        Ronda Ellis MD          QUERY TEXT:    Patient admitted with Hyponatremia. Noted to have Severe Malnutrition by RD   Consultant. If possible, please document in progress notes and discharge   summary if you are evaluating and /or treating any of the following:    The medical record reflects the following:  Risk Factors: Hyponatremia, ARF, HOLA, AFib, COPD.    Clinical Indicators: RD PN: \"Malnutrition Status:  Severe malnutrition,  75%   or less estimated energy requirements for 1 month or longer, Severe muscle   mass loss Calf and thigh.\" BMI 20.11    Treatment: RD Consulted, Rec initiate TF of Jevity 1.5, Lab monitoring.    ASPEN Criteria:    https://aspenjournals.onlinelibrary.lewis.com/doi/full/10.1177/452151479584005  5.  .  Thank you,  KYM Smith, RN, CRCR, CDI Specialist  [email or web address]  Can also be reached on Perfect Serve.  .  Options provided:  -- Protein calorie malnutrition severe  -- Other - I will add my own diagnosis  -- Disagree - Not applicable / Not valid  -- Disagree - Clinically unable to determine / Unknown  -- Refer to Clinical Documentation Reviewer    PROVIDER RESPONSE TEXT:    This patient has severe protein calorie malnutrition.    Query created by: Cheri Jeffery on 2024 6:45 PM      Electronically signed by:  Ronda Ellis MD 2024 12:56 PM          
0000- Patient desaturated between 70's-80's on 65% FiO2, increased to 80% and further increased to 100% FiO2 by RT without significant improvement. Patient is restless and still keeps on talking and arguing even desaturating. Informed Dr. Fenton and ordered PRN IV ativan.    0015- SpO2 improved to 93-96% without IV ativan administration. Patient appears more relaxed and comfortable as of this time.    0200- Desaturated again to 70's-80's and became argumentative and agitated. Despite education on the importance of staying calm, she continued to talk and became increasingly irrelevant. PRN IV ativan given. O2 sats improved to 100%.  
0833: NGT placed L nare. STAT chest xray order placed.     1515: Pt -170s. MD made aware. Message sent via perfect serve to Dr. Jerez (cardiologist). No new or changed orders at this time.   
0839: Spoke with pt boyfriend (provided pt code). Provided update. Pt requiring 80% BIPAP at this time and resting.     1117: Review K+ with Dr. Montilla. Told to give pt 40mg mor    1308: NGT insertion attempt made. Failed d/t pt quickly desaturating and prolonged recovery. Will repeat attempt later today. MD and dietician made aware.    1400: Cardiologist made aware regarding pt HR 150s. No new orders at this time.   
1040: Patient currently refusing PO medications at this time. Reoriented and explained importance of medications. Bipap reapplied for improvement of oxygen by RT. Will attempt medication administration again during next rounds    1200: bipap removed and hi flow o2 applied. Patient not able to finish taking medications before having trouble breathing and HR increased to 150's. Bipap reapplied and O2 improved.   Patient resting comfortably on bipap after approximately 10 minutes.       1600: patient O2 dropping into 70's-80's despite being on BiPAP 65% fio2. Dr Landon notified. Bipap settings changed and no further orders. O2 increased to >95%.  
14F NG tube place in left nare with success. Awaiting placement confirmation.  
2050  On assessment, patient noted to have subcutaneous emphysema to the right chest wall.   notified.  STAT CXR ordered.    2145   notified that CXR shows the chest tube to be coiled inside the lung.  Per MD, reach out to general surgery on-call to see if anything can be done tonight.  Spoke with MD Rasheeda to review patient's CXR and will call back.    2230   at bedside.  MD requests chest tube to be put to suction.  Suction attached as requested.  MD requests CXR in 2 hrs to determine interval changes.      2335   called to check on patient.  MD requests CRRT be started.  Orders received and entered.  Per MD, keep MAP > 65 with Levophed.  Aim for 100 cc net negative fluid removal.  Labs in AM.   If patient starts declining after CRRT is started, abort CRRT.    0225   called the unit for an update.   MD informed that patient's respiratory status has continued to decline and her subcutaneous air has now progressed into her neck.  Per MD, obtain another CXR in 2 hrs.      0247  Patient's respiratory status continues to worsen.  She is tachypneic in the 40's, her work of breathing has increased significantly, and her oxygen saturation is consistently 84%.  PRN Dilaudid administered to try to alleviate work of breathing.  Tube feeding also stopped at this time in anticipation of potential further decline.        0600   at bedside.  MD reviewed CXR, which showed worsening subq air.  MD made a right chest incision to allow air to escape.  Per MD, leave opening patent with packing to allow air to continue to escape.  
4 Eyes Skin Assessment     NAME:  Allison Mauricio  YOB: 1955  MEDICAL RECORD NUMBER:  [id number]    The patient is being assessed for  Admission    I agree that at least one RN has performed a thorough Head to Toe Skin Assessment on the patient. ALL assessment sites listed below have been assessed.      Areas assessed by both nurses:    Head, Face, Ears, Shoulders, Back, Chest, Arms, Elbows, Hands, Sacrum. Buttock, Coccyx, Ischium, and Legs. Feet and Heels        Does the Patient have a Wound? Ecchymosis present on BUE and BLE.       Paresh Prevention initiated by RN: No  Wound Care Orders initiated by RN: No    Pressure Injury (Stage 3,4, Unstageable, DTI, NWPT, and Complex wounds) if present, place Wound referral order by RN under : No    New Ostomies, if present place, Ostomy referral order under : No     Nurse 1 eSignature: Electronically signed by Rosmery Vargas RN on 1/25/24 at 5:00 PM EST    **SHARE this note so that the co-signing nurse can place an eSignature**    Nurse 2 eSignature: Electronically signed by SINDY KINGSLEY RN on 1/25/24 at 5:01 PM EST    
Anesthesia Post-op Note    Patient: Daniel Castañeda  Procedure(s) Performed: EGD (ESOPHAGOGASTRODUODENOSCOPY) with biopsy  COLONOSCOPY with polypectomy  Anesthesia type: MAC    Vitals Value Taken Time   Temp 98.6 12/17/24 1306   Pulse 81 12/17/24 1306   Resp 16 12/17/24 1306   SpO2 99 12/17/24 1306   /78 12/17/24 1306         Patient Location: bedside  Post-op Vital Signs:stable  Level of Consciousness: awake and alert  Respiratory Status: spontaneous ventilation  Cardiovascular stable  Hydration: euvolemic  Pain Management: adequately controlled  Handoff: Handoff to receiving clinician was performed and questions were answered  Vomiting: none  Nausea: None  Airway Patency:patent  Post-op Assessment: no complications and patient tolerated procedure well      No notable events documented.                      
Attempted to place patient on midflow at 15 lpm however, patient saturations dropped into the mid 80's.  Placed patient back on bipap.  
Cefepime Extended-Infusion Dosing/Monitoring  Current regimen:  1000 mg every 12 hours    Recent Labs     02/11/24  0445 02/12/24  0215 02/13/24  0616   * 107* 86*     Estimated CrCl:  15 mL/min; CRRT start 2/12 ~0100    Plan:  Change to 2000 mg IV over 240 minutes every 12 hours  per [facility] P&T Committee Protocol with respect to extended-infusion ?-lactam antibiotics. Pharmacy will continue to monitor patient daily and will make dosage adjustments based upon changing renal function.  
Cefepime Extended-Infusion Dosing/Monitoring  Current regimen:  new start    Estimated CrCl:  11 mL/min; HOLA may be starting on CRRT    Plan:  Change to cefepime 1000 mg IV over 240 minutes every 12 hours  per [facility] P&T Committee Protocol with respect to extended-infusion ?-lactam antibiotics. Pharmacy will continue to monitor patient daily and will make dosage adjustments based upon changing renal function.  
Chart reviewed and spoke with nsg. Pt requiring 80% BiPAP at this time. Will d/c SLP consult and please re-consult if/as medically appropriate.   
Comprehensive Nutrition Assessment    Type and Reason for Visit:  Initial, RD Nutrition Re-Screen/LOS (x7 days)    Nutrition Recommendations/Plan:   Continue diet, advance per SLP rec's.  Hold ONS until medically appropriate.  If Pt remains inappropriate for PO diet, consider EN.  Continue to monitor and document PO intakes, weights, BM in I/Os.     Malnutrition Assessment:  Malnutrition Status:  No malnutrition (01/31/24 1219)    Context:  Chronic Illness     Findings of the 6 clinical characteristics of malnutrition:  Energy Intake:  Mild decrease in energy intake (Comment) (Poor intakes x7 days d/t resp. status)  Weight Loss:  No significant weight loss     Body Fat Loss:  Unable to assess     Muscle Mass Loss:  Unable to assess    Fluid Accumulation:  No significant fluid accumulation     Strength:  Not Performed    Nutrition Assessment:    Admitted for AFib w/ RVR. Screened for LOS. Pt and family recently received [employer] nutrition ed while IP. No noted h/o weight loss PTA; CQM=829-109# within last 8 months per EMR. Today, Pt reported as inappropriate for PO diet at Breakfast d/t requirement for BiPAP. No intakes from yesterday relayed to RN today. RD to dc ONS, continue diet as clinically appropriate. If Pt remains inappropriate for PO intakes, consider EN if meets goals of care. Labs: HcT 32.4, Na 133, K 2.7, BUN 59, , Cr 2.91, GFR 17, Ca 8.2, Phos 4.9. Meds: Precedex, KCl 10 mEq, MVI, B1, B9, lasix, pravastatin, duoneb, rocephin, pulmicort.    Nutrition Related Findings:    NFPE deferred to f/u. No N/V reported. SLP rec'd SBS/Mildly Thick Liq diet. Last BM 1/26- loose; consider limited intakes. +3 pitting BLE edema. Wound Type: None       Current Nutrition Intake & Therapies:    Average Meal Intake: 0%  Average Supplements Intake: 0%  ADULT ORAL NUTRITION SUPPLEMENT; Breakfast, Lunch, Dinner; Standard High Calorie/High Protein Oral Supplement  ADULT DIET; Dysphagia - Soft and Bite Sized; No 
Comprehensive Nutrition Assessment    Type and Reason for Visit:  Reassess (Interim)    Nutrition Recommendations/Plan:   Continue NPO  Please order K+, Phos and Magnesium labs-will monitor for signs of refeeding syndrome  Adjust TF to Jevity 1.5 at goal rate of 45ml/hr   Flush water 30ml Q4h-Note: 30ml Q4h is minimum water flush per ASPEN guidelines  To provide 1620kcals (100%), 69g Pro (100%), 1001ml H2O (68%)     Malnutrition Assessment:  Malnutrition Status:  Severe malnutrition (02/05/24 1622)    Context:  Chronic Illness     Findings of the 6 clinical characteristics of malnutrition:  Energy Intake:  75% or less estimated energy requirements for 1 month or longer (limited PO x12d IP)  Weight Loss:  No significant weight loss     Body Fat Loss:  No significant body fat loss     Muscle Mass Loss:  Severe muscle mass loss Calf (gastrocnemius), Thigh (quadraceps)  Fluid Accumulation:  No significant fluid accumulation     Strength:  Not Performed    Nutrition Assessment:    Admitted for AFib w/ RVR. Screened for LOS. Pt and family recently received [employer] nutrition ed while IP. No noted h/o weight loss PTA; VFA=820-135# within last 8 months per EMR. Today, Pt reported as inappropriate for PO diet at Breakfast d/t requirement for BiPAP. No intakes from yesterday relayed to RN today. RD to dc ONS, continue diet as clinically appropriate. If Pt remains inappropriate for PO intakes, consider EN if meets goals of care. (2/5) Intakes continue to be poor as pt intermittently on continuous BiPAP, agitated. Spoke to RN, attempting to place NGT today. Recs above, consider bolus feeds for safety as pt prone to pulling at lines/tubes. (2/7) Pt remains on BiPAP and sedatated d/t agiation. RN placed TF on 2/06, however pt self removed NGT. RN replaced NGT same day and TF started on 2/06. Per RN pt tolerating and progressing towards goal rate. RN asked DI about possiblity to switch bolus feeds to contiuous feeds. New 
Comprehensive Nutrition Assessment    Type and Reason for Visit:  Reassess (interim)    Nutrition Recommendations/Plan:   PO as appropriate, NPO if on continuous BiPAP    As medically able, place NGT   Rec initiate TF of Jevity 1.5 (Standard with fiber) via NGT at 50mL bolus, advancing 50mL at each bolus as tolerated to goal of 250mL bolus 4 per day.  Flush 100mL H2O prior and post bolus.  Provides: 1500kcal (100%), 64g protein (100%), 1560mL H2O (97%)  Will monitor electrolytes and need to change to Nepro    Provide PRN bowel regimen, no BM >7 days per EMR     Malnutrition Assessment:  Malnutrition Status:  Severe malnutrition (02/05/24 1622)    Context:  Chronic Illness     Findings of the 6 clinical characteristics of malnutrition:  Energy Intake:  75% or less estimated energy requirements for 1 month or longer (limited PO x12d IP)  Weight Loss:  No significant weight loss     Body Fat Loss:  No significant body fat loss     Muscle Mass Loss:  Severe muscle mass loss Calf (gastrocnemius), Thigh (quadraceps)  Fluid Accumulation:  No significant fluid accumulation     Strength:  Not Performed      Nutrition Assessment:    Admitted for AFib w/ RVR. Screened for LOS. Pt and family recently received [employer] nutrition ed while IP. No noted h/o weight loss PTA; DLV=[phone]# within last 8 months per EMR. Today, Pt reported as inappropriate for PO diet at Breakfast d/t requirement for BiPAP. No intakes from yesterday relayed to RN today. RD to dc ONS, continue diet as clinically appropriate. If Pt remains inappropriate for PO intakes, consider EN if meets goals of care. (2/5) Intakes continue to be poor as pt intermittently on continuous BiPAP, agitated. Spoke to RN, attempting to place NGT today. Recs above, consider bolus feeds for safety as pt prone to pulling at lines/tubes. Labs: Na 132, K+ 2.9, BUN 67, Cr 2.96, GFR 16, Phos 6.6, Alb 1.7, H/H 11.2/30.9. Meds: Amiodarone, Eliquis, calcitriol, B9, Lasix, 
Echo completed. Report to follow.     
Ethics consulted, message sent to Belle Vásquez with ethics committee via [employer]ah.   
FV-AMD in 2 ICU.  got a referral from other  and SW to visit the patient if she had any medical wishes.  visited the patient. Patient and patient care staff were present during the visit. Patient expressed her feeling of discomfort due to her current medical condition.  explored her feelings and any needs. At the same time,  asked the patient if she wished to complete her advanced medical directive form. Patient said she hopes she could complete the form but she did not think that she could think clearly due to her medical condition.  respected the patient's thought and feeling.  comforted the patient with a comforting words. Patient expressed her appreciation for 's visit and supportive presence.      also communicated FABRICIO Swenson regarding the patient's wish. FABRICIO Swenson and HIRAM Vázquez are tracking the patient's condition and situation.  advised the patient of  availability.  is available if needed.    Rev. Nate Alvarez Mdiv.  Chaplain Resident  Page a : [phone]     
Follow up visit attempted for patient in ICU unit.  Patient was alone during the visit.  She seemed to be resting and did not engage during the visit.  Provided support of presence.  Contact chaplains for further referrals.     Chaplain Sonu Garcia M.Div.   can be reached by calling the  at [facility]  [phone]  
I was notified to evaluate worsening subcu air.  Patient had right chest pigtail catheter placed for pneumothorax.  Patient was evaluated at bedside.  Pigtail catheter pleural VAC has not been suctioned.  I did recommend for pigtail catheter chest tube suction to 20 mmHg.  I did review chest x-ray.  It appears and trapped air is looking through subcu area.  Patient is satting 84 to 86%.    We did get repeat chest x-ray 2 hours later.  It appears subacute area pneumothorax appears slightly better.  However reports indicates no changes.    We will repeat chest x-ray in 2 hours and reassess.    Apparently patient is DNI, will closely monitor her progress.  
Left NG tube 14f removed.  
MD at bedside. Patient states that she does not want to be intubated. MD explained course of care. She continued to want to have her status changed and not to be intubated. MD changed code status to DNI.   
Narrative:   Received a call for patient in room #261. Patient is . Patient  at 14:45. No family present. Provided the ICU - RN with support by recording death. Advised of  availability.     Chaplain Haley Chavarria DMin.    can be reached by calling  at [facility] [phone]   
OCCUPATIONAL THERAPY EVALUATION  Patient: Allison Mauricio (68 y.o. female)  Date: 1/26/2024  Primary Diagnosis: Elevated troponin [R79.89]  COPD exacerbation (HCC) [J44.1]  Atrial fibrillation with rapid ventricular response (HCC) [I48.91]  Atrial fibrillation with RVR (HCC) [I48.91]       Precautions: Fall Risk                Recommendations for nursing mobility: Out of bed to chair for meals, Amb to bathroom with AD and gait belt, and Assist x1    In place during session:Nasal Cannula 3L, External Catheter, and EKG/telemetry   ASSESSMENT  Pt is a 68 y.o. female presenting [facility] with c/o chest pressure and NGUYEN, admitted 1/24/2024 and currently being treated for afib with RVR, HErEF, elevated troponin, HTN, hypokalemia, hypocalcemia, EtoH abuse, and COPD. Pt received semi-supine in bed upon arrival, AXO x4, and agreeable to OT evaluation.     Based on current observations, pt presents with decreased  functional mobility, independence in ADLs, high-level IADLs, ROM, strength, activity tolerance, endurance, safety awareness, coordination, balance (see below for objective details and assist levels).     Overall, pt tolerates session well today with c/o some pain in R shoulder 2/2 bursitis and SOB throughout, which pt reports is her baseline. Pt performed supine<>sit SBA w/ increased time and use of bed rails. Pt min A for scooting to EOB. At EOB, pt donned flip flops setup assist w/ cues to don completely. Pt performed sit<>stand at EOB CGA. Pt performed SPT bed to chair CGA w/ use of bed rails/ chair arm rests, pt transferred chair to bed min A w/ hand held assist. Pt required frequent rest breaks throughout session to address HR and fatigue. Pt's HR and oxygen saturation fluctuated b/w 103 and 116bpm and 90-94%. Once resituated in bed, pt expressed that external catheter did not feel like it was in place. After several attempts to reposition external catheter, OT provided modA to pt in donning disposable 
PHYSICAL THERAPY EVALUATION  Patient: Allison Mauricio (68 y.o. female)  Date: 1/26/2024  Primary Diagnosis: Elevated troponin [R79.89]  COPD exacerbation (HCC) [J44.1]  Atrial fibrillation with rapid ventricular response (HCC) [I48.91]  Atrial fibrillation with RVR (HCC) [I48.91]       Precautions: Restrictions/Precautions  Restrictions/Precautions: Fall Risk     Recommendations for nursing mobility: Out of bed to chair for meals, Use of BSC for toileting , AD and gt belt for bed to chair , and Assist x1    In place during session: External Catheter and EKG/telemetry     ASSESSMENT  Pt is a 68 y.o. female admitted on 1/24/2024 for chest pressure and NGUYEN; pt currently being treated for afib with RVR, HFrEF, elevated troponin, HTN, hypokalemia, hypocalcemia, EtoH abuse, and COPD. Pt semi-supine in bed upon PT arrival, agreeable to evaluation. Pt A&O x 4.     Based on the objective data described below, the patient currently presents with impaired functional mobility, decreased independence in ADLs, decreased ROM, impaired strength, decreased activity tolerance, impaired balance, and impaired posture. (See below for objective details and assist levels).     Overall pt tolerated session poor today with no c/o pain throughout session, SOB noted with mobility. Pt required SBA with additional time for bed mobility and CGA to min A with additional time for transfers. Pt amb 2 feet with RW, gt belt and CGA with additional time; demonstrates NBOS with short, shuffling, step to gt pattern with generalized unsteadiness but no LOB or knee buckling noted. Pt SOB following mobilizations, SPO2 decreased to 87%, elevated back to 93% with several minute rest breaks. Pt will benefit from continued skilled PT to address above deficits and return to PLOF. Potential barriers for safe discharge: pt is a high fall risk, pt is not safe to be alone, and concern for pt safely navigating or managing the home environment. Current PT DC 
PULMONARY ICU NOTE  [facility]    Name: Allison Mauricio MRN: [id number]   : 1955 Hospital: [facility]   Date: 2024  Admission date: 2024 Hospital Day: 19       HPI:     Patient Active Problem List   Diagnosis    Respiratory failure with hypoxia (HCC)    Hypokalemia    Acute respiratory failure (HCC)    COPD exacerbation (HCC)    Fracture of humeral shaft, left, closed    Atrial fibrillation, rapid (HCC)    Atrial fibrillation (HCC)    CHF (congestive heart failure) (HCC)    Atrial fibrillation with rapid ventricular response (HCC)    Severe protein-calorie malnutrition (HCC)             [x] High complexity decision making was performed  [x] See my orders for details      Subjective/Initial History:     I was asked by Ronda Ellis MD to see Allison Mauricio  a 69 y.o.   female in consultation     Excerpts from admission 2024 or consult notes as follows:   68-year-old lady came in because of shortness of breath palpitation past medical history of atrial fibrillation congestive heart failure history of tobacco and EtOH abuse she is not feeling well for the past couple of days before coming to the hospital she was admitted and found to be in A-fib with RVR cardiology seen the patient started on amiodarone she was on Eliquis today she was having more shortness of breath and dyspnea she is a current everyday smoker not on oxygen at home.      Allergies   Allergen Reactions    Atenolol Angioedema    Sulfa Antibiotics Swelling    Prednisone Other (See Comments)     Patient states \"it makes me bounce off of the walls\"; declines taking     Penicillins Rash     Tolerated ceftriaxone and meropenem 2024        MAR reviewed and pertinent medications noted or modified as needed     Current Facility-Administered Medications   Medication Dose Route Frequency Provider Last Rate Last Admin    methylPREDNISolone sodium succ (SOLU-MEDROL) injection 40 mg  40 mg IntraVENous 
PULMONARY ICU NOTE  [facility]    Name: Allison Mauricio MRN: [id number]   : 1955 Hospital: [facility]   Date: 2024  Admission date: 2024 Hospital Day: 14       HPI:     Patient Active Problem List   Diagnosis    Respiratory failure with hypoxia (HCC)    Hypokalemia    Acute respiratory failure (HCC)    COPD exacerbation (HCC)    Fracture of humeral shaft, left, closed    Atrial fibrillation, rapid (HCC)    Atrial fibrillation (HCC)    CHF (congestive heart failure) (HCC)    Atrial fibrillation with rapid ventricular response (HCC)    Severe protein-calorie malnutrition (HCC)             [x] High complexity decision making was performed  [x] See my orders for details      Subjective/Initial History:     I was asked by Ronda Ellis MD to see Allison Mauricio  a 69 y.o.   female in consultation     Excerpts from admission 2024 or consult notes as follows:   68-year-old lady came in because of shortness of breath palpitation past medical history of atrial fibrillation congestive heart failure history of tobacco and EtOH abuse she is not feeling well for the past couple of days before coming to the hospital she was admitted and found to be in A-fib with RVR cardiology seen the patient started on amiodarone she was on Eliquis today she was having more shortness of breath and dyspnea she is a current everyday smoker not on oxygen at home.      Allergies   Allergen Reactions    Atenolol Angioedema    Sulfa Antibiotics Swelling    Prednisone Other (See Comments)     Patient states \"it makes me bounce off of the walls\"; declines taking     Penicillins Rash     Tolerated ceftriaxone and meropenem 2024        MAR reviewed and pertinent medications noted or modified as needed     Current Facility-Administered Medications   Medication Dose Route Frequency Provider Last Rate Last Admin    LORazepam (ATIVAN) injection 1 mg  1 mg IntraVENous Q4H PRN Alexx Fenton MD   
PULMONARY ICU NOTE  [facility]    Name: Allison Mauricio MRN: [id number]   : 1955 Hospital: [facility]   Date: 2024  Admission date: 2024 Hospital Day: 21       HPI:     Patient Active Problem List   Diagnosis    Respiratory failure with hypoxia (HCC)    Hypokalemia    Acute respiratory failure (HCC)    COPD exacerbation (HCC)    Fracture of humeral shaft, left, closed    Atrial fibrillation, rapid (HCC)    Atrial fibrillation (HCC)    CHF (congestive heart failure) (HCC)    Atrial fibrillation with rapid ventricular response (HCC)    Severe protein-calorie malnutrition (HCC)             [x] High complexity decision making was performed  [x] See my orders for details      Subjective/Initial History:     I was asked by Ronda Ellis MD to see Allison Mauricio  a 69 y.o.   female in consultation     Excerpts from admission 2024 or consult notes as follows:   68-year-old lady came in because of shortness of breath palpitation past medical history of atrial fibrillation congestive heart failure history of tobacco and EtOH abuse she is not feeling well for the past couple of days before coming to the hospital she was admitted and found to be in A-fib with RVR cardiology seen the patient started on amiodarone she was on Eliquis today she was having more shortness of breath and dyspnea she is a current everyday smoker not on oxygen at home.      Allergies   Allergen Reactions    Atenolol Angioedema    Sulfa Antibiotics Swelling    Prednisone Other (See Comments)     Patient states \"it makes me bounce off of the walls\"; declines taking     Penicillins Rash     Tolerated ceftriaxone and meropenem 2024        MAR reviewed and pertinent medications noted or modified as needed     Current Facility-Administered Medications   Medication Dose Route Frequency Provider Last Rate Last Admin    metroNIDAZOLE (FLAGYL) 500 mg in 0.9% NaCl 100 mL IVPB premix  500 mg IntraVENous 
PULMONARY ICU NOTE  [facility]    Name: Allison Mauricio MRN: [id number]   : 1955 Hospital: [facility]   Date: 2024  Admission date: 2024 Hospital Day: 9       HPI:     Patient Active Problem List   Diagnosis    Respiratory failure with hypoxia (HCC)    Hypokalemia    Acute respiratory failure (HCC)    COPD exacerbation (HCC)    Fracture of humeral shaft, left, closed    Atrial fibrillation, rapid (HCC)    Atrial fibrillation (HCC)    CHF (congestive heart failure) (HCC)    Atrial fibrillation with rapid ventricular response (HCC)             [x] High complexity decision making was performed  [x] See my orders for details      Subjective/Initial History:     I was asked by Ronda Ellis MD to see Allison Mauricio  a 68 y.o.   female in consultation     Excerpts from admission 2024 or consult notes as follows:   68-year-old lady came in because of shortness of breath palpitation past medical history of atrial fibrillation congestive heart failure history of tobacco and EtOH abuse she is not feeling well for the past couple of days before coming to the hospital she was admitted and found to be in A-fib with RVR cardiology seen the patient started on amiodarone she was on Eliquis today she was having more shortness of breath and dyspnea she is a current everyday smoker not on oxygen at home.      Allergies   Allergen Reactions    Atenolol Angioedema    Sulfa Antibiotics Swelling    Prednisone Other (See Comments)     Patient states \"it makes me bounce off of the walls\"; declines taking     Penicillins Rash        MAR reviewed and pertinent medications noted or modified as needed     Current Facility-Administered Medications   Medication Dose Route Frequency Provider Last Rate Last Admin    methylPREDNISolone sodium succ (SOLU-MEDROL) injection 40 mg  40 mg IntraVENous Q12H Jorge Alberto Jimenez MD   40 mg at 24    budesonide (PULMICORT) nebulizer 
PULMONARY ICU NOTE  [facility]    Name: Allison Mauricio MRN: [id number]   : 1955 Hospital: [facility]   Date: 2024  Admission date: 2024 Hospital Day: 13       HPI:     Patient Active Problem List   Diagnosis    Respiratory failure with hypoxia (HCC)    Hypokalemia    Acute respiratory failure (HCC)    COPD exacerbation (HCC)    Fracture of humeral shaft, left, closed    Atrial fibrillation, rapid (HCC)    Atrial fibrillation (HCC)    CHF (congestive heart failure) (HCC)    Atrial fibrillation with rapid ventricular response (HCC)             [x] High complexity decision making was performed  [x] See my orders for details      Subjective/Initial History:     I was asked by Ronda Ellis MD to see Allison Mauricio  a 69 y.o.   female in consultation     Excerpts from admission 2024 or consult notes as follows:   68-year-old lady came in because of shortness of breath palpitation past medical history of atrial fibrillation congestive heart failure history of tobacco and EtOH abuse she is not feeling well for the past couple of days before coming to the hospital she was admitted and found to be in A-fib with RVR cardiology seen the patient started on amiodarone she was on Eliquis today she was having more shortness of breath and dyspnea she is a current everyday smoker not on oxygen at home.      Allergies   Allergen Reactions    Atenolol Angioedema    Sulfa Antibiotics Swelling    Prednisone Other (See Comments)     Patient states \"it makes me bounce off of the walls\"; declines taking     Penicillins Rash        MAR reviewed and pertinent medications noted or modified as needed     Current Facility-Administered Medications   Medication Dose Route Frequency Provider Last Rate Last Admin    LORazepam (ATIVAN) injection 1 mg  1 mg IntraVENous Q4H PRN Alexx Fenton MD   1 mg at 24 0200    furosemide (LASIX) injection 80 mg  80 mg IntraVENous BID Dipesh 
PULMONARY ICU NOTE  [facility]    Name: Allison Mauricio MRN: [id number]   : 1955 Hospital: [facility]   Date: 2024  Admission date: 2024 Hospital Day: 16       HPI:     Patient Active Problem List   Diagnosis    Respiratory failure with hypoxia (HCC)    Hypokalemia    Acute respiratory failure (HCC)    COPD exacerbation (HCC)    Fracture of humeral shaft, left, closed    Atrial fibrillation, rapid (HCC)    Atrial fibrillation (HCC)    CHF (congestive heart failure) (HCC)    Atrial fibrillation with rapid ventricular response (HCC)    Severe protein-calorie malnutrition (HCC)             [x] High complexity decision making was performed  [x] See my orders for details      Subjective/Initial History:     I was asked by Ronda Ellis MD to see Allison Mauricio  a 69 y.o.   female in consultation     Excerpts from admission 2024 or consult notes as follows:   68-year-old lady came in because of shortness of breath palpitation past medical history of atrial fibrillation congestive heart failure history of tobacco and EtOH abuse she is not feeling well for the past couple of days before coming to the hospital she was admitted and found to be in A-fib with RVR cardiology seen the patient started on amiodarone she was on Eliquis today she was having more shortness of breath and dyspnea she is a current everyday smoker not on oxygen at home.      Allergies   Allergen Reactions    Atenolol Angioedema    Sulfa Antibiotics Swelling    Prednisone Other (See Comments)     Patient states \"it makes me bounce off of the walls\"; declines taking     Penicillins Rash     Tolerated ceftriaxone and meropenem 2024        MAR reviewed and pertinent medications noted or modified as needed     Current Facility-Administered Medications   Medication Dose Route Frequency Provider Last Rate Last Admin    LORazepam (ATIVAN) injection 1 mg  1 mg IntraVENous Q4H PRN Alexx Fenton MD   
Patient becoming more agitated,yelling, and pulling off bi-pap and other monitoring equipment. Instructed patient patient on why medication equipment is required at this time. Request sitter for patient to prevent harm to her.  
Patient began having difficulty breathing with O2 sats dropping to the mid 80's around 1930.  RT notified and patient was placed on a non-rebreather mask.  ABG were obtained but numbers did not indicate a BIPAP at this time. Patient's O2 maintained in the upper 90's and patient was resting with eyes closed until approx 0400.      At 0415, patient woke complaining of SOB and O2 dropped to the mid 80's.  RT was called and attempted Hi-Flow but patient was unable to tolerate and O2 dropped to mid 80's again.  Patient was placed back on the non-rebreather mask but O2 did not go above 88 percent.  Dr Shelby notified for another ABG and transfer to ICU. ABG's still did not indicate BIPAP but it is recommended due to the presentation of the patient.      Report called to ICU RN and patient transferred to 261.  Will notified patient's significant other, Srinivasa about transfer to ICU.      At 0525, Patient's significant other, Srinivasa was notified of the transfer to ICU.    
Patient currently on PT/OT caseload, however patient transferred to ICU from [facility] due to medical decline. Pt currently placed on hold and current PT/OT orders will be discontinued at this time. Will need new PT/OT evaluation order once pt medically stable for (re)assessment. Thank you.     
Patient on BiPAP and declines po trials. ST to follow on 2/5/24.     Rn reports patient with limited po intake recently. Patient might require an alternate source of nutrient intake.   
Patient on mid-flow desaturated into the mid-70's. Patient agitated and refusing nursing instruction. Family member at bedside also asking for MD. Patient placed on Bi- Pap. Saturations improved to low 90's.   
Patient placed on high flow several times today with each time resulting in patient desaturating into the mid-70's. RRT placed Bi-Pap back on patient with improvement in sp02 to the mid-90's. Pulmonologist notified that staff unable to appropriately wean patient.   
Patient will need HD access and central line and chest tube , because its emergency and no family to contact and patent can't give consent ,2 physician emergent consent required , and I give my consent for above .  
Patient's heart rate in the 170s/190's.  Dr Jimenez paged, orders for amio drip with bolus, d/c diltiazem drip.  Dr Jerez made aware, order given to stop dobutamine.  EKG done.  Dr Kowalski, on call cardiologist, made aware. Report given to night shift nurse.   
Per RN, patient desaturated w/ RT this am w/ attempts to come off Bipap. Patient remains on Bipap and not appropriate for ST at this time.   SLP to continue to follow.   
Per the multidisciplinary review committee, blood was returned with Prismaflex, intravenous infusions discontinued, comfort medications given, bipap removed and replaced with 2L NC. [facility] notified of patient going comfort care and instructed writer to call back with TOPHYLICIA Jiang, Dara, , alfonsoienphylicia Bernabe, and Nursing Supervisor made aware of TOPHYLICIA 4219  
RN noted order for fluids to be started at 50cc/ hr. RN note patient lasix is on hold and CT shows moderate pleural effusion. Patient is, and has been SOB which MD and NP are aware of. RN called to question fluids as this RN does not feel comfortable starting IV fluids given the patients situation. Nephrology called for clarification and MD stated yes give 50cc/hr x 10 hours.This RN will monitor resp. Status until end of shift and endorse to oncoming RN.   
Renal Progress Note    Patient: Allison Mauricio MRN: [id number]  SSN: [id number]    YOB: 1955  Age: 69 y.o.  Sex: female      Admit Date: 1/24/2024    LOS: 20 days     Subjective:   Patient seen in ICU. on bipap with fio2 100%  Unresponsive  On CRRT since last night  Could not tolerate traditional hemodialysis due to severe hypotension  On no pressor support  Plan to increase her UF to help for the volume overload          Current Facility-Administered Medications   Medication Dose Route Frequency    ceFEPIme (MAXIPIME) 2,000 mg in sodium chloride 0.9 % 100 mL IVPB (mini-bag)  2,000 mg IntraVENous Q12H    fluconazole (DIFLUCAN) in 0.9 % sodium chloride IVPB 400 mg  400 mg IntraVENous Q24H    metroNIDAZOLE (FLAGYL) 500 mg in 0.9% NaCl 100 mL IVPB premix  500 mg IntraVENous Q8H    heparin (porcine) injection 2,500 Units  2,500 Units IntraCATHeter PRN    HYDROmorphone HCl PF (DILAUDID) injection 1 mg  1 mg IntraVENous Q4H PRN    vancomycin (VANCOCIN) intermittent dosing (placeholder)  1 each Other RX Placeholder    Vancomycin - please draw level 2/13 1500.  Thanks!   Other Once    remdesivir 100 mg in sodium chloride 0.9 % 250 mL IVPB  100 mg IntraVENous Q24H    sodium chloride 0.9 % bolus 30 mL  30 mL IntraVENous PRN    vancomycin (FIRVANQ) 50 MG/ML oral solution 500 mg  500 mg Per NG tube 4 times per day    zinc gluconate tablet 50 mg  50 mg Oral Daily    lactobacillus (CULTURELLE) capsule 1 capsule  1 capsule Oral TID WC    prismaSol BGK 4/2.5 dialysis solution   Dialysis Continuous    prismaSol BGK 4/2.5 dialysis solution   Dialysis Continuous    norepinephrine (LEVOPHED) 16 mg in sodium chloride 0.9 % 250 mL infusion  1-120 mcg/min IntraVENous Continuous    methylPREDNISolone sodium succ (SOLU-MEDROL) injection 40 mg  40 mg IntraVENous Daily    dilTIAZem 125 mg in sodium chloride 0.9 % 125 mL infusion ([id number])  7.5 mg/hr IntraVENous Continuous    calcitRIOL (ROCALTROL) solution 0.25 mcg  0.25 
Renal Progress Note    Patient: Allison Mauricio MRN: [id number]  SSN: [id number]    YOB: 1955  Age: 68 y.o.  Sex: female      Admit Date: 1/24/2024    LOS: 10 days     Subjective:   Patient seen in ICU. ,on bipap, awake, confused  Repeat labs showed a creatinine of 3.05 today  Hemodynamically stable.   improved co2 of 15 --25 this morning    Current Facility-Administered Medications   Medication Dose Route Frequency    potassium chloride (KLOR-CON M) extended release tablet 20 mEq  20 mEq Oral Daily    furosemide (LASIX) injection 60 mg  60 mg IntraVENous Once    vancomycin (VANCOCIN) intermittent dosing (placeholder)   Other RX Placeholder    guaiFENesin (MUCINEX) extended release tablet 1,200 mg  1,200 mg Oral BID    vancomycin (VANCOCIN) 750 mg in sodium chloride 0.9 % 250 mL IVPB ([id number])  750 mg IntraVENous Once    [START ON 2/4/2024] Vancomycin - Please draw level on 2/4 at 1300.  Thanks!   Other Once    DOBUTamine (DOBUTREX) 500 mg in dextrose 5 % 250 mL infusion  2.5-10 mcg/kg/min IntraVENous Continuous    amiodarone (NEXTERONE) 360 mg in dextrose 5% 200 ml  0.5 mg/min IntraVENous Continuous    calcitRIOL (ROCALTROL) capsule 0.25 mcg  0.25 mcg Oral Daily    vitamin D (ERGOCALCIFEROL) capsule 50,000 Units  50,000 Units Oral Weekly    methylPREDNISolone sodium succ (SOLU-MEDROL) injection 40 mg  40 mg IntraVENous Q12H    budesonide (PULMICORT) nebulizer suspension 500 mcg  0.5 mg Nebulization BID RT    pantoprazole (PROTONIX) tablet 40 mg  40 mg Oral QAM AC    apixaban (ELIQUIS) tablet 2.5 mg  2.5 mg Oral BID    dexmedeTOMIDine (PRECEDEX) 400 mcg in sodium chloride 0.9 % 100 mL infusion  0.1-1.5 mcg/kg/hr IntraVENous Continuous    furosemide (LASIX) injection 40 mg  40 mg IntraVENous Daily    acetaminophen (TYLENOL) tablet 650 mg  650 mg Oral Q8H PRN    ipratropium 0.5 mg-albuterol 2.5 mg (DUONEB) nebulizer solution 1 Dose  1 Dose Inhalation Q6H WA RT    ipratropium 0.5 mg-albuterol 2.5 mg 
Renal Progress Note    Patient: Allison Mauricio MRN: [id number]  SSN: [id number]    YOB: 1955  Age: 69 y.o.  Sex: female      Admit Date: 1/24/2024    LOS: 18 days     Subjective:   Patient seen in ICU. on bipap, awake, confused,   Repeat labs showed a creatinine of 3.5  Sodium further decreased to 129  Lasix started yesterday 40 mg daily blood pressure is low today.  Patient is oliguric with urine output only 150 mL in the past 24 hours    Current Facility-Administered Medications   Medication Dose Route Frequency    methylPREDNISolone sodium succ (SOLU-MEDROL) injection 40 mg  40 mg IntraVENous Daily    dilTIAZem 125 mg in sodium chloride 0.9 % 125 mL infusion ([id number])  7.5 mg/hr IntraVENous Continuous    HYDROmorphone HCl PF (DILAUDID) injection 1 mg  1 mg IntraVENous Q4H PRN    calcitRIOL (ROCALTROL) solution 0.25 mcg  0.25 mcg Per NG tube Daily    Ergocalciferol (Drisdol) 200 MCG/ML drops 50,000 Units  50,000 Units Oral Weekly    guaiFENesin (ROBITUSSIN) 100 MG/5ML liquid 400 mg  400 mg Per NG tube 6 times per day    furosemide (LASIX) injection 40 mg  40 mg IntraVENous Daily    LORazepam (ATIVAN) injection 1 mg  1 mg IntraVENous Q4H PRN    hydrOXYzine pamoate (VISTARIL) capsule 25 mg  25 mg Oral TID    [Held by provider] potassium bicarb-citric acid (EFFER-K) effervescent tablet 20 mEq  20 mEq Oral BID    magnesium oxide (MAG-OX) tablet 400 mg  400 mg Oral Once    amiodarone (CORDARONE) tablet 200 mg  200 mg Oral Daily    [Held by provider] potassium chloride (KLOR-CON M) extended release tablet 20 mEq  20 mEq Oral Daily    budesonide (PULMICORT) nebulizer suspension 500 mcg  0.5 mg Nebulization BID RT    pantoprazole (PROTONIX) tablet 40 mg  40 mg Oral QAM AC    apixaban (ELIQUIS) tablet 2.5 mg  2.5 mg Oral BID    dexmedeTOMIDine (PRECEDEX) 400 mcg in sodium chloride 0.9 % 100 mL infusion  0.1-1.5 mcg/kg/hr IntraVENous Continuous    acetaminophen (TYLENOL) tablet 650 mg  650 mg Oral Q8H PRN 
Renal Progress Note    Patient: Allison Mauricio MRN: [id number]  SSN: [id number]    YOB: 1955  Age: 68 y.o.  Sex: female      Admit Date: 1/24/2024    LOS: 5 days     Subjective:   Patient seen in ICU. Alert and awake,on bipap   Feeling Sob, scheduled for a bedside therapeutic thoracentesis now  No complaints of any swelling in lower extremities.   Repeat labs showed a BUN of 58, creatinine of 3.16 today  Hemodynamically stable. Low urine output reported      Current Facility-Administered Medications   Medication Dose Route Frequency    methylPREDNISolone sodium succ (SOLU-MEDROL) injection 40 mg  40 mg IntraVENous Q6H    [Held by provider] amiodarone (CORDARONE) tablet 200 mg  200 mg Oral BID    dilTIAZem 125 mg in sodium chloride 0.9 % 125 mL infusion ([id number])  7.5 mg/hr IntraVENous Continuous    acetaminophen (TYLENOL) tablet 650 mg  650 mg Oral Q8H PRN    ipratropium 0.5 mg-albuterol 2.5 mg (DUONEB) nebulizer solution 1 Dose  1 Dose Inhalation Q6H WA RT    budesonide-formoterol (SYMBICORT) 160-4.5 MCG/ACT inhaler 2 puff  2 puff Inhalation BID RT    ipratropium 0.5 mg-albuterol 2.5 mg (DUONEB) nebulizer solution 1 Dose  1 Dose Inhalation Q6H PRN    multivitamin 1 tablet  1 tablet Oral Daily    sodium chloride flush 0.9 % injection 5-40 mL  5-40 mL IntraVENous 2 times per day    sodium chloride flush 0.9 % injection 5-40 mL  5-40 mL IntraVENous PRN    0.9 % sodium chloride infusion   IntraVENous PRN    thiamine mononitrate tablet 100 mg  100 mg Oral Daily    aluminum & magnesium hydroxide-simethicone (MAALOX) 200-200-20 MG/5ML suspension 30 mL  30 mL Oral Q6H PRN    ondansetron (ZOFRAN-ODT) disintegrating tablet 4 mg  4 mg Oral Q8H PRN    Or    ondansetron (ZOFRAN) injection 4 mg  4 mg IntraVENous Q6H PRN    polyethylene glycol (GLYCOLAX) packet 17 g  17 g Oral Daily PRN    acetaminophen (TYLENOL) tablet 650 mg  650 mg Oral Q6H PRN    Or    acetaminophen (TYLENOL) suppository 650 mg  650 mg 
Renal Progress Note    Patient: Allison Mauricio MRN: [id number]  SSN: [id number]    YOB: 1955  Age: 69 y.o.  Sex: female      Admit Date: 1/24/2024    LOS: 17 days     Subjective:   Patient seen in ICU. on bipap, awake, confused,   Repeat labs showed a creatinine of 3.7  Sodium 131  Lasix held  because of worsening renal function  Blood pressure is low today.  Patient is oliguric with urine output only 400 mL in the past 24 hours    Current Facility-Administered Medications   Medication Dose Route Frequency    HYDROmorphone HCl PF (DILAUDID) injection 1 mg  1 mg IntraVENous Q4H PRN    calcitRIOL (ROCALTROL) solution 0.25 mcg  0.25 mcg Per NG tube Daily    Ergocalciferol (Drisdol) 200 MCG/ML drops 50,000 Units  50,000 Units Oral Weekly    guaiFENesin (ROBITUSSIN) 100 MG/5ML liquid 400 mg  400 mg Per NG tube 6 times per day    furosemide (LASIX) injection 40 mg  40 mg IntraVENous Daily    LORazepam (ATIVAN) injection 1 mg  1 mg IntraVENous Q4H PRN    dilTIAZem 125 mg in sodium chloride 0.9 % 125 mL infusion ([id number])  5 mg/hr IntraVENous Continuous    hydrOXYzine pamoate (VISTARIL) capsule 25 mg  25 mg Oral TID    [Held by provider] potassium bicarb-citric acid (EFFER-K) effervescent tablet 20 mEq  20 mEq Oral BID    magnesium oxide (MAG-OX) tablet 400 mg  400 mg Oral Once    amiodarone (CORDARONE) tablet 200 mg  200 mg Oral Daily    [Held by provider] potassium chloride (KLOR-CON M) extended release tablet 20 mEq  20 mEq Oral Daily    methylPREDNISolone sodium succ (SOLU-MEDROL) injection 40 mg  40 mg IntraVENous Q12H    budesonide (PULMICORT) nebulizer suspension 500 mcg  0.5 mg Nebulization BID RT    pantoprazole (PROTONIX) tablet 40 mg  40 mg Oral QAM AC    apixaban (ELIQUIS) tablet 2.5 mg  2.5 mg Oral BID    dexmedeTOMIDine (PRECEDEX) 400 mcg in sodium chloride 0.9 % 100 mL infusion  0.1-1.5 mcg/kg/hr IntraVENous Continuous    acetaminophen (TYLENOL) tablet 650 mg  650 mg Oral Q8H PRN    
Renal Progress Note    Patient: Allison Mauricio MRN: [id number]  SSN: [id number]    YOB: 1955  Age: 69 y.o.  Sex: female      Admit Date: 1/24/2024    LOS: 19 days     Subjective:   Patient seen in ICU. on bipap  Lethargic and confused  Worsening HOLA  Fluid overload  Oliguric  On no pressors    Plan to start her on hemodialysis today.      Current Facility-Administered Medications   Medication Dose Route Frequency    methylPREDNISolone sodium succ (SOLU-MEDROL) injection 40 mg  40 mg IntraVENous Daily    dilTIAZem 125 mg in sodium chloride 0.9 % 125 mL infusion ([id number])  7.5 mg/hr IntraVENous Continuous    furosemide (LASIX) injection 40 mg  40 mg IntraVENous BID    calcitRIOL (ROCALTROL) solution 0.25 mcg  0.25 mcg Per NG tube Daily    Ergocalciferol (Drisdol) 200 MCG/ML drops 50,000 Units  50,000 Units Oral Weekly    guaiFENesin (ROBITUSSIN) 100 MG/5ML liquid 400 mg  400 mg Per NG tube 6 times per day    LORazepam (ATIVAN) injection 1 mg  1 mg IntraVENous Q4H PRN    hydrOXYzine pamoate (VISTARIL) capsule 25 mg  25 mg Oral TID    [Held by provider] potassium bicarb-citric acid (EFFER-K) effervescent tablet 20 mEq  20 mEq Oral BID    magnesium oxide (MAG-OX) tablet 400 mg  400 mg Oral Once    amiodarone (CORDARONE) tablet 200 mg  200 mg Oral Daily    [Held by provider] potassium chloride (KLOR-CON M) extended release tablet 20 mEq  20 mEq Oral Daily    budesonide (PULMICORT) nebulizer suspension 500 mcg  0.5 mg Nebulization BID RT    pantoprazole (PROTONIX) tablet 40 mg  40 mg Oral QAM AC    apixaban (ELIQUIS) tablet 2.5 mg  2.5 mg Oral BID    dexmedeTOMIDine (PRECEDEX) 400 mcg in sodium chloride 0.9 % 100 mL infusion  0.1-1.5 mcg/kg/hr IntraVENous Continuous    acetaminophen (TYLENOL) tablet 650 mg  650 mg Oral Q8H PRN    ipratropium 0.5 mg-albuterol 2.5 mg (DUONEB) nebulizer solution 1 Dose  1 Dose Inhalation Q6H WA RT    ipratropium 0.5 mg-albuterol 2.5 mg (DUONEB) nebulizer solution 1 Dose  1 
Renal Progress Note    Patient: Allison Mauricio MRN: [id number]  SSN: [id number]    YOB: 1955  Age: 69 y.o.  Sex: female      Admit Date: 1/24/2024    LOS: 13 days     Subjective:   Patient seen in ICU. ,on bipap, awake, confused, irrelevant talk+  Repeat labs showed a creatinine of 3.0 today.  Potassium has improved to 4.5 today.  Sodium improved to 134  Blood pressure is low normal today    Current Facility-Administered Medications   Medication Dose Route Frequency    LORazepam (ATIVAN) injection 1 mg  1 mg IntraVENous Q4H PRN    furosemide (LASIX) injection 40 mg  40 mg IntraVENous BID    dilTIAZem 125 mg in sodium chloride 0.9 % 125 mL infusion ([id number])  5 mg/hr IntraVENous Continuous    hydrOXYzine pamoate (VISTARIL) capsule 25 mg  25 mg Oral TID    potassium bicarb-citric acid (EFFER-K) effervescent tablet 20 mEq  20 mEq Oral BID    magnesium oxide (MAG-OX) tablet 400 mg  400 mg Oral Once    [Held by provider] losartan (COZAAR) tablet 12.5 mg  12.5 mg Oral BID    amiodarone (CORDARONE) tablet 200 mg  200 mg Oral Daily    potassium chloride (KLOR-CON M) extended release tablet 20 mEq  20 mEq Oral Daily    guaiFENesin (MUCINEX) extended release tablet 1,200 mg  1,200 mg Oral BID    calcitRIOL (ROCALTROL) capsule 0.25 mcg  0.25 mcg Oral Daily    vitamin D (ERGOCALCIFEROL) capsule 50,000 Units  50,000 Units Oral Weekly    methylPREDNISolone sodium succ (SOLU-MEDROL) injection 40 mg  40 mg IntraVENous Q12H    budesonide (PULMICORT) nebulizer suspension 500 mcg  0.5 mg Nebulization BID RT    pantoprazole (PROTONIX) tablet 40 mg  40 mg Oral QAM AC    apixaban (ELIQUIS) tablet 2.5 mg  2.5 mg Oral BID    dexmedeTOMIDine (PRECEDEX) 400 mcg in sodium chloride 0.9 % 100 mL infusion  0.1-1.5 mcg/kg/hr IntraVENous Continuous    acetaminophen (TYLENOL) tablet 650 mg  650 mg Oral Q8H PRN    ipratropium 0.5 mg-albuterol 2.5 mg (DUONEB) nebulizer solution 1 Dose  1 Dose Inhalation Q6H WA RT    ipratropium 0.5 
Renal Progress Note    Patient: Allison Mauricio MRN: [id number]  SSN: [id number]    YOB: 1955  Age: 68 y.o.  Sex: female      Admit Date: 1/24/2024    LOS: 6 days     Subjective:   Patient seen in ICU. ,on bipap   S/p bedside therapeutic thoracentesis 1/29  No complaints of any swelling in lower extremities.   Repeat labs showed a creatinine of 3.16--3.33 today  Hemodynamically stable.     Current Facility-Administered Medications   Medication Dose Route Frequency    apixaban (ELIQUIS) tablet 2.5 mg  2.5 mg Oral BID    cefTRIAXone (ROCEPHIN) 1,000 mg in sterile water 10 mL IV syringe  1,000 mg IntraVENous Q24H    methylPREDNISolone sodium succ (SOLU-MEDROL) injection 40 mg  40 mg IntraVENous Q6H    furosemide (LASIX) injection 40 mg  40 mg IntraVENous Daily    acetaminophen (TYLENOL) tablet 650 mg  650 mg Oral Q8H PRN    ipratropium 0.5 mg-albuterol 2.5 mg (DUONEB) nebulizer solution 1 Dose  1 Dose Inhalation Q6H WA RT    budesonide-formoterol (SYMBICORT) 160-4.5 MCG/ACT inhaler 2 puff  2 puff Inhalation BID RT    ipratropium 0.5 mg-albuterol 2.5 mg (DUONEB) nebulizer solution 1 Dose  1 Dose Inhalation Q6H PRN    multivitamin 1 tablet  1 tablet Oral Daily    sodium chloride flush 0.9 % injection 5-40 mL  5-40 mL IntraVENous 2 times per day    sodium chloride flush 0.9 % injection 5-40 mL  5-40 mL IntraVENous PRN    0.9 % sodium chloride infusion   IntraVENous PRN    thiamine mononitrate tablet 100 mg  100 mg Oral Daily    aluminum & magnesium hydroxide-simethicone (MAALOX) 200-200-20 MG/5ML suspension 30 mL  30 mL Oral Q6H PRN    ondansetron (ZOFRAN-ODT) disintegrating tablet 4 mg  4 mg Oral Q8H PRN    Or    ondansetron (ZOFRAN) injection 4 mg  4 mg IntraVENous Q6H PRN    polyethylene glycol (GLYCOLAX) packet 17 g  17 g Oral Daily PRN    acetaminophen (TYLENOL) tablet 650 mg  650 mg Oral Q6H PRN    Or    acetaminophen (TYLENOL) suppository 650 mg  650 mg Rectal Q6H PRN    potassium chloride 
Renal Progress Note    Patient: Allison Mauricio MRN: [id number]  SSN: [id number]    YOB: 1955  Age: 69 y.o.  Sex: female      Admit Date: 1/24/2024    LOS: 15 days     Subjective:   Patient seen in ICU.  Boyfriend at bedside,on bipap, awake, confused, irrelevant talk+  Repeat labs showed a creatinine of 3.5  Sodium 133  Lasix was held yesterday because of worsening renal function  Blood pressure is low today.  Patient is oliguric with urine output only 350 mL in the past 24 hours    Current Facility-Administered Medications   Medication Dose Route Frequency    LORazepam (ATIVAN) injection 1 mg  1 mg IntraVENous Q4H PRN    [Held by provider] furosemide (LASIX) injection 40 mg  40 mg IntraVENous BID    dilTIAZem 125 mg in sodium chloride 0.9 % 125 mL infusion ([id number])  5 mg/hr IntraVENous Continuous    hydrOXYzine pamoate (VISTARIL) capsule 25 mg  25 mg Oral TID    [Held by provider] potassium bicarb-citric acid (EFFER-K) effervescent tablet 20 mEq  20 mEq Oral BID    magnesium oxide (MAG-OX) tablet 400 mg  400 mg Oral Once    [Held by provider] losartan (COZAAR) tablet 12.5 mg  12.5 mg Oral BID    amiodarone (CORDARONE) tablet 200 mg  200 mg Oral Daily    [Held by provider] potassium chloride (KLOR-CON M) extended release tablet 20 mEq  20 mEq Oral Daily    guaiFENesin (MUCINEX) extended release tablet 1,200 mg  1,200 mg Oral BID    calcitRIOL (ROCALTROL) capsule 0.25 mcg  0.25 mcg Oral Daily    vitamin D (ERGOCALCIFEROL) capsule 50,000 Units  50,000 Units Oral Weekly    methylPREDNISolone sodium succ (SOLU-MEDROL) injection 40 mg  40 mg IntraVENous Q12H    budesonide (PULMICORT) nebulizer suspension 500 mcg  0.5 mg Nebulization BID RT    pantoprazole (PROTONIX) tablet 40 mg  40 mg Oral QAM AC    apixaban (ELIQUIS) tablet 2.5 mg  2.5 mg Oral BID    dexmedeTOMIDine (PRECEDEX) 400 mcg in sodium chloride 0.9 % 100 mL infusion  0.1-1.5 mcg/kg/hr IntraVENous Continuous    acetaminophen (TYLENOL) tablet 
Son and daughter in law at bedside to visit patient. Son expressed that he and the provider had previously discussed DNR status and that he had the opportunity to consider changing her status. Son requested the DNR forms and signed at bedside. Awaiting physician signature.   
Spiritual Care Assessment/Progress Note  [facility]    Name: Allison Mauricio MRN: [id number]    Age: 68 y.o.     Sex: female   Language: English     Date: 1/29/2024            Total Time Calculated: 13 min              Spiritual Assessment begun in [address] ICU  Service Provided For:: Patient, Significant other  Referral/Consult From:: Rounding  Encounter Overview/Reason : Initial Encounter    Spiritual beliefs:      [] Involved in a katherine tradition/spiritual practice:      [] Supported by a katherine community:      [] Claims no spiritual orientation:      [] Seeking spiritual identity:           [] Adheres to an individual form of spirituality:      [x] Not able to assess:                Identified resources for coping and support system:   Support System: Significant other       [] Prayer                  [] Devotional reading               [] Music                  [] Guided Imagery     [] Pet visits                                        [] Other: (COMMENT)     Specific area/focus of visit   Encounter:    Crisis:    Spiritual/Emotional needs: Type: Spiritual Support  Ritual, Rites and Sacraments: Type: Blessings  Grief, Loss, and Adjustments:    Ethics/Mediation:    Behavioral Health:    Palliative Care:    Advance Care Planning:      Plan/Referrals: Other (Comment) ( is available if needed)    Narrative: Rounding visit in 2 ICU. Patient and pt's significant other were present. Patient was resting on her bed and pt's significant other (Mr. Bernabe) was sitting on the sofa. They have not  but they have been together for over 20 years.  explored Mr. Bernabe's feelings and their support system. Mr. Bernabe shared about their lack of support system with no children.  provided a supportive presence with an empathetic listening. During the visit, the patient expressed that she was listening.  acknowledged her presence, and informed them of  availability. Pt and 
Spoke with Dr. Jerez related to patient hr between 150-170 and he gave new orders for diltizem drip at 5mg.   
The patient is with worsening HOLA and oliguric.  She seems to be in uremic encephalopathy and fluid overload.  She needs emergent dialysis  and this seems to be an emergent and life-saving procedure so we will proceed with dialysis catheter placement and initiation of dialysis procedure  
This is a follow-up chest x-ray.    Repeat chest x-ray 2 hours showed worsening subcu air.    Pigtail chest tube has no air or bubbles coming out.  Most likely either tubing system is plugged up.  And that is why the air is leaking through the subcutaneous route.    I made a small incision on the right anterior chest so that the subcu air can be decompressed.    I flushed pigtail catheter with saline.  Plugging noted.  After that pigtail catheter has air leak.    Repeat chest x-ray in 2 hours    Possibly placing another pigtail catheter larger size.      
Vancomycin Dosing Consult  Allison Mauricio is a 68 y.o. female with UTI. Pharmacy was consulted by ÓSCAR Dodson to dose and monitor vancomycin. Today is day 1.    Antibiotic regimen: Vancomycin + Ceftriaxone    Temp (24hrs), Av.5 °F (36.9 °C), Min:97.5 °F (36.4 °C), Max:100 °F (37.8 °C)    Recent Labs     24  0300 24  0420 24  0200   WBC 18.0* 18.6* 18.3*   CREATININE 2.82* 2.88*  2.95* 3.05*   BUN 63* 69*  69* 67*     Est CrCl: 14 mL/min  Concomitant nephrotoxic drugs: Loop diuretics    Cultures:    urine: E. Coli; E. faecalis    MRSA Swab: Not detected     Target range: Re-dose for random level = 15-20 mcg/mL    Recent level history:  Date/Time Dose & Interval Measured Level (mcg/mL) Associated AUC/JEAN              Assessment/Plan:   Afebrile, leukocytosis  Patient has HOLA on CKD III  Start Vancomycin 750mg IV x 1 today.  Pharmacy will order a random level on  at 1300.  Antimicrobial stop date 2/10/24       
Vancomycin Dosing Consult  Allison Mauricio is a 68 y.o. female with UTI. Pharmacy was consulted by ÓSCRA Dodson to dose and monitor vancomycin. Today is day 1.    Antibiotic regimen: Vancomycin + meropenem    Temp (24hrs), Av.6 °F (36.4 °C), Min:97.2 °F (36.2 °C), Max:97.9 °F (36.6 °C)    Recent Labs     24  0420 24  0200 24  0256   WBC 18.6* 18.3* 19.4*   CREATININE 2.88*  2.95* 3.05* 3.00*   BUN 69*  69* 67* 67*     Est CrCl: 14 mL/min  Concomitant nephrotoxic drugs: Loop diuretics    Cultures:    urine: E. Coli; E. faecalis    MRSA Swab: Not detected     Target range: Re-dose for random level = 15-20 mcg/mL    Recent level history:  Date/Time Dose & Interval Measured Level (mcg/mL) Associated AUC/JEAN    @ 1630 750mg x 1 dose 10.5 N/A        Assessment/Plan:   Afebrile, leukocytosis. Management of UTI with vancomycin in a patient with PCN allergy. Macrobid not an option d/t renal function. Continuing with vancomycin for Enterococcus coverage for now  Renal function remains in HOLA; continue pulse dosing by levels  Level today resulted at 10.5; redose vancomycin with 500mg x 1 dose tonight  Check next level on  @ 1600  CMP already ordered for . Will order BMP starting on   Antimicrobial stop date 2/10/24         
Vancomycin Dosing Consult  Allison Mauricio is a 69 y.o. female with UTI. Pharmacy was consulted by ÓSCAR Dodson to dose and monitor vancomycin. Today is day 2.    Antibiotic regimen: Vancomycin monotherapy    Temp (24hrs), Av °F (36.7 °C), Min:97.5 °F (36.4 °C), Max:98.4 °F (36.9 °C)    Recent Labs     24  0200 24  0256 24  0330   WBC 18.3* 19.4* 17.9*   CREATININE 3.05* 3.00* 2.96*  2.93*   BUN 67* 67* 67*  66*     Est CrCl: 14 mL/min  Concomitant nephrotoxic drugs: Loop diuretics    Cultures:    urine: E. Coli; E. faecalis    MRSA Swab: Not detected     Target range: Re-dose for random level < 15    Recent level history:  Date/Time Dose & Interval Measured Level (mcg/mL) Associated AUC/JEAN    @ 1630 750mg x 1 dose 10.5 N/A    @ 1605 500mg x 1 dose 13.5         Assessment/Plan:   Afebrile, leukocytosis. Management of UTI with vancomycin in a patient with PCN allergy. Macrobid not an option d/t renal function. Continuing with vancomycin for Enterococcus coverage for now  Renal function remains in HOLA; continue pulse dosing by levels  Level today resulted at 13.5; redose vancomycin with 500mg x 1 dose tonight  Check next level on  @ 1600  BMP ordered through   Antimicrobial stop date 2/10/24           
Vancomycin Dosing Consult  Allison Mauricio is a 69 y.o. female with sepsis/VAP. Pharmacy was consulted by Dr. Lay to dose and monitor vancomycin. Today is day 1.    Antibiotic regimen: Vancomycin + cefepime + metronidazole    Temp (24hrs), Av °F (36.7 °C), Min:97.4 °F (36.3 °C), Max:98.5 °F (36.9 °C)    Recent Labs     02/10/24  0310 24  0445 24  0215   WBC 27.8* 42.8* 46.2*   CREATININE 3.74* 3.54* 3.61*   * 101* 107*     Est CrCl: 11 mL/min; HOLA on CKD; may start on CRRT  Concomitant nephrotoxic drugs: Loop diuretics    Cultures:    urine - e.coli, e.faecalis, final   blood x 2 - negative, final   blood x 2 - NGTD, prelim   c.diff - ending   Covid - positive, final    MRSA Swab: Not detected     Target range: Re-dose for random level <15 mcg/mL      Assessment/Plan:   Patient afebrile with significantly elevated WBC. Procalcitonin pending  HOLA on CKD. HD attempted but failed due to hypotension. May start on CRRT.  Vancomycin 1250 mg x 1 ordered.  Random level ordered for  1500  Antimicrobial stop date TBD     Cat Hamilton, PharmD, BCPS, BCCCP  Clinical Pharmacist   [phone]    
0.1-1.5 mcg/kg/hr IntraVENous Continuous    acetaminophen (TYLENOL) tablet 650 mg  650 mg Oral Q8H PRN    ipratropium 0.5 mg-albuterol 2.5 mg (DUONEB) nebulizer solution 1 Dose  1 Dose Inhalation Q6H WA RT    ipratropium 0.5 mg-albuterol 2.5 mg (DUONEB) nebulizer solution 1 Dose  1 Dose Inhalation Q6H PRN    multivitamin 1 tablet  1 tablet Oral Daily    sodium chloride flush 0.9 % injection 5-40 mL  5-40 mL IntraVENous 2 times per day    sodium chloride flush 0.9 % injection 5-40 mL  5-40 mL IntraVENous PRN    0.9 % sodium chloride infusion   IntraVENous PRN    thiamine mononitrate tablet 100 mg  100 mg Oral Daily    aluminum & magnesium hydroxide-simethicone (MAALOX) 200-200-20 MG/5ML suspension 30 mL  30 mL Oral Q6H PRN    ondansetron (ZOFRAN-ODT) disintegrating tablet 4 mg  4 mg Oral Q8H PRN    Or    ondansetron (ZOFRAN) injection 4 mg  4 mg IntraVENous Q6H PRN    polyethylene glycol (GLYCOLAX) packet 17 g  17 g Oral Daily PRN    acetaminophen (TYLENOL) tablet 650 mg  650 mg Oral Q6H PRN    Or    acetaminophen (TYLENOL) suppository 650 mg  650 mg Rectal Q6H PRN    potassium chloride (KLOR-CON M) extended release tablet 40 mEq  40 mEq Oral PRN    Or    potassium bicarb-citric acid (EFFER-K) effervescent tablet 40 mEq  40 mEq Oral PRN    Or    potassium chloride 10 mEq/100 mL IVPB (Peripheral Line)  10 mEq IntraVENous PRN    potassium chloride 10 mEq/100 mL IVPB (Peripheral Line)  10 mEq IntraVENous PRN    magnesium sulfate 2000 mg in 50 mL IVPB premix  2,000 mg IntraVENous PRN    folic acid (FOLVITE) tablet 1 mg  1 mg Oral Daily    melatonin tablet 5 mg  5 mg Oral Nightly    pravastatin (PRAVACHOL) tablet 10 mg  10 mg Oral Nightly    [Held by provider] spironolactone (ALDACTONE) tablet 25 mg  25 mg Oral Daily    cyclobenzaprine (FLEXERIL) tablet 10 mg  10 mg Oral TID PRN        Vitals:    02/09/24 1000 02/09/24 1100 02/09/24 1150 02/09/24 1200   BP: (!) 107/59 124/68  114/74   Pulse: (!) 106 98 (!) 106 (!) 116 
200-200-20 MG/5ML suspension 30 mL  30 mL Oral Q6H PRN    ondansetron (ZOFRAN-ODT) disintegrating tablet 4 mg  4 mg Oral Q8H PRN    Or    ondansetron (ZOFRAN) injection 4 mg  4 mg IntraVENous Q6H PRN    polyethylene glycol (GLYCOLAX) packet 17 g  17 g Oral Daily PRN    acetaminophen (TYLENOL) tablet 650 mg  650 mg Oral Q6H PRN    Or    acetaminophen (TYLENOL) suppository 650 mg  650 mg Rectal Q6H PRN    potassium chloride (KLOR-CON M) extended release tablet 40 mEq  40 mEq Oral PRN    Or    potassium bicarb-citric acid (EFFER-K) effervescent tablet 40 mEq  40 mEq Oral PRN    Or    potassium chloride 10 mEq/100 mL IVPB (Peripheral Line)  10 mEq IntraVENous PRN    potassium chloride 10 mEq/100 mL IVPB (Peripheral Line)  10 mEq IntraVENous PRN    magnesium sulfate 2000 mg in 50 mL IVPB premix  2,000 mg IntraVENous PRN    [Held by provider] sacubitril-valsartan (ENTRESTO) 24-26 MG per tablet 1 tablet  1 tablet Oral BID    folic acid (FOLVITE) tablet 1 mg  1 mg Oral Daily    melatonin tablet 5 mg  5 mg Oral Nightly    pravastatin (PRAVACHOL) tablet 10 mg  10 mg Oral Nightly    [Held by provider] spironolactone (ALDACTONE) tablet 25 mg  25 mg Oral Daily    cyclobenzaprine (FLEXERIL) tablet 10 mg  10 mg Oral TID PRN        Vitals:    01/31/24 1400 01/31/24 1500 01/31/24 1600 01/31/24 1756   BP: (!) 152/100 (!) 153/77 (!) 141/96 (!) 168/93   Pulse: 98 (!) 109 (!) 114 (!) 108   Resp: 26 18 22 23   Temp:  99.3 °F (37.4 °C)  98.9 °F (37.2 °C)   TempSrc:  Axillary  Axillary   SpO2: 95% 95% 95%    Weight:       Height:         Objective:   General: alert awake, on bipap, no acute distress.  HEENT: EOMI, no Icterus, no Pallor,   Neck: Neck is supple, No JVD  Lungs: Decreased breath sounds at the bases   CVS: heart sounds normal,   GI: soft, nontender, normal BS.  Extremeties: no cyanosis, 1+ edema,   Neuro: Alert, awake, moving all extremeties well.   Skin: normal skin turgor, no skin rashes.        Intake and Output:  Current 
Daily    HYDROmorphone HCl PF (DILAUDID) injection 1 mg  1 mg IntraVENous Q4H PRN    calcitRIOL (ROCALTROL) solution 0.25 mcg  0.25 mcg Per NG tube Daily    Ergocalciferol (Drisdol) 200 MCG/ML drops 50,000 Units  50,000 Units Oral Weekly    guaiFENesin (ROBITUSSIN) 100 MG/5ML liquid 400 mg  400 mg Per NG tube 6 times per day    furosemide (LASIX) injection 40 mg  40 mg IntraVENous Daily    LORazepam (ATIVAN) injection 1 mg  1 mg IntraVENous Q4H PRN    dilTIAZem 125 mg in sodium chloride 0.9 % 125 mL infusion ([id number])  5 mg/hr IntraVENous Continuous    hydrOXYzine pamoate (VISTARIL) capsule 25 mg  25 mg Oral TID    [Held by provider] potassium bicarb-citric acid (EFFER-K) effervescent tablet 20 mEq  20 mEq Oral BID    magnesium oxide (MAG-OX) tablet 400 mg  400 mg Oral Once    amiodarone (CORDARONE) tablet 200 mg  200 mg Oral Daily    [Held by provider] potassium chloride (KLOR-CON M) extended release tablet 20 mEq  20 mEq Oral Daily    budesonide (PULMICORT) nebulizer suspension 500 mcg  0.5 mg Nebulization BID RT    pantoprazole (PROTONIX) tablet 40 mg  40 mg Oral QAM AC    apixaban (ELIQUIS) tablet 2.5 mg  2.5 mg Oral BID    dexmedeTOMIDine (PRECEDEX) 400 mcg in sodium chloride 0.9 % 100 mL infusion  0.1-1.5 mcg/kg/hr IntraVENous Continuous    acetaminophen (TYLENOL) tablet 650 mg  650 mg Oral Q8H PRN    ipratropium 0.5 mg-albuterol 2.5 mg (DUONEB) nebulizer solution 1 Dose  1 Dose Inhalation Q6H WA RT    ipratropium 0.5 mg-albuterol 2.5 mg (DUONEB) nebulizer solution 1 Dose  1 Dose Inhalation Q6H PRN    multivitamin 1 tablet  1 tablet Oral Daily    sodium chloride flush 0.9 % injection 5-40 mL  5-40 mL IntraVENous 2 times per day    sodium chloride flush 0.9 % injection 5-40 mL  5-40 mL IntraVENous PRN    0.9 % sodium chloride infusion   IntraVENous PRN    thiamine mononitrate tablet 100 mg  100 mg Oral Daily    aluminum & magnesium hydroxide-simethicone (MAALOX) 200-200-20 MG/5ML suspension 30 mL  30 mL Oral 
historian with flight of thoughts.  Anyhow she felt well during the summertime but for the last few weeks has been having some worsening shortness of breath and wheezing.  She cannot herself breathing loud when she sleeps.    Review of Systems:   Negative except for as noted above.    Objective:      Physical Exam:    Last 24hrs VS reviewed since prior progress note. Most recent are:    BP (!) 148/88   Pulse (!) 107   Temp 99 °F (37.2 °C) (Axillary)   Resp 22   Ht 1.575 m (5' 2\")   Wt 49.9 kg (110 lb)   SpO2 95%   BMI 20.12 kg/m²     Intake/Output Summary (Last 24 hours) at 2/3/2024 1047  Last data filed at 2/3/2024 0600  Gross per 24 hour   Intake 1619.99 ml   Output 1250 ml   Net 369.99 ml        General Appearance: Alert; no acute distress.  Ears/Nose/Mouth/Throat: moist mucous membranes  Neck: Supple.  Chest: Lungs clear to auscultation bilaterally.  Cardiovascular: Regular rate and rhythm, S1S2 normal  Abdomen: Soft, non-tender, bowel sounds are active.  Extremities: No edema bilaterally.  Skin: Warm and dry.      PMH/SH reviewed - no change compared to H&P    Telemetry:     EKG:     No valid procedures specified.    []  No new EKG for review    Lab Data Personally Reviewed:    Recent Labs     02/02/24 0420 02/03/24  0200   WBC 18.6* 18.3*   HGB 12.1 11.2*   HCT 34.4* 31.3*   * 144*     No results for input(s): \"INR\", \"APTT\" in the last 72 hours.    Invalid input(s): \"PTP\"   Recent Labs     02/01/24  0300 02/02/24  0420 02/03/24  0200   * 134*  134* 133*   K 3.2* 3.6  3.6 3.6    105  105 99   CO2 20* 15*  16* 25   BUN 63* 69*  69* 67*     No results for input(s): \"CPK\" in the last 72 hours.    Invalid input(s): \"CPKMB\", \"CKNDX\", \"TROIQ\"  Lab Results   Component Value Date/Time    CHOL 180 01/25/2024 05:42 AM    HDL 96 01/25/2024 05:42 AM       Recent Labs     02/01/24  0300 02/02/24  0420 02/03/24  0200   GLOB 3.7 4.1* 4.0     No results for input(s): \"PH\", \"PCO2\", \"PO2\" in the 
investigation.  Patient remains dependent on BiPAP.  Less likely PE as there is no dilatation of the right ventricle or atrium.  A-fib with RVR is probably compromising maximization of the respiratory function.  COPD exacerbation.  Patient already on anticoagulation, therapeutic.  No benefit to perform CTA of the chest as it would not change the overall goal. On 2/1 patient was placed on mid-flow and desaturated into the 70s. Patient placed back on BiPap and improved to low 90s.     Assessment / Plan:    Acute COPD exacerbation  CT chest no pneumonia  SpO2 97% on high flow BiPAP, FiO2 80% to 75%  ABG pCO2 28 likely from hyperventillation  Continue DuoNebs  Continue budesonide   Continue medrol, likely causing WBC elevation 16.4 to 18.0  Pulmonary is following     Tobacco use disorder   Current everyday smoker   Cessation encouraged     RUL lung nodule  CT chest showed moderate bilateral pleural effusions. Mass like density in the RUL measuring 2.5 cm chronic atelectasis vs neoplasm   IR consulted for therapeutic thoracentesis, 450ml removed left side  Will need PET scan outpatient   CXR small pleural effusions and right upper lobe atelectasis 2/1  Pulmonary is following     Atrial fibrillation with RVR  S/p ablation 05/08/23  Discontinued amiodarone per cardiology due to worsening pulmonary function  Currently in sinus rhythm with frequent PACs  Continue diltiazem   Continue Eliquis  Cardiology is following     HFrEF  Pro-BNP 11566  Chest x-ray small bilateral pleural effusions and bibasilar areas of atelectasis in both lower lobes   5/2/23 echo EF 25-30%. Severe global hypokinesis  1/25/24 echo EF 35-40%, low normal systolic function, normal diastolic function; mild/mod TR with RVSP 30 mmgHg, moderately dilated LA, medium sized left pleural effusion  Hold Entresto and spironolactone due to HOLA  Continue lasix 40 mg once daily per nephrology  Cardiology is following     Elevated troponin  No active CP, NGUYEN at rest 
sodium chloride flush 0.9 % injection 5-40 mL  5-40 mL IntraVENous PRN    0.9 % sodium chloride infusion   IntraVENous PRN    thiamine mononitrate tablet 100 mg  100 mg Oral Daily    aluminum & magnesium hydroxide-simethicone (MAALOX) 200-200-20 MG/5ML suspension 30 mL  30 mL Oral Q6H PRN    ondansetron (ZOFRAN-ODT) disintegrating tablet 4 mg  4 mg Oral Q8H PRN    Or    ondansetron (ZOFRAN) injection 4 mg  4 mg IntraVENous Q6H PRN    polyethylene glycol (GLYCOLAX) packet 17 g  17 g Oral Daily PRN    acetaminophen (TYLENOL) tablet 650 mg  650 mg Oral Q6H PRN    Or    acetaminophen (TYLENOL) suppository 650 mg  650 mg Rectal Q6H PRN    potassium chloride (KLOR-CON M) extended release tablet 40 mEq  40 mEq Oral PRN    Or    potassium bicarb-citric acid (EFFER-K) effervescent tablet 40 mEq  40 mEq Oral PRN    Or    potassium chloride 10 mEq/100 mL IVPB (Peripheral Line)  10 mEq IntraVENous PRN    potassium chloride 10 mEq/100 mL IVPB (Peripheral Line)  10 mEq IntraVENous PRN    magnesium sulfate 2000 mg in 50 mL IVPB premix  2,000 mg IntraVENous PRN    [Held by provider] sacubitril-valsartan (ENTRESTO) 24-26 MG per tablet 1 tablet  1 tablet Oral BID    folic acid (FOLVITE) tablet 1 mg  1 mg Oral Daily    melatonin tablet 5 mg  5 mg Oral Nightly    pravastatin (PRAVACHOL) tablet 10 mg  10 mg Oral Nightly    spironolactone (ALDACTONE) tablet 25 mg  25 mg Oral Daily    cyclobenzaprine (FLEXERIL) tablet 10 mg  10 mg Oral TID PRN        Vitals:    02/02/24 1700 02/02/24 1730 02/02/24 1800 02/02/24 1830   BP: 136/88 133/81 (!) 146/83 (!) 160/110   Pulse: 95 94 94 (!) 146   Resp:       Temp:       TempSrc:       SpO2: 96% 97% 97% 91%   Weight:       Height:         Objective:   General: alert awake, on HFNC, no acute distress.  HEENT: EOMI, no Icterus, no Pallor,   Neck: Neck is supple, No JVD  Lungs: Decreased breath sounds at the bases   CVS: heart sounds normal,   GI: soft, nontender, normal BS.  Extremeties: no cyanosis, 
% 125 mL infusion ([id number])  2.5-15 mg/hr IntraVENous Continuous    apixaban (ELIQUIS) tablet 2.5 mg  2.5 mg Oral BID    cefTRIAXone (ROCEPHIN) 1,000 mg in sterile water 10 mL IV syringe  1,000 mg IntraVENous Q24H    dexmedeTOMIDine (PRECEDEX) 400 mcg in sodium chloride 0.9 % 100 mL infusion  0.1-1.5 mcg/kg/hr IntraVENous Continuous    furosemide (LASIX) injection 40 mg  40 mg IntraVENous Daily    acetaminophen (TYLENOL) tablet 650 mg  650 mg Oral Q8H PRN    ipratropium 0.5 mg-albuterol 2.5 mg (DUONEB) nebulizer solution 1 Dose  1 Dose Inhalation Q6H WA RT    ipratropium 0.5 mg-albuterol 2.5 mg (DUONEB) nebulizer solution 1 Dose  1 Dose Inhalation Q6H PRN    multivitamin 1 tablet  1 tablet Oral Daily    sodium chloride flush 0.9 % injection 5-40 mL  5-40 mL IntraVENous 2 times per day    sodium chloride flush 0.9 % injection 5-40 mL  5-40 mL IntraVENous PRN    0.9 % sodium chloride infusion   IntraVENous PRN    thiamine mononitrate tablet 100 mg  100 mg Oral Daily    aluminum & magnesium hydroxide-simethicone (MAALOX) 200-200-20 MG/5ML suspension 30 mL  30 mL Oral Q6H PRN    ondansetron (ZOFRAN-ODT) disintegrating tablet 4 mg  4 mg Oral Q8H PRN    Or    ondansetron (ZOFRAN) injection 4 mg  4 mg IntraVENous Q6H PRN    polyethylene glycol (GLYCOLAX) packet 17 g  17 g Oral Daily PRN    acetaminophen (TYLENOL) tablet 650 mg  650 mg Oral Q6H PRN    Or    acetaminophen (TYLENOL) suppository 650 mg  650 mg Rectal Q6H PRN    potassium chloride (KLOR-CON M) extended release tablet 40 mEq  40 mEq Oral PRN    Or    potassium bicarb-citric acid (EFFER-K) effervescent tablet 40 mEq  40 mEq Oral PRN    Or    potassium chloride 10 mEq/100 mL IVPB (Peripheral Line)  10 mEq IntraVENous PRN    potassium chloride 10 mEq/100 mL IVPB (Peripheral Line)  10 mEq IntraVENous PRN    magnesium sulfate 2000 mg in 50 mL IVPB premix  2,000 mg IntraVENous PRN    [Held by provider] sacubitril-valsartan (ENTRESTO) 24-26 MG per tablet 1 tablet  1 
01/31/24  0235 02/01/24  0300   WBC 12.3* 16.4* 18.0*   HGB 11.8 11.6 11.6   HCT 33.7* 32.4* 32.4*    158 143*     Recent Labs     01/30/24  0300 01/31/24  0235 01/31/24  1510 02/01/24  0300   * 133* 135* 134*   K 3.6 2.7* 4.2 3.2*    103 105 104   CO2 21 22 23 20*   GLUCOSE 144* 151* 137* 120*   BUN 60* 59* 65* 63*   CREATININE 3.33* 2.91* 2.95* 2.82*   CALCIUM 8.3* 8.1*  8.2* 8.6 8.6   PHOS 5.2* 4.9*  --  5.2*   LABALBU 3.1*  3.1* 2.6*  2.6*  --  2.4*   BILITOT 0.3 0.2  0.2  --  0.3   AST 15 14*  13*  --  14*   ALT 15 14  14  --  15     Recent Labs     01/30/24  0306 01/31/24  0248 02/01/24  0252   PHART 7.29* 7.38 7.43   [id number] 42 37 28*   PO2ART 96 67* 60*   QBN0IFL 20* 21* 18*   FIO2A 100 60 75   [id number] 97 93* 92*   OXYHEM 96.7 92.5* 91.2*   CARBOXHGBART 0.3* 0.4* 0.3*   METHGBART 0.3 0.3 0.2   TEMP 98.2 98.8 98.0     Recent Results (from the past 24 hour(s))   Blood Gas, Arterial    Collection Time: 02/01/24  2:52 AM   Result Value Ref Range    pH, Arterial 7.43 7.35 - 7.45      pCO2, Arterial 28 (L) 35 - 45 mmHg    pO2, Arterial 60 (L) 80 - 100 mmHg    O2 Sat, Arterial 92 (L) 95 - 99 %    HCO3, Arterial 18 (L) 22 - 26 mmol/L    Base deficit, arterial blood 5.0 mmol/L    O2 Method BiPAP      FIO2 Arterial 75 %    Mode BiPAP      IPAP/PIP 1,410      POC PEEP/CPA 6.0      Source Arterial      Site Left Radial      Jose Juan Test YES      Carboxyhgb, Arterial 0.3 (L) 1 - 2 %    Methemoglobin, Arterial 0.2 0 - 1.4 %    Oxyhemoglobin 91.2 (L) 95 - 99 %    Performed by: Yovani White     Temperature 98.0     CBC with Auto Differential    Collection Time: 02/01/24  3:00 AM   Result Value Ref Range    WBC 18.0 (H) 3.6 - 11.0 K/uL    RBC 3.13 (L) 3.80 - 5.20 M/uL    Hemoglobin 11.6 11.5 - 16.0 g/dL    Hematocrit 32.4 (L) 35.0 - 47.0 %    .5 (H) 80.0 - 99.0 FL    MCH 37.1 (H) 26.0 - 34.0 PG    MCHC 35.8 30.0 - 36.5 g/dL    RDW 13.2 11.5 - 14.5 %    Platelets 143 (L) 150 - 400 K/uL    MPV 
Continuous    furosemide (LASIX) injection 40 mg  40 mg IntraVENous Daily    acetaminophen (TYLENOL) tablet 650 mg  650 mg Oral Q8H PRN    ipratropium 0.5 mg-albuterol 2.5 mg (DUONEB) nebulizer solution 1 Dose  1 Dose Inhalation Q6H WA RT    ipratropium 0.5 mg-albuterol 2.5 mg (DUONEB) nebulizer solution 1 Dose  1 Dose Inhalation Q6H PRN    multivitamin 1 tablet  1 tablet Oral Daily    sodium chloride flush 0.9 % injection 5-40 mL  5-40 mL IntraVENous 2 times per day    sodium chloride flush 0.9 % injection 5-40 mL  5-40 mL IntraVENous PRN    0.9 % sodium chloride infusion   IntraVENous PRN    thiamine mononitrate tablet 100 mg  100 mg Oral Daily    aluminum & magnesium hydroxide-simethicone (MAALOX) 200-200-20 MG/5ML suspension 30 mL  30 mL Oral Q6H PRN    ondansetron (ZOFRAN-ODT) disintegrating tablet 4 mg  4 mg Oral Q8H PRN    Or    ondansetron (ZOFRAN) injection 4 mg  4 mg IntraVENous Q6H PRN    polyethylene glycol (GLYCOLAX) packet 17 g  17 g Oral Daily PRN    acetaminophen (TYLENOL) tablet 650 mg  650 mg Oral Q6H PRN    Or    acetaminophen (TYLENOL) suppository 650 mg  650 mg Rectal Q6H PRN    potassium chloride (KLOR-CON M) extended release tablet 40 mEq  40 mEq Oral PRN    Or    potassium bicarb-citric acid (EFFER-K) effervescent tablet 40 mEq  40 mEq Oral PRN    Or    potassium chloride 10 mEq/100 mL IVPB (Peripheral Line)  10 mEq IntraVENous PRN    potassium chloride 10 mEq/100 mL IVPB (Peripheral Line)  10 mEq IntraVENous PRN    magnesium sulfate 2000 mg in 50 mL IVPB premix  2,000 mg IntraVENous PRN    [Held by provider] sacubitril-valsartan (ENTRESTO) 24-26 MG per tablet 1 tablet  1 tablet Oral BID    folic acid (FOLVITE) tablet 1 mg  1 mg Oral Daily    melatonin tablet 5 mg  5 mg Oral Nightly    pravastatin (PRAVACHOL) tablet 10 mg  10 mg Oral Nightly    [Held by provider] spironolactone (ALDACTONE) tablet 25 mg  25 mg Oral Daily    cyclobenzaprine (FLEXERIL) tablet 10 mg  10 mg Oral TID PRN     
Met: Progressing toward Goal(s)  Goals: Tolerate nutrition support at goal rate, by next RD assessment       Nutrition Monitoring and Evaluation:   Behavioral-Environmental Outcomes: None Identified  Food/Nutrient Intake Outcomes: Diet Advancement/Tolerance, Enteral Nutrition Intake/Tolerance  Physical Signs/Symptoms Outcomes: Biochemical Data, Nutrition Focused Physical Findings, Weight, Chewing or Swallowing, Meal Time Behavior    Discharge Planning:    Too soon to determine     Torey STARR Both, Dietetic Intern  Contact: [phone]    
hydroxide-simethicone (MAALOX) 200-200-20 MG/5ML suspension 30 mL  30 mL Oral Q6H PRN    ondansetron (ZOFRAN-ODT) disintegrating tablet 4 mg  4 mg Oral Q8H PRN    Or    ondansetron (ZOFRAN) injection 4 mg  4 mg IntraVENous Q6H PRN    polyethylene glycol (GLYCOLAX) packet 17 g  17 g Oral Daily PRN    acetaminophen (TYLENOL) tablet 650 mg  650 mg Oral Q6H PRN    Or    acetaminophen (TYLENOL) suppository 650 mg  650 mg Rectal Q6H PRN    furosemide (LASIX) injection 40 mg  40 mg IntraVENous BID    potassium chloride (KLOR-CON M) extended release tablet 40 mEq  40 mEq Oral PRN    Or    potassium bicarb-citric acid (EFFER-K) effervescent tablet 40 mEq  40 mEq Oral PRN    Or    potassium chloride 10 mEq/100 mL IVPB (Peripheral Line)  10 mEq IntraVENous PRN    potassium chloride 10 mEq/100 mL IVPB (Peripheral Line)  10 mEq IntraVENous PRN    magnesium sulfate 2000 mg in 50 mL IVPB premix  2,000 mg IntraVENous PRN    sacubitril-valsartan (ENTRESTO) 24-26 MG per tablet 1 tablet  1 tablet Oral BID    folic acid (FOLVITE) tablet 1 mg  1 mg Oral Daily    melatonin tablet 5 mg  5 mg Oral Nightly    pravastatin (PRAVACHOL) tablet 10 mg  10 mg Oral Nightly    apixaban (ELIQUIS) tablet 5 mg  5 mg Oral BID    [Held by provider] dilTIAZem (CARDIZEM) tablet 30 mg  30 mg Oral TID AC    [Held by provider] spironolactone (ALDACTONE) tablet 25 mg  25 mg Oral Daily    cyclobenzaprine (FLEXERIL) tablet 10 mg  10 mg Oral TID PRN         Janny Jerez MD     
supple, No JVD  Lungs: Decreased breath sounds at the bases   CVS: heart sounds normal,   GI: soft, nontender, normal BS.  Extremeties: no cyanosis, 1+ edema,   Neuro: Alert, awake, confused, moving all extremeties well.   Skin: normal skin turgor, no skin rashes.        Intake and Output:  Current Shift: No intake/output data recorded.  Last three shifts: 02/03 0701 - 02/04 1900  In: 936 [I.V.:836]  Out: 1000 [Urine:1000]      Lab/Data Review:  Recent Labs     02/02/24 0420 02/03/24  0200 02/04/24  0256   WBC 18.6* 18.3* 19.4*   HGB 12.1 11.2* 11.5   HCT 34.4* 31.3* 32.3*   * 144* 99*     Recent Labs     02/02/24 0420 02/03/24 0200 02/04/24  0256   *  134* 133* 133*   K 3.6  3.6 3.6 3.4*     105 99 98   CO2 15*  16* 25 23   GLUCOSE 130*  133* 175* 126*   BUN 69*  69* 67* 67*   CREATININE 2.88*  2.95* 3.05* 3.00*   CALCIUM 9.2  9.3 8.1* 8.2*   MG  --   --  1.5*   PHOS 6.6*  --   --    LABALBU 2.2*  2.2* 2.1* 2.0*   BILITOT 0.3 0.2 0.2   AST 22 23 39*   ALT 24 23 37     Recent Labs     02/02/24  0408 02/03/24  0524 02/04/24  0309   PHART 7.32* 7.45 7.40   [id number] 31* 34* 38   PO2ART 61* 61* 55*   [id number] 16* 24 23   FIO2A 70 60 60   [id number] 91* 92* 89*   OXYHEM 90.5* 91.4* 88.4*   CARBOXHGBART 0.2* 0.3* 0.4*   METHGBART 0.4 0.2 0.3   TEMP 97.9 97.8 97.6     Recent Results (from the past 24 hour(s))   Comprehensive Metabolic Panel    Collection Time: 02/04/24  2:56 AM   Result Value Ref Range    Sodium 133 (L) 136 - 145 mmol/L    Potassium 3.4 (L) 3.5 - 5.1 mmol/L    Chloride 98 97 - 108 mmol/L    CO2 23 21 - 32 mmol/L    Anion Gap 12 5 - 15 mmol/L    Glucose 126 (H) 65 - 100 mg/dL    BUN 67 (H) 6 - 20 mg/dL    Creatinine 3.00 (H) 0.55 - 1.02 mg/dL    Bun/Cre Ratio 22 (H) 12 - 20      Est, Glom Filt Rate 16 (L) >60 ml/min/1.73m2    Calcium 8.2 (L) 8.5 - 10.1 mg/dL    Total Bilirubin 0.2 0.2 - 1.0 mg/dL    AST 39 (H) 15 - 37 U/L    ALT 37 12 - 78 U/L    Alk Phosphatase 51 45 - 117 U/L    
  01/24/24 1414 (!) 149/90 -- -- (!) 107 -- (!) 89 % -- --   01/24/24 1404 (!) 142/111 -- -- 91 -- (!) 89 % -- --   01/24/24 1344 (!) 158/86 -- -- (!) 110 -- (!) 89 % -- --   01/24/24 1334 (!) 159/96 -- -- (!) 128 -- 92 % -- --   01/24/24 1314 (!) 161/100 -- -- (!) 110 -- 90 % -- --   01/24/24 1304 (!) 169/113 -- -- 98 -- (!) 89 % -- --   01/24/24 1244 (!) 168/143 -- -- (!) 129 -- 92 % -- --   01/24/24 1234 (!) 152/87 -- -- (!) 129 -- 91 % -- --   01/24/24 1214 (!) 162/93 -- -- (!) 115 -- 91 % -- --   01/24/24 1204 (!) 146/80 -- -- (!) 102 -- (!) 88 % -- --   01/24/24 0935 -- -- -- 94 19 100 % -- --   01/24/24 0925 -- -- -- -- -- -- 1.575 m (5' 2\") 49.9 kg (110 lb)   01/24/24 0920 -- -- -- (!) 133 30 93 % -- --   01/24/24 0911 (!) 176/149 97.7 °F (36.5 °C) -- -- -- -- -- 49.9 kg (110 lb)   01/24/24 0905 (!) 163/129 -- -- (!) 115 18 100 % -- --       No intake or output data in the 24 hours ending 01/25/24 0557     I had a face to face encounter and independently examined this patient on 1/25/2024, as outlined below:        PHYSICAL EXAM:  Physical Exam  Vitals and nursing note reviewed.   Constitutional:       General: She is not in acute distress.     Appearance: Normal appearance. She is normal weight. She is not ill-appearing, toxic-appearing or diaphoretic.   Cardiovascular:      Rate and Rhythm: Rhythm irregular.      Pulses: Normal pulses.      Heart sounds: Normal heart sounds.   Pulmonary:      Effort: Pulmonary effort is normal.      Breath sounds: Normal breath sounds. No wheezing, rhonchi or rales.   Musculoskeletal:      Right lower leg: Edema present.      Left lower leg: Edema present.   Skin:     General: Skin is warm and dry.      Capillary Refill: Capillary refill takes less than 2 seconds.   Neurological:      General: No focal deficit present.      Mental Status: She is alert. Mental status is at baseline.   Psychiatric:         Mood and Affect: Mood normal.         Behavior: Behavior normal.      
-- -- (!) 101 18 99 % --   01/31/24 0400 138/69 98.4 °F (36.9 °C) Oral (!) 101 17 96 % --   01/31/24 0300 132/89 -- -- (!) 108 18 95 % --   01/31/24 0200 124/80 -- -- (!) 101 18 92 % --   01/31/24 0100 119/69 -- -- 90 18 93 % --   01/31/24 0000 139/85 98.4 °F (36.9 °C) Oral 100 20 92 % --   01/30/24 2316 (!) 148/90 -- -- (!) 116 -- -- --   01/30/24 2300 (!) 148/90 -- -- (!) 123 21 (!) 85 % --   01/30/24 2200 (!) 143/70 -- -- (!) 121 19 -- --   01/30/24 2100 (!) 143/73 -- -- (!) 124 22 93 % --   01/30/24 2004 -- -- -- 98 20 93 % --   01/30/24 2000 (!) 133/97 98.4 °F (36.9 °C) Oral (!) 103 20 95 % --   01/30/24 1900 131/72 -- -- (!) 113 20 92 % --   01/30/24 1800 139/69 -- -- (!) 117 21 96 % --   01/30/24 1701 (!) 118/100 -- -- 93 20 96 % --   01/30/24 1600 (!) 124/90 -- -- (!) 114 23 91 % --   01/30/24 1509 -- -- -- -- -- 92 % --   01/30/24 1500 126/83 97.6 °F (36.4 °C) Oral (!) 120 22 (!) 82 % --           Intake/Output Summary (Last 24 hours) at 1/31/2024 1446  Last data filed at 1/31/2024 0607  Gross per 24 hour   Intake 408.71 ml   Output 1800 ml   Net -1391.29 ml          I had a face to face encounter and independently examined this patient on 1/31/2024, as outlined below:        PHYSICAL EXAM:  Physical Exam  Vitals and nursing note reviewed.   Constitutional:       General: She is not in acute distress.     Appearance: Normal appearance. She is normal weight. She is not ill-appearing, toxic-appearing or diaphoretic.   Cardiovascular:      Rate and Rhythm: Normal rate and regular rhythm.   Pulmonary:      Effort: Pulmonary effort is normal.      Breath sounds: Normal breath sounds. No wheezing, rhonchi or rales.   Abdominal:      General: Abdomen is flat.      Palpations: Abdomen is soft.      Tenderness: There is no abdominal tenderness.   Musculoskeletal:      Right lower leg: No edema.      Left lower leg: No edema.   Skin:     General: Skin is warm and dry.   Neurological:      General: No focal deficit 
10 mEq/100 mL IVPB (Peripheral Line)  10 mEq IntraVENous PRN    magnesium sulfate 2000 mg in 50 mL IVPB premix  2,000 mg IntraVENous PRN    folic acid (FOLVITE) tablet 1 mg  1 mg Oral Daily    melatonin tablet 5 mg  5 mg Oral Nightly    pravastatin (PRAVACHOL) tablet 10 mg  10 mg Oral Nightly    [Held by provider] spironolactone (ALDACTONE) tablet 25 mg  25 mg Oral Daily    cyclobenzaprine (FLEXERIL) tablet 10 mg  10 mg Oral TID PRN         Janny Jerez MD     
2023); and ep device procedure (N/A, 2023).   FHX: family history includes No Known Problems in her father and mother.   SHX:  reports that she has been smoking cigarettes. She has never used smokeless tobacco. She reports current alcohol use of about 24.0 standard drinks of alcohol per week. She reports that she does not use drugs.     ROS:    Review of system as per HPI and physical       Objective:     Vital Signs: Telemetry:    AFIB Intake/Output:   BP (!) 119/58   Pulse 93   Temp 97.8 °F (36.6 °C) (Axillary)   Resp 17   Ht 1.575 m (5' 2\")   Wt 49.9 kg (110 lb)   SpO2 93%   BMI 20.12 kg/m²     Temp (24hrs), Av.7 °F (36.5 °C), Min:97.4 °F (36.3 °C), Max:98 °F (36.7 °C)          O2 Device: PAP (positive airway pressure) O2 Flow Rate (L/min): 15 L/min     Wt Readings from Last 4 Encounters:   24 49.9 kg (110 lb)   23 48.5 kg (107 lb)   23 47.4 kg (104 lb 8 oz)          Intake/Output Summary (Last 24 hours) at 2024 0832  Last data filed at 2024 0636  Gross per 24 hour   Intake 180 ml   Output 300 ml   Net -120 ml         Last shift:      No intake/output data recorded.  Last 3 shifts:  190 -  0700  In: 180 [I.V.:180]  Out: 300 [Urine:300]       Physical Exam:     Physical Exam  Constitutional:       Appearance: Normal appearance.   HENT:      Head: Normocephalic and atraumatic.      Nose: Nose normal.      Mouth/Throat:      Mouth: Mucous membranes are moist.   Eyes:      Pupils: Pupils are equal, round, and reactive to light.   Cardiovascular:      Rate and Rhythm: Normal rate. Rhythm irregular.      Pulses: Normal pulses.      Heart sounds: Normal heart sounds.   Pulmonary:      Effort: Pulmonary effort is normal.      Comments: Anteriorly diminished breath sound no wheeze  Abdominal:      General: Abdomen is flat. Bowel sounds are normal.      Palpations: Abdomen is soft.   Musculoskeletal:         General: Normal range of motion.      Cervical back: 
Range    Sodium 132 (L) 136 - 145 mmol/L    Potassium 2.9 (L) 3.5 - 5.1 mmol/L    Chloride 97 97 - 108 mmol/L    CO2 23 21 - 32 mmol/L    Anion Gap 12 5 - 15 mmol/L    Glucose 128 (H) 65 - 100 mg/dL    BUN 67 (H) 6 - 20 mg/dL    Creatinine 2.96 (H) 0.55 - 1.02 mg/dL    Bun/Cre Ratio 23 (H) 12 - 20      Est, Glom Filt Rate 16 (L) >60 ml/min/1.73m2    Calcium 8.4 (L) 8.5 - 10.1 mg/dL    Total Bilirubin 0.3 0.2 - 1.0 mg/dL    AST 34 15 - 37 U/L    ALT 33 12 - 78 U/L    Alk Phosphatase 48 45 - 117 U/L    Total Protein 5.5 (L) 6.4 - 8.2 g/dL    Albumin 1.7 (L) 3.5 - 5.0 g/dL    Globulin 3.8 2.0 - 4.0 g/dL    Albumin/Globulin Ratio 0.4 (L) 1.1 - 2.2     Renal Function Panel    Collection Time: 02/05/24  3:30 AM   Result Value Ref Range    Sodium 133 (L) 136 - 145 mmol/L    Potassium 2.9 (L) 3.5 - 5.1 mmol/L    Chloride 98 97 - 108 mmol/L    CO2 23 21 - 32 mmol/L    Anion Gap 12 5 - 15 mmol/L    Glucose 126 (H) 65 - 100 mg/dL    BUN 66 (H) 6 - 20 mg/dL    Creatinine 2.93 (H) 0.55 - 1.02 mg/dL    Bun/Cre Ratio 23 (H) 12 - 20      Est, Glom Filt Rate 17 (L) >60 ml/min/1.73m2    Calcium 8.3 (L) 8.5 - 10.1 mg/dL    Phosphorus 6.6 (H) 2.6 - 4.7 mg/dL    Albumin 1.7 (L) 3.5 - 5.0 g/dL   Magnesium    Collection Time: 02/05/24  3:30 AM   Result Value Ref Range    Magnesium 2.2 1.6 - 2.4 mg/dL   CBC with Auto Differential    Collection Time: 02/05/24  3:30 AM   Result Value Ref Range    WBC 17.9 (H) 3.6 - 11.0 K/uL    RBC 3.00 (L) 3.80 - 5.20 M/uL    Hemoglobin 11.2 (L) 11.5 - 16.0 g/dL    Hematocrit 30.9 (L) 35.0 - 47.0 %    .0 (H) 80.0 - 99.0 FL    MCH 37.3 (H) 26.0 - 34.0 PG    MCHC 36.2 30.0 - 36.5 g/dL    RDW 13.0 11.5 - 14.5 %    Platelets 84 (L) 150 - 400 K/uL    MPV 12.4 8.9 - 12.9 FL    Nucleated RBCs 0.0 0.0  WBC    nRBC 0.00 0.00 - 0.01 K/uL    Neutrophils % 92 (H) 32 - 75 %    Lymphocytes % 3 (L) 12 - 49 %    Monocytes % 4 (L) 5 - 13 %    Eosinophils % 0 0 - 7 %    Basophils % 0 0 - 1 %    Metamyelocytes 1 
following commands but will answer some questions, remains on BiPAP  Respiratory: coarse BS with diminished breath sounds in the bases, no wheezes, no accessory muscle use  Cardiovascular:  tachycardia, irregular, no leg edema  GI:  abdomen soft but appears a little distended compared to yesterday and she has tenderness with palpation, Few BS  Neurologic:  lethargic, sedated but awakens and verbal at times  Skin: poor turgor, UE/LE ecchymoses, no rash    /82   Pulse 96   Temp 97.7 °F (36.5 °C) (Axillary)   Resp 17   Ht 1.575 m (5' 2.01\")   Wt 47.9 kg (105 lb 9.6 oz)   SpO2 98%   BMI 19.31 kg/m²     Diet: ADULT TUBE FEEDING; Nasogastric; Standard with Fiber; Continuous; 45; No; 150; Q 4 hours  DVT Prophylaxis: []PPx LMWH  []SQ Heparin  []IPC/SCDs  [x]Eliquis  []Xarelto  []Coumadin  []Other -      Code status: Limited  PT/OT Eval Status:   [x]NOT yet ordered  []Ordered and Pending   []Seen with Recommendations for:  []Home independently  []Home w/ assist  []HHC  []SNF  []Acute Rehab    Anticipated Discharge Day/Date:  TBD >48hrs    Anticipated Discharge Location: []Home  []HHC  [x]SNF  []Acute Rehab  []ECF  []LTAC  []Hospice  []Other -      Consults:      IP CONSULT TO CARDIOLOGY  IP CONSULT TO SOCIAL WORK  IP CONSULT TO HEART FAILURE NURSE/COORDINATOR  IP CONSULT TO DIETITIAN  IP CONSULT TO ADVANCED HEART FAILURE NURSE NAVIGATOR  IP CONSULT TO PULMONOLOGY  IP CONSULT TO NEPHROLOGY  IP CONSULT TO SPIRITUAL CARE  IP CONSULT TO PHARMACY  IP CONSULT TO ETHICS      This patient has a high likelihood of being discharged tomorrow and is appropriate for A1 Discharge Unit in AM pending clinical course overnight: []Yes  []No    ------------------------------------------------------------------------------------------------------------------------------------------------------------------------    MDM      [x] High (any 2)    A. Problems (any 1)  [x] Acute/Chronic Illness/injury posing threat to life or bodily 
prior progress note. Most recent are:  Patient Vitals for the past 24 hrs:   BP Temp Temp src Pulse Resp SpO2   01/28/24 1208 108/70 98 °F (36.7 °C) Oral 94 20 96 %   01/28/24 0806 (!) 100/53 97.9 °F (36.6 °C) Oral 92 20 95 %   01/28/24 0759 -- -- -- -- -- 93 %   01/28/24 0409 102/79 98.1 °F (36.7 °C) Oral 55 18 98 %   01/27/24 2357 108/64 98.2 °F (36.8 °C) Oral 88 22 96 %   01/27/24 2130 118/67 -- -- (!) 104 -- --   01/27/24 1957 (!) 88/52 97.3 °F (36.3 °C) -- (!) 144 18 100 %   01/27/24 1445 -- -- -- 78 18 95 %         No intake or output data in the 24 hours ending 01/28/24 1248       I had a face to face encounter and independently examined this patient on 1/28/2024, as outlined below:        PHYSICAL EXAM:  Physical Exam  Vitals and nursing note reviewed.   Constitutional:       General: She is not in acute distress.     Appearance: Normal appearance. She is normal weight. She is not ill-appearing, toxic-appearing or diaphoretic.   Cardiovascular:      Rate and Rhythm: Rhythm irregular.      Pulses: Normal pulses.      Heart sounds: Normal heart sounds.   Pulmonary:      Effort: Pulmonary effort is normal.      Breath sounds: Normal breath sounds. No wheezing, rhonchi or rales.   Musculoskeletal:      Right lower leg: Edema present.      Left lower leg: Edema present.   Skin:     General: Skin is warm and dry.      Capillary Refill: Capillary refill takes less than 2 seconds.   Neurological:      General: No focal deficit present.      Mental Status: She is alert. Mental status is at baseline.   Psychiatric:         Mood and Affect: Mood normal.         Behavior: Behavior normal.          Reviewed most current lab test results and cultures  YES  Reviewed most current radiology test results   YES  Review and summation of old records today    NO  Reviewed patient's current orders and MAR    YES  PMH/SH reviewed - no change compared to 
      O2 Device: PAP (positive airway pressure) O2 Flow Rate (L/min): 60 L/min     Wt Readings from Last 4 Encounters:   02/07/24 47.9 kg (105 lb 9.6 oz)   05/13/23 48.5 kg (107 lb)   05/05/23 47.4 kg (104 lb 8 oz)          Intake/Output Summary (Last 24 hours) at 2/7/2024 0848  Last data filed at 2/7/2024 0600  Gross per 24 hour   Intake 1893.51 ml   Output 350 ml   Net 1543.51 ml         Last shift:      No intake/output data recorded.  Last 3 shifts: 02/05 1901 - 02/07 0700  In: 1893.5 [I.V.:493.5]  Out: 750 [Urine:750]       Physical Exam:     Physical Exam  Constitutional:       Comments: Weak frail appearing   HENT:      Head: Normocephalic and atraumatic.      Nose: Nose normal.      Mouth/Throat:      Mouth: Mucous membranes are moist.   Eyes:      Extraocular Movements: Extraocular movements intact.      Pupils: Pupils are equal, round, and reactive to light.   Neck:      Comments: JVD present  Cardiovascular:      Rate and Rhythm: Normal rate. Rhythm irregular.      Pulses: Normal pulses.      Heart sounds: Normal heart sounds.   Pulmonary:      Effort: Pulmonary effort is normal.      Comments: Diminished anteriorly with rales bilateral no breath sounds in the bases  Abdominal:      General: Abdomen is flat. Bowel sounds are normal.      Palpations: Abdomen is soft.   Musculoskeletal:         General: Normal range of motion.      Cervical back: Normal range of motion and neck supple.      Comments: Bilateral trace leg edema   Skin:     General: Skin is warm.   Neurological:      General: No focal deficit present.      Mental Status: She is alert.   Psychiatric:         Mood and Affect: Mood normal.          Labs:    Recent Labs     02/05/24  0330 02/07/24  0203   WBC 17.9* 13.5*   HGB 11.2* 11.0*   PLT 84* 102*       Recent Labs     02/05/24  0330 02/05/24  1854 02/06/24  0423 02/07/24  0203   *  133*  --  134* 131*   K 2.9*  2.9* 5.4* 4.5 5.3*   CL 97  98  --  102 101   CO2 23  23  --  21 21 
1 Dose Inhalation Q6H WA RT    multivitamin  1 tablet Oral Daily    sodium chloride flush  5-40 mL IntraVENous 2 times per day    thiamine  100 mg Oral Daily    folic acid  1 mg Oral Daily    melatonin  5 mg Oral Nightly    pravastatin  10 mg Oral Nightly    [Held by provider] spironolactone  25 mg Oral Daily     PRN Meds: HYDROmorphone, LORazepam, acetaminophen, ipratropium 0.5 mg-albuterol 2.5 mg, sodium chloride flush, sodium chloride, aluminum & magnesium hydroxide-simethicone, ondansetron **OR** ondansetron, polyethylene glycol, acetaminophen **OR** acetaminophen, potassium chloride **OR** potassium alternative oral replacement **OR** potassium chloride, potassium chloride, magnesium sulfate, cyclobenzaprine     Labs:  Personally reviewed and interpreted for clinical significance.     Recent Labs     02/08/24  0400 02/09/24  0250 02/10/24  0310   WBC 16.0* 21.4* 27.8*   HGB 10.7* 11.1* 10.7*   HCT 30.5* 31.3* 29.7*   * 145* 153       Recent Labs     02/08/24  0400 02/09/24  0250 02/10/24  0310   * 132* 130*   K 4.3 4.0 4.3    99 97   CO2 22 21 22   BUN 84* 90* 106*   CREATININE 3.58* 3.69* 3.74*   CALCIUM 8.0* 8.0* 7.8*   MG  --  1.9  --          Ronda Ellis MD  
back: Normal range of motion and neck supple.      Comments: Bilateral trace leg edema   Skin:     General: Skin is warm.   Neurological:      General: No focal deficit present.      Mental Status: She is alert.   Psychiatric:         Mood and Affect: Mood normal.          Labs:    Recent Labs     01/28/24  0758 01/29/24  0615 01/30/24  0300   WBC 13.8* 13.2* 12.3*   HGB 12.9 11.4* 11.8    179 178       Recent Labs     01/28/24  0758 01/28/24  0759 01/29/24  0615 01/30/24  0300   *  --  133* 134*   K 3.6  --  3.5 3.6     --  105 106   CO2 21  --  20* 21   GLUCOSE 158*  --  104* 144*   BUN 57*  --  58* 60*   CREATININE 3.33*  --  3.16* 3.33*   CALCIUM 7.6*  --  7.6* 8.3*   MG 2.2  --  2.0  --    PHOS 5.6*  --  5.4* 5.2*   LABALBU 1.9* 2.0* 3.0*  3.0* 3.1*  3.1*   BILITOT  --  0.1* 0.2 0.3   AST  --  19 23 15   ALT  --  14 17 15         Recent Labs     01/30/24  0300   CKTOTAL 39   NTPROBNP 19,135*       Lab Results   Component Value Date/Time    BNP 10,256 04/26/2023 11:52 AM    NTPROBNP 19,135 01/30/2024 03:00 AM      Lab Results   Component Value Date/Time    TSH 1.21 09/22/2020 06:00 AM       Results       Procedure Component Value Units Date/Time    Culture, Urine [[id number]] Collected: 01/29/24 2122    Order Status: Sent Specimen: Urine Updated: 01/29/24 2131    MRSA by PCR [[id number]] Collected: 01/29/24 0521    Order Status: Completed Specimen: Swab Updated: 01/29/24 1149     MRSA by PCR Not Detected                Imaging:  Echo (TTE) complete (PRN contrast/bubble/strain/3D)    Result Date: 1/25/2024    Left Ventricle: Low normal left ventricular systolic function with a visually estimated EF of 35 - 40%. EF by 2D Simpsons Biplane is 50%. Left ventricle size is normal. LVIDd index is 3.18 cm/m2. Normal wall thickness. Mass index 2D is 69.6 g/m2. Normal wall motion. Normal diastolic function.   Aortic Valve: Trileaflet valve. Mild sclerosis of the aortic valve cusp.   Mitral Valve: 
exacerbation/Copd  Bilateral pleural effusions, s/p thoracentesis 1/29  Will continue Lasix 40 twice daily  monitor renal functions and electrolytes and adjust management as needed    7. Lung mass: Reported in right upper lobe  Pulmonary following the patient    8.  Metabolic acidosis: Probably related to HOLA/CKD  Improved 15--21  repeat CO2 levels tomorrow    Overall prognosis is guarded.  Comfort care would not be an unreasonable option    Signed By: Arabella Montilla MD     February 7, 2024      
procedures/Xrays and details which were not copied into this note but were reviewed prior to creation of Plan.      LABS:  I reviewed today's most current labs and imaging studies.  Pertinent labs include:  Recent Labs     02/10/24  0310 02/11/24  0445 02/12/24  0215   WBC 27.8* 42.8* 46.2*   HGB 10.7* 10.3* 9.5*   HCT 29.7* 28.5* 26.7*    162 165     Recent Labs     02/10/24  0310 02/11/24  0445 02/11/24  1700 02/12/24  0215   * 129*  --  128*   K 4.3 4.4  --  4.1   CL 97 95*  --  94*   CO2 22 21  --  22   GLUCOSE 202* 178*  --  213*   * 101*  --  107*   CREATININE 3.74* 3.54*  --  3.61*   CALCIUM 7.8* 8.0*  --  7.7*   PHOS  --  4.2  --  4.8*   LABALBU  --  1.8* 1.6* 1.7*   BILITOT  --   --  0.7  --    AST  --   --  48*  --    ALT  --   --  43  --        Signed: Antoine Lay MD         
results found for: \"LABURIN\"  Blood Cultures: No results found for: \"BC\"  No results found for: \"BLOODCULT2\"  Organism: No results found for: \"ORG\"      Ronda Ellis MD  
toxic-appearing or diaphoretic.   Cardiovascular:      Rate and Rhythm: Rhythm irregular.      Pulses: Normal pulses.      Heart sounds: Normal heart sounds.   Pulmonary:      Effort: Pulmonary effort is normal.      Breath sounds: Normal breath sounds. No wheezing, rhonchi or rales.   Musculoskeletal:      Right lower leg: Edema present.      Left lower leg: Edema present.   Skin:     General: Skin is warm and dry.      Capillary Refill: Capillary refill takes less than 2 seconds.   Neurological:      General: No focal deficit present.      Mental Status: She is alert. Mental status is at baseline.   Psychiatric:         Mood and Affect: Mood normal.         Behavior: Behavior normal.          Reviewed most current lab test results and cultures  YES  Reviewed most current radiology test results   YES  Review and summation of old records today    NO  Reviewed patient's current orders and MAR    YES  PMH/SH reviewed - no change compared to H&P  ________________________________________________________________________  Care Plan discussed with:    Comments   Patient x    Family      RN x    Care Manager     Consultant                        Multidiciplinary team rounds were held today with , nursing, pharmacist and clinical coordinator.  Patient's plan of care was discussed; medications were reviewed and discharge planning was addressed.     ________________________________________________________________________  Total NON critical care TIME:  35  Minutes    Total CRITICAL CARE TIME Spent: 0  Minutes non procedure based      Comments   >50% of visit spent in counseling and coordination of care     ________________________________________________________________________  Benson Escobar PA-C     Procedures: see electronic medical records for all procedures/Xrays and details which were not copied into this note but were reviewed prior to creation of Plan.      LABS:  I reviewed today's most current 
>100,000  colonies/ml       Culture Escherichia coli               Probable Enterococcus species Idenfitication and susceptibility to follow          Susceptibility        Escherichia coli      BACTERIAL SUSCEPTIBILITY PANEL JEAN (Preliminary)      amikacin <=2 ug/mL Sensitive      ampicillin <=2 ug/mL Sensitive      ampicillin-sulbactam <=2 ug/mL Sensitive      ceFAZolin <=4 ug/mL Sensitive      cefepime <=1 ug/mL Sensitive      cefOXitin <=4 ug/mL Sensitive      cefTAZidime <=1 ug/mL Sensitive      cefTRIAXone <=1 ug/mL Sensitive      ciprofloxacin <=0.25 ug/mL Sensitive      gentamicin <=1 ug/mL Sensitive      levofloxacin <=0.12 ug/mL Sensitive      meropenem <=0.25 ug/mL Sensitive      nitrofurantoin <=16 ug/mL Sensitive      piperacillin-tazobactam <=4 ug/mL Sensitive      tobramycin <=1 ug/mL Sensitive      trimethoprim-sulfamethoxazole <=20 ug/mL Sensitive                           MRSA by PCR [[id number]] Collected: 01/29/24 0521    Order Status: Completed Specimen: Swab Updated: 01/29/24 1149     MRSA by PCR Not Detected                Imaging:  Echo (TTE) complete (PRN contrast/bubble/strain/3D)    Result Date: 1/25/2024    Left Ventricle: Low normal left ventricular systolic function with a visually estimated EF of 35 - 40%. EF by 2D Simpsons Biplane is 50%. Left ventricle size is normal. LVIDd index is 3.18 cm/m2. Normal wall thickness. Mass index 2D is 69.6 g/m2. Normal wall motion. Normal diastolic function.   Aortic Valve: Trileaflet valve. Mild sclerosis of the aortic valve cusp.   Mitral Valve: Mildly thickened leaflet. Mild to moderate regurgitation with a centrally directed jet.   Tricuspid Valve: Mild regurgitation with jet direction toward the septum. The estimated RVSP is 30 mmHg. Mildly elevated RVSP, consistent with mild pulmonary hypertension. Est RA pressure is 5 mmHg. The estimated RVSP is 30 mmHg.   Left Atrium: Left atrium is moderately dilated. Left atrial volume index is severely 
profoundly low will give albumin and repeat renal function in a.m.  PET scan as an out patient for right midlung lung mass       1/27 feels better but still short of breath peripheral edema present BNP remains highly elevated but creatinine is rising did receive 1 dose Lasix today it is now on hold until creatinine improves.  Magnesium is low to be repleted.  Echocardiogram showed pleural effusion CT scan of the chest pending  1/28 sitting up in the chair no distress respirations nonlabored.  CT of the chest does show bilateral pleural effusion but also a midlung mass density radiology says it is slightly increased in size compared with May 2023 I am not sure it is any different.  She will need repeat CT in 3 to 6 months.  Creatinine has worsened will give albumin today to try and improve renal perfusion and function  1/29 alert awake on nasal cannula she is not on oxygen at home renal function worsened 3.16 today little better than yesterday Lasix is on hold for possible thoracentesis minimal pleural effusion on the left side A-fib fluid overload worsening renal function COPD questionable right midlung mass active smoker she had inhalers at home but she stopped using it once discharged we will see her as an outpatient for PET scan and also for nodify blood test.  1/30 alert awake on BiPAP machine yesterday she was on nasal cannula ABG this morning shows pCO2 42 pO2 96 on 100% FiO2 chest x-ray showed bibasilar atelectasis and pleural effusion.  1/31 Sleepy this a.m. on BiPAP machine ABG this morning shows normal pCO2 of 37 pO2 67 on 60% FiO2 she was removing the mask now she is on Precedex she was put on mid flow yesterday during the daytime we will try it again chest x-ray shows atelectasis right upper lobe and also bibasilar atelectasis with small pleural effusion continue with the Lasix, will replace potassium 2.7 today renal function improving creatinine 2.9 will decrease Solu-Medrol WBC elevated 16.4      Jorge Alberto 
Pulmonary Disease no wheeze  Cardiomyopathy ejection fraction most recent ejection fraction 20%  Hypokalemia to be repleted  Hypomagnesemia to be repleted  Anxiety persist despite IV Precedex  Chronic kidney disease  Pulmonary edema  Bilateral pleural effusion may be worsening  Rule out lung mass 2.5 cm right midlung  Mild to moderate mitral regurg  Atrial fibrillation  EtOH abuse  Body mass index is 19.31 kg/m².      RECOMMENDATIONS/PLAN:     68-year lady came in because of shortness of breath palpitation found to be in A-fib with RVR she is now on high flow during the daytime and BiPAP at night as salvage oxygen delivery device to provide high concentration of oxygen to overcome refractory hypoxia; she is not  on oxygen at home  Continue nebulized albuterol Atrovent  and budesonide remains on Solu-Medrol 40 mg every 12 hours  2/4 chest x-ray with bilateral pleural effusions and worsening congestion will increase Lasix to 80 mg twice daily  Supplement potassium and magnesium  Echocardiogram showed pleural effusion CT chest with bilateral pleural effusion and right midlung mass density radiology reads as slightly increased size compared with 5/2023 to me it looks the same  Anxiety currently on Precedex we will add Vistaril 3 times daily  PET scan as an out patient for right midlung lung mass       1/27 feels better but still short of breath peripheral edema present BNP remains highly elevated but creatinine is rising did receive 1 dose Lasix today it is now on hold until creatinine improves.  Magnesium is low to be repleted.  Echocardiogram showed pleural effusion CT scan of the chest pending  1/28 sitting up in the chair no distress respirations nonlabored.  CT of the chest does show bilateral pleural effusion but also a midlung mass density radiology says it is slightly increased in size compared with May 2023 I am not sure it is any different.  She will need repeat CT in 3 to 6 months.  Creatinine has worsened 
daytime and BiPAP at night as salvage oxygen delivery device to provide high concentration of oxygen to overcome refractory hypoxia; she is not  on oxygen at home  Continue nebulized albuterol Atrovent  and budesonide remains on Solu-Medrol 40 mg every 12 hours  2/4 chest x-ray with bilateral pleural effusions and worsening congestion will increase Lasix to 80 mg twice daily  Supplement potassium and magnesium  Echocardiogram showed pleural effusion CT chest with bilateral pleural effusion and right midlung mass density radiology reads as slightly increased size compared with 5/2023 to me it looks the same  Anxiety currently on Precedex we will add Vistaril 3 times daily  PET scan as an out patient for right midlung lung mass       1/27 feels better but still short of breath peripheral edema present BNP remains highly elevated but creatinine is rising did receive 1 dose Lasix today it is now on hold until creatinine improves.  Magnesium is low to be repleted.  Echocardiogram showed pleural effusion CT scan of the chest pending  1/28 sitting up in the chair no distress respirations nonlabored.  CT of the chest does show bilateral pleural effusion but also a midlung mass density radiology says it is slightly increased in size compared with May 2023 I am not sure it is any different.  She will need repeat CT in 3 to 6 months.  Creatinine has worsened will give albumin today to try and improve renal perfusion and function  1/29 alert awake on nasal cannula she is not on oxygen at home renal function worsened 3.16 today little better than yesterday Lasix is on hold for possible thoracentesis minimal pleural effusion on the left side A-fib fluid overload worsening renal function COPD questionable right midlung mass active smoker she had inhalers at home but she stopped using it once discharged we will see her as an outpatient for PET scan and also for nodify blood test.  1/30 alert awake on BiPAP machine yesterday she was 
peripheral edema present BNP remains highly elevated but creatinine is rising did receive 1 dose Lasix today it is now on hold until creatinine improves.  Magnesium is low to be repleted.  Echocardiogram showed pleural effusion CT scan of the chest pending  1/28 sitting up in the chair no distress respirations nonlabored.  CT of the chest does show bilateral pleural effusion but also a midlung mass density radiology says it is slightly increased in size compared with May 2023 I am not sure it is any different.  She will need repeat CT in 3 to 6 months.  Creatinine has worsened will give albumin today to try and improve renal perfusion and function  1/29 alert awake on nasal cannula she is not on oxygen at home renal function worsened 3.16 today little better than yesterday Lasix is on hold for possible thoracentesis minimal pleural effusion on the left side A-fib fluid overload worsening renal function COPD questionable right midlung mass active smoker she had inhalers at home but she stopped using it once discharged we will see her as an outpatient for PET scan and also for nodify blood test.  1/30 alert awake on BiPAP machine yesterday she was on nasal cannula ABG this morning shows pCO2 42 pO2 96 on 100% FiO2 chest x-ray showed bibasilar atelectasis and pleural effusion.  1/31 Sleepy this a.m. on BiPAP machine ABG this morning shows normal pCO2 of 37 pO2 67 on 60% FiO2 she was removing the mask now she is on Precedex she was put on mid flow yesterday during the daytime we will try it again chest x-ray shows atelectasis right upper lobe and also bibasilar atelectasis with small pleural effusion continue with the Lasix, will replace potassium 2.7 today renal function improving creatinine 2.9 will decrease Solu-Medrol WBC elevated 16.4  2/1 alert awake complaining about dryness of the lips she is not hungry she is on noninvasive ventilator BiPAP machine when removed she desaturated will try to put her on high flow 
pneumothorax  Hypomagnesemia to be repleted  Anxiety persist despite IV Precedex  Chronic kidney disease  Pulmonary edema  Bilateral pleural effusion may be worsening  Rule out lung mass 2.5 cm right midlung  Mild to moderate mitral regurg  Atrial fibrillation  EtOH abuse  Body mass index is 19.31 kg/m².      RECOMMENDATIONS/PLAN:     68-year lady came in because of shortness of breath palpitation found to be in A-fib with RVR she is now on high flow during the daytime and BiPAP at night as salvage oxygen delivery device to provide high concentration of oxygen to overcome refractory hypoxia; she is not  on oxygen at home  Continue nebulized albuterol Atrovent  and budesonide remains on Solu-Medrol 40 mg every 12 hours  2/4 chest x-ray with bilateral pleural effusions and worsening congestion will increase Lasix to 80 mg twice daily  Supplement potassium and magnesium  Echocardiogram showed pleural effusion CT chest with bilateral pleural effusion and right midlung mass density radiology reads as slightly increased size compared with 5/2023 to me it looks the same  Anxiety currently on Precedex we will add Vistaril 3 times daily  Small apical pneumothorax will continue to monitor on noninvasive ventilator BiPAP PAP therapy       1/27 feels better but still short of breath peripheral edema present BNP remains highly elevated but creatinine is rising did receive 1 dose Lasix today it is now on hold until creatinine improves.  Magnesium is low to be repleted.  Echocardiogram showed pleural effusion CT scan of the chest pending  1/28 sitting up in the chair no distress respirations nonlabored.  CT of the chest does show bilateral pleural effusion but also a midlung mass density radiology says it is slightly increased in size compared with May 2023 I am not sure it is any different.  She will need repeat CT in 3 to 6 months.  Creatinine has worsened will give albumin today to try and improve renal perfusion and 
placing a bigger chest drainage tube    There is an ethics meeting today to discuss this patient.  Reportedly there is no family for decision making        Code status: No intubation    Social determinants of health: none      Estimated discharge date//time frame/disposition:    Barriers to discharge:           Denny Henderson MD  
for A-fib rate is controlled  2/3 weak frail appearing currently on nasal high flow complains of her chest pounding remains in A-fib with heart rate 10 9-1 10.  Chest x-ray shows bilateral pleural effusions last BNP greater than 35,000 will give extra dose Lasix this afternoon  2/4 alternating noninvasive ventilator and nasal high flow depending upon her degree of hypoxia currently on noninvasive ventilator.  Chest x-ray with worsening congestion and pleural effusion will increase Lasix follow magnesium potassium and renal function  2/5 Sleepy this a.m. received Ativan overnight on a car BiPAP machine she is on 70% FiO2 pCO2 41 pO2 79 on Lasix 80 mg twice a day chest x-ray shows small left pleural effusion replace potassium 2.9 continue to observe creatinine 2.96 today she has low ejection fraction about 25% with chronic kidney disease stage III  2/6 comfortable Sleepy this a.m. ABG acceptable pCO2 37 will change to high flow nasal cannula  2/7 patient remains same on BiPAP combination of congestive heart failure COPD severe hypoxia ABG acceptable on 70% FiO2 pCO2 37 will continue to wean from BiPAP to high flow to regular nasal cannula  2/8 this morning VBG was done pO2 44 will repeat ABG pCO2 33 on BiPAP noninvasive ventilator creatinine 3.58 Lasix is on hold chest x-ray still shows congestive changes will discuss regarding her goals of care she is DNI  2/9 She is on 100% PO2 is 45 and Cxr shows possible right apical pneumothorax will repeat CXR as patient is on BiPAP with 100% Fio2 medical ethics committee to meet regarding her status  2/10 she is still hypoxic on noninvasive ventilator BiPAP machine 80% FiO2 with chest x-ray shows right apical pneumothorax because of positive pressure noninvasive ventilation she desaturated when taken off BiPAP she is DNI and not intubate white count increased to 27.8 on pain medication Dilaudid  2/11 on BiPAP machine 100% FiO2 pO2 58 still with severe hypoxia with PaO2 FiO2

## 2025-01-02 NOTE — PROGRESS NOTES
Maikel Storey is a 68 year old male patient.  Chief Complaint: rt foot pain       Patient Active Problem List   Diagnosis    Benign essential hypertension    Obesity    Knee pain, chronic    Counseling regarding goals of care    Hypercholesterolemia    Type 2 diabetes mellitus with hyperglycemia  (CMD)    Dyspnea on exertion    Anasarca    Chronic diastolic heart failure  (CMD)    CKD (chronic kidney disease)    Severe sepsis (CMD)    Acute kidney injury (TYRELL) with acute tubular necrosis (ATN) (CMD)    Peripheral vascular disease due to secondary diabetes mellitus  (CMD)    Status post amputation of lesser toe, left  (CMD)    Sepsis  (CMD)    Status post amputation of toe  (CMD)    TYRELL (acute kidney injury) (CMD)    Type 2 diabetes mellitus with skin complication, with long-term current use of insulin (CMD)    PVD (peripheral vascular disease) (CMD)    Hyperlipidemia    Debility    Diabetes mellitus with insulin therapy  (CMD)    Constipation    Acute on chronic systolic congestive heart failure  (CMD)    Chronic kidney disease, stage III (moderate)  (CMD)    FELIPE (obstructive sleep apnea)    Morbid obesity with BMI of 40.0-44.9, adult  (CMD)    Anemia    Elevated sed rate    PVOD (pulmonary veno-occlusive disease)  (CMD)    Osteomyelitis of left foot  (CMD)    Gangrene of left foot  (CMD)    Scrotal edema    Leg edema    Elevated brain natriuretic peptide (BNP) level    Troponin level elevated    Open wound of left foot    Osteomyelitis of right foot  (CMD)    Type 2 diabetes mellitus with right diabetic foot ulcer (CMD)    Pneumonia due to COVID-19 virus    Acute respiratory failure with hypoxia  (CMD)    Hyponatremia    Hypoxia    Essential hypertension    Hyperlipidemia    Anemia    History of MRSA infection    Hypokalemia    Preoperative cardiovascular examination    Osteomyelitis (CMD)     Past Medical History:   Diagnosis Date    Anasarca 05/15/2020    Anemia 11/12/2020    Anxiety     Arthritis     CKD  Problem: Pressure Injury - Risk of  Goal: *Prevention of pressure injury  Description: Document [place] Scale and appropriate interventions in the flowsheet. Outcome: Progressing Towards Goal  Note: Pressure Injury Interventions:  Sensory Interventions: Assess changes in LOC    Moisture Interventions: Absorbent underpads    Activity Interventions: Assess need for specialty bed    Mobility Interventions: Assess need for specialty bed    Nutrition Interventions: Document food/fluid/supplement intake    Friction and Shear Interventions: Apply protective barrier, creams and emollients                Problem: Patient Education: Go to Patient Education Activity  Goal: Patient/Family Education  Outcome: Progressing Towards Goal     Problem: Falls - Risk of  Goal: *Absence of Falls  Description: Document [place] Fall Risk and appropriate interventions in the flowsheet.   Outcome: Progressing Towards Goal  Note: Fall Risk Interventions:  Mobility Interventions: Bed/chair exit alarm         Medication Interventions: Bed/chair exit alarm    Elimination Interventions: Bed/chair exit alarm, Call light in reach    History of Falls Interventions: Bed/chair exit alarm         Problem: Patient Education: Go to Patient Education Activity  Goal: Patient/Family Education  Outcome: Progressing Towards Goal     Problem: Pain  Goal: *Control of Pain  Outcome: Progressing Towards Goal     Problem: Patient Education: Go to Patient Education Activity  Goal: Patient/Family Education  Outcome: Progressing Towards Goal     Problem: Patient Education: Go to Patient Education Activity  Goal: Patient/Family Education  Outcome: Progressing Towards Goal     Problem: Patient Education: Go to Patient Education Activity  Goal: Patient/Family Education  Outcome: Progressing Towards Goal     Problem: Patient Education: Go to Patient Education Activity  Goal: Patient/Family Education  Outcome: Progressing Towards Goal (chronic kidney disease) 05/15/2020    Congestive cardiac failure  (CMD)     Dyspnea on exertion 08/13/2019    Erectile dysfunction     Essential (primary) hypertension     Gastroesophageal reflux disease     Hyperlipidemia 07/31/2020    Hyperlipoproteinemia     Kidney stone 11/12/2019    MRSA (methicillin resistant Staphylococcus aureus)     foot, left  2020    Open wound     lt foot    Peripheral vascular disease due to secondary diabetes mellitus  (CMD) 07/08/2020    PVOD (pulmonary veno-occlusive disease)  (CMD) 11/12/2020    Severe sepsis (CMD) 07/08/2020    Sleep apnea     not using  CPAP   due to loss  teeth    Staph infection     left foot wound    Status post amputation of toe  (CMD) 07/31/2020    Transfusion of blood product refused for [employer] reason     Type 2 diabetes mellitus with hyperglycemia  (CMD) 04/11/2017     Current Facility-Administered Medications   Medication Dose Route Frequency Provider Last Rate Last Admin    ceFAZolin (ANCEF) 2,000 mg in sterile water (preservative free) IV syringe  2,000 mg Intravenous 3 times per day Vahid Laureano APNP   2,000 mg at 01/02/25 1324    melatonin tablet 6 mg  6 mg Oral Nightly PRN Michelle Brooke MD        acetaminophen (TYLENOL) tablet 650 mg  650 mg Oral Q4H PRN Minh Mcgowan MD        Or    acetaminophen (TYLENOL) suppository 650 mg  650 mg Rectal Q4H PRN Minh Mcgowan MD        Potassium Standard Replacement Protocol (Levels 3.5 and lower)   Does not apply See Admin Instructions Minh Mcgowan MD        Magnesium Standard Replacement Protocol   Does not apply See Admin Instructions Minh Mcgowan MD        Potassium Standard Replacement Protocol (Levels 3.5 and lower)   Does not apply See Admin Instructions Minh Mcgowan MD        Magnesium Standard Replacement Protocol   Does not apply See Admin Instructions Minh Mcgowan MD        HYDROcodone-acetaminophen (NORCO) 5-325 MG per tablet 1 tablet  1 tablet Oral Q4H  PRN Minh Mcgowan MD   1 tablet at 01/02/25 1323    polyethylene glycol (MIRALAX) packet 17 g  17 g Oral Daily PRN Minh Mcgowan MD        bisacodyl (DULCOLAX) suppository 10 mg  10 mg Rectal Daily PRN Minh Mcgowan MD        magnesium hydroxide (MILK OF MAGNESIA) 400 MG/5ML suspension 30 mL  30 mL Oral Daily PRN Minh Mcgowan MD        dextrose 50 % injection 25 g  25 g Intravenous PRN Minh Mcgowan MD        dextrose 50 % injection 12.5 g  12.5 g Intravenous PRN Minh Mcgowan MD        glucagon (GLUCAGEN) injection 1 mg  1 mg Intramuscular PRN Minh Mcgowan MD        dextrose (GLUTOSE) 40 % gel 15 g  15 g Oral PRN Minh Mcgowan MD        dextrose (GLUTOSE) 40 % gel 30 g  30 g Oral PRN Minh Mcgowan MD        insulin lispro (ADMELOG,HumaLOG) - Correction Dose   Subcutaneous TID WC Minh Mcgowan MD   1 Units at 01/02/25 1322    heparin (porcine) injection 7,500 Units  7,500 Units Subcutaneous 3 times per day Minh Mcgowan MD   7,500 Units at 01/02/25 1323    loratadine (CLARITIN) tablet 10 mg  10 mg Oral Daily PRN Minh Mcgowan MD        allopurinol (ZYLOPRIM) tablet 100 mg  100 mg Oral Daily Minh Mcgowan MD   100 mg at 01/02/25 0940    atorvastatin (LIPITOR) tablet 20 mg  20 mg Oral Daily Minh Mcgowan MD   20 mg at 01/02/25 0940    famotidine (PEPCID) tablet 20 mg  20 mg Oral Daily Minh Mcgowan MD   20 mg at 01/02/25 0940    insulin lispro (ADMELOG, HumaLOG) injection 10 Units  10 Units Subcutaneous TID AC Minh Mcgowan MD   10 Units at 01/02/25 1322    insulin glargine (LANTUS) injection 35 Units  35 Units Subcutaneous BID Minh Mcgowan MD   35 Units at 01/02/25 0950    isosorbide mononitrate (IMDUR) ER tablet 30 mg  30 mg Oral Daily Minh Mcgowan MD   30 mg at 01/02/25 0941    metoPROLOL succinate (TOPROL-XL) ER tablet 25 mg  25 mg Oral Daily Minh Mcgowan MD   25 mg at 01/02/25 0941    spironolactone (ALDACTONE)  tablet 25 mg  25 mg Oral BID Minh Mcgowan MD   25 mg at 01/02/25 0940    torsemide (DEMADEX) tablet 60 mg  60 mg Oral BID Minh Mcgowan MD   60 mg at 01/02/25 0940     ALLERGIES:   Allergen Reactions    Doxycycline Other (See Comments)     Kidney pain    Sulfamethoxazole-Trimethoprim Other (See Comments)     Acute kidney injury     Principal Problem:    Osteomyelitis (CMD)    Blood pressure 114/70, pulse 80, temperature 97.9 °F (36.6 °C), temperature source Oral, resp. rate 16, height 6' (1.829 m), weight (!) 153 kg (337 lb 4.9 oz), SpO2 95%.    Subjective:  Symptoms:  Stable.  No shortness of breath, chest pain, chest pressure or anxiety.    Diet:  Adequate intake.  No nausea.    Activity level: Normal.    Pain:  He reports no pain.      Objective:  Vital signs: (most recent): Blood pressure 114/70, pulse 80, temperature 97.9 °F (36.6 °C), temperature source Oral, resp. rate 16, height 6' (1.829 m), weight (!) 153 kg (337 lb 4.9 oz), SpO2 95%.      Physical Exam  Vitals and nursing note reviewed.   Constitutional:       Appearance: Normal appearance.   HENT:      Head: Normocephalic and atraumatic.      Nose: Nose normal.      Mouth/Throat:      Mouth: Mucous membranes are moist.      Neck: Normal range of motion and neck supple.   Eyes:      Conjunctiva/sclera: Conjunctivae normal.   Cardiovascular:      Rate and Rhythm: Normal rate and regular rhythm.      Pulses: Normal pulses.      Heart sounds: Normal heart sounds.   Pulmonary:      Effort: Pulmonary effort is normal.      Breath sounds: Normal breath sounds.   Abdominal:      General: Abdomen is flat.      Palpations: Abdomen is soft.   Musculoskeletal:         General: Normal range of motion.   Skin:     General: Skin is warm.      Capillary Refill: Capillary refill takes less than 2 seconds.   Neurological:      General: No focal deficit present.      Mental Status: pt is alert.   Psychiatric:         Mood and Affect: Mood normal.     Assessment  & Plan  Maikel is a 68 year old male patient with past medical history of diabetes mellitus, peripheral artery disease, chronic kidney disease, history of osteomyelitis of left foot.  Presenting with right foot pain and swelling.  Patient has been following with his podiatrist, MRI showing osteomyelitis of the right fifth metatarsal with soft tissue abscess.  Patient was on oral antibiotics, instructed by his podiatrist to present to the ER.    # Osteomyelitis of the right foot  # MSSA/MRSA bacteremia  # Diabetic foot  # Peripheral vascular disease  # Diabetes mellitus  # Morbid obesity     Plan  Started on ceftriaxone and vancomycin  Consult infectious disease and podiatry for possible debridement/surgery  Resume insulin, plus additional sliding scale, monitor BS closely as patient is on high dose insulin  Resume antihypertensive medications     DVT prophylaxis    12/30: continue IV vancomycin/ rocephin, monitor fever curve/ CBC/ cx, plan for surgical intervention on Wednesday. 1/1/25 12/31: stop Vancomycin and Ceftriaxone , start Cefazolin 2 g every 8 hrs. Plan for partial ray amputation     1/1: plan for  I&D right foot with partial fifth ray amputation with adjacent tissue transfer closure.     1/2: s/p  I&D right foot with partial fifth ray amputation with adjacent tissue transfer closure. C/o rt knee pain will get rt knee XR     Michelle Brooke MD

## 2025-05-13 NOTE — PROGRESS NOTES
PHYSICAL THERAPY TREATMENT  Patient: Tosha Bui (45 y.o. female)  Date: 1/5/2023  Diagnosis: Dehydration [E86.0] Fracture of humeral shaft, left, closed  Procedure(s) (LRB):  OPEN REDUCTION INTERNAL FIXATION LEFT  HUMERUS (Left) 6 Days Post-Op  Precautions:    Chart, physical therapy assessment, plan of care and goals were reviewed. ASSESSMENT  Patient continues with skilled PT services and is progressing towards goals. Pt seated in bed upon PT arrival, agreeable to session with encouragement. Pt complaining of pain but was min A x1-2 for all mobility today and was able to side step at EOB with HW. Patient took 1-2 steps fwd/back as well. Limited further distance due to declining from fatigue. Tolerated seated therex well. Pt needs motivation and encouragement throughout session. Rec d/c to SNF. See below for objective details and assist levels. Overall pt tolerated session fair today with improved mobility. Will continue to benefit from skilled PT services, and will continue to progress as tolerated. Current Level of Function Impacting Discharge (mobility/balance): safety, min A, limited gait    Other factors to consider for discharge: safety, weakness, unsteady mobility          PLAN :  Patient continues to benefit from skilled intervention to address the above impairments. Continue treatment per established plan of care to address goals. Recommendation for discharge: (in order for the patient to meet his/her long term goals)  Ernesto Hayes    This discharge recommendation:  Has been made in collaboration with the attending provider and/or case management    IF patient discharges home will need the following DME: to be determined (TBD)       SUBJECTIVE:   Patient stated My arm and back hurts.     OBJECTIVE DATA SUMMARY:   Critical Behavior:  Neurologic State: Alert  Orientation Level: Oriented X4  Cognition: Follows commands  Safety/Judgement: Decreased awareness of need for assistance, Lack of insight into deficits  Functional Mobility Training:  Bed Mobility:  Rolling: Minimum assistance  Supine to Sit: Minimum assistance;Assist x2  Sit to Supine: Minimum assistance;Assist x2  Scooting: Minimum assistance; Moderate assistance        Transfers:  Sit to Stand: Minimum assistance;Assist x2  Stand to Sit: Minimum assistance                             Balance:  Sitting: Impaired; With support  Sitting - Static: Good (unsupported)  Sitting - Dynamic: Fair (occasional)  Standing: Impaired; With support  Standing - Static: Constant support; Fair  Standing - Dynamic : Constant support; Fair  Ambulation/Gait Training:  Distance (ft): 2 Feet (ft)  Assistive Device: Gait belt;Walker beronica  Ambulation - Level of Assistance: Minimal assistance;Assist x2      Therapeutic Exercises:       EXERCISE   Sets   Reps   Active Active Assist   Passive Self ROM   Comments   Ankle Pumps  10 [x] [] [] []    Long Arc Quads  10 [x] [] [] []       Pain Ratin/10 arm  4/10 back    Activity Tolerance:   Fair and requires rest breaks    After treatment patient left in no apparent distress:   Bed locked and returned to lowest position, Supine in bed and Call bell within reach      COMMUNICATION/COLLABORATION:   The patients plan of care was discussed with: Occupational therapy assistant and Registered nurse. Cotx for increased safety. Shahnaz Gtz, PT   Time Calculation: 32 mins         Problem: Mobility Impaired (Adult and Pediatric)  Goal: *Acute Goals and Plan of Care (Insert Text)  Description: Patient stated goal: get better  Patient will move from supine to sit and sit to supine , scoot up and down, and roll side to side in bed with supervision/set-up within 7 day(s). Patient will transfer from bed to chair and chair to bed with supervision/set-up using the least restrictive device within 7 day(s). Patient will improve static standing balance to supervision within 1 week(s).   Patient will ambulate 50 feet with supervision with least restrictive device within 1 weeks.      Outcome: Progressing Towards Goal Former smoker

## (undated) DEVICE — SOLUTION SURG PREP 26 CC PURPREP

## (undated) DEVICE — 3M™ WARMING BLANKET, UPPER BODY, 10 PER CASE, 42268: Brand: BAIR HUGGER™

## (undated) DEVICE — PINNACLE INTRODUCER SHEATH: Brand: PINNACLE

## (undated) DEVICE — GLOVE SURG SZ 8 L12IN FNGR THK79MIL GRN LTX FREE

## (undated) DEVICE — SHOULDER STABILIZATION KIT,                                    DISPOSABLE 12 PER BOX

## (undated) DEVICE — CATHETER EP 7FR L115CM 2-8-2MM SPC TIP 2MM DF CRV ADV COMPR

## (undated) DEVICE — BIT DRL DIA2.5MM FOR SM FRAG PLATING SYS

## (undated) DEVICE — TOWEL,OR,DSP,ST,BLUE,DLX,6/PK,12PK/CS: Brand: MEDLINE

## (undated) DEVICE — BIT DRL RMFG CALIB 2.7MM --

## (undated) DEVICE — Device: Brand: JELCO

## (undated) DEVICE — PAD,ABDOMINAL,8"X7.5",STERILE,LF,1/PK: Brand: MEDLINE

## (undated) DEVICE — PADDING CAST W6INXL4YD ST COT RAYON MICROPLEATED HIGHLY

## (undated) DEVICE — POUCH FLD 36X29IN SHLDR HVY LO GLARE MATTE FINISH FLM 2 SUCT

## (undated) DEVICE — 3M™ STERI-DRAPE™ U-DRAPE 1015: Brand: STERI-DRAPE™

## (undated) DEVICE — BNDG ELAS HK LOOP 6X5YD NS -- MATRIX

## (undated) DEVICE — PAD,ABDOMINAL,5"X9",ST,LF,25/BX: Brand: MEDLINE INDUSTRIES, INC.

## (undated) DEVICE — PADDING CAST W4INXL4YD SPUN DACRON POLY POR SYN VERSATILE

## (undated) DEVICE — CHS SHOULDER ARTHROSCOPY: Brand: MEDLINE INDUSTRIES, INC.

## (undated) DEVICE — DRAPE EQUIP C ARM 74X42 IN MOB XR W/ TIE RUBBER BND LF

## (undated) DEVICE — ELECTRODE PT RET AD L9FT HI MOIST COND ADH HYDRGEL CORDED

## (undated) DEVICE — SOLUTION IRRIG 1000ML 0.9% SOD CHL USP POUR PLAS BTL

## (undated) DEVICE — PRECISION THIN (9.0 X 0.38 X 25.0MM)

## (undated) DEVICE — GDWIRE COR J 3MM .035IN 260CM --

## (undated) DEVICE — SYSTEM CLOSURE 6-12 FR VEN VASC VASCADE MVP

## (undated) DEVICE — SHEATH INTRO 8.5FR L71CM 8.5FR DIL GWIRE L180CM DIA0.032IN

## (undated) DEVICE — SUTURE VCRL + SZ 2-0 L36IN ABSRB UD L36MM CT-1 1/2 CIR VCP945H

## (undated) DEVICE — SPONGE GZ W4XL4IN COT 12 PLY TYP VII WVN C FLD DSGN

## (undated) DEVICE — TUBING CNTRST INJ HI PRESSURE 108 IN

## (undated) DEVICE — GUIDEWIRE TRANSSEPTAL .014INX135CM SAFESEPT

## (undated) DEVICE — SURGICAL PROCEDURE TRAY CRD CATH 3 PRT

## (undated) DEVICE — PATCH REF EXT FOR CARTO 3 SYS (EA = 6 PACKS)

## (undated) DEVICE — BLADE ELECTRODE: Brand: EDGE

## (undated) DEVICE — STAPLER SKIN H3.9MM WIRE DIA0.58MM CRWN 6.9MM 35 STPL ROT

## (undated) DEVICE — STERILE (15.2 TAPERED TO 7.6 X 183CM) POLYETHYLENE ACCORDION-FOLDED COVER FOR USE WITH SIEMENS ACUNAV ULTRASOUND CATHETER FAMILY CONNECTOR: Brand: SWIFTLINK TRANSDUCER COVER

## (undated) DEVICE — COVER LT HNDL BLU PLAS

## (undated) DEVICE — 3M™ TEGADERM™ TRANSPARENT FILM DRESSING FRAME STYLE, 1626W, 4 IN X 4-3/4 IN (10 CM X 12 CM), 50/CT 4CT/CASE: Brand: 3M™ TEGADERM™

## (undated) DEVICE — ROCKER SWITCH PENCIL BLADE ELECTRODE, HOLSTER: Brand: EDGE

## (undated) DEVICE — SUTURE VCRL SZ 0 L27IN ABSRB UD L36MM CT-1 1/2 CIR J260H

## (undated) DEVICE — LAMINECTOMY ARM CRADLE FOAM POSITIONER: Brand: CARDINAL HEALTH

## (undated) DEVICE — CATHETER ABLAT 8FR L115CM 1-6-2MM SPC TIP 3.5MM DF CRV

## (undated) DEVICE — SHEATH GUID 115X85FR L71MM SM CRV L17MM BIDIR STEER CARTO

## (undated) DEVICE — NEEDLE TRANSSEPTAL AD 18GA L71CM 30DEG BVL BRK-1 XS CRV S

## (undated) DEVICE — 48" PROBE COVER W/GEL, ULTRASOUND, STERILE: Brand: SITE-RITE

## (undated) DEVICE — GARMENT,MEDLINE,DVT,INT,CALF,MED, GEN2: Brand: MEDLINE

## (undated) DEVICE — GLOVE ORANGE PI 8   MSG9080

## (undated) DEVICE — GAUZE,SPONGE,4"X4",16PLY,STRL,LF,10/TRAY: Brand: MEDLINE

## (undated) DEVICE — CATHETER PACK JR/L 4 PIG 145 DEG 4 FR 5 SH INFIN

## (undated) DEVICE — SOUTHSIDE TURNOVER: Brand: MEDLINE INDUSTRIES, INC.

## (undated) DEVICE — PADDING CAST W4INXL4YD ST COT RAYON MICROPLEATED HIGHLY

## (undated) DEVICE — Device

## (undated) DEVICE — LULU RADIAL/FEMORAL ANGIOGRAPHY SHEET: Brand: CONVERTORS

## (undated) DEVICE — BASIC SINGLE BASIN-LF: Brand: MEDLINE INDUSTRIES, INC.

## (undated) DEVICE — SYRINGE MED 10ML LUERLOCK TIP W/O SFTY DISP

## (undated) DEVICE — YANKAUER,BULB TIP,W/O VENT,RIGID,STERILE: Brand: MEDLINE

## (undated) DEVICE — 3M™ IOBAN™ 2 ANTIMICROBIAL INCISE DRAPE 6650EZ: Brand: IOBAN™ 2

## (undated) DEVICE — MEDI-TRACE CADENCE ADULT, DEFIBRILLATION ELECTRODE -RTS  (10 PR/PK) - PHYSIO-CONTROL: Brand: MEDI-TRACE CADENCE

## (undated) DEVICE — DRAPE,REIN 53X77,STERILE: Brand: MEDLINE

## (undated) DEVICE — SPONGE LAPAROTOMY W18XL18IN WHITE STRUNG RADIOPAQUE STERILE

## (undated) DEVICE — INTENDED FOR TISSUE SEPARATION, AND OTHER PROCEDURES THAT REQUIRE A SHARP SURGICAL BLADE TO PUNCTURE OR CUT.: Brand: BARD-PARKER ® CARBON RIB-BACK BLADES

## (undated) DEVICE — DRESSING,GAUZE,XEROFORM,CURAD,5"X9",ST: Brand: CURAD

## (undated) DEVICE — GLIDESHEATH SLENDER STAINLESS STEEL KIT: Brand: GLIDESHEATH SLENDER

## (undated) DEVICE — GLOVE SURG SZ 75 L12IN FNGR THK79MIL GRN LTX FREE

## (undated) DEVICE — SLING ARM LIFETEC ORTH UNIV --

## (undated) DEVICE — TUBING PMP FOR CARTO SYS SMARTABLATE

## (undated) DEVICE — INTRODUCER SHTH SL1 0.032 IN 8.5 FRX63 CM FAST-CATH

## (undated) DEVICE — TUBING PRESS INJ FLX SH 30IN --

## (undated) DEVICE — GLOVE ORANGE PI 7 1/2   MSG9075

## (undated) DEVICE — GLIDESHEATH SLENDER NITINOL HYDROPHILIC COATED INTRODUCER SHEATH: Brand: GLIDESHEATH SLENDER

## (undated) DEVICE — CATHETER EP 7FR L115CM 2-6-2MM SPC 22 ELECTRD F CRV

## (undated) DEVICE — ADAPTER IV TBNG CLR POLYCARB DBL M LUERLOCK

## (undated) DEVICE — COVER,TABLE,HEAVY DUTY,79"X110",STRL: Brand: MEDLINE

## (undated) DEVICE — PRESSURE MONITORING SET: Brand: TRUWAVE

## (undated) DEVICE — STAPLER SKIN H3.9MM WIRE DIA0.58MM CRWN 6.9MM 35 STPL FIX

## (undated) DEVICE — CATHETER US 8FR L90CM FOR SIEMENS SEQUOIA CYPRESS ACUSON

## (undated) DEVICE — INTENT TO BE USED WITH SUTURE MATERIAL FOR TISSUE CLOSURE: Brand: RICHARD-ALLAN® NEEDLE 1/2 CIRCLE TAPER

## (undated) DEVICE — MICRO DUAL CUT (12.0 X 0.38 X 34.5MM)

## (undated) DEVICE — BNDG ELAS HK LOOP 4X5YD NS -- MATRIX

## (undated) DEVICE — BIT DRL L5.5IN DIA3.5MM OVR QUIK CONN DISP FOR SM FRAG